# Patient Record
Sex: MALE | Race: WHITE | Employment: FULL TIME | RURAL
[De-identification: names, ages, dates, MRNs, and addresses within clinical notes are randomized per-mention and may not be internally consistent; named-entity substitution may affect disease eponyms.]

---

## 2017-08-09 ENCOUNTER — HOSPITAL ENCOUNTER (OUTPATIENT)
Dept: OTHER | Age: 47
Discharge: HOME | End: 2017-08-09
Attending: NURSE PRACTITIONER | Admitting: NURSE PRACTITIONER

## 2017-08-09 LAB
A/G RATIO: 1.8 (ref 0.8–2)
ALBUMIN SERPL-MCNC: 4.9 G/DL (ref 3.4–4.8)
ALP BLD-CCNC: 61 U/L (ref 25–100)
ALT SERPL-CCNC: 42 U/L (ref 4–36)
ANION GAP SERPL CALCULATED.3IONS-SCNC: 13 MMOL/L (ref 3–16)
AST SERPL-CCNC: 36 U/L (ref 8–33)
BASOPHILS ABSOLUTE: 0 K/UL (ref 0–0.1)
BASOPHILS RELATIVE PERCENT: 0.1 %
BILIRUB SERPL-MCNC: 0.5 MG/DL (ref 0.3–1.2)
BUN BLDV-MCNC: 12 MG/DL (ref 6–20)
CALCIUM SERPL-MCNC: 9.9 MG/DL (ref 8.5–10.5)
CHLORIDE BLD-SCNC: 98 MMOL/L (ref 98–107)
CHOLESTEROL, TOTAL: 276 MG/DL (ref 0–200)
CO2: 27 MMOL/L (ref 20–30)
CREAT SERPL-MCNC: 0.9 MG/DL (ref 0.4–1.2)
EOSINOPHILS ABSOLUTE: 0.1 K/UL (ref 0–0.4)
EOSINOPHILS RELATIVE PERCENT: 1.6 %
GFR AFRICAN AMERICAN: >59
GFR NON-AFRICAN AMERICAN: >59
GLOBULIN: 2.8 G/DL
GLUCOSE BLD-MCNC: 95 MG/DL (ref 74–106)
HCT VFR BLD CALC: 50.2 % (ref 40–54)
HDLC SERPL-MCNC: 48 MG/DL (ref 40–60)
HEMOGLOBIN: 16.6 G/DL (ref 13–18)
LDL CHOLESTEROL CALCULATED: 198 MG/DL
LYMPHOCYTES ABSOLUTE: 1.8 K/UL (ref 1.5–4)
LYMPHOCYTES RELATIVE PERCENT: 26.8 % (ref 20–40)
MCH RBC QN AUTO: 32.9 PG (ref 27–32)
MCHC RBC AUTO-ENTMCNC: 33.1 G/DL (ref 31–35)
MCV RBC AUTO: 99.6 FL (ref 80–100)
MONOCYTES ABSOLUTE: 0.6 K/UL (ref 0.2–0.8)
MONOCYTES RELATIVE PERCENT: 9.5 % (ref 3–10)
NEUTROPHILS ABSOLUTE: 4.2 K/UL (ref 2–7.5)
NEUTROPHILS RELATIVE PERCENT: 62 %
PDW BLD-RTO: 14.3 % (ref 11–16)
PLATELET # BLD: 169 K/UL (ref 150–400)
PMV BLD AUTO: 10.8 FL (ref 6–10)
POTASSIUM SERPL-SCNC: 4.8 MMOL/L (ref 3.4–5.1)
RBC # BLD: 5.04 M/UL (ref 4.5–6)
SODIUM BLD-SCNC: 138 MMOL/L (ref 136–145)
TOTAL PROTEIN: 7.7 G/DL (ref 6.4–8.3)
TRIGL SERPL-MCNC: 148 MG/DL (ref 0–249)
TSH SERPL DL<=0.05 MIU/L-ACNC: 1.97 UIU/ML (ref 0.35–5.5)
VITAMIN B-12: 365 PG/ML (ref 211–911)
VLDLC SERPL CALC-MCNC: 30 MG/DL
WBC # BLD: 6.8 K/UL (ref 4–11)

## 2017-08-11 ENCOUNTER — HOSPITAL ENCOUNTER (OUTPATIENT)
Dept: OTHER | Age: 47
Discharge: HOME | End: 2017-08-11
Attending: NURSE PRACTITIONER | Admitting: NURSE PRACTITIONER

## 2017-08-11 DIAGNOSIS — R06.02 BREATH SHORTNESS: ICD-10-CM

## 2017-08-31 ENCOUNTER — TELEPHONE (OUTPATIENT)
Dept: PULMONOLOGY | Facility: CLINIC | Age: 47
End: 2017-08-31

## 2018-09-10 ENCOUNTER — HOSPITAL ENCOUNTER (EMERGENCY)
Facility: HOSPITAL | Age: 48
Discharge: HOME | End: 2018-09-10
Attending: EMERGENCY MEDICINE
Payer: COMMERCIAL

## 2018-09-10 VITALS
HEART RATE: 87 BPM | DIASTOLIC BLOOD PRESSURE: 113 MMHG | OXYGEN SATURATION: 98 % | RESPIRATION RATE: 16 BRPM | SYSTOLIC BLOOD PRESSURE: 178 MMHG | TEMPERATURE: 98.1 F

## 2018-09-10 DIAGNOSIS — I16.0 HYPERTENSIVE URGENCY: ICD-10-CM

## 2018-09-10 DIAGNOSIS — M10.9 GOUT OF BOTH FEET: Primary | ICD-10-CM

## 2018-09-10 PROCEDURE — 99282 EMERGENCY DEPT VISIT SF MDM: CPT

## 2018-09-10 PROCEDURE — 6370000000 HC RX 637 (ALT 250 FOR IP): Performed by: EMERGENCY MEDICINE

## 2018-09-10 PROCEDURE — 96374 THER/PROPH/DIAG INJ IV PUSH: CPT

## 2018-09-10 PROCEDURE — 6360000002 HC RX W HCPCS: Performed by: EMERGENCY MEDICINE

## 2018-09-10 RX ORDER — IBUPROFEN 800 MG/1
800 TABLET ORAL EVERY 8 HOURS PRN
Qty: 30 TABLET | Refills: 0 | Status: SHIPPED | OUTPATIENT
Start: 2018-09-10 | End: 2020-01-23

## 2018-09-10 RX ORDER — METOPROLOL TARTRATE 50 MG/1
50 TABLET, FILM COATED ORAL ONCE
Status: COMPLETED | OUTPATIENT
Start: 2018-09-10 | End: 2018-09-10

## 2018-09-10 RX ORDER — INDOMETHACIN 25 MG/1
50 CAPSULE ORAL ONCE
Status: COMPLETED | OUTPATIENT
Start: 2018-09-10 | End: 2018-09-10

## 2018-09-10 RX ORDER — HYDROCODONE BITARTRATE AND ACETAMINOPHEN 7.5; 325 MG/1; MG/1
1 TABLET ORAL EVERY 6 HOURS PRN
Qty: 12 TABLET | Refills: 0 | Status: SHIPPED | OUTPATIENT
Start: 2018-09-10 | End: 2018-09-13

## 2018-09-10 RX ORDER — INDOMETHACIN 50 MG/1
50 CAPSULE ORAL 2 TIMES DAILY WITH MEALS
Qty: 60 CAPSULE | Refills: 3 | Status: SHIPPED | OUTPATIENT
Start: 2018-09-10 | End: 2019-09-27

## 2018-09-10 RX ORDER — IBUPROFEN 800 MG/1
500 TABLET ORAL EVERY 6 HOURS PRN
COMMUNITY
End: 2019-09-27

## 2018-09-10 RX ORDER — HYDRALAZINE HYDROCHLORIDE 20 MG/ML
10 INJECTION INTRAMUSCULAR; INTRAVENOUS EVERY 6 HOURS PRN
Status: DISCONTINUED | OUTPATIENT
Start: 2018-09-10 | End: 2018-09-10 | Stop reason: HOSPADM

## 2018-09-10 RX ORDER — LISINOPRIL 5 MG/1
20 TABLET ORAL ONCE
Status: COMPLETED | OUTPATIENT
Start: 2018-09-10 | End: 2018-09-10

## 2018-09-10 RX ADMIN — HYDRALAZINE HYDROCHLORIDE 10 MG: 20 INJECTION INTRAMUSCULAR; INTRAVENOUS at 06:47

## 2018-09-10 RX ADMIN — INDOMETHACIN 50 MG: 25 CAPSULE ORAL at 04:57

## 2018-09-10 RX ADMIN — METOPROLOL TARTRATE 50 MG: 50 TABLET ORAL at 05:35

## 2018-09-10 RX ADMIN — LISINOPRIL 20 MG: 5 TABLET ORAL at 05:35

## 2018-09-10 ASSESSMENT — PAIN SCALES - GENERAL: PAINLEVEL_OUTOF10: 7

## 2018-09-10 NOTE — ED PROVIDER NOTES
SURGERY           CURRENT MEDICATIONS       Previous Medications    ALBUTEROL SULFATE  (90 BASE) MCG/ACT INHALER    Inhale 2 puffs into the lungs every 4 hours as needed for Wheezing    ASPIRIN 81 MG TABLET    Take 81 mg by mouth daily    IBUPROFEN (ADVIL;MOTRIN) 800 MG TABLET    Take 500 mg by mouth every 6 hours as needed for Pain    LISINOPRIL (PRINIVIL;ZESTRIL) 20 MG TABLET    Take 20 mg by mouth daily    METOPROLOL (LOPRESSOR) 50 MG TABLET    Take 50 mg by mouth daily       ALLERGIES     Patient has no known allergies. FAMILY HISTORY     No family history on file. SOCIAL HISTORY       Social History     Social History    Marital status:      Spouse name: N/A    Number of children: N/A    Years of education: N/A     Social History Main Topics    Smoking status: Never Smoker    Smokeless tobacco: Current User     Types: Chew    Alcohol use 0.6 oz/week     1 Cans of beer per week      Comment: drinks till he passes out 1/2 pint to a 5th at a time    Drug use: No    Sexual activity: Not on file     Other Topics Concern    Not on file     Social History Narrative    No narrative on file         PHYSICAL EXAM    (up to 7 for level 4, 8 or more for level 5)     ED Triage Vitals   BP Temp Temp src Pulse Resp SpO2 Height Weight   -- -- -- -- -- -- -- --       Physical Exam  General :Patient is awake, alert, oriented, in no acute distress, nontoxic appearing  HEENT: Pupils are equally round and reactive to light, EOMI. Cardiac: Heart regular rate, rhythm, no murmurs, rubs, or gallops  Lungs: Lungs are clear to auscultation, there is no wheezing, rhonchi, or rales. Abdomen: Abdomen is soft, nontender, nondistended. Musculoskeletal: Ambulatory  EXT: tenderness with mild swelling to right ankle; there is also some tenderness at the base of the right 2nd toe;  Left midfoot with mild tenderness; some mild tenderness at the base of the left 2nd toe.   There is no significant redness in either of his feet as might be expected with gout but patient states this is typical for his presentation. Back: No midline or bony tenderness. Dermatology: Skin is warm and dry  Psych: Mentation is grossly normal, cognition is grossly normal. Affect is appropriate. DIAGNOSTIC RESULTS       RADIOLOGY:   Non-plain film images such as CT, Ultrasound and MRI are read by the radiologist. Plain radiographic images are visualized and preliminarily interpreted by the emergency physician with the below findings:      [] Radiologist's Report Reviewed:  No orders to display         ED BEDSIDE ULTRASOUND:   Performed by ED Physician - none    LABS:  Labs Reviewed - No data to display    I have reviewed and interpreted all of the currently available lab results from this visit (if applicable):  No results found for this visit on 09/10/18. All other labs were within normal range or not returned as of this dictation. EMERGENCY DEPARTMENT COURSE and DIFFERENTIAL DIAGNOSIS/MDM:   Vitals:    Vitals:    09/10/18 0441 09/10/18 0446   BP:  (!) 198/128   Pulse:  114   Resp: 16    Temp: 98.1 °F (36.7 °C)    SpO2:  97%           The patient will follow-up with their PCP in 1-2 days for reevaluation. If the patient or family members have any further concerns or any worsening symptoms they will return to the ED for reevaluation. CONSULTS:  None    PROCEDURES:  Procedures    CRITICAL CARE TIME    Total Critical Care time was 0 minutes, excluding separately reportable procedures. There was a high probability of clinically significant/life threatening deterioration in the patient's condition which required my urgent intervention. FINAL IMPRESSION      1.  Gout of both feet          DISPOSITION/PLAN   DISPOSITION Decision To Discharge 09/10/2018 05:22:56 AM      PATIENT REFERRED TO:  DORIS [facility]  [address] [address]  [phone]    Schedule an appointment as soon as possible for a visit

## 2018-09-19 ENCOUNTER — APPOINTMENT (OUTPATIENT)
Dept: CT IMAGING | Facility: HOSPITAL | Age: 48
End: 2018-09-19
Payer: COMMERCIAL

## 2018-09-19 ENCOUNTER — HOSPITAL ENCOUNTER (EMERGENCY)
Facility: HOSPITAL | Age: 48
Discharge: HOME | End: 2018-09-19
Attending: EMERGENCY MEDICINE
Payer: COMMERCIAL

## 2018-09-19 VITALS
HEIGHT: 69 IN | BODY MASS INDEX: 35.55 KG/M2 | SYSTOLIC BLOOD PRESSURE: 170 MMHG | WEIGHT: 240 LBS | DIASTOLIC BLOOD PRESSURE: 113 MMHG | TEMPERATURE: 98 F | HEART RATE: 107 BPM | OXYGEN SATURATION: 98 % | RESPIRATION RATE: 16 BRPM

## 2018-09-19 DIAGNOSIS — M54.32 BILATERAL SCIATICA: ICD-10-CM

## 2018-09-19 DIAGNOSIS — M54.31 BILATERAL SCIATICA: ICD-10-CM

## 2018-09-19 DIAGNOSIS — S39.012A BACK STRAIN, INITIAL ENCOUNTER: ICD-10-CM

## 2018-09-19 DIAGNOSIS — I16.0 HYPERTENSIVE URGENCY: Primary | ICD-10-CM

## 2018-09-19 PROCEDURE — 6360000002 HC RX W HCPCS: Performed by: EMERGENCY MEDICINE

## 2018-09-19 PROCEDURE — 96374 THER/PROPH/DIAG INJ IV PUSH: CPT

## 2018-09-19 PROCEDURE — 72131 CT LUMBAR SPINE W/O DYE: CPT

## 2018-09-19 PROCEDURE — 96375 TX/PRO/DX INJ NEW DRUG ADDON: CPT

## 2018-09-19 PROCEDURE — 6370000000 HC RX 637 (ALT 250 FOR IP): Performed by: EMERGENCY MEDICINE

## 2018-09-19 PROCEDURE — 99283 EMERGENCY DEPT VISIT LOW MDM: CPT

## 2018-09-19 RX ORDER — DIAZEPAM 5 MG/1
10 TABLET ORAL ONCE
Status: COMPLETED | OUTPATIENT
Start: 2018-09-19 | End: 2018-09-19

## 2018-09-19 RX ORDER — CYCLOBENZAPRINE HCL 5 MG
5 TABLET ORAL 3 TIMES DAILY PRN
Qty: 12 TABLET | Refills: 0 | Status: SHIPPED | OUTPATIENT
Start: 2018-09-19 | End: 2018-09-29

## 2018-09-19 RX ORDER — KETOROLAC TROMETHAMINE 30 MG/ML
30 INJECTION, SOLUTION INTRAMUSCULAR; INTRAVENOUS ONCE
Status: COMPLETED | OUTPATIENT
Start: 2018-09-19 | End: 2018-09-19

## 2018-09-19 RX ORDER — MORPHINE SULFATE 4 MG/ML
4 INJECTION, SOLUTION INTRAMUSCULAR; INTRAVENOUS ONCE
Status: COMPLETED | OUTPATIENT
Start: 2018-09-19 | End: 2018-09-19

## 2018-09-19 RX ORDER — DEXAMETHASONE SODIUM PHOSPHATE 10 MG/ML
10 INJECTION, SOLUTION INTRAMUSCULAR; INTRAVENOUS ONCE
Status: COMPLETED | OUTPATIENT
Start: 2018-09-19 | End: 2018-09-19

## 2018-09-19 RX ORDER — HYDRALAZINE HYDROCHLORIDE 20 MG/ML
10 INJECTION INTRAMUSCULAR; INTRAVENOUS ONCE
Status: COMPLETED | OUTPATIENT
Start: 2018-09-19 | End: 2018-09-19

## 2018-09-19 RX ORDER — OXYCODONE AND ACETAMINOPHEN 10; 325 MG/1; MG/1
1 TABLET ORAL EVERY 6 HOURS PRN
Qty: 12 TABLET | Refills: 0 | Status: SHIPPED | OUTPATIENT
Start: 2018-09-19 | End: 2018-10-19

## 2018-09-19 RX ADMIN — HYDRALAZINE HYDROCHLORIDE 10 MG: 20 INJECTION INTRAMUSCULAR; INTRAVENOUS at 05:52

## 2018-09-19 RX ADMIN — MORPHINE SULFATE 4 MG: 4 INJECTION INTRAVENOUS at 05:52

## 2018-09-19 RX ADMIN — DIAZEPAM 10 MG: 5 TABLET ORAL at 05:52

## 2018-09-19 RX ADMIN — KETOROLAC TROMETHAMINE 30 MG: 30 INJECTION, SOLUTION INTRAMUSCULAR at 05:50

## 2018-09-19 RX ADMIN — DEXAMETHASONE SODIUM PHOSPHATE 10 MG: 10 INJECTION, SOLUTION INTRAMUSCULAR; INTRAVENOUS at 05:51

## 2018-09-19 ASSESSMENT — PAIN SCALES - GENERAL: PAINLEVEL_OUTOF10: 7

## 2018-09-19 NOTE — ED PROVIDER NOTES
Medical History:   Diagnosis Date    Anxiety     Arthritis     CAD (coronary artery disease)     Depression     Headache(784.0)     Hypertension          SURGICAL HISTORY       Past Surgical History:   Procedure Laterality Date    FINGER AMPUTATION      also has partial amputation of right index and ring finger    LEG SURGERY           CURRENT MEDICATIONS       Previous Medications    ALBUTEROL SULFATE  (90 BASE) MCG/ACT INHALER    Inhale 2 puffs into the lungs every 4 hours as needed for Wheezing    ASPIRIN 81 MG TABLET    Take 81 mg by mouth daily    IBUPROFEN (ADVIL;MOTRIN) 800 MG TABLET    Take 500 mg by mouth every 6 hours as needed for Pain    IBUPROFEN (ADVIL;MOTRIN) 800 MG TABLET    Take 1 tablet by mouth every 8 hours as needed for Pain    INDOMETHACIN (INDOCIN) 50 MG CAPSULE    Take 1 capsule by mouth 2 times daily (with meals)    LISINOPRIL (PRINIVIL;ZESTRIL) 20 MG TABLET    Take 20 mg by mouth daily    METOPROLOL (LOPRESSOR) 50 MG TABLET    Take 50 mg by mouth daily       ALLERGIES     Patient has no known allergies. FAMILY HISTORY     No family history on file.        SOCIAL HISTORY       Social History     Social History    Marital status:      Spouse name: N/A    Number of children: N/A    Years of education: N/A     Social History Main Topics    Smoking status: Never Smoker    Smokeless tobacco: Current User     Types: Chew    Alcohol use 0.6 oz/week     1 Cans of beer per week      Comment: drinks till he passes out 1/2 pint to a 5th at a time    Drug use: No    Sexual activity: Not on file     Other Topics Concern    Not on file     Social History Narrative    No narrative on file         PHYSICAL EXAM    (up to 7 for level 4, 8 or more for level 5)     ED Triage Vitals [09/19/18 0455]   BP Temp Temp Source Pulse Resp SpO2 Height Weight   (!) 210/121 98 °F (36.7 °C) Oral 109 16 95 % 5' 9\" (1.753 m) 240 lb (108.9 kg)       Physical Exam  General :Patient is awake, Comment: Please note this report has been produced using speech recognition software and may contain errors related to that system including errors in grammar, punctuation, and spelling, as well as words and phrases that may be inappropriate. If there are any questions or concerns please feel free to contact the dictating provider for clarification.     Janette Dumont MD  Attending Emergency Physician                  Arnaud Kingsley MD  09/19/18 0999  Lion Minor MD  09/19/18 0044

## 2019-01-23 ENCOUNTER — HOSPITAL ENCOUNTER (EMERGENCY)
Facility: HOSPITAL | Age: 49
Discharge: HOME | End: 2019-01-23
Attending: EMERGENCY MEDICINE
Payer: COMMERCIAL

## 2019-01-23 ENCOUNTER — APPOINTMENT (OUTPATIENT)
Dept: GENERAL RADIOLOGY | Facility: HOSPITAL | Age: 49
End: 2019-01-23
Payer: COMMERCIAL

## 2019-01-23 VITALS
RESPIRATION RATE: 20 BRPM | HEIGHT: 68 IN | HEART RATE: 76 BPM | BODY MASS INDEX: 34.86 KG/M2 | WEIGHT: 230 LBS | OXYGEN SATURATION: 96 % | TEMPERATURE: 98.5 F | DIASTOLIC BLOOD PRESSURE: 101 MMHG | SYSTOLIC BLOOD PRESSURE: 172 MMHG

## 2019-01-23 DIAGNOSIS — R05.9 COUGH: ICD-10-CM

## 2019-01-23 DIAGNOSIS — J06.9 UPPER RESPIRATORY TRACT INFECTION, UNSPECIFIED TYPE: Primary | ICD-10-CM

## 2019-01-23 DIAGNOSIS — I10 ESSENTIAL HYPERTENSION: ICD-10-CM

## 2019-01-23 DIAGNOSIS — J40 BRONCHITIS: ICD-10-CM

## 2019-01-23 LAB
RAPID INFLUENZA  B AGN: NEGATIVE
RAPID INFLUENZA A AGN: NEGATIVE

## 2019-01-23 PROCEDURE — 6360000002 HC RX W HCPCS: Performed by: EMERGENCY MEDICINE

## 2019-01-23 PROCEDURE — 99284 EMERGENCY DEPT VISIT MOD MDM: CPT

## 2019-01-23 PROCEDURE — 6370000000 HC RX 637 (ALT 250 FOR IP): Performed by: EMERGENCY MEDICINE

## 2019-01-23 PROCEDURE — 87804 INFLUENZA ASSAY W/OPTIC: CPT

## 2019-01-23 PROCEDURE — 71046 X-RAY EXAM CHEST 2 VIEWS: CPT

## 2019-01-23 PROCEDURE — 94640 AIRWAY INHALATION TREATMENT: CPT

## 2019-01-23 RX ORDER — ATORVASTATIN CALCIUM 10 MG/1
10 TABLET, FILM COATED ORAL DAILY
COMMUNITY
End: 2019-09-27

## 2019-01-23 RX ORDER — PREDNISONE 20 MG/1
60 TABLET ORAL DAILY
Qty: 12 TABLET | Refills: 0 | Status: SHIPPED | OUTPATIENT
Start: 2019-01-23 | End: 2019-01-27

## 2019-01-23 RX ORDER — LEVOFLOXACIN 500 MG/1
500 TABLET, FILM COATED ORAL DAILY
Qty: 10 TABLET | Refills: 0 | Status: SHIPPED | OUTPATIENT
Start: 2019-01-23 | End: 2019-02-02

## 2019-01-23 RX ORDER — BENZONATATE 100 MG/1
100 CAPSULE ORAL 3 TIMES DAILY PRN
Qty: 10 CAPSULE | Refills: 0 | Status: SHIPPED | OUTPATIENT
Start: 2019-01-23 | End: 2019-01-30

## 2019-01-23 RX ORDER — DEXAMETHASONE 4 MG/1
10 TABLET ORAL ONCE
Status: COMPLETED | OUTPATIENT
Start: 2019-01-23 | End: 2019-01-23

## 2019-01-23 RX ORDER — ALBUTEROL SULFATE 90 UG/1
2 AEROSOL, METERED RESPIRATORY (INHALATION) EVERY 4 HOURS PRN
Qty: 1 INHALER | Refills: 1 | Status: SHIPPED | OUTPATIENT
Start: 2019-01-23 | End: 2020-01-23 | Stop reason: ALTCHOICE

## 2019-01-23 RX ORDER — LISINOPRIL 5 MG/1
20 TABLET ORAL ONCE
Status: COMPLETED | OUTPATIENT
Start: 2019-01-23 | End: 2019-01-23

## 2019-01-23 RX ORDER — ALBUTEROL SULFATE 2.5 MG/3ML
2.5 SOLUTION RESPIRATORY (INHALATION) ONCE
Status: COMPLETED | OUTPATIENT
Start: 2019-01-23 | End: 2019-01-23

## 2019-01-23 RX ORDER — METOPROLOL TARTRATE 50 MG/1
50 TABLET, FILM COATED ORAL ONCE
Status: COMPLETED | OUTPATIENT
Start: 2019-01-23 | End: 2019-01-23

## 2019-01-23 RX ADMIN — ALBUTEROL SULFATE 2.5 MG: 2.5 SOLUTION RESPIRATORY (INHALATION) at 16:55

## 2019-01-23 RX ADMIN — LISINOPRIL 20 MG: 5 TABLET ORAL at 17:23

## 2019-01-23 RX ADMIN — METOPROLOL TARTRATE 50 MG: 50 TABLET ORAL at 17:23

## 2019-01-23 RX ADMIN — DEXAMETHASONE 10 MG: 4 TABLET ORAL at 17:23

## 2019-09-27 ENCOUNTER — HOSPITAL ENCOUNTER (EMERGENCY)
Facility: HOSPITAL | Age: 49
Discharge: HOME | End: 2019-09-27
Attending: FAMILY MEDICINE
Payer: COMMERCIAL

## 2019-09-27 ENCOUNTER — APPOINTMENT (OUTPATIENT)
Dept: GENERAL RADIOLOGY | Facility: HOSPITAL | Age: 49
End: 2019-09-27
Payer: COMMERCIAL

## 2019-09-27 VITALS
BODY MASS INDEX: 37.67 KG/M2 | RESPIRATION RATE: 16 BRPM | WEIGHT: 240 LBS | DIASTOLIC BLOOD PRESSURE: 134 MMHG | OXYGEN SATURATION: 95 % | TEMPERATURE: 98.2 F | HEART RATE: 111 BPM | SYSTOLIC BLOOD PRESSURE: 215 MMHG | HEIGHT: 67 IN

## 2019-09-27 DIAGNOSIS — S89.81XA HYPEREXTENSION INJURY OF KNEE, RIGHT, INITIAL ENCOUNTER: Primary | ICD-10-CM

## 2019-09-27 DIAGNOSIS — M25.461 KNEE EFFUSION, RIGHT: ICD-10-CM

## 2019-09-27 DIAGNOSIS — I10 ESSENTIAL HYPERTENSION: ICD-10-CM

## 2019-09-27 PROCEDURE — 73560 X-RAY EXAM OF KNEE 1 OR 2: CPT

## 2019-09-27 PROCEDURE — 99283 EMERGENCY DEPT VISIT LOW MDM: CPT

## 2019-09-27 RX ORDER — OXYCODONE AND ACETAMINOPHEN 10; 325 MG/1; MG/1
1 TABLET ORAL EVERY 6 HOURS PRN
Qty: 12 TABLET | Refills: 0 | Status: SHIPPED | OUTPATIENT
Start: 2019-09-27 | End: 2019-09-30

## 2019-09-27 RX ORDER — METOPROLOL SUCCINATE 50 MG/1
50 TABLET, EXTENDED RELEASE ORAL DAILY
Qty: 30 TABLET | Refills: 0 | Status: SHIPPED | OUTPATIENT
Start: 2019-09-27 | End: 2020-01-23

## 2019-09-27 RX ORDER — METOPROLOL TARTRATE 50 MG/1
50 TABLET, FILM COATED ORAL ONCE
Status: DISCONTINUED | OUTPATIENT
Start: 2019-09-27 | End: 2019-09-27 | Stop reason: HOSPADM

## 2019-09-27 RX ORDER — LISINOPRIL 20 MG/1
40 TABLET ORAL ONCE
Status: DISCONTINUED | OUTPATIENT
Start: 2019-09-27 | End: 2019-09-27 | Stop reason: HOSPADM

## 2019-09-27 RX ORDER — LISINOPRIL 40 MG/1
40 TABLET ORAL DAILY
Qty: 30 TABLET | Refills: 0 | Status: SHIPPED | OUTPATIENT
Start: 2019-09-27 | End: 2020-01-23

## 2019-09-27 RX ORDER — OXYCODONE AND ACETAMINOPHEN 10; 325 MG/1; MG/1
1 TABLET ORAL ONCE
Status: DISCONTINUED | OUTPATIENT
Start: 2019-09-27 | End: 2019-09-27 | Stop reason: HOSPADM

## 2019-09-27 ASSESSMENT — PAIN SCALES - GENERAL: PAINLEVEL_OUTOF10: 5

## 2019-09-27 NOTE — ED PROVIDER NOTES
Laterality Date    FINGER AMPUTATION      also has partial amputation of right index and ring finger    LEG SURGERY           CURRENT MEDICATIONS       Previous Medications    ALBUTEROL SULFATE HFA (PROVENTIL HFA) 108 (90 BASE) MCG/ACT INHALER    Inhale 2 puffs into the lungs every 4 hours as needed for Wheezing or Shortness of Breath With spacer (and mask if indicated). Thanks. ASPIRIN 81 MG TABLET    Take 81 mg by mouth daily    IBUPROFEN (ADVIL;MOTRIN) 800 MG TABLET    Take 1 tablet by mouth every 8 hours as needed for Pain       ALLERGIES     Patient has no known allergies. FAMILY HISTORY     No family history on file. SOCIAL HISTORY       Social History     Socioeconomic History    Marital status:      Spouse name: Not on file    Number of children: Not on file    Years of education: Not on file    Highest education level: Not on file   Occupational History    Not on file   Social Needs    Financial resource strain: Not on file    Food insecurity:     Worry: Not on file     Inability: Not on file    Transportation needs:     Medical: Not on file     Non-medical: Not on file   Tobacco Use    Smoking status: Never Smoker    Smokeless tobacco: Current User     Types: Chew   Substance and Sexual Activity    Alcohol use:  Yes     Alcohol/week: 1.0 standard drinks     Types: 1 Cans of beer per week     Comment: drinks till he passes out 1/2 pint to a 5th at a time    Drug use: No    Sexual activity: Not on file   Lifestyle    Physical activity:     Days per week: Not on file     Minutes per session: Not on file    Stress: Not on file   Relationships    Social connections:     Talks on phone: Not on file     Gets together: Not on file     Attends Yazidism service: Not on file     Active member of club or organization: Not on file     Attends meetings of clubs or organizations: Not on file     Relationship status: Not on file    Intimate partner violence:     Fear of current or ex partner: Not on file     Emotionally abused: Not on file     Physically abused: Not on file     Forced sexual activity: Not on file   Other Topics Concern    Not on file   Social History Narrative    Not on file       SCREENINGS             PHYSICAL EXAM    (up to 7 for level 4, 8 or more for level 5)     ED Triage Vitals [09/27/19 0622]   BP Temp Temp Source Pulse Resp SpO2 Height Weight   (!) 215/134 98.2 °F (36.8 °C) Oral 111 16 95 % 5' 7\" (1.702 m) 240 lb (108.9 kg)       Physical Exam   Constitutional: He is oriented to person, place, and time. He appears well-developed and well-nourished. He appears distressed. Pt with moderate pain on arrival. Unable to fully ambulate on right knee   HENT:   Head: Normocephalic and atraumatic. Eyes: Pupils are equal, round, and reactive to light. Conjunctivae and EOM are normal.   Neck: Neck supple. Cardiovascular: Regular rhythm and normal heart sounds. Tachycardia   Pulmonary/Chest: Effort normal and breath sounds normal.   Musculoskeletal: He exhibits edema and tenderness. He exhibits no deformity. Tenderness to right global knee area to palpation with moderate effusion suprapatellar with limited ROM globally. No popliteal fossa tenderness. Stable knee without any laxity. Neurological: He is alert and oriented to person, place, and time. Skin: Skin is warm and dry. Psychiatric: He has a normal mood and affect. His behavior is normal.   Nursing note and vitals reviewed. DIAGNOSTIC RESULTS     EKG: All EKG's are interpreted by the Emergency Department Physician who either signs or Co-signs this chart in the absence of a cardiologist.    None    RADIOLOGY:   Non-plain film images such as CT, Ultrasound and MRI are read by the radiologist. Ab Macario radiographic images are visualized andpreliminarily interpreted by the emergency physician with the below findings:    Right knee - No acute fracture;  Moderate effusion;  Normal alignment    Interpretationper the Radiologist below, if available at the time of this note:    XR KNEE RIGHT (1-2 VIEWS)   Final Result      2 views demonstrate a moderate knee effusion. No osseous abnormality. ED BEDSIDE ULTRASOUND:   Performed by ED Physician - none    LABS:  Labs Reviewed - No data to display    All other labs were within normal range or not returned as of this dictation. EMERGENCY DEPARTMENT COURSE and DIFFERENTIAL DIAGNOSIS/MDM:   Vitals:    Vitals:    09/27/19 0622   BP: (!) 215/134   Pulse: 111   Resp: 16   Temp: 98.2 °F (36.8 °C)   TempSrc: Oral   SpO2: 95%   Weight: 240 lb (108.9 kg)   Height: 5' 7\" (1.702 m)           CRITICAL CARE TIME   Total Critical Care time was 0 minutes, excluding separatelyreportable procedures. There was a high probability ofclinically significant/life threatening deterioration in the patient's condition which required my urgent intervention. CONSULTS:  None    PROCEDURES:  None    PROGRESS NOTES:    Pt given his BP meds when he got here. He had been out of BP meds for 2 months. Asymptomatic with BP. Will cover and give BP rx and give pain meds and give Rx of pain meds. Pt needs to wear brace and will give pain meds for home. FINAL IMPRESSION      1. Hyperextension injury of knee, right, initial encounter New Problem   2. Knee effusion, right New Problem   3.  Essential hypertension          DISPOSITION/PLAN   DISPOSITION Decision To Discharge 09/27/2019 07:21:26 AM      PATIENT REFERRED TO:  DORIS Zapien CNP  [address] [place] [id number]  [phone]    Schedule an appointment as soon as possible for a visit in 1 week  For follow-up and may need Ortho referral or MRI of knee to assess for ligamentous injury      DISCHARGE MEDICATIONS:  New Prescriptions    LISINOPRIL (PRINIVIL;ZESTRIL) 40 MG TABLET    Take 1 tablet by mouth daily    METOPROLOL SUCCINATE (TOPROL XL) 50 MG EXTENDED RELEASE TABLET    Take 1 tablet by mouth daily    OXYCODONE-ACETAMINOPHEN (PERCOCET)  MG PER TABLET    Take 1 tablet by mouth every 6 hours as needed for Pain for up to 3 days.  Intended supply: 30 days       (Please note that portions of this note were completed with a voice recognition program.  Efforts were made to edit the dictations but occasionallywords are mis-transcribed.)    Rola Hansen DO (electronically signed)  Attending Emergency Physician          Rola Hansen DO  09/27/19 7917

## 2019-10-07 ENCOUNTER — TRANSCRIBE ORDERS (OUTPATIENT)
Dept: ADMINISTRATIVE | Facility: HOSPITAL | Age: 49
End: 2019-10-07

## 2019-10-07 DIAGNOSIS — M25.561 RIGHT KNEE PAIN, UNSPECIFIED CHRONICITY: Primary | ICD-10-CM

## 2019-10-08 ENCOUNTER — HOSPITAL ENCOUNTER (OUTPATIENT)
Dept: MRI IMAGING | Facility: HOSPITAL | Age: 49
Discharge: HOME | End: 2019-10-08
Admitting: ORTHOPAEDIC SURGERY

## 2019-10-08 DIAGNOSIS — M25.561 RIGHT KNEE PAIN, UNSPECIFIED CHRONICITY: ICD-10-CM

## 2019-10-08 PROCEDURE — 73721 MRI JNT OF LWR EXTRE W/O DYE: CPT

## 2020-01-23 ENCOUNTER — OFFICE VISIT (OUTPATIENT)
Dept: PRIMARY CARE CLINIC | Age: 50
End: 2020-01-23
Payer: COMMERCIAL

## 2020-01-23 ENCOUNTER — HOSPITAL ENCOUNTER (EMERGENCY)
Facility: HOSPITAL | Age: 50
Discharge: HOME | End: 2020-01-23
Attending: EMERGENCY MEDICINE
Payer: COMMERCIAL

## 2020-01-23 VITALS
SYSTOLIC BLOOD PRESSURE: 185 MMHG | DIASTOLIC BLOOD PRESSURE: 118 MMHG | OXYGEN SATURATION: 94 % | RESPIRATION RATE: 20 BRPM | HEIGHT: 68 IN | WEIGHT: 250 LBS | BODY MASS INDEX: 37.89 KG/M2 | HEART RATE: 96 BPM

## 2020-01-23 VITALS
DIASTOLIC BLOOD PRESSURE: 100 MMHG | BODY MASS INDEX: 38.04 KG/M2 | TEMPERATURE: 98.7 F | HEART RATE: 121 BPM | OXYGEN SATURATION: 98 % | SYSTOLIC BLOOD PRESSURE: 200 MMHG | RESPIRATION RATE: 16 BRPM | HEIGHT: 68 IN | WEIGHT: 251 LBS

## 2020-01-23 LAB
INFLUENZA A ANTIBODY: NORMAL
INFLUENZA B ANTIBODY: NORMAL
S PYO AG THROAT QL: NEGATIVE

## 2020-01-23 PROCEDURE — 99284 EMERGENCY DEPT VISIT MOD MDM: CPT

## 2020-01-23 PROCEDURE — 87804 INFLUENZA ASSAY W/OPTIC: CPT | Performed by: NURSE PRACTITIONER

## 2020-01-23 PROCEDURE — 87880 STREP A ASSAY W/OPTIC: CPT

## 2020-01-23 PROCEDURE — 96375 TX/PRO/DX INJ NEW DRUG ADDON: CPT

## 2020-01-23 PROCEDURE — 96374 THER/PROPH/DIAG INJ IV PUSH: CPT

## 2020-01-23 PROCEDURE — 2500000003 HC RX 250 WO HCPCS: Performed by: EMERGENCY MEDICINE

## 2020-01-23 PROCEDURE — 99202 OFFICE O/P NEW SF 15 MIN: CPT | Performed by: NURSE PRACTITIONER

## 2020-01-23 PROCEDURE — 6360000002 HC RX W HCPCS: Performed by: EMERGENCY MEDICINE

## 2020-01-23 RX ORDER — GUAIFENESIN 600 MG/1
600 TABLET, EXTENDED RELEASE ORAL 2 TIMES DAILY
Qty: 14 TABLET | Refills: 0 | Status: SHIPPED | OUTPATIENT
Start: 2020-01-23 | End: 2020-01-30

## 2020-01-23 RX ORDER — PREDNISONE 20 MG/1
20 TABLET ORAL 2 TIMES DAILY
Qty: 10 TABLET | Refills: 0 | Status: SHIPPED | OUTPATIENT
Start: 2020-01-23 | End: 2020-01-28

## 2020-01-23 RX ORDER — AMLODIPINE BESYLATE 10 MG/1
10 TABLET ORAL NIGHTLY
Qty: 30 TABLET | Refills: 0 | Status: SHIPPED | OUTPATIENT
Start: 2020-01-23 | End: 2020-10-06 | Stop reason: SDUPTHER

## 2020-01-23 RX ORDER — AMLODIPINE BESYLATE 10 MG/1
10 TABLET ORAL DAILY
Qty: 15 TABLET | Refills: 0 | Status: SHIPPED | OUTPATIENT
Start: 2020-01-23 | End: 2020-01-23

## 2020-01-23 RX ORDER — CLONIDINE HYDROCHLORIDE 0.1 MG/1
0.1 TABLET ORAL ONCE
Status: COMPLETED | OUTPATIENT
Start: 2020-01-23 | End: 2020-01-23

## 2020-01-23 RX ORDER — DIPHENHYDRAMINE HYDROCHLORIDE 50 MG/ML
50 INJECTION INTRAMUSCULAR; INTRAVENOUS ONCE
Status: COMPLETED | OUTPATIENT
Start: 2020-01-23 | End: 2020-01-23

## 2020-01-23 RX ORDER — METHYLPREDNISOLONE SODIUM SUCCINATE 125 MG/2ML
125 INJECTION, POWDER, LYOPHILIZED, FOR SOLUTION INTRAMUSCULAR; INTRAVENOUS ONCE
Status: COMPLETED | OUTPATIENT
Start: 2020-01-23 | End: 2020-01-23

## 2020-01-23 RX ORDER — METOPROLOL SUCCINATE 50 MG/1
50 TABLET, EXTENDED RELEASE ORAL DAILY
Qty: 30 TABLET | Refills: 0 | Status: SHIPPED | OUTPATIENT
Start: 2020-01-23 | End: 2020-10-06 | Stop reason: SDUPTHER

## 2020-01-23 RX ADMIN — CLONIDINE HYDROCHLORIDE 0.1 MG: 0.1 TABLET ORAL at 16:26

## 2020-01-23 RX ADMIN — METHYLPREDNISOLONE SODIUM SUCCINATE 125 MG: 125 INJECTION, POWDER, FOR SOLUTION INTRAMUSCULAR; INTRAVENOUS at 00:15

## 2020-01-23 RX ADMIN — FAMOTIDINE 20 MG: 10 INJECTION, SOLUTION INTRAVENOUS at 00:15

## 2020-01-23 RX ADMIN — DIPHENHYDRAMINE HYDROCHLORIDE 50 MG: 50 INJECTION, SOLUTION INTRAMUSCULAR; INTRAVENOUS at 00:14

## 2020-01-23 SDOH — HEALTH STABILITY: MENTAL HEALTH: HOW OFTEN DO YOU HAVE A DRINK CONTAINING ALCOHOL?: NOT ASKED

## 2020-01-23 ASSESSMENT — PATIENT HEALTH QUESTIONNAIRE - PHQ9: DEPRESSION UNABLE TO ASSESS: URGENT/EMERGENT SITUATION

## 2020-01-23 NOTE — LETTER
[facility]  [facility] [address]  Phone: [phone]               January 23, 2020    Patient: Cecilia Villagran   YOB: 1970   Date of Visit: 1/22/2020       To Whom It May Concern:    Tonio Clay was seen and treated in our emergency department on 1/22/2020.  He needs to be excused from work 1/23/20 thru 1/24/20    Sincerely,       Kayla Abarca RN         Signature:__________________________________

## 2020-01-23 NOTE — PROGRESS NOTES
SUBJECTIVE:    Patient ID: Susana Jara is a 54 y.o.male. Chief Complaint   Patient presents with    Cough     not production    Congestion    Wheezing    Diarrhea    Hypertension         HPI:    Cough  Pt presents to clinic for cough, congestion and wheezing for several days. Congested cough, nonproductive. Was on albuterol inhaler but hasn't had it due to financial issues. Denies fevers, SOB, AMIN, LE edema. Lying flat makes it worse. No treatment regimens. HTN  Pt with hx HTN and has been Elevated past few months. Was on norvasc, lisinopril, metoprolol and stopped 2 months ago. Has been off BP meds for several months due to financial issues. Reports SBP Has been running 180-200s. Report she wants to change PCPs and establish here in clinic. 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 Does not exercise or follow low sodium diet. Denies CP, LE edema, AMIN, SOB, palpitations, syncope. No treatment regimens. Patient's medications, allergies, past medical, surgical, social and family histories were reviewed and updated as appropriate in electronic medical record. No outpatient medications have been marked as taking for the 1/23/20 encounter (Office Visit) with Hiawatha Gosselin, APRN - CNP. Review of Systems   Constitutional: Positive for fatigue. Negative for chills and fever. HENT: Positive for congestion, postnasal drip, sinus pressure, sinus pain and sore throat. Negative for ear discharge and ear pain. Eyes: Negative for photophobia and visual disturbance. Respiratory: Positive for cough. Negative for chest tightness, shortness of breath and wheezing. Cardiovascular: Negative for chest pain and palpitations. Hx HTN -200s in office and not been taking meds   Gastrointestinal: Negative for abdominal pain, diarrhea and vomiting.    Musculoskeletal: Negative lisinopril because episode angioedema last night and seen in ED for it. Did send in rx for URI, HTN. Long conversation on medication compliance. Pt agreeable to plans of care. - metoprolol succinate (TOPROL XL) 50 MG extended release tablet; Take 1 tablet by mouth daily  Dispense: 30 tablet; Refill: 0  - amLODIPine (NORVASC) 10 MG tablet; Take 1 tablet by mouth nightly  Dispense: 30 tablet; Refill: 0    2. Non compliance w medication regimen  See 1.    3. Upper respiratory tract infection, unspecified type  Take meds as directed. RTC if needed. - predniSONE (DELTASONE) 20 MG tablet; Take 1 tablet by mouth 2 times daily for 5 days  Dispense: 10 tablet; Refill: 0  - guaiFENesin (MUCINEX) 600 MG extended release tablet; Take 1 tablet by mouth 2 times daily for 7 days  Dispense: 14 tablet; Refill: 0    4. Cough  Flu negative.      - POCT Influenza A/B        Orders Placed This Encounter   Medications    metoprolol succinate (TOPROL XL) 50 MG extended release tablet     Sig: Take 1 tablet by mouth daily     Dispense:  30 tablet     Refill:  0    amLODIPine (NORVASC) 10 MG tablet     Sig: Take 1 tablet by mouth nightly     Dispense:  30 tablet     Refill:  0    predniSONE (DELTASONE) 20 MG tablet     Sig: Take 1 tablet by mouth 2 times daily for 5 days     Dispense:  10 tablet     Refill:  0    guaiFENesin (MUCINEX) 600 MG extended release tablet     Sig: Take 1 tablet by mouth 2 times daily for 7 days     Dispense:  14 tablet     Refill:  0    cloNIDine (CATAPRES) tablet 0.1 mg    cloNIDine (CATAPRES) tablet 0.1 mg

## 2020-01-23 NOTE — ED PROVIDER NOTES
Triage Chief Complaint:   Allergic Reaction and Shortness of Breath    [place]:  Zaria Altamirano is a 54 y.o. male that presents with swelling of the tongue. He noticed his tongue swelling about an hour PTA. Swelling has increased. He is having difficulty speaking. He is not having difficulty swallowing or breathing. He has hypertension and takes lisinopril. He denies having taking lisinopril recently. Tells me been is off his medication. Blood pressure is elevated. He denies headache. No stiff neck. No chest pain or palpitations. No shortness of breath or cough. No abdominal pain or vomiting. He has not had a seizure. ROS:  General:  No fevers, no chills, no weakness  Eyes:  No recent vison changes, no discharge  ENT:  No sore throat, no nasal congestion, no hearing changes, tongue swelling  Cardiovascular:  No chest pain, no palpitations  Respiratory:  No shortness of breath, no cough, no wheezing  Gastrointestinal:  No pain, no nausea, no vomiting, no diarrhea  Musculoskeletal:  No muscle pain, no joint pain  Skin:  No rash, no pruritis, no easy bruising  Neurologic:  No speech problems, no headache, no extremity numbness, no extremity tingling, no extremity weakness  Psychiatric:  No anxiety  Genitourinary:  No dysuria, no hematuria  Endocrine:  No unexpected weight gain, no unexpected weight loss  Extremities:  no edema, no pain    Past Medical History:   Diagnosis Date    Anxiety     Arthritis     CAD (coronary artery disease)     Depression     Headache(784.0)     Hypertension      Past Surgical History:   Procedure Laterality Date    FINGER AMPUTATION      also has partial amputation of right index and ring finger    LEG SURGERY       History reviewed. No pertinent family history.   Social History     Socioeconomic History    Marital status:      Spouse name: Not on file    Number of children: Not on file    Years of education: Not on file    Highest education level: Not on file   Occupational History    Not on file   Social Needs    Financial resource strain: Not on file    Food insecurity:     Worry: Not on file     Inability: Not on file    Transportation needs:     Medical: Not on file     Non-medical: Not on file   Tobacco Use    Smoking status: Never Smoker    Smokeless tobacco: Current User     Types: Chew   Substance and Sexual Activity    Alcohol use: Yes     Alcohol/week: 1.0 standard drinks     Types: 1 Cans of beer per week     Comment: drinks till he passes out 1/2 pint to a 5th at a time    Drug use: No    Sexual activity: Not on file   Lifestyle    Physical activity:     Days per week: Not on file     Minutes per session: Not on file    Stress: Not on file   Relationships    Social connections:     Talks on phone: Not on file     Gets together: Not on file     Attends Yarsani service: Not on file     Active member of club or organization: Not on file     Attends meetings of clubs or organizations: Not on file     Relationship status: Not on file    Intimate partner violence:     Fear of current or ex partner: Not on file     Emotionally abused: Not on file     Physically abused: Not on file     Forced sexual activity: Not on file   Other Topics Concern    Not on file   Social History Narrative    Not on file     No current facility-administered medications for this encounter. Current Outpatient Medications   Medication Sig Dispense Refill    amLODIPine (NORVASC) 10 MG tablet Take 1 tablet by mouth daily 15 tablet 0    lisinopril (PRINIVIL;ZESTRIL) 40 MG tablet Take 1 tablet by mouth daily 30 tablet 0    metoprolol succinate (TOPROL XL) 50 MG extended release tablet Take 1 tablet by mouth daily 30 tablet 0    albuterol sulfate HFA (PROVENTIL HFA) 108 (90 Base) MCG/ACT inhaler Inhale 2 puffs into the lungs every 4 hours as needed for Wheezing or Shortness of Breath With spacer (and mask if indicated). Thanks.  1 Inhaler 1    ibuprofen (ADVIL;MOTRIN) 800 MG tablet Take 1 tablet by mouth every 8 hours as needed for Pain 30 tablet 0    aspirin 81 MG tablet Take 81 mg by mouth daily       No Known Allergies    Nursing Notes Reviewed    Physical Exam:  ED Triage Vitals [01/23/20 0004]   Enc Vitals Group      BP (!) 212/131      Pulse 105      Resp 26      Temp       Temp src       SpO2 95 %      Weight 250 lb (113.4 kg)      Height 5' 8\" (1.727 m)      Head Circumference       Peak Flow       Pain Score       Pain Loc       Pain Edu? Excl. in [address]? GENERAL APPEARANCE: Awake and alert. Cooperative. No acute distress. He is sitting up for examination. Speech is somewhat garbled but can be understood. HEAD: Normocephalic. Atraumatic. EYES: EOM's grossly intact. Sclera anicteric. PEERL  ENT: Mucous membranes are moist. Tolerates saliva. No trismus. His tongue is enlarged. He can close his mouth. No lip swelling. No posterior pharyngeal swelling. No airway compromise or stridor. NECK: Supple. No meningismus. Trachea midline. No JVD. No bruits. HEART: RRR. Radial pulses 2+. Heart without murmur. LUNGS: Respirations unlabored. CTAB  ABDOMEN: Soft. Non-tender. No guarding or rebound. No CVAT. EXTREMITIES: No acute deformities. Hand  are equal.  No joint swelling. No lower extremity calf pain or edema. SKIN: Warm and dry. NEUROLOGICAL: No gross facial drooping. GCS 15. Cranial nerves intact. DTRs are equal.  Moves all 4 extremities spontaneously. No focal motor or sensory abnormalities of the upper or lower extremities. PSYCHIATRIC: Normal mood.     I have reviewed and interpreted all of the currently available lab results from this visit (if applicable):  Results for orders placed or performed during the hospital encounter of 01/23/20   Strep Screen Group A Throat   Result Value Ref Range    Rapid Strep A Screen Negative Negative      Radiographs (if obtained):  [] The following radiograph was interpreted by myself in

## 2020-01-23 NOTE — PATIENT INSTRUCTIONS
dispose of used patches by folding them in half so that the sticky sides meet, and then flushing them down a toilet. They should not be placed in the household trash where children or pets can find them. · If you have any questions, ask your provider or pharmacist for more information. · Be sure to keep all appointments for provider visits or tests. We are committed to providing you with the best care possible. In order to help us achieve these goals please remember to bring all medications, herbal products, and over the counter supplements with you to each visit. If your provider has ordered testing for you, please be sure to follow up with our office if you have not received results within 7 days after the testing took place. *If you receive a survey after visiting one of our offices, please take time to share your experience concerning your physician office visit. These surveys are confidential and no health information about you is shared. We are eager to improve for you and we are counting on your feedback to help make that happen. Thank you for requesting your Continuity of Care Document (CCD) electronically. Please follow the instructions below to securely access your online medical record. [email or web address] allows you to send messages to your doctor, view your test results, renew your prescriptions, schedule appointments, and more. How Do I Access my CCD? In your Internet browser, go to [email or web address] Enter your user name and password   Click on My medical Record  --> Download Summary --> Enter Password --> Download --> Save or Open Document    Additional Information  If you have questions, please contact your physician practice where you receive care. Remember, [email or web address] is NOT to be used for urgent needs. For medical emergencies, dial 911.

## 2020-02-12 ASSESSMENT — ENCOUNTER SYMPTOMS
COUGH: 1
SINUS PRESSURE: 1
CHEST TIGHTNESS: 0
DIARRHEA: 0
SORE THROAT: 1
ABDOMINAL PAIN: 0
WHEEZING: 0
BACK PAIN: 0
VOMITING: 0
SINUS PAIN: 1
PHOTOPHOBIA: 0
SHORTNESS OF BREATH: 0

## 2020-07-17 ENCOUNTER — HOSPITAL ENCOUNTER (OUTPATIENT)
Dept: GENERAL RADIOLOGY | Facility: HOSPITAL | Age: 50
Discharge: HOME | End: 2020-07-17
Payer: COMMERCIAL

## 2020-07-17 ENCOUNTER — OFFICE VISIT (OUTPATIENT)
Dept: PRIMARY CARE CLINIC | Age: 50
End: 2020-07-17
Payer: COMMERCIAL

## 2020-07-17 ENCOUNTER — HOSPITAL ENCOUNTER (OUTPATIENT)
Facility: HOSPITAL | Age: 50
Discharge: HOME | End: 2020-07-17
Payer: COMMERCIAL

## 2020-07-17 VITALS — TEMPERATURE: 98 F | OXYGEN SATURATION: 97 % | HEART RATE: 109 BPM

## 2020-07-17 LAB
BILIRUBIN, POC: NORMAL
BLOOD URINE, POC: NORMAL
CLARITY, POC: CLEAR
COLOR, POC: YELLOW
GLUCOSE URINE, POC: NORMAL
INFLUENZA A ANTIBODY: NORMAL
INFLUENZA B ANTIBODY: NORMAL
KETONES, POC: NORMAL
LEUKOCYTE EST, POC: NORMAL
NITRITE, POC: NORMAL
PH, POC: 6
PROTEIN, POC: NORMAL
SPECIFIC GRAVITY, POC: 1.01
UROBILINOGEN, POC: 0.2

## 2020-07-17 PROCEDURE — 99213 OFFICE O/P EST LOW 20 MIN: CPT | Performed by: NURSE PRACTITIONER

## 2020-07-17 PROCEDURE — 99000 SPECIMEN HANDLING OFFICE-LAB: CPT | Performed by: NURSE PRACTITIONER

## 2020-07-17 PROCEDURE — 87804 INFLUENZA ASSAY W/OPTIC: CPT | Performed by: NURSE PRACTITIONER

## 2020-07-17 PROCEDURE — 81002 URINALYSIS NONAUTO W/O SCOPE: CPT | Performed by: NURSE PRACTITIONER

## 2020-07-17 RX ORDER — CEPHALEXIN 500 MG/1
500 CAPSULE ORAL 3 TIMES DAILY
Qty: 15 CAPSULE | Refills: 0 | Status: SHIPPED | OUTPATIENT
Start: 2020-07-17 | End: 2020-07-22

## 2020-07-17 ASSESSMENT — ENCOUNTER SYMPTOMS
SHORTNESS OF BREATH: 1
EYES NEGATIVE: 1
SORE THROAT: 1
SINUS PAIN: 1
COUGH: 1
GASTROINTESTINAL NEGATIVE: 1

## 2020-07-17 ASSESSMENT — PATIENT HEALTH QUESTIONNAIRE - PHQ9: DEPRESSION UNABLE TO ASSESS: URGENT/EMERGENT SITUATION

## 2020-07-17 NOTE — PROGRESS NOTES
2020    Michel Yuan (:  1970) is a 54 y.o. male, here requesting COVID-19 testing    History of Present Illness  Fever and lung pain body aches    Vitals:    20 1106   Pulse: 109   Temp: 98 °F (36.7 °C)   TempSrc: Oral   SpO2: 97%  Comment: room air       ASSESSMENT  Screening for COVID-19/ Viral disease    PLAN  POCT influenza testing Negative  COVID-19 sample collected and submitted  Patient given detailed CDC instructions contained within After Visit Summary       An  electronic signature was used to authenticate this note.     --Ascencion Ray MA on 2020 at 11:07 AM

## 2020-09-08 ENCOUNTER — HOSPITAL ENCOUNTER (EMERGENCY)
Facility: HOSPITAL | Age: 50
Discharge: HOME | End: 2020-09-09
Attending: EMERGENCY MEDICINE
Payer: COMMERCIAL

## 2020-09-08 ENCOUNTER — APPOINTMENT (OUTPATIENT)
Dept: GENERAL RADIOLOGY | Facility: HOSPITAL | Age: 50
End: 2020-09-08
Payer: COMMERCIAL

## 2020-09-08 PROCEDURE — 73590 X-RAY EXAM OF LOWER LEG: CPT

## 2020-09-08 PROCEDURE — 6370000000 HC RX 637 (ALT 250 FOR IP): Performed by: EMERGENCY MEDICINE

## 2020-09-08 PROCEDURE — 99283 EMERGENCY DEPT VISIT LOW MDM: CPT

## 2020-09-08 RX ORDER — LISINOPRIL 20 MG/1
40 TABLET ORAL ONCE
Status: COMPLETED | OUTPATIENT
Start: 2020-09-08 | End: 2020-09-08

## 2020-09-08 RX ORDER — ASPIRIN 81 MG/1
81 TABLET ORAL DAILY
COMMUNITY
End: 2020-10-06 | Stop reason: SDUPTHER

## 2020-09-08 RX ORDER — LISINOPRIL 40 MG/1
40 TABLET ORAL 2 TIMES DAILY
COMMUNITY
End: 2020-10-06

## 2020-09-08 RX ORDER — HYDROCODONE BITARTRATE AND ACETAMINOPHEN 7.5; 325 MG/1; MG/1
1 TABLET ORAL ONCE
Status: COMPLETED | OUTPATIENT
Start: 2020-09-08 | End: 2020-09-08

## 2020-09-08 RX ADMIN — LISINOPRIL 40 MG: 20 TABLET ORAL at 22:15

## 2020-09-08 RX ADMIN — HYDROCODONE BITARTRATE AND ACETAMINOPHEN 1 TABLET: 7.5; 325 TABLET ORAL at 22:15

## 2020-09-08 ASSESSMENT — PAIN DESCRIPTION - FREQUENCY: FREQUENCY: CONTINUOUS

## 2020-09-08 ASSESSMENT — PAIN SCALES - GENERAL
PAINLEVEL_OUTOF10: 6
PAINLEVEL_OUTOF10: 6

## 2020-09-08 ASSESSMENT — PAIN DESCRIPTION - DESCRIPTORS: DESCRIPTORS: SHARP;SHOOTING

## 2020-09-08 ASSESSMENT — PAIN DESCRIPTION - PROGRESSION: CLINICAL_PROGRESSION: GRADUALLY WORSENING

## 2020-09-08 ASSESSMENT — PAIN DESCRIPTION - PAIN TYPE: TYPE: ACUTE PAIN

## 2020-09-08 ASSESSMENT — PAIN DESCRIPTION - LOCATION: LOCATION: LEG

## 2020-09-08 ASSESSMENT — PAIN DESCRIPTION - ORIENTATION: ORIENTATION: LEFT

## 2020-09-08 NOTE — LETTER
[facility]  [place] [address]  Phone: [phone]               September 9, 2020    Patient: Aldo Brennan   YOB: 1970   Date of Visit: 9/8/2020       To Whom It May Concern:    Aldo Brennan was seen and treated in our emergency department on 9/8/2020.  He       Sincerely,       Edis David RN         Signature:__________________________________

## 2020-09-09 VITALS
WEIGHT: 249 LBS | HEIGHT: 67 IN | DIASTOLIC BLOOD PRESSURE: 102 MMHG | RESPIRATION RATE: 16 BRPM | BODY MASS INDEX: 39.08 KG/M2 | SYSTOLIC BLOOD PRESSURE: 158 MMHG | OXYGEN SATURATION: 98 % | TEMPERATURE: 97.6 F | HEART RATE: 91 BPM

## 2020-09-09 RX ORDER — ACETAMINOPHEN AND CODEINE PHOSPHATE 300; 30 MG/1; MG/1
1 TABLET ORAL EVERY 6 HOURS PRN
Qty: 12 TABLET | Refills: 0 | Status: SHIPPED | OUTPATIENT
Start: 2020-09-09 | End: 2020-09-12

## 2020-09-09 RX ORDER — LISINOPRIL 40 MG/1
40 TABLET ORAL 2 TIMES DAILY
Qty: 60 TABLET | Refills: 0 | Status: SHIPPED | OUTPATIENT
Start: 2020-09-09 | End: 2020-10-06 | Stop reason: SDUPTHER

## 2020-09-09 RX ORDER — CYCLOBENZAPRINE HCL 10 MG
10 TABLET ORAL 3 TIMES DAILY PRN
Qty: 21 TABLET | Refills: 0 | Status: SHIPPED | OUTPATIENT
Start: 2020-09-09 | End: 2020-09-16

## 2020-09-09 NOTE — ED TRIAGE NOTES
Patient arrived to ER with chief complaint with right leg pain that started 4-5 years ago that has progressively worsened. Patient is hypertensive on arrival but hasn't taken his medications because he hasn't been able to get to his PCP. Patient typically works full time but this leg pain is interfering with his work and daily life.

## 2020-09-09 NOTE — ED PROVIDER NOTES
[address] ENCOUNTER      Pt Name: Fabrizio Sena  MRN: [id number]  YOB: 1970  Date of evaluation: 9/8/2020  Provider: Jamil Hanley MD    [address] [place] [address]       Chief Complaint   Patient presents with    Leg Pain         HISTORY OF PRESENT ILLNESS  (Location/Symptom, Timing/Onset, Context/Setting, Quality, Duration, Modifying Factors, Severity.)   Fabrizio Sena is a 52 y.o. male who presents to the emergency department with chronic right leg pain that he states feels that the discomfort is in the front of his shin bone. He denies any injury or trauma. He states that every time that he goes to his regular doctor that they are more worried about his blood pressure and has never x-rayed or evaluated his shin bones so he presented to the emergency department for further evaluation. Patient is able to ambulate but with some discomfort. Patient denies any other complaint. Patient is resting comfortably no acute distress conversing in full sentences. Patient states that he is been out of his lisinopril for approximately 3 months has not followed up to get it refilled      Nursing notes were reviewed. REVIEW OFSYSTEMS    (2-9 systems for level 4, 10 or more for level 5)   ROS:  General:  No fevers, no chills, no weakness  Cardiovascular:  No chest pain, no palpitations  Respiratory:  No shortness of breath, no cough, no wheezing  Gastrointestinal:  No pain, no nausea, no vomiting, no diarrhea  Musculoskeletal: Right leg pain  Skin:  No rash, no easy bruising  Neurologic:  No speech problems, no headache, no extremity weakness  Psychiatric:  No anxiety  Genitourinary:  No dysuria, no hematuria    Except as noted above the remainder of the review of systems was reviewed and negative.        PAST MEDICAL HISTORY     Past Medical History:   Diagnosis Date    Anxiety     Arthritis     CAD (coronary artery disease)     Depression     Headache(784.0)     Hypertension          SURGICAL HISTORY       Past Surgical History:   Procedure Laterality Date    FINGER AMPUTATION      also has partial amputation of right index and ring finger    LEG SURGERY           CURRENT MEDICATIONS       Previous Medications    AMLODIPINE (NORVASC) 10 MG TABLET    Take 1 tablet by mouth nightly    ASPIRIN EC 81 MG EC TABLET    Take 81 mg by mouth daily    LISINOPRIL (PRINIVIL;ZESTRIL) 40 MG TABLET    Take 40 mg by mouth 2 times daily    METOPROLOL SUCCINATE (TOPROL XL) 50 MG EXTENDED RELEASE TABLET    Take 1 tablet by mouth daily       ALLERGIES     Patient has no known allergies. FAMILY HISTORY     History reviewed. No pertinent family history. SOCIAL HISTORY       Social History     Socioeconomic History    Marital status:      Spouse name: None    Number of children: None    Years of education: None    Highest education level: None   Occupational History    None   Social Needs    Financial resource strain: None    Food insecurity     Worry: None     Inability: None    Transportation needs     Medical: None     Non-medical: None   Tobacco Use    Smoking status: Never Smoker    Smokeless tobacco: Current User     Types: Chew   Substance and Sexual Activity    Alcohol use: Yes     Comment: weekends.     Drug use: Never    Sexual activity: None   Lifestyle    Physical activity     Days per week: None     Minutes per session: None    Stress: None   Relationships    Social connections     Talks on phone: None     Gets together: None     Attends Yarsani service: None     Active member of club or organization: None     Attends meetings of clubs or organizations: None     Relationship status: None    Intimate partner violence     Fear of current or ex partner: None     Emotionally abused: None     Physically abused: None     Forced sexual activity: None   Other Topics Concern    None   Social History Narrative    None         PHYSICAL EXAM    (up to 7 for level 4, 8 or more for level 5)     ED Triage Vitals [09/08/20 2105]   BP Temp Temp Source Pulse Resp SpO2 Height Weight   (!) 180/125 98.7 °F (37.1 °C) Oral 95 16 97 % 5' 7\" (1.702 m) 249 lb (112.9 kg)       Physical Exam  General :Patient is awake, alert, oriented, in no acute distress, nontoxic appearing  HEENT: Pupils are equally round and reactive to light, EOMI, conjunctivae clear. Oral mucosa is moist, no exudate. Uvula is midline  Neck: Neck is supple, full range of motion, trachea midline  Cardiac: Heart regular rate, rhythm, no murmurs, rubs, or gallops  Lungs: Lungs are clear to auscultation, there is no wheezing, rhonchi, or rales. There is no use of accessory muscles. Chest wall: There is no tenderness to palpation over the chest wall or over ribs  Abdomen: Abdomen is soft, nontender, nondistended. There is no firm or pulsatile masses, no rebound rigidity or guarding. Musculoskeletal: 5 out of 5 strength in all 4 extremities. No focal muscle deficits are appreciated  Right tib-fib= pain on palpation anterior tibia medial aspect that re-creates the pain on palpation that brought the patient into the emergency department. Negative Homans sign present. No deformity noted. Patient has 5 out of 5 muscle 2+ reflexes neurovascularly intact  Neuro: Motor intact, sensory intact, level of consciousness is normal, cerebellar function is normal, reflexes are grossly normal. No evidence of incontinence or loss of bowel or bladder function, no saddle anesthesia noted   Dermatology: Skin is warm and dry  Psych: Mentation is grossly normal, cognition is grossly normal. Affect is appropriate.       DIAGNOSTIC RESULTS     EKG: All EKG's are interpreted by the Emergency Department Physician who either signs or Co-signs this chart in the [address] a cardiologist.    The EKG interpreted by me shows     RADIOLOGY:   Non-plain film images such as CT, Ultrasound and MRI are read by the radiologist. Plain radiographic images are visualized and preliminarily interpreted by the emergency physician with the below findings:      ? Radiologist's Report Reviewed:  XR TIBIA FIBULA RIGHT (2 VIEWS)   Final Result      No acute osseous findings. ED BEDSIDE ULTRASOUND:   Performed by ED Physician - none    LABS:    I have reviewed and interpreted all of the currently available lab results from this visit (ifapplicable):  No results found for this visit on 09/08/20. All other labs were within normal range or not returned as of this dictation. EMERGENCY DEPARTMENT COURSE and DIFFERENTIAL DIAGNOSIS/MDM:   Vitals:    Vitals:    09/08/20 2105 09/08/20 2215 09/09/20 0007 09/09/20 0010   BP: (!) 180/125 (!) 117/112 (!) 158/102 (!) 148/108   Pulse: 95      Resp: 16      Temp: 98.7 °F (37.1 °C)      TempSrc: Oral      SpO2: 97%      Weight: 249 lb (112.9 kg)      Height: 5' 7\" (1.702 m)          MEDICATIONS ADMINISTERED IN ED:  Medications   lisinopril (PRINIVIL;ZESTRIL) tablet 40 mg (40 mg Oral Given 9/8/20 2215)   HYDROcodone-acetaminophen (NORCO) 7.5-325 MG per tablet 1 tablet (1 tablet Oral Given 9/8/20 2215)       Patient was placed examination room at which time after exam was performed patient was given Norco 7.5 p.o. along with lisinopril 40 mg p.o. Patient was then was taken over for radiographic evaluation of the right anterior tibia-fibula which was negative per radiology. Results were reviewed with patient who is instructed the need to follow-up with orthopedics. She was instructed to ice and elevate. Patient was given a prescription for his lisinopril 40 mg p.o. twice daily that he was taking but stopped several months ago. Patient was instructed to follow-up with Dr. Lin Shipley. Patient's blood pressure on discharge 148/96. Patient was appreciative the care and agrees with the plan above    The patient will follow-up with their PCP in 1-2 days for reevaluation.   If the patient or family members have anyfurther concerns or any worsening symptoms they will return to the ED for reevaluation. CONSULTS:  None    PROCEDURES:  Procedures    CRITICAL CARE TIME    Total Critical Care time was 0 minutes, excluding separately reportable procedures. There was a high probability of clinically significant/life threatening deterioration in the patient's condition which required my urgent intervention. FINAL IMPRESSION      1. Right leg pain Stable   2. Musculoskeletal pain Stable   3. Essential hypertension Stable         DISPOSITION/PLAN   DISPOSITION        PATIENT REFERRED TO:  DORIS Hooper - CNP  [address] [place] [id number]  [phone]    Schedule an appointment as soon as possible for a visit in 2 days      Margarita Diallo MD  [address]  [phone]    In 2 days  If symptoms worsen      DISCHARGE MEDICATIONS:  New Prescriptions    ACETAMINOPHEN-CODEINE (TYLENOL/CODEINE #3) 300-30 MG PER TABLET    Take 1 tablet by mouth every 6 hours as needed for Pain for up to 3 days. Intended supply: 3 days. Take lowest dose possible to manage pain    CYCLOBENZAPRINE (FLEXERIL) 10 MG TABLET    Take 1 tablet by mouth 3 times daily as needed for Muscle spasms    LISINOPRIL (PRINIVIL;ZESTRIL) 40 MG TABLET    Take 1 tablet by mouth 2 times daily       Comment: Please note this report has been produced using speech recognition software and may contain errorsrelated to that system including errors in grammar, punctuation, and spelling, as well as words and phrases that may be inappropriate. If there are any questions or concerns please feel free to contact the dictating providerfor clarification.     Pardeep Callejas MD  Attending Emergency Physician              Pardeep Clalejas MD  09/09/20 [id number]

## 2020-10-06 ENCOUNTER — OFFICE VISIT (OUTPATIENT)
Dept: PRIMARY CARE CLINIC | Age: 50
End: 2020-10-06
Payer: COMMERCIAL

## 2020-10-06 PROCEDURE — 99213 OFFICE O/P EST LOW 20 MIN: CPT | Performed by: NURSE PRACTITIONER

## 2020-10-06 RX ORDER — METOPROLOL SUCCINATE 50 MG/1
50 TABLET, EXTENDED RELEASE ORAL DAILY
Qty: 30 TABLET | Refills: 1 | Status: SHIPPED | OUTPATIENT
Start: 2020-10-06 | End: 2021-05-17 | Stop reason: SDUPTHER

## 2020-10-06 RX ORDER — PREDNISONE 20 MG/1
20 TABLET ORAL 2 TIMES DAILY
Qty: 10 TABLET | Refills: 0 | Status: SHIPPED | OUTPATIENT
Start: 2020-10-06 | End: 2020-10-11

## 2020-10-06 RX ORDER — CYCLOBENZAPRINE HCL 10 MG
10 TABLET ORAL 2 TIMES DAILY PRN
Qty: 30 TABLET | Refills: 0 | Status: SHIPPED | OUTPATIENT
Start: 2020-10-06 | End: 2022-05-27 | Stop reason: ALTCHOICE

## 2020-10-06 RX ORDER — LISINOPRIL 40 MG/1
40 TABLET ORAL DAILY
Qty: 30 TABLET | Refills: 1 | Status: SHIPPED | OUTPATIENT
Start: 2020-10-06 | End: 2021-05-17 | Stop reason: SDUPTHER

## 2020-10-06 RX ORDER — HYDROXYZINE HYDROCHLORIDE 25 MG/1
25 TABLET, FILM COATED ORAL DAILY PRN
Qty: 30 TABLET | Refills: 0 | Status: SHIPPED | OUTPATIENT
Start: 2020-10-06 | End: 2022-05-27

## 2020-10-06 RX ORDER — ASPIRIN 81 MG/1
81 TABLET ORAL DAILY
Qty: 30 TABLET | Refills: 2 | Status: SHIPPED | OUTPATIENT
Start: 2020-10-06 | End: 2021-05-17 | Stop reason: SDUPTHER

## 2020-10-06 RX ORDER — AMLODIPINE BESYLATE 10 MG/1
10 TABLET ORAL NIGHTLY
Qty: 30 TABLET | Refills: 1 | Status: SHIPPED | OUTPATIENT
Start: 2020-10-06 | End: 2021-05-17 | Stop reason: SDUPTHER

## 2020-10-06 RX ORDER — HYDROCHLOROTHIAZIDE 25 MG/1
25 TABLET ORAL DAILY
Qty: 30 TABLET | Refills: 0 | Status: SHIPPED | OUTPATIENT
Start: 2020-10-06 | End: 2021-05-17 | Stop reason: SDUPTHER

## 2020-10-06 RX ORDER — AZITHROMYCIN 250 MG/1
250 TABLET, FILM COATED ORAL SEE ADMIN INSTRUCTIONS
Qty: 6 TABLET | Refills: 0 | Status: SHIPPED | OUTPATIENT
Start: 2020-10-06 | End: 2020-10-11

## 2020-10-06 NOTE — PROGRESS NOTES
10/6/2020    Gwen Canchola (:  1970) is a 52 y.o. male, here requesting COVID-19 testing    History of Present Illness  Close contact with a lab confirmed COVID-19 patient within 14 days of symptom onset     Temp 99    ASSESSMENT  Screening for COVID-19/ Viral disease    PLAN  POCT influenza testing Not Tested  COVID-19 sample collected and submitted  Patient given detailed CDC instructions contained within After Visit Summary       An  electronic signature was used to authenticate this note.     --Regina Robles on 10/6/2020 at 2:40 PM

## 2020-10-06 NOTE — LETTER
[facility]  [address] [address]  Phone: [phone]  Fax: [phone]    DORIS Mackay CNP        October 6, 2020     Patient: Oksana Tatum   YOB: 1970   Date of Visit: 10/6/2020       To Whom it May Concern:    Oksana Tatum was seen in my clinic on 10/6/2020. He may return to work on when his covid test results are recieved. .    If you have any questions or concerns, please don't hesitate to call.     Sincerely,         DORIS Mackay CNP

## 2020-10-12 ENCOUNTER — TELEPHONE (OUTPATIENT)
Dept: PRIMARY CARE CLINIC | Age: 50
End: 2020-10-12

## 2020-10-12 ASSESSMENT — ENCOUNTER SYMPTOMS
SORE THROAT: 0
FACIAL SWELLING: 0
RESPIRATORY NEGATIVE: 1
EYES NEGATIVE: 1
GASTROINTESTINAL NEGATIVE: 1
SINUS PRESSURE: 1

## 2020-10-12 NOTE — TELEPHONE ENCOUNTER
Health department notified [facility] ([facility]) of negative COVID results. Tried calling patient, number not a working number. Will scan in the copy of results to patients chart.

## 2020-10-12 NOTE — PROGRESS NOTES
Musculoskeletal: Normal range of motion. Cardiovascular:      Rate and Rhythm: Normal rate. Pulmonary:      Effort: Pulmonary effort is normal.      Breath sounds: Normal breath sounds. Musculoskeletal:         General: No swelling or tenderness. Skin:     General: Skin is warm and dry. Neurological:      General: No focal deficit present. Mental Status: He is alert and oriented to person, place, and time. Psychiatric:         Mood and Affect: Mood normal.         Behavior: Behavior normal.         Lab Results   Component Value Date     04/18/2018    K 3.8 04/18/2018     04/18/2018    CO2 24 04/18/2018    GLUCOSE 151 04/18/2018    BUN 11 04/18/2018    CREATININE 0.9 04/18/2018    CALCIUM 8.9 04/18/2018    PROT 7.3 04/18/2018    LABALBU 4.5 04/18/2018    BILITOT 0.3 04/18/2018    ALT 31 04/18/2018    AST 29 04/18/2018       Microscopic Examination (no units)   Date Value   04/18/2018 Not Indicated     LDL Calculated (mg/dL)   Date Value   08/09/2017 198 (H)         Lab Results   Component Value Date    WBC 5.2 04/18/2018    NEUTROABS 2.5 04/18/2018    HGB 15.0 04/18/2018    HCT 43.9 04/18/2018    MCV 97.6 04/18/2018     04/18/2018       Lab Results   Component Value Date    TSH 1.97 08/09/2017         ASSESSMENT/PLAN:     1. Suspected COVID-19 virus infection  Test today. Education provided. - GA HANDLG&/OR CONVEY OF SPEC FOR TR OFFICE TO LAB    2. Educated about COVID-19 virus infection  - [place] HANDLG&/OR CONVEY OF SPEC FOR TR OFFICE TO LAB    3. Uncontrolled hypertension  Refill meds. Cont home meds. Added hctz. Keep BP log BID and call PCP results in 4-5 days. If worsening BP, RTC or ED. Pt verbalized understanding. F/U PCP 2 weeks. - amLODIPine (NORVASC) 10 MG tablet; Take 1 tablet by mouth nightly  Dispense: 30 tablet; Refill: 1  - metoprolol succinate (TOPROL XL) 50 MG extended release tablet; Take 1 tablet by mouth daily  Dispense: 30 tablet;  Refill: 1  - hydroCHLOROthiazide (HYDRODIURIL) 25 MG tablet; Take 1 tablet by mouth daily  Dispense: 30 tablet; Refill: 0    4. Muscle spasm  - cyclobenzaprine (FLEXERIL) 10 MG tablet; Take 1 tablet by mouth 2 times daily as needed for Muscle spasms (muscle relaxer, pain)  Dispense: 30 tablet; Refill: 0    5. Anxiety  - hydrOXYzine (ATARAX) 25 MG tablet; Take 1 tablet by mouth daily as needed for Anxiety  Dispense: 30 tablet; Refill: 0    6. Bilateral non-suppurative otitis media  - azithromycin (ZITHROMAX) 250 MG tablet; Take 1 tablet by mouth See Admin Instructions for 5 days 500mg on day 1 followed by 250mg on days 2 - 5  Dispense: 6 tablet;  Refill: 0        Orders Placed This Encounter   Medications    amLODIPine (NORVASC) 10 MG tablet     Sig: Take 1 tablet by mouth nightly     Dispense:  30 tablet     Refill:  1    lisinopril (PRINIVIL;ZESTRIL) 40 MG tablet     Sig: Take 1 tablet by mouth daily     Dispense:  30 tablet     Refill:  1    metoprolol succinate (TOPROL XL) 50 MG extended release tablet     Sig: Take 1 tablet by mouth daily     Dispense:  30 tablet     Refill:  1    cyclobenzaprine (FLEXERIL) 10 MG tablet     Sig: Take 1 tablet by mouth 2 times daily as needed for Muscle spasms (muscle relaxer, pain)     Dispense:  30 tablet     Refill:  0    hydroCHLOROthiazide (HYDRODIURIL) 25 MG tablet     Sig: Take 1 tablet by mouth daily     Dispense:  30 tablet     Refill:  0    azithromycin (ZITHROMAX) 250 MG tablet     Sig: Take 1 tablet by mouth See Admin Instructions for 5 days 500mg on day 1 followed by 250mg on days 2 - 5     Dispense:  6 tablet     Refill:  0    predniSONE (DELTASONE) 20 MG tablet     Sig: Take 1 tablet by mouth 2 times daily for 5 days     Dispense:  10 tablet     Refill:  0    hydrOXYzine (ATARAX) 25 MG tablet     Sig: Take 1 tablet by mouth daily as needed for Anxiety     Dispense:  30 tablet     Refill:  0    aspirin EC 81 MG EC tablet     Sig: Take 1 tablet by mouth daily Dispense:  30 tablet     Refill:  2

## 2020-10-28 ENCOUNTER — TELEPHONE (OUTPATIENT)
Dept: PRIMARY CARE CLINIC | Age: 50
End: 2020-10-28

## 2020-10-28 NOTE — TELEPHONE ENCOUNTER
----- Message from Rosa AprilDORIS - CNP sent at 10/12/2020 10:41 AM EDT -----  Regarding: needs f/u appt  Needs f/u appointment for HTN. Used to see jourdan and now wants to establish here. [facility]ma or [facility]. Thanks.

## 2021-05-17 ENCOUNTER — APPOINTMENT (OUTPATIENT)
Dept: GENERAL RADIOLOGY | Facility: HOSPITAL | Age: 51
End: 2021-05-17
Payer: COMMERCIAL

## 2021-05-17 ENCOUNTER — HOSPITAL ENCOUNTER (EMERGENCY)
Facility: HOSPITAL | Age: 51
Discharge: HOME | End: 2021-05-17
Attending: EMERGENCY MEDICINE
Payer: COMMERCIAL

## 2021-05-17 VITALS
BODY MASS INDEX: 39.24 KG/M2 | RESPIRATION RATE: 18 BRPM | HEART RATE: 111 BPM | DIASTOLIC BLOOD PRESSURE: 128 MMHG | HEIGHT: 67 IN | OXYGEN SATURATION: 94 % | WEIGHT: 250 LBS | SYSTOLIC BLOOD PRESSURE: 201 MMHG | TEMPERATURE: 107 F

## 2021-05-17 DIAGNOSIS — I10 ESSENTIAL HYPERTENSION: ICD-10-CM

## 2021-05-17 DIAGNOSIS — I10 UNCONTROLLED HYPERTENSION: ICD-10-CM

## 2021-05-17 DIAGNOSIS — M25.561 ACUTE PAIN OF RIGHT KNEE: ICD-10-CM

## 2021-05-17 DIAGNOSIS — M25.461 KNEE EFFUSION, RIGHT: Primary | ICD-10-CM

## 2021-05-17 PROCEDURE — 6360000002 HC RX W HCPCS: Performed by: EMERGENCY MEDICINE

## 2021-05-17 PROCEDURE — 99284 EMERGENCY DEPT VISIT MOD MDM: CPT

## 2021-05-17 PROCEDURE — 6370000000 HC RX 637 (ALT 250 FOR IP): Performed by: EMERGENCY MEDICINE

## 2021-05-17 PROCEDURE — 73562 X-RAY EXAM OF KNEE 3: CPT

## 2021-05-17 PROCEDURE — 96372 THER/PROPH/DIAG INJ SC/IM: CPT

## 2021-05-17 RX ORDER — LISINOPRIL 40 MG/1
40 TABLET ORAL DAILY
Qty: 30 TABLET | Refills: 1 | Status: SHIPPED | OUTPATIENT
Start: 2021-05-17 | End: 2022-04-03

## 2021-05-17 RX ORDER — AMLODIPINE BESYLATE 10 MG/1
10 TABLET ORAL NIGHTLY
Qty: 30 TABLET | Refills: 1 | Status: SHIPPED | OUTPATIENT
Start: 2021-05-17 | End: 2022-05-16 | Stop reason: SDUPTHER

## 2021-05-17 RX ORDER — AMLODIPINE BESYLATE 5 MG/1
10 TABLET ORAL ONCE
Status: COMPLETED | OUTPATIENT
Start: 2021-05-17 | End: 2021-05-17

## 2021-05-17 RX ORDER — METOPROLOL TARTRATE 50 MG/1
50 TABLET, FILM COATED ORAL ONCE
Status: COMPLETED | OUTPATIENT
Start: 2021-05-17 | End: 2021-05-17

## 2021-05-17 RX ORDER — ASPIRIN 81 MG/1
81 TABLET ORAL DAILY
Qty: 30 TABLET | Refills: 2 | Status: SHIPPED | OUTPATIENT
Start: 2021-05-17 | End: 2022-05-27 | Stop reason: SDUPTHER

## 2021-05-17 RX ORDER — KETOROLAC TROMETHAMINE 30 MG/ML
30 INJECTION, SOLUTION INTRAMUSCULAR; INTRAVENOUS ONCE
Status: COMPLETED | OUTPATIENT
Start: 2021-05-17 | End: 2021-05-17

## 2021-05-17 RX ORDER — METOPROLOL SUCCINATE 50 MG/1
50 TABLET, EXTENDED RELEASE ORAL DAILY
Qty: 30 TABLET | Refills: 1 | Status: SHIPPED | OUTPATIENT
Start: 2021-05-17 | End: 2022-05-16 | Stop reason: SDUPTHER

## 2021-05-17 RX ORDER — HYDROCHLOROTHIAZIDE 25 MG/1
25 TABLET ORAL DAILY
Qty: 30 TABLET | Refills: 1 | Status: SHIPPED | OUTPATIENT
Start: 2021-05-17 | End: 2022-06-24

## 2021-05-17 RX ADMIN — AMLODIPINE BESYLATE 10 MG: 5 TABLET ORAL at 10:04

## 2021-05-17 RX ADMIN — METOPROLOL TARTRATE 50 MG: 50 TABLET, FILM COATED ORAL at 10:04

## 2021-05-17 RX ADMIN — KETOROLAC TROMETHAMINE 30 MG: 30 INJECTION, SOLUTION INTRAMUSCULAR at 09:40

## 2021-05-17 ASSESSMENT — PAIN DESCRIPTION - PAIN TYPE: TYPE: ACUTE PAIN

## 2021-05-17 ASSESSMENT — PAIN DESCRIPTION - LOCATION: LOCATION: KNEE

## 2021-05-17 ASSESSMENT — PAIN SCALES - GENERAL
PAINLEVEL_OUTOF10: 5
PAINLEVEL_OUTOF10: 2

## 2021-05-17 NOTE — ED PROVIDER NOTES
[address] [facility] ENCOUNTER      Pt Name: Krystal Kanner  MRN: [id number]  [place] 1970  Date of evaluation: 5/17/2021  Provider: Krystal Bragg DO    CHIEF COMPLAINT       Chief Complaint   Patient presents with    Knee Injury         HISTORY OF PRESENT ILLNESS   (Location/Symptom, Timing/Onset, Context/Setting, Quality, Duration, Modifying Factors, Severity)  Note limiting factors. Krystal Kanner is a 52 y.o. male who presents to the emergency department with complaint of right knee pain. Patient reports 2 days ago he was playing Frisbee with his family member and when he jumped up and landed on his right leg felt something pull. Reports pain in his right knee since that time. Still able to flex and extend at the knee but reporting worsening pain with the movement. Has been taking Tylenol and Motrin for the pain without relief. Denies any numbness weakness or tingling distally. Denies any other fall or head trauma. States he has history of a patellar tendon tear in the past.        Appropriate PPE was used including an eye shield, gloves, N95 mask, surgical mask during the entire patient encounter and exam.  If necessary (pt being intubated or aerosolizing equipment in use) a gown was also used. Nursing Notes were reviewed.       PAST MEDICAL HISTORY     Past Medical History:   Diagnosis Date    Anxiety     Arthritis     CAD (coronary artery disease)     Depression     Headache(784.0)     Hypertension          SURGICAL HISTORY       Past Surgical History:   Procedure Laterality Date    FINGER AMPUTATION      also has partial amputation of right index and ring finger    LEG SURGERY           CURRENT MEDICATIONS       Current Discharge Medication List      CONTINUE these medications which have NOT CHANGED    Details   cyclobenzaprine (FLEXERIL) 10 MG tablet Take 1 tablet by mouth 2 times daily as needed for Muscle spasms (muscle relaxer, pain)  Qty: breath  Cardiovascular: denies chest pain, palpitations  GI: denies abdominal pain, nausea, vomiting, or diarrhea  Musculoskeletal: Reports right knee pain  Skin: denies rash  Neurologic: denies focal weakness or sensory changes    Except as noted above the remainder of the review of systems was reviewed and negative. PHYSICAL EXAM    (up to 7 for level 4, 8 or more for level 5)     ED Triage Vitals   BP Temp Temp src Pulse Resp SpO2 Height Weight   -- -- -- -- -- -- -- --       General appearance: well-developed, well-nourished, no acute distress, nontoxic appearance  Neck: normal range of motion, no tenderness, trachea midline, no stridor  Respiratory: normal breath sounds, non labored breathing pattern  Cardiovascular: normal heart rate, normal rhythm  GI: nontender, bowel sounds normal, soft, nondistended, no pulsatile masses  Musculoskeletal: Patient with decreased motion of the right knee secondary pain however able to flex and extend at the knee, 2+ DP pulse distally, normal sensation distally, normal dorsiflexion plantarflexion of the ankle and normal internal and external rotation of the hip, no edema, intact distal pulses, no clubbing, cyanosis. Good range of motion  Back: no tenderness  Integument: warm, dry, no erythema, no rash, < 2 second cap refill  Neurologic: alert and oriented ×3, no focal deficits appreciated    DIAGNOSTIC RESULTS       RADIOLOGY:   Interpretation per the Radiologist below, if available at the time of this note:    XR KNEE RIGHT (3 VIEWS)   Final Result   1. No radiographic evidence of acute fracture or subluxation. 2. Mild osteoarthritis. 3. Enthesopathy changes at the inferior pole of the patella. 4. Incidental suprapatellar joint effusion. 5. Soft tissue swelling anterior to the patellar tendon can be correlated for possible bursitis.             LABS:  Labs Reviewed - No data to display    All other labs were within normal range or not returned as of this dictation. EMERGENCY DEPARTMENT COURSE and DIFFERENTIAL DIAGNOSIS/MDM:   Vitals:    Vitals:    05/17/21 0930 05/17/21 0953 05/17/21 1004 05/17/21 1012   BP: (!) 201/125 (!) 208/121     Pulse: 112  111    Resp:       Temp:    98.2 °F (36.8 °C)   SpO2: 94%      Weight:       Height:             MDM  44-year-old male presents the emergency department with complaint of right knee pain. Vital signs show elevated blood pressure. Physical exam as above. He is alert awake and overall appears nontoxic. No acute distress. Does have decreased range of motion of the right knee secondary to pain however still able to flex and extend at the knee. No obvious sign of complete tendon tear. No swelling or ecchymosis noted. Neurovascular intact distally. Will obtain an x-ray of the right knee and give a shot of Toradol for pain. Patient's blood pressure continues to be elevated. Discussed with him that he has been out of his medications for over 1 month. Is normally on 4 antihypertensive medications. He denies chest pain, shortness of breath, headache, vision changes, neurological abnormalities. Appears to be suffering from asymptomatic hypertension. He was given his home dose amlodipine and metoprolol here. Will prescribe patient's blood pressure medication for outpatient treatment and instructed he needs to follow-up closely for this as this puts him at risk for heart attack, stroke, kidney injury and other acute abnormalities. Patient's x-ray shows no acute fracture or subluxation. Does show signs of patellar tendon changes as well as a suprapatellar effusion. Possibility of bursitis, tendon or ligament injury. Discussed with patient he needs to use a knee immobilizer as well as crutches and follow-up with orthopedic surgery. He states he already has both of these at home and will make sure to use those prior to seeing orthopedic surgery.  He has followed up with Dr. Finesse Felton in the past so we will give his contact information. Return precautions given. Patient agrees with discharge plan. CONSULTS:  None      FINAL IMPRESSION      1. Knee effusion, right    2. Acute pain of right knee    3. Uncontrolled hypertension    4.  Essential hypertension          DISPOSITION/PLAN   DISPOSITION Decision To Discharge 05/17/2021 10:18:23 AM      PATIENT REFERRED TO:  DORIS Teague CNP  [address] [place] [address]  [phone]    Schedule an appointment as soon as possible for a visit in 2 days  As needed, If symptoms worsen    Dr. Daniela Guerin  [phone]: [facility] in 1 week  As needed, If symptoms worsen      DISCHARGE MEDICATIONS:  Current Discharge Medication List             (Please note that portions of this note were completed with a voice recognition program.  Efforts were made to edit the dictations but occasionally words are mis-transcribed.)    Tucker Langford DO (electronically signed)  Attending Emergency Physician      Tucker Langford DO  05/17/21 1027

## 2021-06-03 ENCOUNTER — HOSPITAL ENCOUNTER (OUTPATIENT)
Facility: HOSPITAL | Age: 51
Discharge: HOME | End: 2021-06-03
Payer: COMMERCIAL

## 2021-06-03 LAB
A/G RATIO: 1.6 (ref 0.8–2)
ALBUMIN SERPL-MCNC: 4.7 G/DL (ref 3.4–4.8)
ALP BLD-CCNC: 66 U/L (ref 25–100)
ALT SERPL-CCNC: 33 U/L (ref 4–36)
ANION GAP SERPL CALCULATED.3IONS-SCNC: 15 MMOL/L (ref 3–16)
AST SERPL-CCNC: 38 U/L (ref 8–33)
BASOPHILS ABSOLUTE: 0 K/UL (ref 0–0.1)
BASOPHILS RELATIVE PERCENT: 0.6 %
BILIRUB SERPL-MCNC: 0.5 MG/DL (ref 0.3–1.2)
BUN BLDV-MCNC: 9 MG/DL (ref 6–20)
C-REACTIVE PROTEIN: 4.7 MG/L (ref 0–5.1)
CALCIUM SERPL-MCNC: 9.1 MG/DL (ref 8.5–10.5)
CHLORIDE BLD-SCNC: 99 MMOL/L (ref 98–107)
CHOLESTEROL, TOTAL: 250 MG/DL (ref 0–200)
CO2: 24 MMOL/L (ref 20–30)
CREAT SERPL-MCNC: 0.8 MG/DL (ref 0.4–1.2)
EOSINOPHILS ABSOLUTE: 0.3 K/UL (ref 0–0.4)
EOSINOPHILS RELATIVE PERCENT: 4.4 %
FOLATE: 14.06 NG/ML
GFR AFRICAN AMERICAN: >59
GFR NON-AFRICAN AMERICAN: >59
GLOBULIN: 2.9 G/DL
GLUCOSE BLD-MCNC: 111 MG/DL (ref 74–106)
HCT VFR BLD CALC: 46.1 % (ref 40–54)
HDLC SERPL-MCNC: 43 MG/DL (ref 40–60)
HEMOGLOBIN: 15.8 G/DL (ref 13–18)
IMMATURE GRANULOCYTES #: 0 K/UL
IMMATURE GRANULOCYTES %: 0.2 % (ref 0–5)
LDL CHOLESTEROL CALCULATED: 153 MG/DL
LYMPHOCYTES ABSOLUTE: 2.4 K/UL (ref 1.5–4)
LYMPHOCYTES RELATIVE PERCENT: 36.4 %
MCH RBC QN AUTO: 33 PG (ref 27–32)
MCHC RBC AUTO-ENTMCNC: 34.3 G/DL (ref 31–35)
MCV RBC AUTO: 96.2 FL (ref 80–100)
MONOCYTES ABSOLUTE: 0.5 K/UL (ref 0.2–0.8)
MONOCYTES RELATIVE PERCENT: 8.1 %
NEUTROPHILS ABSOLUTE: 3.3 K/UL (ref 2–7.5)
NEUTROPHILS RELATIVE PERCENT: 50.3 %
PDW BLD-RTO: 12.4 % (ref 11–16)
PLATELET # BLD: 182 K/UL (ref 150–400)
PMV BLD AUTO: 10.3 FL (ref 6–10)
POTASSIUM SERPL-SCNC: 4 MMOL/L (ref 3.4–5.1)
RBC # BLD: 4.79 M/UL (ref 4.5–6)
RHEUMATOID FACTOR: <10 IU/ML
SEDIMENTATION RATE, ERYTHROCYTE: 7 MM/HR (ref 0–15)
SODIUM BLD-SCNC: 138 MMOL/L (ref 136–145)
TOTAL PROTEIN: 7.6 G/DL (ref 6.4–8.3)
TRIGL SERPL-MCNC: 270 MG/DL (ref 0–249)
TSH SERPL DL<=0.05 MIU/L-ACNC: 1.73 UIU/ML (ref 0.27–4.2)
URIC ACID, SERUM: 10 MG/DL (ref 3.7–8.6)
VITAMIN B-12: 446 PG/ML (ref 211–911)
VLDLC SERPL CALC-MCNC: 54 MG/DL
WBC # BLD: 6.5 K/UL (ref 4–11)

## 2021-06-03 PROCEDURE — 86038 ANTINUCLEAR ANTIBODIES: CPT

## 2021-06-03 PROCEDURE — 82607 VITAMIN B-12: CPT

## 2021-06-03 PROCEDURE — 36415 COLL VENOUS BLD VENIPUNCTURE: CPT

## 2021-06-03 PROCEDURE — 86431 RHEUMATOID FACTOR QUANT: CPT

## 2021-06-03 PROCEDURE — 84550 ASSAY OF BLOOD/URIC ACID: CPT

## 2021-06-03 PROCEDURE — 80053 COMPREHEN METABOLIC PANEL: CPT

## 2021-06-03 PROCEDURE — 84443 ASSAY THYROID STIM HORMONE: CPT

## 2021-06-03 PROCEDURE — 82746 ASSAY OF FOLIC ACID SERUM: CPT

## 2021-06-03 PROCEDURE — 85025 COMPLETE CBC W/AUTO DIFF WBC: CPT

## 2021-06-03 PROCEDURE — 86140 C-REACTIVE PROTEIN: CPT

## 2021-06-03 PROCEDURE — 85652 RBC SED RATE AUTOMATED: CPT

## 2021-06-03 PROCEDURE — 80061 LIPID PANEL: CPT

## 2021-06-04 LAB — ANTI-NUCLEAR ANTIBODY (ANA): NEGATIVE

## 2021-09-14 ENCOUNTER — HOSPITAL ENCOUNTER (EMERGENCY)
Facility: HOSPITAL | Age: 51
Discharge: HOME | End: 2021-09-14
Attending: EMERGENCY MEDICINE
Payer: COMMERCIAL

## 2021-09-14 ENCOUNTER — APPOINTMENT (OUTPATIENT)
Dept: CT IMAGING | Facility: HOSPITAL | Age: 51
End: 2021-09-14
Payer: COMMERCIAL

## 2021-09-14 VITALS
RESPIRATION RATE: 16 BRPM | TEMPERATURE: 97.9 F | HEIGHT: 68 IN | OXYGEN SATURATION: 96 % | DIASTOLIC BLOOD PRESSURE: 82 MMHG | HEART RATE: 91 BPM | BODY MASS INDEX: 38.09 KG/M2 | WEIGHT: 251.3 LBS | SYSTOLIC BLOOD PRESSURE: 124 MMHG

## 2021-09-14 DIAGNOSIS — M47.817 SPONDYLOSIS OF LUMBOSACRAL REGION, UNSPECIFIED SPINAL OSTEOARTHRITIS COMPLICATION STATUS: ICD-10-CM

## 2021-09-14 DIAGNOSIS — S39.012A STRAIN OF LUMBAR REGION, INITIAL ENCOUNTER: Primary | ICD-10-CM

## 2021-09-14 DIAGNOSIS — M62.838 SPASM OF MUSCLE: ICD-10-CM

## 2021-09-14 LAB
BILIRUBIN URINE: NEGATIVE
BLOOD, URINE: NEGATIVE
CLARITY: CLEAR
COLOR: YELLOW
GLUCOSE URINE: NEGATIVE MG/DL
KETONES, URINE: NEGATIVE MG/DL
LEUKOCYTE ESTERASE, URINE: NEGATIVE
MICROSCOPIC EXAMINATION: NORMAL
NITRITE, URINE: NEGATIVE
PH UA: 5 (ref 5–8)
PROTEIN UA: NEGATIVE MG/DL
SPECIFIC GRAVITY UA: <=1.005 (ref 1–1.03)
URINE REFLEX TO CULTURE: NORMAL
URINE TYPE: NORMAL
UROBILINOGEN, URINE: 0.2 E.U./DL

## 2021-09-14 PROCEDURE — 72131 CT LUMBAR SPINE W/O DYE: CPT

## 2021-09-14 PROCEDURE — 6360000002 HC RX W HCPCS: Performed by: EMERGENCY MEDICINE

## 2021-09-14 PROCEDURE — 93005 ELECTROCARDIOGRAM TRACING: CPT

## 2021-09-14 PROCEDURE — 81003 URINALYSIS AUTO W/O SCOPE: CPT

## 2021-09-14 PROCEDURE — 6370000000 HC RX 637 (ALT 250 FOR IP): Performed by: EMERGENCY MEDICINE

## 2021-09-14 PROCEDURE — 96372 THER/PROPH/DIAG INJ SC/IM: CPT

## 2021-09-14 PROCEDURE — 99283 EMERGENCY DEPT VISIT LOW MDM: CPT

## 2021-09-14 RX ORDER — KETOROLAC TROMETHAMINE 30 MG/ML
60 INJECTION, SOLUTION INTRAMUSCULAR; INTRAVENOUS ONCE
Status: COMPLETED | OUTPATIENT
Start: 2021-09-14 | End: 2021-09-14

## 2021-09-14 RX ORDER — HYDROCODONE BITARTRATE AND ACETAMINOPHEN 7.5; 325 MG/1; MG/1
1 TABLET ORAL ONCE
Status: COMPLETED | OUTPATIENT
Start: 2021-09-14 | End: 2021-09-14

## 2021-09-14 RX ORDER — HYDROCODONE BITARTRATE AND ACETAMINOPHEN 5; 325 MG/1; MG/1
1 TABLET ORAL EVERY 6 HOURS PRN
Qty: 12 TABLET | Refills: 0 | Status: SHIPPED | OUTPATIENT
Start: 2021-09-14 | End: 2021-09-17

## 2021-09-14 RX ORDER — CYCLOBENZAPRINE HCL 10 MG
10 TABLET ORAL ONCE
Status: COMPLETED | OUTPATIENT
Start: 2021-09-14 | End: 2021-09-14

## 2021-09-14 RX ADMIN — CYCLOBENZAPRINE 10 MG: 10 TABLET, FILM COATED ORAL at 23:03

## 2021-09-14 RX ADMIN — KETOROLAC TROMETHAMINE 60 MG: 30 INJECTION, SOLUTION INTRAMUSCULAR at 22:28

## 2021-09-14 RX ADMIN — HYDROCODONE BITARTRATE AND ACETAMINOPHEN 1 TABLET: 7.5; 325 TABLET ORAL at 23:03

## 2021-09-14 ASSESSMENT — PAIN SCALES - GENERAL
PAINLEVEL_OUTOF10: 10

## 2021-09-14 NOTE — LETTER
[facility] Emergency Department  [facility] [address]  Phone: [phone]               September 14, 2021    Patient: Oksana Tatum   YOB: 1970   Date of Visit: 9/14/2021       To Whom It May Concern:    Oksana Tatum was seen and treated in our emergency department on 9/14/2021. He may return to work on 9/16/2021.       Sincerely,       Bravo Bowden RN         Signature:__________________________________

## 2021-09-15 LAB
EKG ATRIAL RATE: 90 BPM
EKG DIAGNOSIS: NORMAL
EKG P AXIS: 44 DEGREES
EKG P-R INTERVAL: 168 MS
EKG Q-T INTERVAL: 368 MS
EKG QRS DURATION: 118 MS
EKG QTC CALCULATION (BAZETT): 450 MS
EKG R AXIS: 51 DEGREES
EKG T AXIS: -54 DEGREES
EKG VENTRICULAR RATE: 90 BPM

## 2021-09-15 NOTE — ED NOTES
Patient reports having low back pain, difficult to move, walk, etc. States when he moves a certain way, feels like a \"knife stabbing him in the back\". 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 Patient intoxicated. States he drank a half pint today attempting to numb his pain, but it did not work.       Rohit Andrew RN  09/14/21 2032

## 2021-09-15 NOTE — ED PROVIDER NOTES
[address] [facility] [address] ENCOUNTER      Pt Name: Shelli Guzman  MRN: [id number]  YOB: 1970  Date of evaluation: 9/14/2021  Provider: Luz Ruiz MD    CHIEF COMPLAINT       Chief Complaint   Patient presents with    Back Pain         HISTORY OF PRESENT ILLNESS  (Location/Symptom, Timing/Onset, Context/Setting, Quality, Duration, Modifying Factors, Severity.)   Shelli Guzman is a 54 y.o. male who presents to the emergency department with what he describes as approximately 2-day history of lower back pain radiating to his thighs. Patient states that he was trying to load a tractor onto a trailer when he felt a sharp pulling sensation in his lower back. Patient denied any changes in bowel or bladder function. Patient denies any saddle anesthesia. Patient states he is able to ambulate but with marked discomfort secondary to a pulling sensation in his lower back. Patient states that he drank a half a pint of whiskey today to try and help offset the pain but as the pain continued to be present he came to the emergency department for further evaluation. Patient denies any other complaint. Patient is resting comfortably in the room in no acute distress conversing in full sentences nontoxic      Nursing notes were reviewed. REVIEW OFSYSTEMS    (2-9 systems for level 4, 10 or more for level 5)   ROS:  General:  No fevers, no chills, no weakness  Cardiovascular:  No chest pain, no palpitations  Respiratory:  No shortness of breath, no cough, no wheezing  Gastrointestinal:  No pain, no nausea, no vomiting, no diarrhea  Musculoskeletal: Lower back pain  Skin:  No rash, no easy bruising  Neurologic:  No speech problems, no headache, no extremity weakness  Psychiatric:  No anxiety  Genitourinary:  No dysuria, no hematuria    Except as noted above the remainder of the review of systems was reviewed and negative.        PAST MEDICAL HISTORY     Past Medical History: Diagnosis Date    Anxiety     Arthritis     Back pain     CAD (coronary artery disease)     Depression     Headache(784.0)     Hypertension          SURGICAL HISTORY       Past Surgical History:   Procedure Laterality Date    FINGER AMPUTATION      also has partial amputation of right index and ring finger    LEG SURGERY           CURRENT MEDICATIONS       Previous Medications    AMLODIPINE (NORVASC) 10 MG TABLET    Take 1 tablet by mouth nightly    ASPIRIN EC 81 MG EC TABLET    Take 1 tablet by mouth daily    CYCLOBENZAPRINE (FLEXERIL) 10 MG TABLET    Take 1 tablet by mouth 2 times daily as needed for Muscle spasms (muscle relaxer, pain)    HYDROCHLOROTHIAZIDE (HYDRODIURIL) 25 MG TABLET    Take 1 tablet by mouth daily    HYDROXYZINE (ATARAX) 25 MG TABLET    Take 1 tablet by mouth daily as needed for Anxiety    LISINOPRIL (PRINIVIL;ZESTRIL) 40 MG TABLET    Take 1 tablet by mouth daily    METOPROLOL SUCCINATE (TOPROL XL) 50 MG EXTENDED RELEASE TABLET    Take 1 tablet by mouth daily       ALLERGIES     Patient has no known allergies. FAMILY HISTORY     History reviewed. No pertinent family history.        SOCIAL HISTORY       Social History     Socioeconomic History    Marital status:      Spouse name: None    Number of children: None    Years of education: None    Highest education level: None   Occupational History    None   Tobacco Use    Smoking status: Never Smoker    Smokeless tobacco: Current User     Types: Chew   Substance and Sexual Activity    Alcohol use: Yes     Comment: drank a half pint today 9/14 trying to take his pain away    Drug use: Never    Sexual activity: None   Other Topics Concern    None   Social History Narrative    None     Social Determinants of Health     Financial Resource Strain:     Difficulty of Paying Living Expenses:    Food Insecurity:     Worried About Running Out of Food in the Last Year:     Marla of Food in the Last Year:    Transportation Needs:     Lack of Transportation (Medical):  Lack of Transportation (Non-Medical):    Physical Activity:     Days of Exercise per Week:     Minutes of Exercise per Session:    Stress:     Feeling of Stress :    Social Connections:     Frequency of Communication with Friends and Family:     Frequency of Social Gatherings with Friends and Family:     Attends Oriental orthodox Services:     Active Member of Clubs or Organizations:     Attends Club or Organization Meetings:     Marital Status:    Intimate Partner Violence:     Fear of Current or Ex-Partner:     Emotionally Abused:     Physically Abused:     Sexually Abused:          PHYSICAL EXAM    (up to 7 for level 4, 8 or more for level 5)     ED Triage Vitals [09/14/21 2026]   BP Temp Temp Source Pulse Resp SpO2 Height Weight   124/82 97.9 °F (36.6 °C) Oral 91 16 96 % 5' 8\" (1.727 m) 251 lb 4.8 oz (114 kg)       Physical Exam  General :Patient is awake, alert, oriented, in no acute distress, nontoxic appearing  HEENT: Pupils are equally round and reactive to light, EOMI, conjunctivae clear. Oral mucosa is moist, no exudate. Uvula is midline  Neck: Neck is supple, full range of motion, trachea midline  Cardiac: Heart regular rate, rhythm, no murmurs, rubs, or gallops  Lungs: Lungs are clear to auscultation, there is no wheezing, rhonchi, or rales. There is no use of accessory muscles. Chest wall: There is no tenderness to palpation over the chest wall or over ribs  Abdomen: Abdomen is soft, nontender, nondistended. There is no firm or pulsatile masses, no rebound rigidity or guarding. Musculoskeletal: 5 out of 5 strength in all 4 extremities. No focal muscle deficits are appreciated  Lumbar spine= paravertebral muscle tenderness on palpation that recreates his symptoms of brought the patient to emergency department. No step-off deformity noted. Neurovascularly intact  Neuro:  Motor intact, sensory intact, level of consciousness is normal, cerebellar function is normal, reflexes are grossly normal. No evidence of incontinence or loss of bowel or bladder function, no saddle anesthesia noted   Dermatology: Skin is warm and dry  Psych: Mentation is grossly normal, cognition is grossly normal. Affect is appropriate. DIAGNOSTIC RESULTS     EKG: All EKG's are interpreted by the Emergency Department Physician who either signs or Co-signs this chart in the [address] a cardiologist.    The EKG interpreted by me shows     RADIOLOGY:   Non-plain film images such as CT, Ultrasound and MRI are read by the radiologist. Plain radiographic images are visualized and preliminarily interpreted by the emergency physician with the below findings:      ? Radiologist's Report Reviewed:  CT LUMBAR SPINE WO CONTRAST   Final Result   Impression:   No acute fracture. 2. Degenerative changes similar compared to 9/19/2018. ED BEDSIDE ULTRASOUND:   Performed by ED Physician - none    LABS:    I have reviewed and interpreted all of the currently available lab results from this visit (ifapplicable):  Results for orders placed or performed during the hospital encounter of 09/14/21   Urine Reflex to Culture    Specimen: Urine, clean catch   Result Value Ref Range    Color, UA Yellow Straw/Yellow    Clarity, UA Clear Clear    Glucose, Ur Negative Negative mg/dL    Bilirubin Urine Negative Negative    Ketones, Urine Negative Negative mg/dL    Specific Gravity, UA <=1.005 1.005 - 1.030    Blood, Urine Negative Negative    pH, UA 5.0 5.0 - 8.0    Protein, UA Negative Negative mg/dL    Urobilinogen, Urine 0.2 <2.0 E.U./dL    Nitrite, Urine Negative Negative    Leukocyte Esterase, Urine Negative Negative    Microscopic Examination Not Indicated     Urine Type Voided     Urine Reflex to Culture Not Indicated         All other labs were within normal range or not returned as of this dictation.     EMERGENCY DEPARTMENT COURSE and DIFFERENTIAL DIAGNOSIS/MDM:   Vitals:    Vitals: 09/14/21 2026   BP: 124/82   Pulse: 91   Resp: 16   Temp: 97.9 °F (36.6 °C)   TempSrc: Oral   SpO2: 96%   Weight: 251 lb 4.8 oz (114 kg)   Height: 5' 8\" (1.727 m)       MEDICATIONS ADMINISTERED IN ED:  Medications   HYDROcodone-acetaminophen (NORCO) 7.5-325 MG per tablet 1 tablet (has no administration in time range)   cyclobenzaprine (FLEXERIL) tablet 10 mg (has no administration in time range)   ketorolac (TORADOL) injection 60 mg (60 mg IntraMUSCular Given 9/14/21 2228)       Patient was placed examination room at which time after exam was performed patient was given Toradol 60 mg IM and was sent over for CT scan lumbar spine. Urinalysis was within normal limits. CT scan lumbar spine revealed degenerative changes with no acute process. Patient was given Norco 7.5 p.o. along with 10 mg of Flexeril p.o. Results reviewed with patient who is instructed that back rest and to follow-up with primary physician for outpatient physical therapy and to take medications as prescribed. Patient was appreciative the care and agrees with the plan above    The patient will follow-up with their PCP in 1-2 days for reevaluation. If the patient or family members have anyfurther concerns or any worsening symptoms they will return to the ED for reevaluation. CONSULTS:  None    PROCEDURES:  Procedures    CRITICAL CARE TIME    Total Critical Care time was 0 minutes, excluding separately reportable procedures. There was a high probability of clinically significant/life threatening deterioration in the patient's condition which required my urgent intervention. FINAL IMPRESSION      1. Strain of lumbar region, initial encounter Stable   2. Spasm of muscle Stable   3.  Spondylosis of lumbosacral region, unspecified spinal osteoarthritis complication status Stable         DISPOSITION/PLAN   DISPOSITION        PATIENT REFERRED TO:  DORIS Woodard - [address] [facility] [place]  [phone]    Schedule an appointment as soon as possible for a visit in 2 days      [facility] Emergency Department  [address] [facility]  [phone]  In 2 days  If symptoms worsen      DISCHARGE MEDICATIONS:  New Prescriptions    HYDROCODONE-ACETAMINOPHEN (NORCO) 5-325 MG PER TABLET    Take 1 tablet by mouth every 6 hours as needed for Pain for up to 3 days. Intended supply: 3 days. Take lowest dose possible to manage pain       Comment: Please note this report has been produced using speech recognition software and may contain errorsrelated to that system including errors in grammar, punctuation, and spelling, as well as words and phrases that may be inappropriate. If there are any questions or concerns please feel free to contact the dictating providerfor clarification.     Ra Pedersen MD  Attending Emergency Physician              Ra Pedersen MD  09/14/21 5484

## 2021-09-15 NOTE — ED NOTES
Patient began to complain of \"pressure in the chest\". Patient states it has been that way for a few days but \"didn't want to say anything to anyone\" but thought it would be a good idea to \"mention it while he was here\". RN will perform EKG.       Rebecca Hazel RN  09/14/21 1647

## 2021-09-15 NOTE — ED NOTES
Discharge instructions provided to patient. Patient verbalizes understanding of d/c plan and follow up care. Patient given paper prescription for qty 12 norco to fill at his choice pharmacy. Patient ambulatory off unit to POV at this time with no further needs noted.        Cruzito Loomis RN  09/14/21 4672

## 2021-09-23 ENCOUNTER — TELEPHONE (OUTPATIENT)
Dept: CARDIOLOGY | Facility: CLINIC | Age: 51
End: 2021-09-23

## 2022-01-03 ENCOUNTER — APPOINTMENT (OUTPATIENT)
Dept: GENERAL RADIOLOGY | Facility: HOSPITAL | Age: 52
End: 2022-01-03
Payer: COMMERCIAL

## 2022-01-03 ENCOUNTER — HOSPITAL ENCOUNTER (EMERGENCY)
Facility: HOSPITAL | Age: 52
Discharge: HOME | End: 2022-01-03
Attending: EMERGENCY MEDICINE
Payer: COMMERCIAL

## 2022-01-03 VITALS
TEMPERATURE: 98.2 F | RESPIRATION RATE: 17 BRPM | WEIGHT: 240 LBS | SYSTOLIC BLOOD PRESSURE: 174 MMHG | DIASTOLIC BLOOD PRESSURE: 98 MMHG | HEIGHT: 67 IN | OXYGEN SATURATION: 95 % | HEART RATE: 100 BPM | BODY MASS INDEX: 37.67 KG/M2

## 2022-01-03 DIAGNOSIS — U07.1 UPPER RESPIRATORY TRACT INFECTION DUE TO COVID-19 VIRUS: Primary | ICD-10-CM

## 2022-01-03 DIAGNOSIS — J06.9 UPPER RESPIRATORY TRACT INFECTION DUE TO COVID-19 VIRUS: Primary | ICD-10-CM

## 2022-01-03 LAB
A/G RATIO: 1.6 (ref 0.8–2)
ALBUMIN SERPL-MCNC: 4.6 G/DL (ref 3.4–4.8)
ALP BLD-CCNC: 74 U/L (ref 25–100)
ALT SERPL-CCNC: 81 U/L (ref 4–36)
ANION GAP SERPL CALCULATED.3IONS-SCNC: 14 MMOL/L (ref 3–16)
APTT: 25 SEC (ref 22.5–34.9)
AST SERPL-CCNC: 89 U/L (ref 8–33)
BASOPHILS ABSOLUTE: 0 K/UL (ref 0–0.1)
BASOPHILS RELATIVE PERCENT: 0.3 %
BILIRUB SERPL-MCNC: 0.9 MG/DL (ref 0.3–1.2)
BUN BLDV-MCNC: 12 MG/DL (ref 6–20)
CALCIUM SERPL-MCNC: 9.6 MG/DL (ref 8.5–10.5)
CHLORIDE BLD-SCNC: 94 MMOL/L (ref 98–107)
CO2: 26 MMOL/L (ref 20–30)
CREAT SERPL-MCNC: 1 MG/DL (ref 0.4–1.2)
D DIMER: 0.46 UG/ML FEU (ref 0–0.6)
EOSINOPHILS ABSOLUTE: 0.1 K/UL (ref 0–0.4)
EOSINOPHILS RELATIVE PERCENT: 1.4 %
GFR AFRICAN AMERICAN: >59
GFR NON-AFRICAN AMERICAN: >59
GLOBULIN: 2.9 G/DL
GLUCOSE BLD-MCNC: 136 MG/DL (ref 74–106)
HCT VFR BLD CALC: 44.9 % (ref 40–54)
HEMOGLOBIN: 15.7 G/DL (ref 13–18)
IMMATURE GRANULOCYTES #: 0 K/UL
IMMATURE GRANULOCYTES %: 0.3 % (ref 0–5)
INR BLD: 1.01 (ref 0.88–1.11)
LACTIC ACID, SEPSIS: 1.8 MMOL/L (ref 0.4–1.9)
LYMPHOCYTES ABSOLUTE: 1.5 K/UL (ref 1.5–4)
LYMPHOCYTES RELATIVE PERCENT: 23.5 %
MAGNESIUM: 1.6 MG/DL (ref 1.7–2.4)
MCH RBC QN AUTO: 34.4 PG (ref 27–32)
MCHC RBC AUTO-ENTMCNC: 35 G/DL (ref 31–35)
MCV RBC AUTO: 98.5 FL (ref 80–100)
MONOCYTES ABSOLUTE: 0.6 K/UL (ref 0.2–0.8)
MONOCYTES RELATIVE PERCENT: 8.8 %
NEUTROPHILS ABSOLUTE: 4.1 K/UL (ref 2–7.5)
NEUTROPHILS RELATIVE PERCENT: 65.7 %
PDW BLD-RTO: 13.1 % (ref 11–16)
PLATELET # BLD: 133 K/UL (ref 150–400)
PMV BLD AUTO: 10 FL (ref 6–10)
POTASSIUM REFLEX MAGNESIUM: 3.1 MMOL/L (ref 3.4–5.1)
PRO-BNP: 95 PG/ML (ref 0–900)
PROTHROMBIN TIME: 12.8 SEC (ref 11.6–13.8)
RBC # BLD: 4.56 M/UL (ref 4.5–6)
SARS-COV-2, NAAT: DETECTED
SODIUM BLD-SCNC: 134 MMOL/L (ref 136–145)
TOTAL PROTEIN: 7.5 G/DL (ref 6.4–8.3)
TROPONIN: <0.3 NG/ML
WBC # BLD: 6.3 K/UL (ref 4–11)

## 2022-01-03 PROCEDURE — 93005 ELECTROCARDIOGRAM TRACING: CPT

## 2022-01-03 PROCEDURE — 85610 PROTHROMBIN TIME: CPT

## 2022-01-03 PROCEDURE — 83605 ASSAY OF LACTIC ACID: CPT

## 2022-01-03 PROCEDURE — 85025 COMPLETE CBC W/AUTO DIFF WBC: CPT

## 2022-01-03 PROCEDURE — 85730 THROMBOPLASTIN TIME PARTIAL: CPT

## 2022-01-03 PROCEDURE — 83880 ASSAY OF NATRIURETIC PEPTIDE: CPT

## 2022-01-03 PROCEDURE — 85379 FIBRIN DEGRADATION QUANT: CPT

## 2022-01-03 PROCEDURE — 71045 X-RAY EXAM CHEST 1 VIEW: CPT

## 2022-01-03 PROCEDURE — 87635 SARS-COV-2 COVID-19 AMP PRB: CPT

## 2022-01-03 PROCEDURE — 80053 COMPREHEN METABOLIC PANEL: CPT

## 2022-01-03 PROCEDURE — 83735 ASSAY OF MAGNESIUM: CPT

## 2022-01-03 PROCEDURE — 84484 ASSAY OF TROPONIN QUANT: CPT

## 2022-01-03 PROCEDURE — 36415 COLL VENOUS BLD VENIPUNCTURE: CPT

## 2022-01-03 PROCEDURE — 99283 EMERGENCY DEPT VISIT LOW MDM: CPT

## 2022-01-03 RX ORDER — SODIUM CHLORIDE, SODIUM LACTATE, POTASSIUM CHLORIDE, AND CALCIUM CHLORIDE .6; .31; .03; .02 G/100ML; G/100ML; G/100ML; G/100ML
1000 INJECTION, SOLUTION INTRAVENOUS ONCE
Status: DISCONTINUED | OUTPATIENT
Start: 2022-01-03 | End: 2022-01-03

## 2022-01-03 ASSESSMENT — PAIN SCALES - GENERAL: PAINLEVEL_OUTOF10: 2

## 2022-01-03 ASSESSMENT — PAIN DESCRIPTION - PROGRESSION: CLINICAL_PROGRESSION: GRADUALLY WORSENING

## 2022-01-03 ASSESSMENT — PAIN DESCRIPTION - DESCRIPTORS: DESCRIPTORS: ACHING

## 2022-01-03 ASSESSMENT — PAIN DESCRIPTION - PAIN TYPE: TYPE: ACUTE PAIN

## 2022-01-03 ASSESSMENT — PAIN DESCRIPTION - FREQUENCY: FREQUENCY: CONTINUOUS

## 2022-01-03 ASSESSMENT — PAIN DESCRIPTION - LOCATION: LOCATION: GENERALIZED

## 2022-01-03 NOTE — Clinical Note
Chang Handler was seen and treated in our emergency department on 1/3/2022. He may return to work on 01/08/2022. If you have any questions or concerns, please don't hesitate to call.       Blaze Rodriguez MD

## 2022-01-03 NOTE — ED PROVIDER NOTES
[facility]ittney [facility] ENCOUNTER      Pt Name: Gilberto Downey  MRN: [id number]  YOB: 1970  Date of evaluation: 1/3/2022  Provider: Dontae Green MD    CHIEF COMPLAINT       Chief Complaint   Patient presents with    Concern For COVID-19     cough, general weakness, body aches, loss of taste and smell, onset last night         HISTORY OF PRESENT ILLNESS  (Location/Symptom, Timing/Onset, Context/Setting, Quality, Duration, Modifying Factors, Severity.)   Gilberto Downey is a 54 y.o. male who presents to the emergency department complaining of feeling really bad having no taste or no spell aching all over for the last 2 days temp was 100.7 yesterday he has not taken anything for fever today and is afebrile he has not been eating or drinking just because he does not feel like it he has not been vaccinated for COVID-19 he is not had any known exposure he does complain of some head congestion and a severe cough productive of white sputum and gets very short of breath with coughing spells denies any sore throat. Nursing notes were reviewed. REVIEW OFSYSTEMS    (2-9 systems for level 4, 10 or more for level 5)   ROS:  General:  + fevers, no chills, no weakness  Cardiovascular:  No chest pain, no palpitations  Respiratory:  + shortness of breath+ cough, no wheezing  Gastrointestinal:  No pain, no nausea, no vomiting, no diarrhea  Musculoskeletal:  No muscle pain, no joint pain  Skin:  No rash, no easy bruising  Neurologic:  No speech problems, no headache, no extremity weakness  Psychiatric:  No anxiety  Genitourinary:  No dysuria, no hematuria    Except as noted above the remainder of the review of systems was reviewed and negative.        PAST MEDICAL HISTORY     Past Medical History:   Diagnosis Date    Anxiety     Arthritis     Back pain     CAD (coronary artery disease)     Depression     Headache(784.0)     Hypertension          SURGICAL HISTORY Past Surgical History:   Procedure Laterality Date    FINGER AMPUTATION      also has partial amputation of right index and ring finger    LEG SURGERY           CURRENT MEDICATIONS       Previous Medications    AMLODIPINE (NORVASC) 10 MG TABLET    Take 1 tablet by mouth nightly    ASPIRIN EC 81 MG EC TABLET    Take 1 tablet by mouth daily    CYCLOBENZAPRINE (FLEXERIL) 10 MG TABLET    Take 1 tablet by mouth 2 times daily as needed for Muscle spasms (muscle relaxer, pain)    HYDROCHLOROTHIAZIDE (HYDRODIURIL) 25 MG TABLET    Take 1 tablet by mouth daily    HYDROXYZINE (ATARAX) 25 MG TABLET    Take 1 tablet by mouth daily as needed for Anxiety    LISINOPRIL (PRINIVIL;ZESTRIL) 40 MG TABLET    Take 1 tablet by mouth daily    METOPROLOL SUCCINATE (TOPROL XL) 50 MG EXTENDED RELEASE TABLET    Take 1 tablet by mouth daily       ALLERGIES     Patient has no known allergies. FAMILY HISTORY     History reviewed. No pertinent family history. SOCIAL HISTORY       Social History     Socioeconomic History    Marital status:      Spouse name: None    Number of children: None    Years of education: None    Highest education level: None   Occupational History    None   Tobacco Use    Smoking status: Never Smoker    Smokeless tobacco: Current User     Types: Chew   Substance and Sexual Activity    Alcohol use: Yes     Comment: drank a half pint today 9/14 trying to take his pain away    Drug use: Never    Sexual activity: None   Other Topics Concern    None   Social History Narrative    None     Social Determinants of Health     Financial Resource Strain:     Difficulty of Paying Living Expenses: Not on file   Food Insecurity:     Worried About Running Out of Food in the Last Year: Not on file    Marla of Food in the Last Year: Not on file   Transportation Needs:     Lack of Transportation (Medical): Not on file    Lack of Transportation (Non-Medical):  Not on file   Physical Activity:     Days of Exercise per Week: Not on file    Minutes of Exercise per Session: Not on file   Stress:     Feeling of Stress : Not on file   Social Connections:     Frequency of Communication with Friends and Family: Not on file    Frequency of Social Gatherings with Friends and Family: Not on file    Attends Faith Services: Not on file    Active Member of [address] [place] [address] [employer] or Organizations: Not on file    Attends Club or Organization Meetings: Not on file    Marital Status: Not on file   Intimate Partner Violence:     Fear of Current or Ex-Partner: Not on file    Emotionally Abused: Not on file    Physically Abused: Not on file    Sexually Abused: Not on file   Housing Stability:     Unable to Pay for Housing in the Last Year: Not on file    Number of [place] in the Last Year: Not on file    Unstable Housing in the Last Year: Not on file         PHYSICAL EXAM    (up to 7 for level 4, 8 or more for level 5)     ED Triage Vitals [01/03/22 1701]   BP Temp Temp Source Pulse Resp SpO2 Height Weight   (!) 132/103 98.2 °F (36.8 °C) Oral 110 24 99 % 5' 7\" (1.702 m) 240 lb (108.9 kg)       Physical Exam  General :Patient is awake, alert, oriented, in no acute distress, nontoxic appearing  HEENT: Pupils are equally round and reactive to light, EOMI, conjunctivae clear. Neck: Neck is supple, full range of motion, trachea midline  Cardiac: Heart tachycardic, rhythm, no murmurs, rubs, or gallops  Lungs: Lungs are clear to auscultation, there is no wheezing, rhonchi, or rales. There is no use of accessory muscles. Chest wall: There is no tenderness to palpation over the chest wall or over ribs  Abdomen: Abdomen is soft, nontender, nondistended. There is no firm or pulsatile masses, no rebound rigidity or guarding. Musculoskeletal: 5 out of 5 strength in all 4 extremities. No focal muscle deficits are appreciated  Neuro:  Motor intact, sensory intact, level of consciousness is normal,Dermatology: Skin is warm and dry  Psych: Mentation is grossly normal, cognition is grossly normal. Affect is appropriate. DIAGNOSTIC RESULTS     EKG: All EKG's are interpreted by the Emergency Department Physician who either signs or Co-signs this chart in the [address] a cardiologist.    The EKG interpreted by me shows sinus tach rate of 100 biphasic T waves in V5 and V6  RADIOLOGY:   Non-plain film images such as CT, Ultrasound and MRI are read by the radiologist. Plain radiographic images are visualized and preliminarily interpreted by the emergency physician with the below findings:      ? Radiologist's Report Reviewed:  XR CHEST PORTABLE   Final Result      No acute cardiopulmonary disease.             ED BEDSIDE ULTRASOUND:   Performed by ED Physician - none    LABS:    I have reviewed and interpreted all of the currently available lab results from this visit (ifapplicable):  Results for orders placed or performed during the hospital encounter of 01/03/22   COVID-19, Rapid    Specimen: Nasopharyngeal Swab   Result Value Ref Range    SARS-CoV-2, NAAT DETECTED (AA) Not Detected   CBC Auto Differential   Result Value Ref Range    WBC 6.3 4.0 - 11.0 K/uL    RBC 4.56 4.50 - 6.00 M/uL    Hemoglobin 15.7 13.0 - 18.0 g/dL    Hematocrit 44.9 40.0 - 54.0 %    MCV 98.5 80.0 - 100.0 fL    MCH 34.4 (H) 27.0 - 32.0 pg    MCHC 35.0 31.0 - 35.0 g/dL    RDW 13.1 11.0 - 16.0 %    Platelets 397 (L) 651 - 400 K/uL    MPV 10.0 6.0 - 10.0 fL    Neutrophils % 65.7 %    Immature Granulocytes % 0.3 0.0 - 5.0 %    Lymphocytes % 23.5 %    Monocytes % 8.8 %    Eosinophils % 1.4 %    Basophils % 0.3 %    Neutrophils Absolute 4.1 2.0 - 7.5 K/uL    Immature Granulocytes # 0.0 K/uL    Lymphocytes Absolute 1.5 1.5 - 4.0 K/uL    Monocytes Absolute 0.6 0.2 - 0.8 K/uL    Eosinophils Absolute 0.1 0.0 - 0.4 K/uL    Basophils Absolute 0.0 0.0 - 0.1 K/uL   Comprehensive Metabolic Panel w/ Reflex to MG   Result Value Ref Range    Sodium 134 (L) 136 - 145 mmol/L    Potassium reflex Magnesium 3.1 (L) 3.4 - 5.1 mmol/L    Chloride 94 (L) 98 - 107 mmol/L    CO2 26 20 - 30 mmol/L    Anion Gap 14 3 - 16    Glucose 136 (H) 74 - 106 mg/dL    BUN 12 6 - 20 mg/dL    CREATININE 1.0 0.4 - 1.2 mg/dL    GFR Non-African American >59 >59    GFR African American >59 >59    Calcium 9.6 8.5 - 10.5 mg/dL    Total Protein 7.5 6.4 - 8.3 g/dL    Albumin 4.6 3.4 - 4.8 g/dL    Albumin/Globulin Ratio 1.6 0.8 - 2.0    Total Bilirubin 0.9 0.3 - 1.2 mg/dL    Alkaline Phosphatase 74 25 - 100 U/L    ALT 81 (H) 4 - 36 U/L    AST 89 (H) 8 - 33 U/L    Globulin 2.9 Not Established g/dL   Troponin   Result Value Ref Range    Troponin <0.30 <0.30 ng/mL   Brain Natriuretic Peptide   Result Value Ref Range    Pro-BNP 95 0 - 900 pg/mL   Protime-INR   Result Value Ref Range    Protime 12.8 11.6 - 13.8 sec    INR 1.01 0.88 - 1.11   APTT   Result Value Ref Range    aPTT 25.0 22.5 - 34.9 sec   D-Dimer, Quantitative   Result Value Ref Range    D-Dimer, Quant 0.46 0.00 - 0.60 ug/mL FEU   Lactate, Sepsis   Result Value Ref Range    Lactic Acid, Sepsis 1.8 0.4 - 1.9 mmol/L   Magnesium   Result Value Ref Range    Magnesium 1.6 (L) 1.7 - 2.4 mg/dL        All other labs were within normal range or not returned as of this dictation.     EMERGENCY DEPARTMENT COURSE and DIFFERENTIAL DIAGNOSIS/MDM:   Vitals:    Vitals:    01/03/22 1800 01/03/22 1815 01/03/22 1830 01/03/22 1845   BP:   (!) 158/106    Pulse: 101 100     Resp: 17      Temp:       TempSrc:       SpO2: 99% 98% 96% 95%   Weight:       Height:           MEDICATIONS ADMINISTERED IN ED:  Medications   lactated ringers bolus (has no administration in time range)       Patient is been stable and feeling okay his lab work is good other than his magnesium potassium being a little low he does want to go ahead and take the antibody therapy but he wishes to come back during the week and get it as an outpatient infusion rather than staying tonight therefore we will discharge to home and have him follow-up for that tomorrow. The patient will follow-up with their PCP in 1-2 days for reevaluation. If the patient or family members have anyfurther concerns or any worsening symptoms they will return to the ED for reevaluation. CONSULTS:  None    PROCEDURES:  Procedures    CRITICAL CARE TIME    Total Critical Care time was 0 minutes, excluding separately reportable procedures. There was a high probability of clinically significant/life threatening deterioration in the patient's condition which required my urgent intervention. FINAL IMPRESSION      1. Upper respiratory tract infection due to COVID-19 virus New Problem         DISPOSITION/PLAN   DISPOSITION    Stable discharge to home to return for outpatient antibiotic therapy    PATIENT REFERRED TO:  [facility] [address]  [phone]    Call in 3 days        DISCHARGE MEDICATIONS:  New Prescriptions    No medications on file       Comment: Please note this report has been produced using speech recognition software and may contain errorsrelated to that system including errors in grammar, punctuation, and spelling, as well as words and phrases that may be inappropriate. If there are any questions or concerns please feel free to contact the dictating providerfor clarification.     Erich Charles MD  Attending Emergency Physician              Erich Charles MD  01/03/22 6546

## 2022-03-06 ENCOUNTER — APPOINTMENT (OUTPATIENT)
Dept: CT IMAGING | Facility: HOSPITAL | Age: 52
End: 2022-03-06

## 2022-03-06 ENCOUNTER — HOSPITAL ENCOUNTER (EMERGENCY)
Facility: HOSPITAL | Age: 52
Discharge: HOME | End: 2022-03-06
Attending: EMERGENCY MEDICINE | Admitting: EMERGENCY MEDICINE

## 2022-03-06 ENCOUNTER — APPOINTMENT (OUTPATIENT)
Dept: GENERAL RADIOLOGY | Facility: HOSPITAL | Age: 52
End: 2022-03-06

## 2022-03-06 VITALS
SYSTOLIC BLOOD PRESSURE: 175 MMHG | DIASTOLIC BLOOD PRESSURE: 108 MMHG | RESPIRATION RATE: 18 BRPM | OXYGEN SATURATION: 95 % | TEMPERATURE: 98.5 F | WEIGHT: 230 LBS | BODY MASS INDEX: 34.07 KG/M2 | HEIGHT: 69 IN | HEART RATE: 111 BPM

## 2022-03-06 DIAGNOSIS — I10 HYPERTENSION, UNSPECIFIED TYPE: Primary | ICD-10-CM

## 2022-03-06 DIAGNOSIS — R55 SYNCOPE, UNSPECIFIED SYNCOPE TYPE: ICD-10-CM

## 2022-03-06 LAB
ALBUMIN SERPL-MCNC: 4.4 G/DL (ref 3.5–5.2)
ALBUMIN/GLOB SERPL: 1.4 G/DL
ALP SERPL-CCNC: 89 U/L (ref 39–117)
ALT SERPL W P-5'-P-CCNC: 49 U/L (ref 1–41)
ANION GAP SERPL CALCULATED.3IONS-SCNC: 15.2 MMOL/L (ref 5–15)
AST SERPL-CCNC: 41 U/L (ref 1–40)
BASOPHILS # BLD AUTO: 0.03 10*3/MM3 (ref 0–0.2)
BASOPHILS NFR BLD AUTO: 0.5 % (ref 0–1.5)
BILIRUB SERPL-MCNC: 0.2 MG/DL (ref 0–1.2)
BUN SERPL-MCNC: 12 MG/DL (ref 6–20)
BUN/CREAT SERPL: 14 (ref 7–25)
CALCIUM SPEC-SCNC: 9.4 MG/DL (ref 8.6–10.5)
CHLORIDE SERPL-SCNC: 103 MMOL/L (ref 98–107)
CO2 SERPL-SCNC: 21.8 MMOL/L (ref 22–29)
CREAT SERPL-MCNC: 0.86 MG/DL (ref 0.76–1.27)
DEPRECATED RDW RBC AUTO: 45.6 FL (ref 37–54)
EGFRCR SERPLBLD CKD-EPI 2021: 106.1 ML/MIN/1.73
EOSINOPHIL # BLD AUTO: 0.15 10*3/MM3 (ref 0–0.4)
EOSINOPHIL NFR BLD AUTO: 2.6 % (ref 0.3–6.2)
ERYTHROCYTE [DISTWIDTH] IN BLOOD BY AUTOMATED COUNT: 12.7 % (ref 12.3–15.4)
GLOBULIN UR ELPH-MCNC: 3.1 GM/DL
GLUCOSE SERPL-MCNC: 137 MG/DL (ref 65–99)
HCT VFR BLD AUTO: 47.5 % (ref 37.5–51)
HGB BLD-MCNC: 16.6 G/DL (ref 13–17.7)
HOLD SPECIMEN: NORMAL
HOLD SPECIMEN: NORMAL
IMM GRANULOCYTES # BLD AUTO: 0.02 10*3/MM3 (ref 0–0.05)
IMM GRANULOCYTES NFR BLD AUTO: 0.3 % (ref 0–0.5)
LYMPHOCYTES # BLD AUTO: 2.22 10*3/MM3 (ref 0.7–3.1)
LYMPHOCYTES NFR BLD AUTO: 38.4 % (ref 19.6–45.3)
MCH RBC QN AUTO: 34.2 PG (ref 26.6–33)
MCHC RBC AUTO-ENTMCNC: 34.9 G/DL (ref 31.5–35.7)
MCV RBC AUTO: 97.9 FL (ref 79–97)
MONOCYTES # BLD AUTO: 0.48 10*3/MM3 (ref 0.1–0.9)
MONOCYTES NFR BLD AUTO: 8.3 % (ref 5–12)
NEUTROPHILS NFR BLD AUTO: 2.88 10*3/MM3 (ref 1.7–7)
NEUTROPHILS NFR BLD AUTO: 49.9 % (ref 42.7–76)
NRBC BLD AUTO-RTO: 0 /100 WBC (ref 0–0.2)
PLATELET # BLD AUTO: 191 10*3/MM3 (ref 140–450)
PMV BLD AUTO: 10.1 FL (ref 6–12)
POTASSIUM SERPL-SCNC: 3.7 MMOL/L (ref 3.5–5.2)
PROT SERPL-MCNC: 7.5 G/DL (ref 6–8.5)
RBC # BLD AUTO: 4.85 10*6/MM3 (ref 4.14–5.8)
SODIUM SERPL-SCNC: 140 MMOL/L (ref 136–145)
TROPONIN T SERPL-MCNC: <0.01 NG/ML (ref 0–0.03)
TROPONIN T SERPL-MCNC: <0.01 NG/ML (ref 0–0.03)
WBC NRBC COR # BLD: 5.78 10*3/MM3 (ref 3.4–10.8)
WHOLE BLOOD HOLD SPECIMEN: NORMAL
WHOLE BLOOD HOLD SPECIMEN: NORMAL

## 2022-03-06 PROCEDURE — 80053 COMPREHEN METABOLIC PANEL: CPT | Performed by: NURSE PRACTITIONER

## 2022-03-06 PROCEDURE — 71045 X-RAY EXAM CHEST 1 VIEW: CPT

## 2022-03-06 PROCEDURE — 96374 THER/PROPH/DIAG INJ IV PUSH: CPT

## 2022-03-06 PROCEDURE — 85025 COMPLETE CBC W/AUTO DIFF WBC: CPT | Performed by: NURSE PRACTITIONER

## 2022-03-06 PROCEDURE — 99284 EMERGENCY DEPT VISIT MOD MDM: CPT

## 2022-03-06 PROCEDURE — 70450 CT HEAD/BRAIN W/O DYE: CPT

## 2022-03-06 PROCEDURE — 93005 ELECTROCARDIOGRAM TRACING: CPT

## 2022-03-06 PROCEDURE — 84484 ASSAY OF TROPONIN QUANT: CPT | Performed by: NURSE PRACTITIONER

## 2022-03-06 PROCEDURE — 25010000002 HYDRALAZINE PER 20 MG: Performed by: NURSE PRACTITIONER

## 2022-03-06 RX ORDER — HYDROCODONE BITARTRATE AND ACETAMINOPHEN 5; 325 MG/1; MG/1
1 TABLET ORAL ONCE
Status: COMPLETED | OUTPATIENT
Start: 2022-03-06 | End: 2022-03-06

## 2022-03-06 RX ORDER — HYDRALAZINE HYDROCHLORIDE 20 MG/ML
10 INJECTION INTRAMUSCULAR; INTRAVENOUS ONCE
Status: COMPLETED | OUTPATIENT
Start: 2022-03-06 | End: 2022-03-06

## 2022-03-06 RX ORDER — SODIUM CHLORIDE 0.9 % (FLUSH) 0.9 %
10 SYRINGE (ML) INJECTION AS NEEDED
Status: DISCONTINUED | OUTPATIENT
Start: 2022-03-06 | End: 2022-03-06 | Stop reason: HOSPADM

## 2022-03-06 RX ADMIN — METOPROLOL TARTRATE 25 MG: 25 TABLET, FILM COATED ORAL at 20:52

## 2022-03-06 RX ADMIN — HYDRALAZINE HYDROCHLORIDE 10 MG: 20 INJECTION INTRAMUSCULAR; INTRAVENOUS at 19:42

## 2022-03-06 RX ADMIN — HYDROCODONE BITARTRATE AND ACETAMINOPHEN 1 TABLET: 5; 325 TABLET ORAL at 19:00

## 2022-03-06 NOTE — ED PROVIDER NOTES
Subjective   Chief Complaint: Chest pain with syncopal episode    History of Present Illness: This is a 51-year-old male patient comes into the ED via EMS with complaints of chest pain that radiates to his right arm, and syncope episode where patient hit his head.  Patient states he has had ongoing chest pain described as a dull continual ache that has radiated to his right arm that has come and gone over the last 24 to 36 hours.  Patient states he was getting ready to go to work when he blacked out fell.  Patient states he has a headache and thinks he hit his head when he passed out.  Patient rates his pain at 7 out of 10 described as a dull continual ache    Nurses Notes reviewed and agree, including vitals, allergies, social history and prior medical history.                Review of Systems   Cardiovascular: Positive for chest pain and leg swelling.   Neurological: Positive for syncope and headaches.   All other systems reviewed and are negative.      Past Medical History:   Diagnosis Date   • Anxiety    • Hypertension        No Known Allergies    Past Surgical History:   Procedure Laterality Date   • FRACTURE SURGERY Left     tib/fib       Family History   Problem Relation Age of Onset   • No Known Problems Mother    • No Known Problems Father        Social History     Socioeconomic History   • Marital status:    Tobacco Use   • Smoking status: Never Smoker   • Smokeless tobacco: Current User     Types: Snuff   Substance and Sexual Activity   • Alcohol use: Yes           Objective   Physical Exam  Vitals and nursing note reviewed.   Constitutional:       Appearance: Normal appearance.   HENT:      Head: Normocephalic and atraumatic.   Eyes:      Extraocular Movements: Extraocular movements intact.      Pupils: Pupils are equal, round, and reactive to light.   Cardiovascular:      Rate and Rhythm: Normal rate and regular rhythm.      Pulses: Normal pulses.           Carotid pulses are 2+ on the right side  and 2+ on the left side.       Radial pulses are 2+ on the right side and 2+ on the left side.        Dorsalis pedis pulses are 2+ on the right side and 2+ on the left side.        Posterior tibial pulses are 2+ on the right side and 2+ on the left side.      Heart sounds: Normal heart sounds.   Pulmonary:      Effort: Pulmonary effort is normal.      Breath sounds: Normal breath sounds.   Chest:      Chest wall: No tenderness.   Abdominal:      General: Bowel sounds are normal.      Palpations: Abdomen is soft.   Musculoskeletal:         General: Normal range of motion.      Cervical back: Normal range of motion and neck supple.   Skin:     General: Skin is warm and dry.      Capillary Refill: Capillary refill takes less than 2 seconds.   Neurological:      Mental Status: He is alert.      GCS: GCS eye subscore is 4. GCS verbal subscore is 5. GCS motor subscore is 6.      Cranial Nerves: Cranial nerves are intact.      Sensory: Sensation is intact.      Motor: Motor function is intact.   Psychiatric:         Attention and Perception: Attention and perception normal.         Mood and Affect: Mood and affect normal.         Speech: Speech normal.         Behavior: Behavior normal.         Procedures           ED Course  ED Course as of 03/06/22 2136   Sun Mar 06, 2022   1810 EKG interpreted by me reveals sinus tachycardia with rate of 117 bpm.  There are nonspecific ST and T wave changes.  EKG similar morphology compared to previous.  This is an abnormal appearing EKG. [TB]      ED Course User Index  [TB] Ayana Rosado MD                                                 [facility]    Final diagnoses:   Hypertension, unspecified type   Syncope, unspecified syncope type       ED Disposition  ED Disposition     ED Disposition   Discharge    Condition   Stable    Comment   --             Shanna Rubin, APRN  [address] [facility] [address] [phone]    In 1 week  Follow-up         Medication List      No  changes were made to your prescriptions during this visit.          Kyle Adan, APRN  03/06/22 2277

## 2022-03-07 NOTE — EXTERNAL PATIENT INSTRUCTIONS
Patient Education   Table of Contents       Hypertension, Adult       Near-Syncope     To view videos and all your education online visit,   [email or web address]   or scan this QR code with your smartphone.                  Hypertension, Adult     Hypertension is another name for high blood pressure. High blood pressure forces your heart to work harder to pump blood. This can cause problems over time.   There are two numbers in a blood pressure reading. There is a top number (systolic) over a bottom number (diastolic). It is best to have a blood pressure that is below 120/80. Healthy choices can help lower your blood pressure, or you may need medicine to help lower it.   What are the causes?   The cause of this condition is not known. Some conditions may be related to high blood pressure.   What increases the risk?         Smoking.       Having type 2 diabetes mellitus, high cholesterol, or both.       Not getting enough exercise or physical activity.       Being overweight.       Having too much fat, sugar, calories, or salt (sodium) in your diet.       Drinking too much alcohol.       Having long-term (chronic) kidney disease.       Having a family history of high blood pressure.       Age. Risk increases with age.       Race. You may be at higher risk if you are .       Gender. Men are at higher risk than women before age 45. After age 65, women are at higher risk than men.       Having obstructive sleep apnea.       Stress.     What are the signs or symptoms?        High blood pressure may not cause symptoms. Very high blood pressure (hypertensive crisis) may cause:       Headache.       Feelings of worry or nervousness (anxiety).       Shortness of breath.       Nosebleed.       A feeling of being sick to your stomach (nausea).       Throwing up (vomiting).       Changes in how you see.       Very bad chest pain.       Seizures.     How is this treated?        This condition is  treated by making healthy lifestyle changes, such as:       Eating healthy foods.       Exercising more.       Drinking less alcohol.      Your health care provider may prescribe medicine if lifestyle changes are not enough to get your blood pressure under control, and if:       Your top number is above 130.       Your bottom number is above 80.       Your personal target blood pressure may vary.     Follow these instructions at home:   Eating and drinking           If told, follow the DASH eating plan. To follow this plan:       Fill one half of your plate at each meal with fruits and vegetables.       Fill one fourth of your plate at each meal with whole grains. Whole grains include whole-wheat pasta, brown rice, and whole-grain bread.       Eat or drink low-fat dairy products, such as skim milk or low-fat yogurt.       Fill one fourth of your plate at each meal with low-fat (lean) proteins. Low-fat proteins include fish, chicken without skin, eggs, beans, and tofu.       Avoid fatty meat, cured and processed meat, or chicken with skin.       Avoid pre-made or processed food.       Eat less than 1,500 mg of salt each day.      Do not  drink alcohol if:       Your doctor tells you not to drink.       You are pregnant, may be pregnant, or are planning to become pregnant.      If you drink alcohol:      Limit how much you use to:       0?1 drink a day for women.       0?2 drinks a day for men.       Be aware of how much alcohol is in your drink. In the [place] one drink equals one 12 oz bottle of beer (355 mL), one 5 oz glass of wine (148 mL), or one 1? oz glass of hard liquor (44 mL).     Lifestyle            Work with your doctor to stay at a healthy weight or to lose weight. Ask your doctor what the best weight is for you.       Get at least 30 minutes of exercise most days of the week. This may include walking, swimming, or biking.       Get at least 30 minutes of exercise that strengthens your muscles (resistance  exercise) at least 3 days a week. This may include lifting weights or doing Pilates.      Do not  use any products that contain nicotine or tobacco, such as cigarettes, e-cigarettes, and chewing tobacco. If you need help quitting, ask your doctor.       Check your blood pressure at home as told by your doctor.       Keep all follow-up visits as told by your doctor. This is important.       Medicines         Take over-the-counter and prescription medicines only as told by your doctor. Follow directions carefully.      Do not  skip doses of blood pressure medicine. The medicine does not work as well if you skip doses. Skipping doses also puts you at risk for problems.       Ask your doctor about side effects or reactions to medicines that you should watch for.       Contact a doctor if you:         Think you are having a reaction to the medicine you are taking.       Have headaches that keep coming back (recurring).       Feel dizzy.       Have swelling in your ankles.       Have trouble with your vision.     Get help right away if you:         Get a very bad headache.       Start to feel mixed up (confused).       Feel weak or numb.       Feel faint.      Have very bad pain in your:       Chest.       Belly (abdomen).       Throw up more than once.       Have trouble breathing.     Summary         Hypertension is another name for high blood pressure.       High blood pressure forces your heart to work harder to pump blood.       For most people, a normal blood pressure is less than 120/80.       Making healthy choices can help lower blood pressure. If your blood pressure does not get lower with healthy choices, you may need to take medicine.     This information is not intended to replace advice given to you by your health care provider. Make sure you discuss any questions you have with your health care provider.     Document Released: 06/05/2009Document Revised: 08/28/2019Document Reviewed: 08/28/2019     [employer]  Patient Education ? 2021 [employer]         Near-Syncope         Near-syncope is when you suddenly get weak or dizzy, or you feel like you might pass out (faint). This may also be called presyncope. This is due to a lack of blood flow to the brain. During an episode of near-syncope, you may:       Feel dizzy, weak, or light-headed.       Feel sick to your stomach (nauseous).       See all white or all black.       See spots.       Have cold, clammy skin.     This condition is caused by a sudden decrease in blood flow to the brain. This decrease can result from various causes, but most of those causes are not dangerous. However, near-syncope may be a sign of a serious medical problem, so it is important to seek medical care.     Follow these instructions at home:   Medicines         Take over-the-counter and prescription medicines only as told by your doctor.       If you are taking blood pressure or heart medicine, get up slowly and spend many minutes getting ready to sit and then stand. This can help with dizziness.     General instructions         Be aware of any changes in your symptoms.       Talk with your doctor about your symptoms. You may need to have testing to find the cause of your near-syncope.       If you start to feel like you might pass out, lie down right away. Raise (elevate) your feet above the level of your heart. Breathe deeply and steadily. Wait until all of the symptoms are gone.       Have someone stay with you until you feel stable.      Do not  drive, use machinery, or play sports until your doctor says it is okay.       Drink enough fluid to keep your pee (urine) pale yellow.       Keep all follow-up visits as told by your doctor. This is important.     Get help right away if you:         Have a seizure.      Have pain in your:       Chest.       Belly (abdomen).       Back.       Faint once or more than once.       Have a very bad headache.       Are bleeding from your mouth or butt.        Have black or tarry poop (stool).       Have a very fast or uneven heartbeat (palpitations).       Are mixed up (confused).       Have trouble walking.       Are very weak.       Have trouble seeing.     These symptoms may be an emergency. Do not wait to see if the symptoms will go away. Get medical help right away. Call your local emergency services (911 in the [place] Do not drive yourself to the hospital.   Summary         Near-syncope is when you suddenly get weak or dizzy, or you feel like you might pass out (faint).       This condition is caused by a lack of blood flow to the brain.       Near-syncope may be a sign of a serious medical problem, so it is important to seek medical care.     This information is not intended to replace advice given to you by your health care provider. Make sure you discuss any questions you have with your health care provider.     Document Released: 06/05/2009Document Revised: 04/10/2020Document Reviewed: 11/06/2019     [employer] Patient Education ? 2021 [employer]

## 2022-04-03 ENCOUNTER — HOSPITAL ENCOUNTER (EMERGENCY)
Facility: HOSPITAL | Age: 52
Discharge: HOME | End: 2022-04-03
Attending: HOSPITALIST
Payer: COMMERCIAL

## 2022-04-03 ENCOUNTER — APPOINTMENT (OUTPATIENT)
Dept: GENERAL RADIOLOGY | Facility: HOSPITAL | Age: 52
End: 2022-04-03
Payer: COMMERCIAL

## 2022-04-03 VITALS
BODY MASS INDEX: 34.1 KG/M2 | OXYGEN SATURATION: 96 % | TEMPERATURE: 98.3 F | HEART RATE: 91 BPM | SYSTOLIC BLOOD PRESSURE: 152 MMHG | HEIGHT: 68 IN | RESPIRATION RATE: 16 BRPM | DIASTOLIC BLOOD PRESSURE: 94 MMHG | WEIGHT: 225 LBS

## 2022-04-03 DIAGNOSIS — R07.9 CHEST PAIN, UNSPECIFIED TYPE: Primary | ICD-10-CM

## 2022-04-03 LAB
A/G RATIO: 1.3 (ref 0.8–2)
ALBUMIN SERPL-MCNC: 4.5 G/DL (ref 3.4–4.8)
ALP BLD-CCNC: 82 U/L (ref 25–100)
ALT SERPL-CCNC: 27 U/L (ref 4–36)
ANION GAP SERPL CALCULATED.3IONS-SCNC: 15 MMOL/L (ref 3–16)
AST SERPL-CCNC: 28 U/L (ref 8–33)
BASOPHILS ABSOLUTE: 0.1 K/UL (ref 0–0.1)
BASOPHILS RELATIVE PERCENT: 0.7 %
BILIRUB SERPL-MCNC: 0.4 MG/DL (ref 0.3–1.2)
BUN BLDV-MCNC: 8 MG/DL (ref 6–20)
CALCIUM SERPL-MCNC: 8.6 MG/DL (ref 8.5–10.5)
CHLORIDE BLD-SCNC: 97 MMOL/L (ref 98–107)
CO2: 23 MMOL/L (ref 20–30)
CREAT SERPL-MCNC: 0.8 MG/DL (ref 0.4–1.2)
EOSINOPHILS ABSOLUTE: 0.2 K/UL (ref 0–0.4)
EOSINOPHILS RELATIVE PERCENT: 2.1 %
GFR AFRICAN AMERICAN: >59
GFR NON-AFRICAN AMERICAN: >59
GLOBULIN: 3.4 G/DL
GLUCOSE BLD-MCNC: 138 MG/DL (ref 74–106)
HCT VFR BLD CALC: 47 % (ref 40–54)
HEMOGLOBIN: 16.4 G/DL (ref 13–18)
IMMATURE GRANULOCYTES #: 0 K/UL
IMMATURE GRANULOCYTES %: 0.3 % (ref 0–5)
LYMPHOCYTES ABSOLUTE: 2.3 K/UL (ref 1.5–4)
LYMPHOCYTES RELATIVE PERCENT: 25.4 %
MCH RBC QN AUTO: 33.3 PG (ref 27–32)
MCHC RBC AUTO-ENTMCNC: 34.9 G/DL (ref 31–35)
MCV RBC AUTO: 95.5 FL (ref 80–100)
MONOCYTES ABSOLUTE: 0.5 K/UL (ref 0.2–0.8)
MONOCYTES RELATIVE PERCENT: 5.5 %
NEUTROPHILS ABSOLUTE: 6 K/UL (ref 2–7.5)
NEUTROPHILS RELATIVE PERCENT: 66 %
PDW BLD-RTO: 12.2 % (ref 11–16)
PLATELET # BLD: 226 K/UL (ref 150–400)
PMV BLD AUTO: 9.2 FL (ref 6–10)
POTASSIUM REFLEX MAGNESIUM: 3.8 MMOL/L (ref 3.4–5.1)
PRO-BNP: 141 PG/ML (ref 0–900)
RBC # BLD: 4.92 M/UL (ref 4.5–6)
SODIUM BLD-SCNC: 135 MMOL/L (ref 136–145)
TOTAL PROTEIN: 7.9 G/DL (ref 6.4–8.3)
TROPONIN: <0.3 NG/ML
TROPONIN: <0.3 NG/ML
WBC # BLD: 9 K/UL (ref 4–11)

## 2022-04-03 PROCEDURE — 83880 ASSAY OF NATRIURETIC PEPTIDE: CPT

## 2022-04-03 PROCEDURE — 6370000000 HC RX 637 (ALT 250 FOR IP): Performed by: HOSPITALIST

## 2022-04-03 PROCEDURE — 99284 EMERGENCY DEPT VISIT MOD MDM: CPT

## 2022-04-03 PROCEDURE — 96374 THER/PROPH/DIAG INJ IV PUSH: CPT

## 2022-04-03 PROCEDURE — 85025 COMPLETE CBC W/AUTO DIFF WBC: CPT

## 2022-04-03 PROCEDURE — 71045 X-RAY EXAM CHEST 1 VIEW: CPT

## 2022-04-03 PROCEDURE — 80053 COMPREHEN METABOLIC PANEL: CPT

## 2022-04-03 PROCEDURE — 93005 ELECTROCARDIOGRAM TRACING: CPT

## 2022-04-03 PROCEDURE — 84484 ASSAY OF TROPONIN QUANT: CPT

## 2022-04-03 PROCEDURE — 6360000002 HC RX W HCPCS: Performed by: HOSPITALIST

## 2022-04-03 PROCEDURE — 36415 COLL VENOUS BLD VENIPUNCTURE: CPT

## 2022-04-03 RX ORDER — NITROGLYCERIN 0.4 MG/1
0.4 TABLET SUBLINGUAL EVERY 5 MIN PRN
Qty: 25 TABLET | Refills: 0 | Status: SHIPPED | OUTPATIENT
Start: 2022-04-03 | End: 2022-06-24 | Stop reason: SDUPTHER

## 2022-04-03 RX ORDER — MORPHINE SULFATE 2 MG/ML
2 INJECTION, SOLUTION INTRAMUSCULAR; INTRAVENOUS EVERY 30 MIN PRN
Status: DISCONTINUED | OUTPATIENT
Start: 2022-04-03 | End: 2022-04-03 | Stop reason: HOSPADM

## 2022-04-03 RX ORDER — NITROGLYCERIN 0.4 MG/1
0.4 TABLET SUBLINGUAL EVERY 5 MIN PRN
Status: DISCONTINUED | OUTPATIENT
Start: 2022-04-03 | End: 2022-04-03 | Stop reason: HOSPADM

## 2022-04-03 RX ORDER — ONDANSETRON 2 MG/ML
4 INJECTION INTRAMUSCULAR; INTRAVENOUS EVERY 30 MIN PRN
Status: DISCONTINUED | OUTPATIENT
Start: 2022-04-03 | End: 2022-04-03 | Stop reason: HOSPADM

## 2022-04-03 RX ORDER — ASPIRIN 81 MG/1
324 TABLET, CHEWABLE ORAL ONCE
Status: COMPLETED | OUTPATIENT
Start: 2022-04-03 | End: 2022-04-03

## 2022-04-03 RX ADMIN — NITROGLYCERIN 1 INCH: 20 OINTMENT TOPICAL at 12:59

## 2022-04-03 RX ADMIN — Medication 0.4 MG: at 12:22

## 2022-04-03 RX ADMIN — Medication 0.4 MG: at 12:34

## 2022-04-03 RX ADMIN — ONDANSETRON 4 MG: 2 INJECTION INTRAMUSCULAR; INTRAVENOUS at 12:22

## 2022-04-03 RX ADMIN — ASPIRIN 81 MG 324 MG: 81 TABLET ORAL at 12:21

## 2022-04-03 ASSESSMENT — PAIN SCALES - GENERAL: PAINLEVEL_OUTOF10: 4

## 2022-04-03 ASSESSMENT — PAIN DESCRIPTION - PAIN TYPE: TYPE: ACUTE PAIN

## 2022-04-03 ASSESSMENT — HEART SCORE: ECG: 1

## 2022-04-03 ASSESSMENT — PAIN - FUNCTIONAL ASSESSMENT: PAIN_FUNCTIONAL_ASSESSMENT: 0-10

## 2022-04-03 ASSESSMENT — PAIN DESCRIPTION - DESCRIPTORS: DESCRIPTORS: SQUEEZING

## 2022-04-03 ASSESSMENT — PAIN DESCRIPTION - FREQUENCY: FREQUENCY: INTERMITTENT

## 2022-04-03 ASSESSMENT — PAIN DESCRIPTION - LOCATION: LOCATION: CHEST

## 2022-04-03 NOTE — ED NOTES
Pt continues to deny chest pain at this time. I will apply nitro paste as ordered.      Jose Rodrigues RN  04/03/22 7687

## 2022-04-03 NOTE — ED TRIAGE NOTES
Pt arrives pov c/o chest pain 4/10 at this time, squeezing pain. He states he was watching tv when it started about 1000. He also c/o vomiting.   He did take a nitro at home about 1 hour ago but it did not help

## 2022-04-03 NOTE — ED NOTES
Reviewed discharge plan with Jennifer Iglesias. Encouraged him to f/u with Sahra Henson, DROIS Thomson CNP and with dr. Matt Paul and he understood. NAD noted on discharge, gait steady. Reviewed discharge prescription for:     Current Discharge Medication List      START taking these medications    Details   nitroGLYCERIN (NITROSTAT) 0.4 MG SL tablet Place 1 tablet under the tongue every 5 minutes as needed for Chest pain up to max of 3 total doses. If no relief after 1 dose, call 911. Qty: 25 tablet, Refills: 0             Yadira Tolentino states understanding of how and when to take medications.       Electronically signed by Gregorio Brian RN on 4/3/2022 at 4:58 PM     Gregorio Brian RN  04/03/22 1096

## 2022-04-03 NOTE — ED PROVIDER NOTES
[address] ENCOUNTER      Pt Name: Ld Barth  MRN: [id number]  YOB: 1970  Date of evaluation: 4/3/2022  Provider: Dipak Loo DO    CHIEF COMPLAINT       Chief Complaint   Patient presents with    Chest Pain         HISTORY OF PRESENT ILLNESS  (Location/Symptom, Timing/Onset, Context/Setting, Quality, Duration, Modifying Factors, Severity.)   Ld Barth is a 54 y.o. male who presents to the emergency department for chest pain. Patient states chest pain started 2 hours prior to arrival here around 10 AM.  Patient states he is watching television when it started. Patient describes the sensation as a sharp squeezing sensation in his chest.  When asked where it hurts he points to the left lateral aspect of his chest wall. Denies any radiation of the pain. However he stated that earlier his left arm was hurting some but that has resolved. Patient states he did take 1 nitroglycerin that his wife gave him at home about an hour prior to arrival here without any change in his symptoms. He states he is supposed to take an aspirin every day but he ran out so is not had any today. He denies anything that he did at home to make his symptoms any better or any worse. States he is short of breath with the symptoms. Advised that he is nauseated but he has not had any vomiting. Denies any diaphoresis or breaking out in a cold sweat. Patient states he had similar symptoms like this about a month ago was evaluated at the emergency department at [facility]. Has not followed up with cardiology since then. Patient states he is on medication for high blood pressure but denies any history of diabetes. Denies any smoking. States he does occasionally drink alcohol just socially he had about 6 beers last night while watching basketball games.   States he does use smokeless tobacco.  No primary relatives with history of CAD before the age of 60.      Nursing notes were reviewed. REVIEW OFSYSTEMS    (2-9 systems for level 4, 10 or more for level 5)   ROS:  General:  No fevers, no chills, no weakness  Cardiovascular:  + chest pain, no palpitations  Respiratory:  + shortness of breath, no cough, no wheezing  Gastrointestinal:  No pain, + nausea, no vomiting, no diarrhea  Musculoskeletal:  No muscle pain, no joint pain  Skin:  No rash, no easy bruising  Neurologic:  No speech problems, no headache, no extremity weakness  Psychiatric:  No anxiety  Genitourinary:  No dysuria, no hematuria    Except as noted above the remainder of the review of systems was reviewed and negative. PAST MEDICAL HISTORY     Past Medical History:   Diagnosis Date    Anxiety     Arthritis     Back pain     CAD (coronary artery disease)     Depression     Headache(784.0)     Hypertension          SURGICAL HISTORY       Past Surgical History:   Procedure Laterality Date    FINGER AMPUTATION      also has partial amputation of right index and ring finger    LEG SURGERY           CURRENT MEDICATIONS       Previous Medications    AMLODIPINE (NORVASC) 10 MG TABLET    Take 1 tablet by mouth nightly    ASPIRIN EC 81 MG EC TABLET    Take 1 tablet by mouth daily    CYCLOBENZAPRINE (FLEXERIL) 10 MG TABLET    Take 1 tablet by mouth 2 times daily as needed for Muscle spasms (muscle relaxer, pain)    HYDROCHLOROTHIAZIDE (HYDRODIURIL) 25 MG TABLET    Take 1 tablet by mouth daily    HYDROXYZINE (ATARAX) 25 MG TABLET    Take 1 tablet by mouth daily as needed for Anxiety    METOPROLOL SUCCINATE (TOPROL XL) 50 MG EXTENDED RELEASE TABLET    Take 1 tablet by mouth daily       ALLERGIES     Patient has no known allergies. FAMILY HISTORY     History reviewed. No pertinent family history.        SOCIAL HISTORY       Social History     Socioeconomic History    Marital status:      Spouse name: None    Number of children: None    Years of education: None    Highest education level: None   Occupational History    None   Tobacco Use    Smoking status: Never Smoker    Smokeless tobacco: Current User     Types: Chew   Substance and Sexual Activity    Alcohol use: Yes     Comment: drank a half pint today 9/14 trying to take his pain away    Drug use: Never    Sexual activity: None   Other Topics Concern    None   Social History Narrative    None     Social Determinants of Health     Financial Resource Strain:     Difficulty of Paying Living Expenses: Not on file   Food Insecurity:     Worried About Running Out of Food in the Last Year: Not on file    Marla of Food in the Last Year: Not on file   Transportation Needs:     Lack of Transportation (Medical): Not on file    Lack of Transportation (Non-Medical):  Not on file   Physical Activity:     Days of Exercise per Week: Not on file    Minutes of Exercise per Session: Not on file   Stress:     Feeling of Stress : Not on file   Social Connections:     Frequency of Communication with Friends and Family: Not on file    Frequency of Social Gatherings with Friends and Family: Not on file    Attends Faith Services: Not on file    Active Member of [address] [place] [address] or Organizations: Not on file    Attends Club or Organization Meetings: Not on file    Marital Status: Not on file   Intimate Partner Violence:     Fear of Current or Ex-Partner: Not on file    Emotionally Abused: Not on file    Physically Abused: Not on file    Sexually Abused: Not on file   Housing Stability:     Unable to Pay for Housing in the Last Year: Not on file    Number of [place] in the Last Year: Not on file    Unstable Housing in the Last Year: Not on file         PHYSICAL EXAM    (up to 7 for level 4, 8 or more for level 5)     ED Triage Vitals   BP Temp Temp src Pulse Resp SpO2 Height Weight   -- -- -- -- -- -- -- --       Physical Exam  General :Patient is awake, alert, oriented, in no acute distress, nontoxic appearing  HEENT: Pupils are equally round and reactive to light, EOMI, conjunctivae clear. Oral mucosa is moist, no exudate. Uvula is midline  Cardiac: Heart regular rate, rhythm, no murmurs, rubs, or gallops  Lungs: Lungs are clear to auscultation, there is no wheezing, rhonchi, or rales. There is no use of accessory muscles. Chest wall: There is no tenderness to palpation over the chest wall or over ribs  Abdomen: Abdomen is soft, nontender, nondistended. There is no firm or pulsatile masses, no rebound rigidity or guarding. Musculoskeletal: 5 out of 5 strength in all 4 extremities. No focal muscle deficits are appreciated  Neuro: Motor intact, sensory intact, level of consciousness is normal  Dermatology: Skin is warm and dry  Psych: Mentation is grossly normal, cognition is grossly normal. Affect is appropriate. DIAGNOSTIC RESULTS     EKG: All EKG's are interpreted by the Emergency Department Physician who either signs or Co-signs this chart in the [address] a cardiologist.    The EKG interpreted by me shows rhythm. Incomplete left bundle branch block. Heart rate is 95 bpm, ID interval is 175 ms, QRS durations 119, QT is 352 QTC is 443 ms. No acute T wave inversions concerning for acute myocardial ischemia. No ST elevations concerning for acute myocardial infarction. There is ST depression with inverted T wave in lead III and the possible elevation pattern in aVL but there is not contiguous findings for this.     RADIOLOGY:   Non-plain film images such as CT, Ultrasound and MRI are read by the radiologist. Plain radiographic images are visualized and preliminarily interpreted by the emergency physician with the below findings:      ? Radiologist's Report Reviewed:  XR CHEST PORTABLE   Final Result      Normal.            ED BEDSIDE ULTRASOUND:   Performed by ED Physician - none    LABS:    I have reviewed and interpreted all of the currently available lab results from this visit (ifapplicable):  Results for orders placed or performed during the hospital encounter of 04/03/22   CBC with Auto Differential   Result Value Ref Range    WBC 9.0 4.0 - 11.0 K/uL    RBC 4.92 4.50 - 6.00 M/uL    Hemoglobin 16.4 13.0 - 18.0 g/dL    Hematocrit 47.0 40.0 - 54.0 %    MCV 95.5 80.0 - 100.0 fL    MCH 33.3 (H) 27.0 - 32.0 pg    MCHC 34.9 31.0 - 35.0 g/dL    RDW 12.2 11.0 - 16.0 %    Platelets 607 910 - 288 K/uL    MPV 9.2 6.0 - 10.0 fL    Neutrophils % 66.0 %    Immature Granulocytes % 0.3 0.0 - 5.0 %    Lymphocytes % 25.4 %    Monocytes % 5.5 %    Eosinophils % 2.1 %    Basophils % 0.7 %    Neutrophils Absolute 6.0 2.0 - 7.5 K/uL    Immature Granulocytes # 0.0 K/uL    Lymphocytes Absolute 2.3 1.5 - 4.0 K/uL    Monocytes Absolute 0.5 0.2 - 0.8 K/uL    Eosinophils Absolute 0.2 0.0 - 0.4 K/uL    Basophils Absolute 0.1 0.0 - 0.1 K/uL   Comprehensive Metabolic Panel w/ Reflex to MG   Result Value Ref Range    Sodium 135 (L) 136 - 145 mmol/L    Potassium reflex Magnesium 3.8 3.4 - 5.1 mmol/L    Chloride 97 (L) 98 - 107 mmol/L    CO2 23 20 - 30 mmol/L    Anion Gap 15 3 - 16    Glucose 138 (H) 74 - 106 mg/dL    BUN 8 6 - 20 mg/dL    CREATININE 0.8 0.4 - 1.2 mg/dL    GFR Non-African American >59 >59    GFR African American >59 >59    Calcium 8.6 8.5 - 10.5 mg/dL    Total Protein 7.9 6.4 - 8.3 g/dL    Albumin 4.5 3.4 - 4.8 g/dL    Albumin/Globulin Ratio 1.3 0.8 - 2.0    Total Bilirubin 0.4 0.3 - 1.2 mg/dL    Alkaline Phosphatase 82 25 - 100 U/L    ALT 27 4 - 36 U/L    AST 28 8 - 33 U/L    Globulin 3.4 Not Established g/dL   Troponin   Result Value Ref Range    Troponin <0.30 <0.30 ng/mL   Brain Natriuretic Peptide   Result Value Ref Range    Pro- 0 - 900 pg/mL   Troponin   Result Value Ref Range    Troponin <0.30 <0.30 ng/mL        All other labs were within normal range or not returned as of this dictation.     EMERGENCY DEPARTMENT COURSE and DIFFERENTIAL DIAGNOSIS/MDM:   Vitals:    Vitals:    04/03/22 1224 04/03/22 1230 04/03/22 1238 04/03/22 1315   BP:  (!) 136/93  (!) 146/94   Pulse:  93 94 90   Resp:  23 16 17   Temp:       TempSrc:       SpO2:   96%    Weight: 225 lb (102.1 kg)      Height: 5' 8\" (1.727 m)          MEDICATIONS ADMINISTERED IN ED:  Medications   nitroGLYCERIN (NITROSTAT) SL tablet 0.4 mg (0.4 mg SubLINGual Given 4/3/22 1234)   ondansetron (ZOFRAN) injection 4 mg (4 mg IntraVENous Given 4/3/22 1222)   morphine (PF) injection 2 mg (has no administration in time range)   aspirin chewable tablet 324 mg (324 mg Oral Given 4/3/22 1221)   nitroglycerin (NITRO-BID) 2 % ointment 1 inch (1 inch Topical Given 4/3/22 1259)       After initial evaluation examination I did have a conversation with the patient about the upcoming plan, treatment possible disposition which he was agreeable to the times dictation. Patient has been going given some aspirin 324 mg here. We will also give him nitro for his chest pain. If this does not help we will also provide with some morphine to see if we can help ease the patient's symptoms off. He does rate his pain about an 8 out of 10. Describes it as a squeezing sharp sensation. Patient also had portable chest radiograph performed. We will check CBC, CMP, troponin, 3-hour troponin and a proBNP. Patient also have twelve-lead EKG performed. Patient states he takes multiple medications but he is not sure exactly what they are. States he gets them at [facility] here in town. Patient with patient's presentation and history his HEART score would be a 3 or less. This would place him at the low risk category for possible discharge home with a 2.5% chance of a major acute cardiac event within the next 6weeks the risk is not 0% but it is a 2.5% which is considered safe for discharge. This discussed with the patient he does state his understanding. Blood work showed white count 9, hemoglobin is 16.4, hematocrit 47.0, platelet counts 399.   Comprehensive metabolic panel was benign except for sodium 135 which is just one-point should return back to emergency department for further evaluation work-up. The patient has a does need to follow-up with his regular family physician within the next 1 to 2 days reevaluation. Also given instructions if symptoms worsens or new symptoms arise he should return back to emergency department for further evaluation work-up. CONSULTS:  None    PROCEDURES:  Procedures    CRITICAL CARE TIME    Total Critical Care time was 0 minutes, excluding separately reportable procedures. There was a high probability of clinically significant/life threatening deterioration in the patient's condition which required my urgent intervention. FINAL IMPRESSION      1. Chest pain, unspecified type          DISPOSITION/PLAN   DISPOSITION        PATIENT REFERRED TO:  DORIS Carroll CNP  [address] [place] [address] [place]  [phone]    In 2 days      [facility]  [facility] [address] [address],[facility] [address] [address] [phone]    As needed, If symptoms worsen    [facility], [address] [place] [address]  [phone]    Schedule an appointment as soon as possible for a visit in 1 week        DISCHARGE MEDICATIONS:  New Prescriptions    NITROGLYCERIN (NITROSTAT) 0.4 MG SL TABLET    Place 1 tablet under the tongue every 5 minutes as needed for Chest pain up to max of 3 total doses. If no relief after 1 dose, call 911. Comment: Please note this report has been produced using speech recognition software and may contain errorsrelated to that system including errors in grammar, punctuation, and spelling, as well as words and phrases that may be inappropriate. If there are any questions or concerns please feel free to contact the dictating providerfor clarification.     Dipak Loo DO  Attending Emergency Physician              Dipak Loo DO  04/03/22 6544

## 2022-04-03 NOTE — ED NOTES
Pt was given aspirin and nitro. He states it has helped his pain. He rates his pain 1/10 at this time.      Kezia Oliver RN  04/03/22 3701

## 2022-04-03 NOTE — ED NOTES
I did advise registration that pt wife may come back. He advised me that she stepped out for a moment. He will let her back when she returns.      Trisha Lambert RN  04/03/22 9924

## 2022-04-03 NOTE — ED NOTES
Pt states his pain is back - squeezing / pressure pain. He rates it 6/10 at this time.   Second nitro was given     Guy Hernandez RN  04/03/22 6961

## 2022-04-03 NOTE — ED NOTES
Tech is having difficulty obtaining ekg. Pt's chest has been shaved.      Jose Rodrigues, RN  04/03/22 6519

## 2022-05-16 ENCOUNTER — OFFICE VISIT (OUTPATIENT)
Dept: PRIMARY CARE CLINIC | Age: 52
End: 2022-05-16
Payer: COMMERCIAL

## 2022-05-16 DIAGNOSIS — R09.81 NASAL CONGESTION: ICD-10-CM

## 2022-05-16 DIAGNOSIS — I10 UNCONTROLLED HYPERTENSION: ICD-10-CM

## 2022-05-16 DIAGNOSIS — R05.9 COUGH: Primary | ICD-10-CM

## 2022-05-16 DIAGNOSIS — I10 HYPERTENSION, UNSPECIFIED TYPE: ICD-10-CM

## 2022-05-16 LAB
INFLUENZA A ANTIBODY: NORMAL
INFLUENZA B ANTIBODY: NORMAL
Lab: NORMAL
QC PASS/FAIL: NORMAL
SARS-COV-2 RDRP RESP QL NAA+PROBE: NEGATIVE

## 2022-05-16 PROCEDURE — 99213 OFFICE O/P EST LOW 20 MIN: CPT | Performed by: PHYSICIAN ASSISTANT

## 2022-05-16 PROCEDURE — 87635 SARS-COV-2 COVID-19 AMP PRB: CPT | Performed by: PHYSICIAN ASSISTANT

## 2022-05-16 PROCEDURE — 87804 INFLUENZA ASSAY W/OPTIC: CPT | Performed by: PHYSICIAN ASSISTANT

## 2022-05-16 RX ORDER — AMLODIPINE BESYLATE 10 MG/1
10 TABLET ORAL NIGHTLY
Qty: 30 TABLET | Refills: 1 | Status: SHIPPED | OUTPATIENT
Start: 2022-05-16 | End: 2022-05-27 | Stop reason: SDUPTHER

## 2022-05-16 RX ORDER — METOPROLOL SUCCINATE 50 MG/1
50 TABLET, EXTENDED RELEASE ORAL DAILY
Qty: 30 TABLET | Refills: 1 | Status: SHIPPED | OUTPATIENT
Start: 2022-05-16 | End: 2022-05-16

## 2022-05-16 RX ORDER — METOPROLOL SUCCINATE 50 MG/1
50 TABLET, EXTENDED RELEASE ORAL DAILY
Qty: 30 TABLET | Refills: 1 | Status: SHIPPED | OUTPATIENT
Start: 2022-05-16 | End: 2022-05-27 | Stop reason: SDUPTHER

## 2022-05-16 RX ORDER — GUAIFENESIN 100 MG/5ML
10 SYRUP ORAL EVERY 4 HOURS PRN
Qty: 180 ML | Refills: 0 | Status: SHIPPED | OUTPATIENT
Start: 2022-05-16 | End: 2022-05-27 | Stop reason: ALTCHOICE

## 2022-05-16 RX ORDER — METOPROLOL SUCCINATE 50 MG/1
50 TABLET, EXTENDED RELEASE ORAL DAILY
Qty: 90 TABLET | OUTPATIENT
Start: 2022-05-16

## 2022-05-16 RX ORDER — AMLODIPINE BESYLATE 10 MG/1
10 TABLET ORAL NIGHTLY
Qty: 30 TABLET | Refills: 1 | Status: SHIPPED | OUTPATIENT
Start: 2022-05-16 | End: 2022-05-16

## 2022-05-16 ASSESSMENT — ENCOUNTER SYMPTOMS
GASTROINTESTINAL NEGATIVE: 1
SHORTNESS OF BREATH: 1
COUGH: 1
RHINORRHEA: 1

## 2022-05-16 NOTE — PROGRESS NOTES
SUBJECTIVE:    Patient ID: Marilyn Nathan is a 54 y.o.male. No chief complaint on file. HPI:    Pt here with c/o cough, stuffy/runny nose, chills, SOB and dysgeusia for the last 3 days. He has taken otc ibu. He states his sxs are worsening. He is not covid/flu vaccinated. Patient's medications, allergies, past medical, surgical, social and family histories were reviewed and updated as appropriate in electronic medical record. Current Outpatient Medications on File Prior to Visit   Medication Sig Dispense Refill    nitroGLYCERIN (NITROSTAT) 0.4 MG SL tablet Place 1 tablet under the tongue every 5 minutes as needed for Chest pain up to max of 3 total doses. If no relief after 1 dose, call 911. 25 tablet 0    amLODIPine (NORVASC) 10 MG tablet Take 1 tablet by mouth nightly 30 tablet 1    aspirin EC 81 MG EC tablet Take 1 tablet by mouth daily 30 tablet 2    hydroCHLOROthiazide (HYDRODIURIL) 25 MG tablet Take 1 tablet by mouth daily 30 tablet 1    metoprolol succinate (TOPROL XL) 50 MG extended release tablet Take 1 tablet by mouth daily 30 tablet 1    cyclobenzaprine (FLEXERIL) 10 MG tablet Take 1 tablet by mouth 2 times daily as needed for Muscle spasms (muscle relaxer, pain) 30 tablet 0    hydrOXYzine (ATARAX) 25 MG tablet Take 1 tablet by mouth daily as needed for Anxiety 30 tablet 0     No current facility-administered medications on file prior to visit. Review of Systems   Constitutional: Positive for chills. Negative for fever. HENT: Positive for congestion and rhinorrhea. Respiratory: Positive for cough and shortness of breath. Cardiovascular: Negative. Gastrointestinal: Negative. Skin: Negative. Neurological: Negative. Psychiatric/Behavioral: Negative.         Past Medical History:   Diagnosis Date    Anxiety     Arthritis     Back pain     CAD (coronary artery disease)     Depression     Headache(784.0)     Hypertension      Past Surgical History: Microscopic Examination (no units)   Date Value   09/14/2021 Not Indicated     LDL Calculated (mg/dL)   Date Value   06/03/2021 153 (H)       Lab Results   Component Value Date    WBC 9.0 04/03/2022    NEUTROABS 6.0 04/03/2022    HGB 16.4 04/03/2022    HCT 47.0 04/03/2022    MCV 95.5 04/03/2022     04/03/2022     Lab Results   Component Value Date    TSH 1.73 06/03/2021       ASSESSMENT/PLAN:     1. Cough  - guaiFENesin (ALTARUSSIN) 100 MG/5ML syrup; Take 10 mLs by mouth every 4 hours as needed for Cough  Dispense: 180 mL; Refill: 0    2. Nasal congestion  - POCT Influenza A/B  - POCT COVID-19 Rapid, NAAT  - guaiFENesin (ALTARUSSIN) 100 MG/5ML syrup; Take 10 mLs by mouth every 4 hours as needed for Cough  Dispense: 180 mL; Refill: 0    3. Hypertension, unspecified type    4. Uncontrolled hypertension  - amLODIPine (NORVASC) 10 MG tablet; Take 1 tablet by mouth nightly  Dispense: 30 tablet; Refill: 1  - metoprolol succinate (TOPROL XL) 50 MG extended release tablet; Take 1 tablet by mouth daily  Dispense: 30 tablet; Refill: 1      No orders of the defined types were placed in this encounter.        Electronically signed by Michelle Mcknight on 5/16/2022 at 1:07 PM

## 2022-05-23 ENCOUNTER — APPOINTMENT (OUTPATIENT)
Dept: GENERAL RADIOLOGY | Facility: HOSPITAL | Age: 52
End: 2022-05-23

## 2022-05-23 ENCOUNTER — APPOINTMENT (OUTPATIENT)
Dept: CT IMAGING | Facility: HOSPITAL | Age: 52
End: 2022-05-23

## 2022-05-23 ENCOUNTER — HOSPITAL ENCOUNTER (EMERGENCY)
Facility: HOSPITAL | Age: 52
Discharge: HOME | End: 2022-05-23
Attending: EMERGENCY MEDICINE | Admitting: EMERGENCY MEDICINE

## 2022-05-23 VITALS
TEMPERATURE: 97.8 F | RESPIRATION RATE: 18 BRPM | HEART RATE: 84 BPM | SYSTOLIC BLOOD PRESSURE: 173 MMHG | BODY MASS INDEX: 34.86 KG/M2 | WEIGHT: 230 LBS | HEIGHT: 68 IN | OXYGEN SATURATION: 98 % | DIASTOLIC BLOOD PRESSURE: 106 MMHG

## 2022-05-23 DIAGNOSIS — I16.0 HYPERTENSIVE URGENCY: Primary | ICD-10-CM

## 2022-05-23 LAB
ALBUMIN SERPL-MCNC: 4.6 G/DL (ref 3.5–5.2)
ALBUMIN/GLOB SERPL: 1.7 G/DL
ALP SERPL-CCNC: 83 U/L (ref 39–117)
ALT SERPL W P-5'-P-CCNC: 36 U/L (ref 1–41)
ANION GAP SERPL CALCULATED.3IONS-SCNC: 15.5 MMOL/L (ref 5–15)
AST SERPL-CCNC: 32 U/L (ref 1–40)
BASOPHILS # BLD AUTO: 0.06 10*3/MM3 (ref 0–0.2)
BASOPHILS NFR BLD AUTO: 0.7 % (ref 0–1.5)
BILIRUB SERPL-MCNC: 0.5 MG/DL (ref 0–1.2)
BUN SERPL-MCNC: 14 MG/DL (ref 6–20)
BUN/CREAT SERPL: 14.3 (ref 7–25)
CALCIUM SPEC-SCNC: 9.7 MG/DL (ref 8.6–10.5)
CHLORIDE SERPL-SCNC: 101 MMOL/L (ref 98–107)
CO2 SERPL-SCNC: 23.5 MMOL/L (ref 22–29)
CREAT SERPL-MCNC: 0.98 MG/DL (ref 0.76–1.27)
D DIMER PPP FEU-MCNC: 0.29 MCGFEU/ML (ref 0–0.57)
DEPRECATED RDW RBC AUTO: 42.1 FL (ref 37–54)
EGFRCR SERPLBLD CKD-EPI 2021: 94.5 ML/MIN/1.73
EOSINOPHIL # BLD AUTO: 0.32 10*3/MM3 (ref 0–0.4)
EOSINOPHIL NFR BLD AUTO: 3.9 % (ref 0.3–6.2)
ERYTHROCYTE [DISTWIDTH] IN BLOOD BY AUTOMATED COUNT: 12.1 % (ref 12.3–15.4)
GLOBULIN UR ELPH-MCNC: 2.7 GM/DL
GLUCOSE SERPL-MCNC: 146 MG/DL (ref 65–99)
HCT VFR BLD AUTO: 44.5 % (ref 37.5–51)
HGB BLD-MCNC: 16 G/DL (ref 13–17.7)
HOLD SPECIMEN: NORMAL
HOLD SPECIMEN: NORMAL
IMM GRANULOCYTES # BLD AUTO: 0.02 10*3/MM3 (ref 0–0.05)
IMM GRANULOCYTES NFR BLD AUTO: 0.2 % (ref 0–0.5)
LYMPHOCYTES # BLD AUTO: 2.54 10*3/MM3 (ref 0.7–3.1)
LYMPHOCYTES NFR BLD AUTO: 31 % (ref 19.6–45.3)
MCH RBC QN AUTO: 34.5 PG (ref 26.6–33)
MCHC RBC AUTO-ENTMCNC: 36 G/DL (ref 31.5–35.7)
MCV RBC AUTO: 95.9 FL (ref 79–97)
MONOCYTES # BLD AUTO: 0.78 10*3/MM3 (ref 0.1–0.9)
MONOCYTES NFR BLD AUTO: 9.5 % (ref 5–12)
NEUTROPHILS NFR BLD AUTO: 4.48 10*3/MM3 (ref 1.7–7)
NEUTROPHILS NFR BLD AUTO: 54.7 % (ref 42.7–76)
NRBC BLD AUTO-RTO: 0 /100 WBC (ref 0–0.2)
PLATELET # BLD AUTO: 160 10*3/MM3 (ref 140–450)
PMV BLD AUTO: 10.3 FL (ref 6–12)
POTASSIUM SERPL-SCNC: 3.9 MMOL/L (ref 3.5–5.2)
PROT SERPL-MCNC: 7.3 G/DL (ref 6–8.5)
RBC # BLD AUTO: 4.64 10*6/MM3 (ref 4.14–5.8)
SODIUM SERPL-SCNC: 140 MMOL/L (ref 136–145)
TROPONIN T SERPL-MCNC: <0.01 NG/ML (ref 0–0.03)
TROPONIN T SERPL-MCNC: <0.01 NG/ML (ref 0–0.03)
WBC NRBC COR # BLD: 8.2 10*3/MM3 (ref 3.4–10.8)
WHOLE BLOOD HOLD COAG: NORMAL
WHOLE BLOOD HOLD SPECIMEN: NORMAL

## 2022-05-23 PROCEDURE — 85025 COMPLETE CBC W/AUTO DIFF WBC: CPT | Performed by: EMERGENCY MEDICINE

## 2022-05-23 PROCEDURE — 70498 CT ANGIOGRAPHY NECK: CPT

## 2022-05-23 PROCEDURE — 96375 TX/PRO/DX INJ NEW DRUG ADDON: CPT

## 2022-05-23 PROCEDURE — 99284 EMERGENCY DEPT VISIT MOD MDM: CPT

## 2022-05-23 PROCEDURE — 25010000002 DIAZEPAM PER 5 MG: Performed by: EMERGENCY MEDICINE

## 2022-05-23 PROCEDURE — 80053 COMPREHEN METABOLIC PANEL: CPT | Performed by: EMERGENCY MEDICINE

## 2022-05-23 PROCEDURE — 25010000002 HYDRALAZINE PER 20 MG: Performed by: EMERGENCY MEDICINE

## 2022-05-23 PROCEDURE — 85379 FIBRIN DEGRADATION QUANT: CPT | Performed by: EMERGENCY MEDICINE

## 2022-05-23 PROCEDURE — 25010000002 IOPAMIDOL 61 % SOLUTION: Performed by: EMERGENCY MEDICINE

## 2022-05-23 PROCEDURE — 96374 THER/PROPH/DIAG INJ IV PUSH: CPT

## 2022-05-23 PROCEDURE — 70496 CT ANGIOGRAPHY HEAD: CPT

## 2022-05-23 PROCEDURE — 84484 ASSAY OF TROPONIN QUANT: CPT | Performed by: EMERGENCY MEDICINE

## 2022-05-23 PROCEDURE — 70450 CT HEAD/BRAIN W/O DYE: CPT

## 2022-05-23 PROCEDURE — 25010000002 ONDANSETRON PER 1 MG: Performed by: EMERGENCY MEDICINE

## 2022-05-23 PROCEDURE — 71045 X-RAY EXAM CHEST 1 VIEW: CPT

## 2022-05-23 PROCEDURE — 93005 ELECTROCARDIOGRAM TRACING: CPT | Performed by: EMERGENCY MEDICINE

## 2022-05-23 RX ORDER — ONDANSETRON 2 MG/ML
4 INJECTION INTRAMUSCULAR; INTRAVENOUS ONCE
Status: COMPLETED | OUTPATIENT
Start: 2022-05-23 | End: 2022-05-23

## 2022-05-23 RX ORDER — ONDANSETRON 4 MG/1
4 TABLET, ORALLY DISINTEGRATING ORAL EVERY 8 HOURS PRN
Qty: 12 TABLET | Refills: 0 | Status: SHIPPED | OUTPATIENT
Start: 2022-05-23 | End: 2023-03-29

## 2022-05-23 RX ORDER — METOPROLOL TARTRATE 50 MG/1
50 TABLET, FILM COATED ORAL ONCE
Status: COMPLETED | OUTPATIENT
Start: 2022-05-23 | End: 2022-05-23

## 2022-05-23 RX ORDER — ASPIRIN 325 MG
325 TABLET ORAL ONCE
Status: COMPLETED | OUTPATIENT
Start: 2022-05-23 | End: 2022-05-23

## 2022-05-23 RX ORDER — MECLIZINE HYDROCHLORIDE 25 MG/1
50 TABLET ORAL ONCE
Status: COMPLETED | OUTPATIENT
Start: 2022-05-23 | End: 2022-05-23

## 2022-05-23 RX ORDER — LISINOPRIL 20 MG/1
20 TABLET ORAL ONCE
Status: COMPLETED | OUTPATIENT
Start: 2022-05-23 | End: 2022-05-23

## 2022-05-23 RX ORDER — SODIUM CHLORIDE 0.9 % (FLUSH) 0.9 %
10 SYRINGE (ML) INJECTION AS NEEDED
Status: DISCONTINUED | OUTPATIENT
Start: 2022-05-23 | End: 2022-05-23 | Stop reason: HOSPADM

## 2022-05-23 RX ORDER — HYDRALAZINE HYDROCHLORIDE 20 MG/ML
20 INJECTION INTRAMUSCULAR; INTRAVENOUS ONCE
Status: COMPLETED | OUTPATIENT
Start: 2022-05-23 | End: 2022-05-23

## 2022-05-23 RX ORDER — MECLIZINE HYDROCHLORIDE 25 MG/1
25 TABLET ORAL 3 TIMES DAILY PRN
Qty: 30 TABLET | Refills: 0 | Status: SHIPPED | OUTPATIENT
Start: 2022-05-23 | End: 2023-03-29

## 2022-05-23 RX ORDER — DIAZEPAM 5 MG/ML
5 INJECTION, SOLUTION INTRAMUSCULAR; INTRAVENOUS ONCE
Status: COMPLETED | OUTPATIENT
Start: 2022-05-23 | End: 2022-05-23

## 2022-05-23 RX ORDER — HYDRALAZINE HYDROCHLORIDE 10 MG/1
10 TABLET, FILM COATED ORAL 3 TIMES DAILY
Qty: 45 TABLET | Refills: 0 | Status: SHIPPED | OUTPATIENT
Start: 2022-05-23

## 2022-05-23 RX ADMIN — DIAZEPAM 5 MG: 5 INJECTION, SOLUTION INTRAMUSCULAR; INTRAVENOUS at 05:21

## 2022-05-23 RX ADMIN — LISINOPRIL 20 MG: 20 TABLET ORAL at 07:11

## 2022-05-23 RX ADMIN — METOPROLOL TARTRATE 50 MG: 50 TABLET ORAL at 07:11

## 2022-05-23 RX ADMIN — ASPIRIN 325 MG ORAL TABLET 325 MG: 325 PILL ORAL at 04:48

## 2022-05-23 RX ADMIN — IOPAMIDOL 100 ML: 612 INJECTION, SOLUTION INTRAVENOUS at 07:43

## 2022-05-23 RX ADMIN — MECLIZINE HYDROCHLORIDE 50 MG: 25 TABLET ORAL at 07:11

## 2022-05-23 RX ADMIN — ONDANSETRON 4 MG: 2 INJECTION INTRAMUSCULAR; INTRAVENOUS at 05:20

## 2022-05-23 RX ADMIN — HYDRALAZINE HYDROCHLORIDE 20 MG: 20 INJECTION INTRAMUSCULAR; INTRAVENOUS at 10:49

## 2022-05-23 RX ADMIN — SODIUM CHLORIDE 1000 ML: 9 INJECTION, SOLUTION INTRAVENOUS at 05:20

## 2022-05-23 NOTE — ED PROVIDER NOTES
"Subjective   51-year-old male presenting with several complaints.  He states that this morning just prior to arrival he was driving to work when he had onset of dizziness.  Had sensation that the room was spinning, felt like his eyes were \"bouncing\".  This was associated with profuse nausea and vomiting and diaphoresis.  After the symptoms started he started having some squeezing sensation to his chest.  His wife administered a nitroglycerin which resolved the chest pressure but the other symptoms persisted.  He denies any numbness, vision changes, difficulty swallowing, slurred speech.  He does note that he feels \"weak all over\".  Has never had symptoms like this before.  He does note that he did not take his prescription medications last night.          Review of Systems   HENT: Negative.    Eyes: Negative.    Respiratory: Negative.    Cardiovascular: Positive for chest pain.   Gastrointestinal: Positive for nausea and vomiting.   Genitourinary: Negative.    Musculoskeletal: Negative.    Skin: Negative.    Neurological: Positive for dizziness and weakness. Negative for facial asymmetry, numbness and headaches.   Psychiatric/Behavioral: Negative.        Past Medical History:   Diagnosis Date   • Anxiety    • Hypertension        No Known Allergies    Past Surgical History:   Procedure Laterality Date   • FRACTURE SURGERY Left     tib/fib       Family History   Problem Relation Age of Onset   • No Known Problems Mother    • No Known Problems Father        Social History     Socioeconomic History   • Marital status:    Tobacco Use   • Smoking status: Never Smoker   • Smokeless tobacco: Current User     Types: Snuff   Substance and Sexual Activity   • Alcohol use: Yes           Objective   Physical Exam  Vitals reviewed.   Constitutional:       General: He is not in acute distress.     Appearance: Normal appearance. He is not ill-appearing, toxic-appearing or diaphoretic.   HENT:      Head: Normocephalic and " atraumatic.      Right Ear: External ear normal.      Left Ear: External ear normal.      Nose: Nose normal.      Mouth/Throat:      Mouth: Mucous membranes are moist.      Pharynx: Oropharynx is clear.   Eyes:      Extraocular Movements: Extraocular movements intact.      Conjunctiva/sclera: Conjunctivae normal.      Pupils: Pupils are equal, round, and reactive to light.   Cardiovascular:      Rate and Rhythm: Normal rate and regular rhythm.      Pulses: Normal pulses.      Heart sounds: Normal heart sounds.   Pulmonary:      Effort: Pulmonary effort is normal. No respiratory distress.      Breath sounds: Normal breath sounds.   Abdominal:      General: Bowel sounds are normal. There is no distension.      Tenderness: There is no abdominal tenderness.   Musculoskeletal:         General: No swelling, tenderness or deformity. Normal range of motion.      Cervical back: Normal range of motion and neck supple.   Skin:     General: Skin is warm and dry.      Capillary Refill: Capillary refill takes less than 2 seconds.      Findings: No rash.   Neurological:      General: No focal deficit present.      Mental Status: He is alert and oriented to person, place, and time.      Comments: No nystagmus, cranial nerves intact, normal strength and sensation, gait normal   Psychiatric:         Mood and Affect: Mood normal.         Behavior: Behavior normal.         Procedures           ED Course  ED Course as of 05/23/22 1255   Mon May 23, 2022   0719 Troponin T: <0.010 [PF]   0719 D-Dimer, Quant: 0.29  WBC 8.20   RBC 4.64   Hemoglobin 16.0   Hematocrit 44.5   MCV 95.9   MCH 34.5   MCHC 36.0   RDW 12.1   RDW-SD 42.1   MPV 10.3   Platelets 160   Glucose 146   BUN 14   Creatinine 0.98   Sodium 140   Potassium 3.9   Chloride 101   CO2 23.5   Calcium 9.7   Total Protein 7.3   Albumin 4.60   ALT (SGPT) 36   AST (SGOT) 32   Alkaline Phosphatase 83   Total Bilirubin 0.5    [PF]   1251 Troponin T: <0.010 [PF]      ED Course User  Index  [PF] Mike Clement W, DO      CT Head Without Contrast    Result Date: 5/23/2022  FINAL REPORT TECHNIQUE: null CLINICAL HISTORY: dizziness COMPARISON: null FINDINGS: CT head. Technique: Unenhanced CT from the vertex to the skull base. Coronal reformations. Comparison: None Findings: No hemorrhage, mass focal infarct. Sulci and CSF spaces are within normal limits. Mild ethmoid sinus mucosal thickening. Mastoid air cells are clear. Calvarium and scalp are within normal limits.     Impression: Impression: No acute intracranial abnormality. Authenticated by Porfirio Daniels MD on 05/23/2022 06:50:15 AM    CT Angiogram Neck    Result Date: 5/23/2022  CT HEAD AND NECK ANGIO WITH CONTRAST 05/23/2022 7:32 AM  HISTORY: Dizziness, syncope..  TECHNIQUE: Thin section axial CT with IV contrast supplemented with multiplanar 3 D reconstructions of the head and neck. This study was performed with techniques to keep radiation doses as low as reasonably achievable, (ALARA). Individualized dose reduction techniques using automated exposure control or adjustment of mA and/or kV according to the patient size were employed.  FINDINGS: CTA:  Aortic arch:  Arch shows no significant narrowing. Great vessel origins are widely patent.  Right carotid:  No significant stenosis is seen of the cervical common or internal carotid artery. Mild-to-moderate right carotid bulb atherosclerotic disease resulting in 10% narrowing of the right carotid bulb by NASCET criteria. Retropharyngeal course of the right carotid system. Tortuous right cervical internal carotid artery.  Left carotid:  No significant stenosis is seen of the cervical common or internal carotid artery. Mild-to-moderate left carotid bulb atherosclerotic disease resulting in 50% narrowing of the left carotid bulb by NASCET criteria. Retropharyngeal course of the left carotid system. Tortuous left cervical internal carotid artery.  Vertebrals: Dominant right vertebral artery.  Diminutive caliber left vertebral artery. No significant stenosis is present. Left intracranial vertebral artery is diminutive in caliber and appears to be continuing as a left posterior inferior cerebellar artery. Right vertebral artery is dominant and appears to be continuing as the basilar artery. There is moderate stenosis of the distal basilar artery before bifurcation into the superior cerebellar artery. Bilateral ACAs, MCAs, PCAs and their branches are widely patent without significant stenosis, occlusion, aneurysm.      Impression: Bilateral carotid bulb atherosclerotic disease, resulting in 10% stenosis of the right carotid bulb and 50% stenosis of left carotid bulb by NASCET criteria. Dominant right vertebral artery with a diminutive caliber left vertebral artery. Moderate stenosis of the basilar artery. Other findings as above.  Images personally reviewed, interpreted and dictated by ASHISH Rosales.       This report was signed and finalized on 5/23/2022 10:12 AM by ASHISH Rosales.    XR Chest 1 View    Result Date: 5/23/2022  CLINICAL INDICATION:  Chest Pain Triage Protocol chest pain.  EXAMINATION TECHNIQUE: XR CHEST 1 VIEW-  COMPARISON: Radiographs of 03/06/2022.  FINDINGS: Lungs are clear. No pneumothorax or pleural effusion. Heart and mediastinal contours are within normal limits. Mildly low lung volumes. No acute osseous or soft tissue abnormality.      Impression: Mild hypoventilatory changes without acute process.  Images personally reviewed, interpreted and dictated by ASHISH Rosales.       This report was signed and finalized on 5/23/2022 10:12 AM by ASHISH Rosales.    CT Angiogram Head    Result Date: 5/23/2022  CT HEAD AND NECK ANGIO WITH CONTRAST 05/23/2022 7:32 AM  HISTORY: Dizziness, syncope..  TECHNIQUE: Thin section axial CT with IV contrast supplemented with multiplanar 3 D reconstructions of the head and neck. This study was performed with techniques to keep  radiation doses as low as reasonably achievable, (ALARA). Individualized dose reduction techniques using automated exposure control or adjustment of mA and/or kV according to the patient size were employed.  FINDINGS: CTA:  Aortic arch:  Arch shows no significant narrowing. Great vessel origins are widely patent.  Right carotid:  No significant stenosis is seen of the cervical common or internal carotid artery. Mild-to-moderate right carotid bulb atherosclerotic disease resulting in 10% narrowing of the right carotid bulb by NASCET criteria. Retropharyngeal course of the right carotid system. Tortuous right cervical internal carotid artery.  Left carotid:  No significant stenosis is seen of the cervical common or internal carotid artery. Mild-to-moderate left carotid bulb atherosclerotic disease resulting in 50% narrowing of the left carotid bulb by NASCET criteria. Retropharyngeal course of the left carotid system. Tortuous left cervical internal carotid artery.  Vertebrals: Dominant right vertebral artery. Diminutive caliber left vertebral artery. No significant stenosis is present. Left intracranial vertebral artery is diminutive in caliber and appears to be continuing as a left posterior inferior cerebellar artery. Right vertebral artery is dominant and appears to be continuing as the basilar artery. There is moderate stenosis of the distal basilar artery before bifurcation into the superior cerebellar artery. Bilateral ACAs, MCAs, PCAs and their branches are widely patent without significant stenosis, occlusion, aneurysm.      Impression: Bilateral carotid bulb atherosclerotic disease, resulting in 10% stenosis of the right carotid bulb and 50% stenosis of left carotid bulb by NASCET criteria. Dominant right vertebral artery with a diminutive caliber left vertebral artery. Moderate stenosis of the basilar artery. Other findings as above.  Images personally reviewed, interpreted and dictated by John Manning  ASHISH.       This report was signed and finalized on 5/23/2022 10:12 AM by ASHISH Rosales.                                               MDM  Number of Diagnoses or Management Options  Diagnosis management comments: 51-year-old male with dizziness and chest pain.  Well-developed, well-nourished man in no distress with exam as above.  He is notably hypertensive.  His vital signs are otherwise normal.  His exam is fairly nonfocal.  His history sounds most consistent with vertigo.  I am unsure the connection between vertigo and his chest pain.  Will obtain labs to include D-dimer.  We will get head imaging.  We will likely proceed with CTA imaging given all of his complaints.  Will give symptomatic treatment in the meantime.  Disposition pending.    DDx: Anxiety, ACS, vertigo, ICH    EKG interpreted by me: Sinus rhythm, normal rate, poor R wave progression, some nonspecific ST/T changes, this is an abnormal EKG, compared to previous this actually looks improved    06:57 EDT Work-up thus far is reassuring.  D-dimer is negative.  He feels somewhat improved.  Will give additional medications, repeat troponin, get CT angiograms.  Disposition pending.    12:55 EDT  I received care from Dr. Alejandro in regards to follow-up of labs and imaging.  Patient is symptom-free after blood pressure treatment.  At highest blood pressure was 234/184.  Blood pressure is proved to 160s systolic now.  Patient received a.m. blood pressure medications, 20 mg of hydralazine.  No evidence of ACS.  Dizziness has resolved with Valium and meclizine, no vomiting in emergency department.  CT angiogram of the head neck reveals some noncritical stenosis.  Patient requesting discharge home.  Advised patient to continue his lisinopril 40 mg daily, metoprolol 50 mg daily, amlodipine 10 mg daily, hydrochlorothiazide 50 mg daily.  We will add hydralazine 10 mg p.o.3 times daily.  Advised to keep his appointment with his PCP this Friday, return sooner  if persistent or worsening symptoms.  Final diagnoses:   Hypertensive urgency          Mike Clement,   05/23/22 4725

## 2022-05-25 NOTE — LETTER
[facility]  [address] [place] [address]  Phone: [phone]  Fax: [phone]          October 28, 2020    Tristen Jose [address]      Dear Leo Mead:    Please call and schedule an appointment to recheck your blood pressure with your primary care physician. If you have any questions or concerns, please don't hesitate to call.     Sincerely,        RAFI Fox hair removal not indicated

## 2022-05-27 ENCOUNTER — OFFICE VISIT (OUTPATIENT)
Dept: PRIMARY CARE CLINIC | Age: 52
End: 2022-05-27
Payer: COMMERCIAL

## 2022-05-27 VITALS
SYSTOLIC BLOOD PRESSURE: 171 MMHG | OXYGEN SATURATION: 97 % | HEART RATE: 112 BPM | RESPIRATION RATE: 16 BRPM | HEIGHT: 68 IN | DIASTOLIC BLOOD PRESSURE: 109 MMHG | WEIGHT: 244.6 LBS | TEMPERATURE: 99.1 F | BODY MASS INDEX: 37.07 KG/M2

## 2022-05-27 DIAGNOSIS — F32.0 MILD MAJOR DEPRESSION (HCC): ICD-10-CM

## 2022-05-27 DIAGNOSIS — M54.40 CHRONIC MIDLINE LOW BACK PAIN WITH SCIATICA, SCIATICA LATERALITY UNSPECIFIED: ICD-10-CM

## 2022-05-27 DIAGNOSIS — E78.00 PURE HYPERCHOLESTEROLEMIA: ICD-10-CM

## 2022-05-27 DIAGNOSIS — I10 UNCONTROLLED HYPERTENSION: ICD-10-CM

## 2022-05-27 DIAGNOSIS — R73.9 HYPERGLYCEMIA: ICD-10-CM

## 2022-05-27 DIAGNOSIS — G89.29 CHRONIC MIDLINE LOW BACK PAIN WITH SCIATICA, SCIATICA LATERALITY UNSPECIFIED: ICD-10-CM

## 2022-05-27 DIAGNOSIS — I10 UNCONTROLLED HYPERTENSION: Primary | ICD-10-CM

## 2022-05-27 DIAGNOSIS — R07.9 CHEST PAIN, UNSPECIFIED TYPE: ICD-10-CM

## 2022-05-27 DIAGNOSIS — I77.9 CAROTID ARTERY DISEASE WITHOUT CEREBRAL INFARCTION (HCC): ICD-10-CM

## 2022-05-27 PROCEDURE — 99204 OFFICE O/P NEW MOD 45 MIN: CPT | Performed by: PHYSICIAN ASSISTANT

## 2022-05-27 RX ORDER — ASPIRIN 325 MG
325 TABLET, DELAYED RELEASE (ENTERIC COATED) ORAL DAILY
Qty: 30 TABLET | Refills: 3 | Status: SHIPPED | OUTPATIENT
Start: 2022-05-27 | End: 2022-09-29 | Stop reason: SDUPTHER

## 2022-05-27 RX ORDER — BUPROPION HYDROCHLORIDE 150 MG/1
150 TABLET ORAL EVERY MORNING
Qty: 30 TABLET | Refills: 3 | Status: SHIPPED | OUTPATIENT
Start: 2022-05-27 | End: 2022-06-24 | Stop reason: SDUPTHER

## 2022-05-27 RX ORDER — AMLODIPINE BESYLATE 10 MG/1
10 TABLET ORAL NIGHTLY
Qty: 30 TABLET | Refills: 1 | Status: SHIPPED | OUTPATIENT
Start: 2022-05-27 | End: 2022-09-29 | Stop reason: SDUPTHER

## 2022-05-27 RX ORDER — IBUPROFEN 800 MG/1
800 TABLET ORAL 2 TIMES DAILY PRN
Qty: 180 TABLET | Refills: 1 | Status: SHIPPED | OUTPATIENT
Start: 2022-05-27

## 2022-05-27 RX ORDER — AMLODIPINE BESYLATE 10 MG/1
TABLET ORAL
Qty: 90 TABLET | OUTPATIENT
Start: 2022-05-27

## 2022-05-27 RX ORDER — METOPROLOL SUCCINATE 50 MG/1
50 TABLET, EXTENDED RELEASE ORAL DAILY
Qty: 30 TABLET | Refills: 1 | Status: SHIPPED | OUTPATIENT
Start: 2022-05-27 | End: 2022-09-29 | Stop reason: SDUPTHER

## 2022-05-27 SDOH — ECONOMIC STABILITY: FOOD INSECURITY: WITHIN THE PAST 12 MONTHS, YOU WORRIED THAT YOUR FOOD WOULD RUN OUT BEFORE YOU GOT MONEY TO BUY MORE.: NEVER TRUE

## 2022-05-27 SDOH — ECONOMIC STABILITY: FOOD INSECURITY: WITHIN THE PAST 12 MONTHS, THE FOOD YOU BOUGHT JUST DIDN'T LAST AND YOU DIDN'T HAVE MONEY TO GET MORE.: NEVER TRUE

## 2022-05-27 ASSESSMENT — ENCOUNTER SYMPTOMS
SHORTNESS OF BREATH: 0
ABDOMINAL PAIN: 0
EYE PAIN: 0
COUGH: 0
DIARRHEA: 0
SORE THROAT: 0
CONSTIPATION: 0

## 2022-05-27 ASSESSMENT — PATIENT HEALTH QUESTIONNAIRE - PHQ9
1. LITTLE INTEREST OR PLEASURE IN DOING THINGS: 0
SUM OF ALL RESPONSES TO PHQ QUESTIONS 1-9: 0
SUM OF ALL RESPONSES TO PHQ9 QUESTIONS 1 & 2: 0
2. FEELING DOWN, DEPRESSED OR HOPELESS: 0
SUM OF ALL RESPONSES TO PHQ QUESTIONS 1-9: 0

## 2022-05-27 ASSESSMENT — SOCIAL DETERMINANTS OF HEALTH (SDOH): HOW HARD IS IT FOR YOU TO PAY FOR THE VERY BASICS LIKE FOOD, HOUSING, MEDICAL CARE, AND HEATING?: HARD

## 2022-05-27 NOTE — PROGRESS NOTES
Chief Complaint   Patient presents with   [address] Hypertension       Have you seen any other physician or provider since your last visit yes - [address]    Have you had any other diagnostic tests since your last visit? yes - labs    Have you changed or stopped any medications since your last visit? no     I have recommended that this patient have a sigmoidoscopy but he declines at this time. I have discussed the risks and benefits of this examination with him. The patient verbalizes understanding. Diabetic retinal exam completed this year? Yes                       * If yes please have patient sign a records release to obtain record to update Health Maintenance                       * If no, please order referral for patient to be scheduled     Patient has had hypertension for several years. He has not been compliant with taking medications, without side effects from it. He has been following a low-sodium, is active and never exercises. Weight is stable, compared to last visit. His blood pressure is elevated 188/107 at this time. Patient was seen in [facility] ER recently and was told he was at risk for heart attack or stroke and needs referred to cardiology.

## 2022-05-27 NOTE — PROGRESS NOTES
Anjelica Tucker (:  1970) is a 54 y.o. male,New patient, here for evaluation of the following chief complaint(s):  Establish Care and Hypertension           Subjective   SUBJECTIVE/OBJECTIVE:  HPI   Mr. Abner Adams is a 54year old male with PMH uncontrolled HTN and Depression. He has been off all of his medications for over a month. He has had recent ED visits for chest pain and pressure and gas on his stomach. He denies any h/o smoking; he does chew tobacco. He has not had recent labs. Recent CT Head and Neck Angio with Contrast on 22:  \"Impression: Bilateral carotid bulb atherosclerotic disease, resulting in 10% stenosis of the right carotid bulb and 50% stenosis of left carotid bulb by NASCET criteria. Dominant right vertebral artery with a diminutive caliber left vertebral artery. Moderate stenosis of the basilar artery. Other findings as above. Images personally reviewed, interpreted and dictated by ANDRY Wasserman. This report was signed and finalized on 2022 10:12 AM by ANDRY Wasserman\". He has chronic back pain treated with advil and flexeril for a while; denies any injury but does work standing on feet all day. Past Medical History:   Diagnosis Date    Anxiety     Arthritis     Back pain     CAD (coronary artery disease)     Depression     Headache(784.0)     Hypertension       Past Surgical History:   Procedure Laterality Date    FINGER AMPUTATION      also has partial amputation of right index and ring finger    LEG SURGERY          Review of Systems   Constitutional: Negative for chills, fatigue and fever. HENT: Negative for congestion, ear pain, nosebleeds and sore throat. Eyes: Negative for pain and visual disturbance. Respiratory: Negative for cough and shortness of breath. Cardiovascular: Negative for chest pain and palpitations. Gastrointestinal: Negative for abdominal pain, constipation and diarrhea.    Genitourinary: Negative for difficulty Thought content normal.                 ASSESSMENT/PLAN:  1. Uncontrolled hypertension  He has been out of all medications. I have instructed him to restart medication ; strict blood pressure control and DASH diet and call with the readings next week. We discussed complications of noncompliance with medications and follow-up. - metoprolol succinate (TOPROL XL) 50 MG extended release tablet; Take 1 tablet by mouth daily  Dispense: 30 tablet; Refill: 1  - amLODIPine (NORVASC) 10 MG tablet; Take 1 tablet by mouth nightly  Dispense: 30 tablet; Refill: 1  - External Referral To Cardiology  - CBC with Auto Differential; Future  - Comprehensive Metabolic Panel; Future    2. Pure hypercholesterolemia  Denies ever being on cholesterol medication  - Lipid Panel; Future    3. Carotid artery disease without cerebral infarction (HCC)  Increase ASA to 325 mg daily  - External Referral To Vascular Surgery    4. Mild major depression (HCC)  - buPROPion (WELLBUTRIN XL) 150 MG extended release tablet; Take 1 tablet by mouth every morning  Dispense: 30 tablet; Refill: 3    5. Chest pain, unspecified type  He has NTG; To ED if worsening s/sx  - External Referral To Cardiology  - TSH; Future    6. Hyperglycemia  - Hemoglobin A1C; Future    7. Chronic midline low back pain with sciatica, sciatica laterality unspecified  - ibuprofen (ADVIL;MOTRIN) 800 MG tablet; Take 1 tablet by mouth 2 times daily as needed for Pain  Dispense: 180 tablet; Refill: 1        Return in about 2 weeks (around 6/10/2022) for HTN. An electronic signature was used to authenticate this note.     --Addie Moore PA-C

## 2022-06-17 ENCOUNTER — HOSPITAL ENCOUNTER (OUTPATIENT)
Facility: HOSPITAL | Age: 52
Discharge: HOME | End: 2022-06-17
Payer: COMMERCIAL

## 2022-06-17 DIAGNOSIS — R73.9 HYPERGLYCEMIA: ICD-10-CM

## 2022-06-17 DIAGNOSIS — R07.9 CHEST PAIN, UNSPECIFIED TYPE: ICD-10-CM

## 2022-06-17 DIAGNOSIS — I10 UNCONTROLLED HYPERTENSION: ICD-10-CM

## 2022-06-17 DIAGNOSIS — E78.00 PURE HYPERCHOLESTEROLEMIA: ICD-10-CM

## 2022-06-17 LAB
A/G RATIO: 1.9 (ref 0.8–2)
ALBUMIN SERPL-MCNC: 4.5 G/DL (ref 3.4–4.8)
ALP BLD-CCNC: 80 U/L (ref 25–100)
ALT SERPL-CCNC: 23 U/L (ref 4–36)
ANION GAP SERPL CALCULATED.3IONS-SCNC: 10 MMOL/L (ref 3–16)
AST SERPL-CCNC: 28 U/L (ref 8–33)
BASOPHILS ABSOLUTE: 0 K/UL (ref 0–0.1)
BASOPHILS RELATIVE PERCENT: 0.6 %
BILIRUB SERPL-MCNC: 0.5 MG/DL (ref 0.3–1.2)
BUN BLDV-MCNC: 10 MG/DL (ref 6–20)
CALCIUM SERPL-MCNC: 9.4 MG/DL (ref 8.5–10.5)
CHLORIDE BLD-SCNC: 102 MMOL/L (ref 98–107)
CHOLESTEROL, TOTAL: 154 MG/DL (ref 0–200)
CO2: 26 MMOL/L (ref 20–30)
CREAT SERPL-MCNC: 0.9 MG/DL (ref 0.4–1.2)
EOSINOPHILS ABSOLUTE: 0.2 K/UL (ref 0–0.4)
EOSINOPHILS RELATIVE PERCENT: 3.7 %
GFR AFRICAN AMERICAN: >59
GFR NON-AFRICAN AMERICAN: >59
GLOBULIN: 2.4 G/DL
GLUCOSE BLD-MCNC: 108 MG/DL (ref 74–106)
HBA1C MFR BLD: 5.5 %
HCT VFR BLD CALC: 41.4 % (ref 40–54)
HDLC SERPL-MCNC: 37 MG/DL (ref 40–60)
HEMOGLOBIN: 13.9 G/DL (ref 13–18)
IMMATURE GRANULOCYTES #: 0 K/UL
IMMATURE GRANULOCYTES %: 0.3 % (ref 0–5)
LDL CHOLESTEROL CALCULATED: 85 MG/DL
LYMPHOCYTES ABSOLUTE: 2.3 K/UL (ref 1.5–4)
LYMPHOCYTES RELATIVE PERCENT: 37.1 %
MCH RBC QN AUTO: 33 PG (ref 27–32)
MCHC RBC AUTO-ENTMCNC: 33.6 G/DL (ref 31–35)
MCV RBC AUTO: 98.3 FL (ref 80–100)
MONOCYTES ABSOLUTE: 0.6 K/UL (ref 0.2–0.8)
MONOCYTES RELATIVE PERCENT: 9.7 %
NEUTROPHILS ABSOLUTE: 3.1 K/UL (ref 2–7.5)
NEUTROPHILS RELATIVE PERCENT: 48.6 %
PDW BLD-RTO: 12.9 % (ref 11–16)
PLATELET # BLD: 147 K/UL (ref 150–400)
PMV BLD AUTO: 10.4 FL (ref 6–10)
POTASSIUM SERPL-SCNC: 4.2 MMOL/L (ref 3.4–5.1)
RBC # BLD: 4.21 M/UL (ref 4.5–6)
SODIUM BLD-SCNC: 138 MMOL/L (ref 136–145)
TOTAL PROTEIN: 6.9 G/DL (ref 6.4–8.3)
TRIGL SERPL-MCNC: 162 MG/DL (ref 0–249)
TSH SERPL DL<=0.05 MIU/L-ACNC: 1.96 UIU/ML (ref 0.27–4.2)
VLDLC SERPL CALC-MCNC: 32 MG/DL
WBC # BLD: 6.3 K/UL (ref 4–11)

## 2022-06-17 PROCEDURE — 85025 COMPLETE CBC W/AUTO DIFF WBC: CPT

## 2022-06-17 PROCEDURE — 36415 COLL VENOUS BLD VENIPUNCTURE: CPT

## 2022-06-17 PROCEDURE — 84443 ASSAY THYROID STIM HORMONE: CPT

## 2022-06-17 PROCEDURE — 80061 LIPID PANEL: CPT

## 2022-06-17 PROCEDURE — 80053 COMPREHEN METABOLIC PANEL: CPT

## 2022-06-17 PROCEDURE — 83036 HEMOGLOBIN GLYCOSYLATED A1C: CPT

## 2022-06-24 ENCOUNTER — OFFICE VISIT (OUTPATIENT)
Dept: PRIMARY CARE CLINIC | Age: 52
End: 2022-06-24
Payer: COMMERCIAL

## 2022-06-24 VITALS
DIASTOLIC BLOOD PRESSURE: 94 MMHG | HEART RATE: 89 BPM | BODY MASS INDEX: 36.68 KG/M2 | OXYGEN SATURATION: 97 % | WEIGHT: 242 LBS | SYSTOLIC BLOOD PRESSURE: 147 MMHG | HEIGHT: 68 IN

## 2022-06-24 DIAGNOSIS — F41.8 MIXED ANXIETY AND DEPRESSIVE DISORDER: ICD-10-CM

## 2022-06-24 DIAGNOSIS — R07.9 CHEST PAIN, UNSPECIFIED TYPE: ICD-10-CM

## 2022-06-24 DIAGNOSIS — Z12.11 SCREENING FOR COLON CANCER: ICD-10-CM

## 2022-06-24 DIAGNOSIS — Z12.5 SCREENING FOR PROSTATE CANCER: ICD-10-CM

## 2022-06-24 DIAGNOSIS — I10 PRIMARY HYPERTENSION: Primary | ICD-10-CM

## 2022-06-24 DIAGNOSIS — M79.604 BILATERAL LEG PAIN: ICD-10-CM

## 2022-06-24 DIAGNOSIS — M79.605 BILATERAL LEG PAIN: ICD-10-CM

## 2022-06-24 DIAGNOSIS — I77.9 CAROTID ARTERY DISEASE WITHOUT CEREBRAL INFARCTION (HCC): ICD-10-CM

## 2022-06-24 PROCEDURE — 99214 OFFICE O/P EST MOD 30 MIN: CPT | Performed by: PHYSICIAN ASSISTANT

## 2022-06-24 RX ORDER — NITROGLYCERIN 0.4 MG/1
0.4 TABLET SUBLINGUAL EVERY 5 MIN PRN
Qty: 25 TABLET | Refills: 0 | Status: SHIPPED | OUTPATIENT
Start: 2022-06-24 | End: 2022-09-29 | Stop reason: SDUPTHER

## 2022-06-24 RX ORDER — BUPROPION HYDROCHLORIDE 150 MG/1
150 TABLET ORAL EVERY MORNING
Qty: 30 TABLET | Refills: 3 | Status: SHIPPED | OUTPATIENT
Start: 2022-06-24 | End: 2022-09-29 | Stop reason: SDUPTHER

## 2022-06-24 RX ORDER — ROPINIROLE 0.5 MG/1
TABLET, FILM COATED ORAL
Qty: 90 TABLET | Refills: 3 | Status: SHIPPED | OUTPATIENT
Start: 2022-06-24 | End: 2022-09-29 | Stop reason: SDUPTHER

## 2022-06-24 RX ORDER — ESCITALOPRAM OXALATE 10 MG/1
10 TABLET ORAL DAILY
Qty: 30 TABLET | Refills: 3 | Status: SHIPPED | OUTPATIENT
Start: 2022-06-24 | End: 2022-09-29 | Stop reason: SDUPTHER

## 2022-06-24 ASSESSMENT — ENCOUNTER SYMPTOMS
COUGH: 0
SHORTNESS OF BREATH: 0
DIARRHEA: 0
CONSTIPATION: 0
EYE PAIN: 0
SORE THROAT: 0
ABDOMINAL PAIN: 0

## 2022-06-24 NOTE — PROGRESS NOTES
Health Maintenance Due This Visit   Colonoscopy Yes- Pt agreeable to Cologuard   Mammogram No   Annual Wellness Visit No   Microalbumin No   HgbA1C No   Diabetic Eye Exam No              Bone Dexa No                  Chief Complaint   Patient presents with    Follow-up    Hypertension       Have you seen any other physician or provider since your last visit no    Have you had any other diagnostic tests since your last visit? no    Have you changed or stopped any medications since your last visit? no
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
## 2022-09-29 ENCOUNTER — OFFICE VISIT (OUTPATIENT)
Dept: PRIMARY CARE CLINIC | Age: 52
End: 2022-09-29
Payer: COMMERCIAL

## 2022-09-29 VITALS
WEIGHT: 240.6 LBS | TEMPERATURE: 98 F | SYSTOLIC BLOOD PRESSURE: 169 MMHG | HEIGHT: 68 IN | HEART RATE: 82 BPM | DIASTOLIC BLOOD PRESSURE: 109 MMHG | OXYGEN SATURATION: 97 % | BODY MASS INDEX: 36.46 KG/M2

## 2022-09-29 DIAGNOSIS — F41.8 MIXED ANXIETY AND DEPRESSIVE DISORDER: Primary | ICD-10-CM

## 2022-09-29 DIAGNOSIS — M79.604 BILATERAL LEG PAIN: ICD-10-CM

## 2022-09-29 DIAGNOSIS — I10 UNCONTROLLED HYPERTENSION: ICD-10-CM

## 2022-09-29 DIAGNOSIS — Z91.14 NONCOMPLIANCE WITH MEDICATIONS: ICD-10-CM

## 2022-09-29 DIAGNOSIS — R09.89 DECREASED PEDAL PULSES: ICD-10-CM

## 2022-09-29 DIAGNOSIS — R07.9 CHEST PAIN, UNSPECIFIED TYPE: ICD-10-CM

## 2022-09-29 DIAGNOSIS — I77.9 CAROTID ARTERY DISEASE WITHOUT CEREBRAL INFARCTION (HCC): ICD-10-CM

## 2022-09-29 DIAGNOSIS — M79.605 BILATERAL LEG PAIN: ICD-10-CM

## 2022-09-29 PROCEDURE — 99214 OFFICE O/P EST MOD 30 MIN: CPT | Performed by: PHYSICIAN ASSISTANT

## 2022-09-29 RX ORDER — ATORVASTATIN CALCIUM 20 MG/1
20 TABLET, FILM COATED ORAL NIGHTLY
Qty: 90 TABLET | Refills: 2 | Status: SHIPPED | OUTPATIENT
Start: 2022-09-29 | End: 2022-12-28

## 2022-09-29 RX ORDER — ASPIRIN 325 MG
325 TABLET, DELAYED RELEASE (ENTERIC COATED) ORAL DAILY
Qty: 90 TABLET | Refills: 2 | Status: SHIPPED | OUTPATIENT
Start: 2022-09-29 | End: 2023-09-29

## 2022-09-29 RX ORDER — ROPINIROLE 0.5 MG/1
TABLET, FILM COATED ORAL
Qty: 90 TABLET | Refills: 3 | Status: SHIPPED | OUTPATIENT
Start: 2022-09-29

## 2022-09-29 RX ORDER — NITROGLYCERIN 0.4 MG/1
0.4 TABLET SUBLINGUAL EVERY 5 MIN PRN
Qty: 25 TABLET | Refills: 0 | Status: SHIPPED | OUTPATIENT
Start: 2022-09-29

## 2022-09-29 RX ORDER — AMLODIPINE BESYLATE 10 MG/1
10 TABLET ORAL NIGHTLY
Qty: 90 TABLET | Refills: 2 | Status: SHIPPED | OUTPATIENT
Start: 2022-09-29

## 2022-09-29 RX ORDER — HYDRALAZINE HYDROCHLORIDE 10 MG/1
10 TABLET, FILM COATED ORAL 3 TIMES DAILY
Qty: 90 TABLET | Refills: 2 | Status: SHIPPED | OUTPATIENT
Start: 2022-09-29

## 2022-09-29 RX ORDER — MECLIZINE HYDROCHLORIDE 25 MG/1
25 TABLET ORAL 3 TIMES DAILY PRN
Status: CANCELLED | OUTPATIENT
Start: 2022-09-29

## 2022-09-29 RX ORDER — ESCITALOPRAM OXALATE 10 MG/1
10 TABLET ORAL DAILY
Qty: 90 TABLET | Refills: 2 | Status: SHIPPED | OUTPATIENT
Start: 2022-09-29

## 2022-09-29 RX ORDER — BUPROPION HYDROCHLORIDE 150 MG/1
150 TABLET ORAL EVERY MORNING
Qty: 90 TABLET | Refills: 2 | Status: SHIPPED | OUTPATIENT
Start: 2022-09-29

## 2022-09-29 RX ORDER — METOPROLOL SUCCINATE 50 MG/1
50 TABLET, EXTENDED RELEASE ORAL DAILY
Qty: 90 TABLET | Refills: 2 | Status: SHIPPED | OUTPATIENT
Start: 2022-09-29 | End: 2022-11-01 | Stop reason: SDUPTHER

## 2022-09-29 ASSESSMENT — ENCOUNTER SYMPTOMS
EYE PAIN: 0
SHORTNESS OF BREATH: 0
CONSTIPATION: 0
COUGH: 0
ABDOMINAL PAIN: 0
DIARRHEA: 0
SORE THROAT: 0

## 2022-09-29 NOTE — PROGRESS NOTES
Health Maintenance Due This Visit   Colonoscopy Yes- ordered, not yet performed    Mammogram No   Annual Wellness Visit No   Microalbumin No   HgbA1C No   Diabetic Eye Exam No              Bone Dexa No                  Chief Complaint   Patient presents with    Follow-up    Hypertension    Medication Refill     Has been out of all medicines for 2 months    Have you seen any other physician or provider since your last visit no    Have you had any other diagnostic tests since your last visit? no    Have you changed or stopped any medications since your last visit? no

## 2022-09-29 NOTE — PROGRESS NOTES
06/24/22 (!) 147/94   05/27/22 (!) 171/109       BP (!) 169/109 (Site: Right Upper Arm, Position: Sitting)   Pulse 82   Temp 98 °F (36.7 °C) (Temporal)   Ht 5' 8\" (1.727 m)   Wt 240 lb 9.6 oz (109.1 kg)   SpO2 97%   BMI 36.58 kg/m²      Physical Exam  Vitals reviewed. Constitutional:       Appearance: Normal appearance. HENT:      Head: Normocephalic and atraumatic. Right Ear: External ear normal.      Left Ear: External ear normal.      Nose: Nose normal.      Mouth/Throat:      Mouth: Mucous membranes are moist.      Pharynx: Oropharynx is clear. Eyes:      Extraocular Movements: Extraocular movements intact. Conjunctiva/sclera: Conjunctivae normal.      Pupils: Pupils are equal, round, and reactive to light. Cardiovascular:      Rate and Rhythm: Normal rate and regular rhythm. Pulses: Decreased pulses (b/l pedal). Heart sounds: Normal heart sounds. Pulmonary:      Effort: Pulmonary effort is normal.      Breath sounds: Normal breath sounds. Abdominal:      General: Bowel sounds are normal.      Palpations: Abdomen is soft. Musculoskeletal:         General: Normal range of motion. Cervical back: Normal range of motion. Skin:     General: Skin is warm. Findings: No rash. Neurological:      General: No focal deficit present. Mental Status: He is alert. Psychiatric:         Mood and Affect: Mood normal.         Thought Content:  Thought content normal.       Lab Results   Component Value Date/Time     06/17/2022 08:47 AM    K 4.2 06/17/2022 08:47 AM    K 3.8 04/03/2022 12:20 PM     06/17/2022 08:47 AM    CO2 26 06/17/2022 08:47 AM    GLUCOSE 108 06/17/2022 08:47 AM    BUN 10 06/17/2022 08:47 AM    CREATININE 0.9 06/17/2022 08:47 AM    CALCIUM 9.4 06/17/2022 08:47 AM    PROT 6.9 06/17/2022 08:47 AM    LABALBU 4.5 06/17/2022 08:47 AM    BILITOT 0.5 06/17/2022 08:47 AM    ALT 23 06/17/2022 08:47 AM    AST 28 06/17/2022 08:47 AM       Hemoglobin Take 1 tablet by mouth at bedtime  Dispense: 90 tablet; Refill: 2    5. Decreased pedal pulses  - VL DUP LOWER EXTREMITY ARTERIES BILATERAL; Future    6. Chest pain, unspecified type  Patient was given the phone number of cardiology office to schedule on his own today as they have been unable to reach him due to incorrect phone number  - External Referral To Cardiology  NTG refilled     7. Noncompliance with medications  Long discussion with patient regarding need for antihypertensives and aspirin and that there is no reason he should ever be out of these medications; I have refilled these medications with a 90-day supply with refills. I have asked that he return for prostate screening lab work and a complete his Cologuard for routine health maintenance. Orders Placed This Encounter   Medications    nitroGLYCERIN (NITROSTAT) 0.4 MG SL tablet     Sig: Place 1 tablet under the tongue every 5 minutes as needed for Chest pain up to max of 3 total doses. If no relief after 1 dose, call 911.      Dispense:  25 tablet     Refill:  0    escitalopram (LEXAPRO) 10 MG tablet     Sig: Take 1 tablet by mouth daily     Dispense:  90 tablet     Refill:  2    buPROPion (WELLBUTRIN XL) 150 MG extended release tablet     Sig: Take 1 tablet by mouth every morning     Dispense:  90 tablet     Refill:  2    rOPINIRole (REQUIP) 0.5 MG tablet     Sig: Take one po qhs for 5 days then increase to to 2 pills po qhs     Dispense:  90 tablet     Refill:  3    hydrALAZINE (APRESOLINE) 10 MG tablet     Sig: Take 1 tablet by mouth 3 times daily     Dispense:  90 tablet     Refill:  2    metoprolol succinate (TOPROL XL) 50 MG extended release tablet     Sig: Take 1 tablet by mouth daily     Dispense:  90 tablet     Refill:  2    amLODIPine (NORVASC) 10 MG tablet     Sig: Take 1 tablet by mouth nightly     Dispense:  90 tablet     Refill:  2    aspirin 325 MG EC tablet     Sig: Take 1 tablet by mouth daily STOP 81 mg Aspirin     Dispense:  90 tablet     Refill:  2    atorvastatin (LIPITOR) 20 MG tablet     Sig: Take 1 tablet by mouth at bedtime     Dispense:  90 tablet     Refill:  2

## 2022-10-07 ENCOUNTER — HOSPITAL ENCOUNTER (OUTPATIENT)
Dept: ULTRASOUND IMAGING | Facility: HOSPITAL | Age: 52
Discharge: HOME | End: 2022-10-07
Payer: COMMERCIAL

## 2022-10-07 DIAGNOSIS — I73.9 PAD (PERIPHERAL ARTERY DISEASE) (HCC): Primary | ICD-10-CM

## 2022-10-07 DIAGNOSIS — R09.89 DECREASED PEDAL PULSES: ICD-10-CM

## 2022-10-07 DIAGNOSIS — M79.604 BILATERAL LEG PAIN: ICD-10-CM

## 2022-10-07 DIAGNOSIS — M79.605 BILATERAL LEG PAIN: ICD-10-CM

## 2022-10-07 PROCEDURE — 93925 LOWER EXTREMITY STUDY: CPT

## 2022-11-01 ENCOUNTER — OFFICE VISIT (OUTPATIENT)
Dept: PRIMARY CARE CLINIC | Age: 52
End: 2022-11-01
Payer: COMMERCIAL

## 2022-11-01 VITALS
BODY MASS INDEX: 36.34 KG/M2 | OXYGEN SATURATION: 99 % | DIASTOLIC BLOOD PRESSURE: 110 MMHG | SYSTOLIC BLOOD PRESSURE: 166 MMHG | HEART RATE: 81 BPM | HEIGHT: 68 IN | WEIGHT: 239.8 LBS

## 2022-11-01 DIAGNOSIS — I73.9 PERIPHERAL VASCULAR DISEASE (HCC): ICD-10-CM

## 2022-11-01 DIAGNOSIS — I77.9 CAROTID ARTERY DISEASE WITHOUT CEREBRAL INFARCTION (HCC): ICD-10-CM

## 2022-11-01 DIAGNOSIS — M54.16 LUMBAR BACK PAIN WITH RADICULOPATHY AFFECTING LEFT LOWER EXTREMITY: ICD-10-CM

## 2022-11-01 DIAGNOSIS — I10 UNCONTROLLED HYPERTENSION: ICD-10-CM

## 2022-11-01 DIAGNOSIS — M79.604 LEG PAIN, BILATERAL: Primary | ICD-10-CM

## 2022-11-01 DIAGNOSIS — M79.605 LEG PAIN, BILATERAL: Primary | ICD-10-CM

## 2022-11-01 PROCEDURE — 3074F SYST BP LT 130 MM HG: CPT | Performed by: PHYSICIAN ASSISTANT

## 2022-11-01 PROCEDURE — 3079F DIAST BP 80-89 MM HG: CPT | Performed by: PHYSICIAN ASSISTANT

## 2022-11-01 PROCEDURE — 99214 OFFICE O/P EST MOD 30 MIN: CPT | Performed by: PHYSICIAN ASSISTANT

## 2022-11-01 RX ORDER — GABAPENTIN 300 MG/1
300 CAPSULE ORAL EVERY EVENING
Qty: 30 CAPSULE | Refills: 0 | Status: SHIPPED | OUTPATIENT
Start: 2022-11-01 | End: 2022-12-01

## 2022-11-01 RX ORDER — METOPROLOL SUCCINATE 50 MG/1
TABLET, EXTENDED RELEASE ORAL
Qty: 135 TABLET | Refills: 0 | Status: SHIPPED | OUTPATIENT
Start: 2022-11-01 | End: 2022-11-08 | Stop reason: DRUGHIGH

## 2022-11-01 ASSESSMENT — ENCOUNTER SYMPTOMS
ABDOMINAL PAIN: 0
EYE PAIN: 0
COUGH: 0
SORE THROAT: 0
CONSTIPATION: 0
DIARRHEA: 0
SHORTNESS OF BREATH: 0

## 2022-11-01 NOTE — PROGRESS NOTES
SUBJECTIVE:    Patient ID: Phani Randall is a 50 y.o.male. Chief Complaint   Patient presents with    Follow-up    Hypertension         HPI:  Mr. Cecelia Cummings is a 16-year-old white male with past medical history of hypertension, anxiety and depression, oral tobacco use, RLS and carotid artery disease. His home blood pressures have been remaining high; however he is taking all antihypertensive medications only at night. He complains of bilateral leg pain ongoing for 8 years with worsening over the past 3-4 years. Non-smoker. Recent Bilateral Lower Extremity Arterial Dopller showing moderate peripheral vascular disease. Also has noted loose discoloration of feet and cold to the touch at times; pain is to the point he describes as excruciating without relief with ibuprofen. 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 He does note radicular symptoms down left lower extremity. Patient's medications, allergies, past medical, surgical, social and family histories were reviewed and updated as appropriate in electronic medical record. Outpatient Medications Marked as Taking for the 11/1/22 encounter (Office Visit) with Abdon Camacho PA-C   Medication Sig Dispense Refill    gabapentin (NEURONTIN) 300 MG capsule Take 1 capsule by mouth every evening for 30 days. Intended supply: 30 days 30 capsule 0    metoprolol succinate (TOPROL XL) 50 MG extended release tablet One and a half tablets daily or 75 mg daily 135 tablet 0    nitroGLYCERIN (NITROSTAT) 0.4 MG SL tablet Place 1 tablet under the tongue every 5 minutes as needed for Chest pain up to max of 3 total doses.  If no relief after 1 dose, call 911. 25 tablet 0    escitalopram (LEXAPRO) 10 MG tablet Take 1 tablet by mouth daily 90 tablet 2    buPROPion (WELLBUTRIN XL) 150 MG extended release tablet Take 1 tablet by mouth every morning 90 tablet 2 rOPINIRole (REQUIP) 0.5 MG tablet Take one po qhs for 5 days then increase to to 2 pills po qhs 90 tablet 3    hydrALAZINE (APRESOLINE) 10 MG tablet Take 1 tablet by mouth 3 times daily 90 tablet 2    amLODIPine (NORVASC) 10 MG tablet Take 1 tablet by mouth nightly 90 tablet 2    aspirin 325 MG EC tablet Take 1 tablet by mouth daily STOP 81 mg Aspirin 90 tablet 2    atorvastatin (LIPITOR) 20 MG tablet Take 1 tablet by mouth at bedtime 90 tablet 2    meclizine (ANTIVERT) 25 MG tablet Take 25 mg by mouth 3 times daily as needed      ibuprofen (ADVIL;MOTRIN) 800 MG tablet Take 1 tablet by mouth 2 times daily as needed for Pain 180 tablet 1        Review of Systems   Constitutional:  Negative for chills, fatigue and fever. HENT:  Negative for congestion, ear pain, nosebleeds and sore throat. Eyes:  Negative for pain and visual disturbance. Respiratory:  Negative for cough and shortness of breath. Cardiovascular:  Negative for chest pain and palpitations. Gastrointestinal:  Negative for abdominal pain, constipation and diarrhea. Genitourinary:  Negative for difficulty urinating and flank pain. Musculoskeletal:  Positive for arthralgias (b/l LE pain). Negative for gait problem and myalgias. Skin:  Negative for rash. Neurological:  Negative for syncope, light-headedness and headaches. Psychiatric/Behavioral:  Negative for behavioral problems, confusion and dysphoric mood. Past Medical History:   Diagnosis Date    Anxiety     Arthritis     Back pain     CAD (coronary artery disease)     Depression     Headache(784.0)     Hypertension      Past Surgical History:   Procedure Laterality Date    FINGER AMPUTATION      also has partial amputation of right index and ring finger    LEG SURGERY       No family history on file.    Social History     Tobacco Use   Smoking Status Never   Smokeless Tobacco Current    Types: Chew   Tobacco Comments    40 years       OBJECTIVE:   Wt Readings from Last 3 Encounters:   11/01/22 239 lb 12.8 oz (108.8 kg)   09/29/22 240 lb 9.6 oz (109.1 kg)   06/24/22 242 lb (109.8 kg)     BP Readings from Last 3 Encounters:   11/01/22 (!) 174/108   09/29/22 (!) 169/109   06/24/22 (!) 147/94       BP (!) 174/108 (Site: Right Upper Arm, Position: Sitting)   Pulse 81   Ht 5' 8\" (1.727 m)   Wt 239 lb 12.8 oz (108.8 kg)   SpO2 99%   BMI 36.46 kg/m²      Physical Exam  Vitals reviewed. Constitutional:       Appearance: Normal appearance. HENT:      Head: Normocephalic and atraumatic. Right Ear: External ear normal.      Left Ear: External ear normal.      Nose: Nose normal.      Mouth/Throat:      Mouth: Mucous membranes are moist.      Pharynx: Oropharynx is clear. Eyes:      Extraocular Movements: Extraocular movements intact. Conjunctiva/sclera: Conjunctivae normal.      Pupils: Pupils are equal, round, and reactive to light. Cardiovascular:      Rate and Rhythm: Normal rate and regular rhythm. Pulses: Normal pulses. Heart sounds: Normal heart sounds. Pulmonary:      Effort: Pulmonary effort is normal.      Breath sounds: Normal breath sounds. Abdominal:      General: Bowel sounds are normal.      Palpations: Abdomen is soft. Musculoskeletal:         General: Normal range of motion. Cervical back: Normal range of motion. Skin:     General: Skin is warm. Findings: No rash. Neurological:      General: No focal deficit present. Mental Status: He is alert. Psychiatric:         Mood and Affect: Mood normal.         Thought Content:  Thought content normal.       Lab Results   Component Value Date/Time     06/17/2022 08:47 AM    K 4.2 06/17/2022 08:47 AM    K 3.8 04/03/2022 12:20 PM     06/17/2022 08:47 AM    CO2 26 06/17/2022 08:47 AM    GLUCOSE 108 06/17/2022 08:47 AM    BUN 10 06/17/2022 08:47 AM    CREATININE 0.9 06/17/2022 08:47 AM    CALCIUM 9.4 06/17/2022 08:47 AM    PROT 6.9 06/17/2022 08:47 AM    LABALBU 4.5 06/17/2022 08:47 AM    BILITOT 0.5 06/17/2022 08:47 AM    ALT 23 06/17/2022 08:47 AM    AST 28 06/17/2022 08:47 AM       Hemoglobin A1C (%)   Date Value   06/17/2022 5.5     Microscopic Examination (no units)   Date Value   09/14/2021 Not Indicated     LDL Calculated (mg/dL)   Date Value   06/17/2022 85         Lab Results   Component Value Date/Time    WBC 6.3 06/17/2022 08:47 AM    NEUTROABS 3.1 06/17/2022 08:47 AM    HGB 13.9 06/17/2022 08:47 AM    HCT 41.4 06/17/2022 08:47 AM    MCV 98.3 06/17/2022 08:47 AM     06/17/2022 08:47 AM       Lab Results   Component Value Date    TSH 1.96 06/17/2022         ASSESSMENT/PLAN:     1. Leg pain, bilateral  Likely PVD but will RO radiculopathy   - gabapentin (NEURONTIN) 300 MG capsule; Take 1 capsule by mouth every evening for 30 days. Intended supply: 30 days  Dispense: 30 capsule; Refill: 0  - XR Lumbosacral Spine 2 or 3 VW; Future  - DRUG SCREEN MULTI URINE; Future    2. Peripheral vascular disease ([address]  Referred to vascular  Remain on ASA and statin  Maybe candidate for Pletal    3. Lumbar back pain with radiculopathy affecting left lower extremity  Xray as above and Gabapentin. Controlled substances monitoring: possible medication side effects, risk of tolerance and/or dependence, and alternative treatments discussed, no signs of potential drug abuse or diversion identified, [place]AR[place] report reviewed today- activity consistent with treatment plan, OARRS report reviewed today- 11/1/22, medication contract signed today, and random urine drug screen sent today. 1. Goal is to alleviate pain to a degree that the patient can participate in own ADLS social activity an improve function strength and endurance. 2. Risk and benefits were reviewed at length, including risk for addiction and possible side effects and interactions. Alternative therapy was discussed and will be a part of the patients overall care.   Including but not limited to, physical therapy, other medications as well as behavioral and activity modification and the use of other modalities (chiropractic care ect. ). 3. This patient does not exhibit a potential for abuse or addiction at this time. Will monitor closely. 4. UDS is compliant. 5. Bhavana Martinez completed and compliant. 6. Advised him that UDS and possible pill counts and frequent monitoring will be a part of his care. 7. Patient has or will sign a narcotic agreement with this office. he has been informed not to seek or obtain medication from other practitioners     4. Uncontrolled hypertension  Change medications to AM dosing and increase Toprol XL to 75 mg daily; Continue Amlodipine and Hydralazine  - metoprolol succinate (TOPROL XL) 50 MG extended release tablet; One and a half tablets daily or 75 mg daily  Dispense: 135 tablet; Refill: 0    5. Carotid artery disease without cerebral infarction [facility])  Recent CT Head and Neck Angio with Contrast on 5/23/22:  \"Impression: Bilateral carotid bulb atherosclerotic disease, resulting in 10% stenosis of the right carotid bulb and 50% stenosis of left carotid bulb by NASCET criteria. Dominant right vertebral artery with a diminutive caliber left vertebral artery. Moderate stenosis of the basilar artery. Other findings as above. Images personally reviewed, interpreted and dictated by ANDRY Wiggins. This report was signed and finalized on 5/23/2022 10:12 AM by ANDRY Wiggins\"  Remain on ASA, Statin; FUP Vascular Surgery      Orders Placed This Encounter   Medications    gabapentin (NEURONTIN) 300 MG capsule     Sig: Take 1 capsule by mouth every evening for 30 days.  Intended supply: 30 days     Dispense:  30 capsule     Refill:  0    metoprolol succinate (TOPROL XL) 50 MG extended release tablet     Sig: One and a half tablets daily or 75 mg daily     Dispense:  135 tablet     Refill:  0

## 2022-11-01 NOTE — PROGRESS NOTES
Health Maintenance Due This Visit   Colonoscopy Yes-ordered but not yet performed    Mammogram No   Annual Wellness Visit No   Microalbumin No   HgbA1C No   Diabetic Eye Exam No              Bone Dexa No                  Chief Complaint   Patient presents with    Follow-up    Hypertension       Have you seen any other physician or provider since your last visit no    Have you had any other diagnostic tests since your last visit? no    Have you changed or stopped any medications since your last visit? no

## 2022-11-04 DIAGNOSIS — M79.604 LEG PAIN, BILATERAL: ICD-10-CM

## 2022-11-04 DIAGNOSIS — M79.605 LEG PAIN, BILATERAL: ICD-10-CM

## 2022-11-04 RX ORDER — GABAPENTIN 300 MG/1
CAPSULE ORAL
Qty: 30 CAPSULE | OUTPATIENT
Start: 2022-11-04

## 2022-11-08 ENCOUNTER — TELEPHONE (OUTPATIENT)
Dept: PRIMARY CARE CLINIC | Age: 52
End: 2022-11-08

## 2022-11-08 DIAGNOSIS — I10 UNCONTROLLED HYPERTENSION: Primary | ICD-10-CM

## 2022-11-08 RX ORDER — METOPROLOL SUCCINATE 100 MG/1
100 TABLET, EXTENDED RELEASE ORAL DAILY
Qty: 30 TABLET | Refills: 1 | Status: SHIPPED | OUTPATIENT
Start: 2022-11-08

## 2022-11-08 NOTE — TELEPHONE ENCOUNTER
Per pharmacy Metoprolol ER not covered by insurance. 1.5 tabs daily exceeds max daily dose. Should we PA?

## 2022-11-08 NOTE — LETTER
[facility]  [address] [place] [address]  Phone: [phone]  Fax: [phone]    Belgica Dunbar PA-C        November 14, 2022    G[facility] Hand  [address] [address]      Dear Reyes Krebs: We have been unsuccessful at reaching you by phone. Please contact our office at your earliest convenience.         Sincerely,        eBlgica Dunbar PA-C

## 2022-12-01 ENCOUNTER — OFFICE VISIT (OUTPATIENT)
Dept: PRIMARY CARE CLINIC | Age: 52
End: 2022-12-01
Payer: COMMERCIAL

## 2022-12-01 VITALS
BODY MASS INDEX: 36.57 KG/M2 | DIASTOLIC BLOOD PRESSURE: 98 MMHG | HEART RATE: 65 BPM | OXYGEN SATURATION: 98 % | HEIGHT: 68 IN | WEIGHT: 241.3 LBS | SYSTOLIC BLOOD PRESSURE: 154 MMHG

## 2022-12-01 DIAGNOSIS — I77.9 CAROTID ARTERY DISEASE WITHOUT CEREBRAL INFARCTION (HCC): ICD-10-CM

## 2022-12-01 DIAGNOSIS — M79.605 LEG PAIN, BILATERAL: ICD-10-CM

## 2022-12-01 DIAGNOSIS — I10 PRIMARY HYPERTENSION: Primary | ICD-10-CM

## 2022-12-01 DIAGNOSIS — M79.604 LEG PAIN, BILATERAL: ICD-10-CM

## 2022-12-01 DIAGNOSIS — I73.9 PERIPHERAL VASCULAR DISEASE (HCC): ICD-10-CM

## 2022-12-01 PROCEDURE — 3079F DIAST BP 80-89 MM HG: CPT | Performed by: PHYSICIAN ASSISTANT

## 2022-12-01 PROCEDURE — 3074F SYST BP LT 130 MM HG: CPT | Performed by: PHYSICIAN ASSISTANT

## 2022-12-01 PROCEDURE — 99214 OFFICE O/P EST MOD 30 MIN: CPT | Performed by: PHYSICIAN ASSISTANT

## 2022-12-01 RX ORDER — LISINOPRIL 10 MG/1
10 TABLET ORAL DAILY
Qty: 90 TABLET | Refills: 1 | Status: SHIPPED | OUTPATIENT
Start: 2022-12-01

## 2022-12-01 ASSESSMENT — ENCOUNTER SYMPTOMS
ABDOMINAL PAIN: 0
DIARRHEA: 0
COUGH: 0
SHORTNESS OF BREATH: 0
SORE THROAT: 0
EYE PAIN: 0
CONSTIPATION: 0

## 2022-12-01 NOTE — PROGRESS NOTES
SUBJECTIVE:    Patient ID: Concepción Call is a 50 y.o.male. Chief Complaint   Patient presents with    Follow-up    Hypertension         HPI:  Mr. Bindu Ferris is a 53-year-old white male with past medical history of hypertension, anxiety and depression, oral tobacco use, RLS and carotid artery disease. His home blood pressures have been remaining high; he did switch his medications to the mornings. He is taking the Hydralazine as well in the morning. He denies any headaches, dizziness. His legs continue to bother him. He feels burning and throbbing pain below knees continues. He has noted little relief with Gabapentin. He denies any numbness or tingling. He has to move them to get relief. [phone] This is his mother in laws number; He has not gotten appt with  Vascular yet likely due to phone issues as his phone has been broken. He can go on a Friday or after 4 PM.  Bilateral lower extremity arterial Doppler did show moderate peripheral vascular disease. He is not a smoker and has remained on aspirin 325 mg. Patient's medications, allergies, past medical, surgical, social and family histories were reviewed and updated as appropriate in electronic medical record. Outpatient Medications Marked as Taking for the 12/1/22 encounter (Office Visit) with Kip Ramirez PA-C   Medication Sig Dispense Refill    lisinopril (PRINIVIL;ZESTRIL) 10 MG tablet Take 1 tablet by mouth daily 90 tablet 1    metoprolol succinate (TOPROL XL) 100 MG extended release tablet Take 1 tablet by mouth daily 30 tablet 1    nitroGLYCERIN (NITROSTAT) 0.4 MG SL tablet Place 1 tablet under the tongue every 5 minutes as needed for Chest pain up to max of 3 total doses.  If no relief after 1 dose, call 911. 25 tablet 0    escitalopram (LEXAPRO) 10 MG tablet Take 1 tablet by mouth daily 90 tablet 2    buPROPion (WELLBUTRIN XL) 150 MG extended release tablet Take 1 tablet by mouth every morning 90 tablet 2    rOPINIRole (REQUIP) 0.5 MG tablet Take one po qhs for 5 days then increase to to 2 pills po qhs 90 tablet 3    hydrALAZINE (APRESOLINE) 10 MG tablet Take 1 tablet by mouth 3 times daily 90 tablet 2    amLODIPine (NORVASC) 10 MG tablet Take 1 tablet by mouth nightly 90 tablet 2    aspirin 325 MG EC tablet Take 1 tablet by mouth daily STOP 81 mg Aspirin 90 tablet 2    atorvastatin (LIPITOR) 20 MG tablet Take 1 tablet by mouth at bedtime 90 tablet 2    meclizine (ANTIVERT) 25 MG tablet Take 25 mg by mouth 3 times daily as needed      ibuprofen (ADVIL;MOTRIN) 800 MG tablet Take 1 tablet by mouth 2 times daily as needed for Pain 180 tablet 1        Review of Systems   Constitutional:  Negative for chills, fatigue and fever. HENT:  Negative for congestion, ear pain, nosebleeds and sore throat. Eyes:  Negative for pain and visual disturbance. Respiratory:  Negative for cough and shortness of breath. Cardiovascular:  Negative for chest pain and palpitations. Gastrointestinal:  Negative for abdominal pain, constipation and diarrhea. Genitourinary:  Negative for difficulty urinating and flank pain. Musculoskeletal:  Negative for arthralgias, gait problem and myalgias. B/l leg pain   Skin:  Negative for rash. Neurological:  Negative for syncope, light-headedness and headaches. Psychiatric/Behavioral:  Negative for behavioral problems, confusion and dysphoric mood. Past Medical History:   Diagnosis Date    Anxiety     Arthritis     Back pain     CAD (coronary artery disease)     Depression     Headache(784.0)     Hypertension      Past Surgical History:   Procedure Laterality Date    FINGER AMPUTATION      also has partial amputation of right index and ring finger    LEG SURGERY       No family history on file.    Social History     Tobacco Use   Smoking Status Never   Smokeless Tobacco Current    Types: Chew   Tobacco Comments    40 years       OBJECTIVE:   Wt Readings from Last 3 Encounters:   12/01/22 241 lb 4.8 oz (109.5 kg)   11/01/22 239 lb 12.8 oz (108.8 kg)   09/29/22 240 lb 9.6 oz (109.1 kg)     BP Readings from Last 3 Encounters:   12/01/22 (!) 154/98   11/01/22 (!) 166/110   09/29/22 (!) 169/109       BP (!) 154/98 (Site: Left Upper Arm, Position: Sitting)   Pulse 65   Ht 5' 8\" (1.727 m)   Wt 241 lb 4.8 oz (109.5 kg)   SpO2 98%   BMI 36.69 kg/m²      Physical Exam  Vitals reviewed. Constitutional:       Appearance: Normal appearance. HENT:      Head: Normocephalic and atraumatic. Right Ear: External ear normal.      Left Ear: External ear normal.      Nose: Nose normal.      Mouth/Throat:      Mouth: Mucous membranes are moist.      Pharynx: Oropharynx is clear. Eyes:      Extraocular Movements: Extraocular movements intact. Conjunctiva/sclera: Conjunctivae normal.      Pupils: Pupils are equal, round, and reactive to light. Neck:      Vascular: No carotid bruit. Cardiovascular:      Rate and Rhythm: Normal rate and regular rhythm. Pulses: Decreased pulses. Dorsalis pedis pulses are 1+ on the right side and 1+ on the left side. Heart sounds: Normal heart sounds. Pulmonary:      Effort: Pulmonary effort is normal.      Breath sounds: Normal breath sounds. Abdominal:      General: Bowel sounds are normal.      Palpations: Abdomen is soft. Musculoskeletal:         General: Normal range of motion. Cervical back: Normal range of motion. Skin:     General: Skin is warm. Findings: No rash. Neurological:      General: No focal deficit present. Mental Status: He is alert. Psychiatric:         Mood and Affect: Mood normal.         Thought Content:  Thought content normal.       Lab Results   Component Value Date/Time     06/17/2022 08:47 AM    K 4.2 06/17/2022 08:47 AM    K 3.8 04/03/2022 12:20 PM     06/17/2022 08:47 AM    CO2 26 06/17/2022 08:47 AM    GLUCOSE 108 06/17/2022 08:47 AM    BUN 10 06/17/2022 08:47 AM    CREATININE 0.9 06/17/2022 08:47 AM    CALCIUM 9.4 06/17/2022 08:47 AM    PROT 6.9 06/17/2022 08:47 AM    LABALBU 4.5 06/17/2022 08:47 AM    BILITOT 0.5 06/17/2022 08:47 AM    ALT 23 06/17/2022 08:47 AM    AST 28 06/17/2022 08:47 AM       Hemoglobin A1C (%)   Date Value   06/17/2022 5.5     Microscopic Examination (no units)   Date Value   09/14/2021 Not Indicated     LDL Calculated (mg/dL)   Date Value   06/17/2022 85         Lab Results   Component Value Date/Time    WBC 6.3 06/17/2022 08:47 AM    NEUTROABS 3.1 06/17/2022 08:47 AM    HGB 13.9 06/17/2022 08:47 AM    HCT 41.4 06/17/2022 08:47 AM    MCV 98.3 06/17/2022 08:47 AM     06/17/2022 08:47 AM       Lab Results   Component Value Date    TSH 1.96 06/17/2022         ASSESSMENT/PLAN:     1. Primary hypertension  Not at goal since increase in metoprolol to 100 mg daily. He is to remain on amlodipine 10 mg daily and use hydralazine 10 mg as needed only. I have added lisinopril today. I have advised strict monitoring and he will follow-up or call if it is remaining high  - lisinopril (PRINIVIL;ZESTRIL) 10 MG tablet; Take 1 tablet by mouth daily  Dispense: 90 tablet; Refill: 1    2. Peripheral vascular disease ([facility] [address]  Referred to vascular I have asked that he remain on aspirin and statin  I have got an additional phone number so that we are able to contact him for vascular referral as patient has been without phone. 3. Carotid artery disease without cerebral infarction (HCC)  50% stenosis of left carotid bulb; dominant right vertebral artery with a diminutive caliber left vertebral artery and moderate stenosis of the basilar artery. I have referred him to vascular and asked that he remain on aspirin and statin  4.  Leg pain, bilateral  Likely secondary to PVD  Failed gabapentin   On restless leg medicine Requip  treatment      Orders Placed This Encounter   Medications    lisinopril (PRINIVIL;ZESTRIL) 10 MG tablet     Sig: Take 1 tablet by mouth daily     Dispense:  90 tablet Refill:  1

## 2022-12-22 ENCOUNTER — TELEPHONE (OUTPATIENT)
Dept: PRIMARY CARE CLINIC | Age: 52
End: 2022-12-22

## 2022-12-22 NOTE — TELEPHONE ENCOUNTER
Attempted to call patient to follow up on BP monitoring. No answer, voicemail not set up. Unable to leave a message.

## 2022-12-24 DIAGNOSIS — I10 UNCONTROLLED HYPERTENSION: ICD-10-CM

## 2022-12-27 RX ORDER — HYDRALAZINE HYDROCHLORIDE 10 MG/1
TABLET, FILM COATED ORAL
Qty: 90 TABLET | Refills: 0 | Status: SHIPPED | OUTPATIENT
Start: 2022-12-27

## 2023-01-18 ENCOUNTER — CARE COORDINATION (OUTPATIENT)
Dept: CARE COORDINATION | Age: 53
End: 2023-01-18

## 2023-01-18 NOTE — CARE COORDINATION
Olinda Ramos, RN  Manager Care Coordination  [phone]     I placed a call to Wing Prado to follow up on his blood pressure. No voicemail set up.

## 2023-02-07 ENCOUNTER — CARE COORDINATION (OUTPATIENT)
Dept: CARE COORDINATION | Age: 53
End: 2023-02-07

## 2023-02-07 NOTE — CARE COORDINATION
Mello Lares, RN  Manager Care Coordination  [phone]     I placed a call to Dania Ramos to follow up on blood pressure. Unable to leave message.

## 2023-02-20 DIAGNOSIS — I10 UNCONTROLLED HYPERTENSION: ICD-10-CM

## 2023-02-20 RX ORDER — METOPROLOL SUCCINATE 100 MG/1
100 TABLET, EXTENDED RELEASE ORAL DAILY
Qty: 30 TABLET | Refills: 0 | Status: SHIPPED | OUTPATIENT
Start: 2023-02-20

## 2023-03-28 DIAGNOSIS — M25.511 ARTHRALGIA OF RIGHT SHOULDER REGION: Primary | ICD-10-CM

## 2023-03-29 ENCOUNTER — OFFICE VISIT (OUTPATIENT)
Dept: ORTHOPEDIC SURGERY | Facility: CLINIC | Age: 53
End: 2023-03-29
Payer: OTHER MISCELLANEOUS

## 2023-03-29 VITALS
WEIGHT: 263 LBS | HEIGHT: 68 IN | SYSTOLIC BLOOD PRESSURE: 150 MMHG | DIASTOLIC BLOOD PRESSURE: 80 MMHG | BODY MASS INDEX: 39.86 KG/M2

## 2023-03-29 DIAGNOSIS — M25.511 ARTHRALGIA OF RIGHT SHOULDER REGION: Primary | ICD-10-CM

## 2023-03-29 DIAGNOSIS — S46.011A ROTATOR CUFF STRAIN, RIGHT, INITIAL ENCOUNTER: ICD-10-CM

## 2023-03-29 PROCEDURE — 99203 OFFICE O/P NEW LOW 30 MIN: CPT | Performed by: PHYSICIAN ASSISTANT

## 2023-03-29 RX ORDER — MELOXICAM 15 MG/1
TABLET ORAL
COMMUNITY
Start: 2023-03-24

## 2023-03-29 RX ORDER — METOPROLOL SUCCINATE 100 MG/1
1 TABLET, EXTENDED RELEASE ORAL DAILY
COMMUNITY
Start: 2023-01-20

## 2023-03-29 RX ORDER — IBUPROFEN 800 MG/1
800 TABLET ORAL 2 TIMES DAILY PRN
COMMUNITY
Start: 2022-12-27 | End: 2023-03-29 | Stop reason: ALTCHOICE

## 2023-03-29 RX ORDER — ASPIRIN 325 MG
325 TABLET ORAL
COMMUNITY
Start: 2022-09-29 | End: 2023-09-29

## 2023-03-29 RX ORDER — AMLODIPINE BESYLATE 10 MG/1
TABLET ORAL
COMMUNITY
Start: 2023-03-24

## 2023-03-29 RX ORDER — CYCLOBENZAPRINE HCL 10 MG
TABLET ORAL
COMMUNITY
Start: 2023-03-24

## 2023-03-29 RX ORDER — ESCITALOPRAM OXALATE 10 MG/1
TABLET ORAL
COMMUNITY
Start: 2023-03-29

## 2023-03-29 NOTE — PROGRESS NOTES
"Subjective   Patient ID: Andrés Denton is a 52 y.o. right hand dominant male  Pain of the Right Shoulder (States he has had pain for about a month, he was lifting pipes at work and thought it was just sore. On 3/21/2023 his shoulder popped out twice while lifting his arm at work.)         History of Present Illness  Patient presents with right shoulder that began approx.1 month ago while working at [employer]. He was lifting pipes repetitively and had to outstretch his right arm. Later that night, he began having right shoulder pain.  He assures me he was not having any right shoulder issues until the incident at work.  Patient also endorses that on 3-21-23.  He raised his arm up boards and felt like his shoulder \"popped in and out\"                                                 Past Medical History:   Diagnosis Date   • Anxiety    • Hypertension         Past Surgical History:   Procedure Laterality Date   • AMPUTATION  1995    right ring finger partial amputation   • FRACTURE SURGERY Left     tib/fib pt. is unsure the exact surgery it was when he was 17       Family History   Problem Relation Age of Onset   • No Known Problems Mother    • No Known Problems Father        Social History     Socioeconomic History   • Marital status:    Tobacco Use   • Smoking status: Never   • Smokeless tobacco: Current     Types: Snuff   Substance and Sexual Activity   • Alcohol use: Yes         Current Outpatient Medications:   •  aspirin 325 MG tablet, Take 1 tablet by mouth., Disp: , Rfl:   •  amLODIPine (NORVASC) 10 MG tablet, , Disp: , Rfl:   •  cyclobenzaprine (FLEXERIL) 10 MG tablet, , Disp: , Rfl:   •  hydrALAZINE (APRESOLINE) 10 MG tablet, Take 1 tablet by mouth 3 (Three) Times a Day., Disp: 45 tablet, Rfl: 0  •  ibuprofen (ADVIL,MOTRIN) 800 MG tablet, Take 1 tablet by mouth 2 (Two) Times a Day As Needed. for pain, Disp: , Rfl:   •  lisinopril (PRINIVIL,ZESTRIL) 20 MG tablet, Take 40 mg by mouth., Disp: " ", Rfl:   •  meloxicam (MOBIC) 15 MG tablet, , Disp: , Rfl:   •  metoprolol succinate XL (TOPROL-XL) 100 MG 24 hr tablet, Take 1 tablet by mouth Daily., Disp: , Rfl:     No Known Allergies    Review of Systems   Constitutional: Negative for fever.   HENT: Negative for dental problem and voice change.    Eyes: Negative for visual disturbance.   Respiratory: Negative for shortness of breath.    Cardiovascular: Negative for chest pain.   Gastrointestinal: Negative for abdominal pain.   Genitourinary: Negative for dysuria.   Musculoskeletal: Positive for arthralgias (right shoulder ). Negative for gait problem and joint swelling.   Skin: Negative for rash.   Neurological: Negative for speech difficulty.   Hematological: Does not bruise/bleed easily.   Psychiatric/Behavioral: Negative for confusion.       I have reviewed the medical and surgical history, family history, social history, medications, and/or allergies, and the review of systems of this report.    Objective   /80   Ht 172.7 cm (67.99\")   Wt 119 kg (263 lb)   BMI 40.00 kg/m²    Physical Exam  Vitals and nursing note reviewed.   Constitutional:       Appearance: Normal appearance.   Pulmonary:      Effort: Pulmonary effort is normal.   Musculoskeletal:      Right shoulder: Tenderness, bony tenderness and crepitus present. No deformity or effusion. Decreased range of motion (due to pain).   Neurological:      Mental Status: He is alert and oriented to person, place, and time.       Right Shoulder Exam     Tenderness   The patient is experiencing tenderness in the acromioclavicular joint and biceps tendon.    Range of Motion   Active abduction: 120   Passive abduction: 130   Forward flexion: 130     Tests   Apprehension: positive  Cross arm: positive  Impingement: positive    Other   Sensation: normal  Pulse: present             Neurologic Exam     Mental Status   Oriented to person, place, and time.            Assessment & Plan   Independent Review of " Radiographic Studies:    X-ray of the right shoulder 3 view performed in the clinic independently reviewed for the evaluation of shoulder pain.  No comparison films available to review.  No acute fracture or dislocation.      Procedures       Diagnoses and all orders for this visit:    1. Arthralgia of right shoulder region (Primary)  -     MRI Shoulder Right Without Contrast    2. Rotator cuff strain, right, initial encounter  -     MRI Shoulder Right Without Contrast       Orthopedic activities reviewed and patient expressed appreciation  Discussion of orthopedic goals  Risk, benefits, and merits of treatment alternatives reviewed with the patient and questions answered  Ice, heat, and/or modalities as beneficial    Recommendations/Plan:  Exercise, medications, injections, other patient advice, and return appointment as noted.  Patient is encouraged to call or return for any issues or concerns.  Work status form given to patient  Do not take ibuprofen and Mobic together.  Patient agreeable to call or return sooner for any concerns.        EMR Dragon-transcription disclaimer:  This encounter note is an electronic transcription of spoken language to printed text.  Electronic transcription of spoken language may permit erroneous or at times nonsensical words or phrases to be inadvertently transcribed.  Although I have reviewed the note for such errors, some may still exist

## 2023-05-15 ENCOUNTER — APPOINTMENT (OUTPATIENT)
Dept: CT IMAGING | Facility: HOSPITAL | Age: 53
End: 2023-05-15
Payer: MEDICAID

## 2023-05-15 ENCOUNTER — HOSPITAL ENCOUNTER (EMERGENCY)
Facility: HOSPITAL | Age: 53
Discharge: OTHER INSTITUTION | End: 2023-05-16
Attending: EMERGENCY MEDICINE
Payer: MEDICAID

## 2023-05-15 ENCOUNTER — APPOINTMENT (OUTPATIENT)
Dept: GENERAL RADIOLOGY | Facility: HOSPITAL | Age: 53
End: 2023-05-15
Payer: MEDICAID

## 2023-05-15 DIAGNOSIS — I10 ESSENTIAL HYPERTENSION: ICD-10-CM

## 2023-05-15 DIAGNOSIS — F10.230 ALCOHOL DEPENDENCE WITH UNCOMPLICATED WITHDRAWAL: Primary | ICD-10-CM

## 2023-05-15 LAB
ALBUMIN SERPL-MCNC: 4.6 G/DL (ref 3.5–5.2)
ALBUMIN/GLOB SERPL: 1.4 G/DL
ALP SERPL-CCNC: 83 U/L (ref 39–117)
ALT SERPL W P-5'-P-CCNC: 35 U/L (ref 1–41)
AMPHET+METHAMPHET UR QL: NEGATIVE
AMPHETAMINES UR QL: NEGATIVE
ANION GAP SERPL CALCULATED.3IONS-SCNC: 16.5 MMOL/L (ref 5–15)
APAP SERPL-MCNC: <5 MCG/ML (ref 0–30)
AST SERPL-CCNC: 43 U/L (ref 1–40)
BARBITURATES UR QL SCN: NEGATIVE
BASOPHILS # BLD AUTO: 0.03 10*3/MM3 (ref 0–0.2)
BASOPHILS NFR BLD AUTO: 0.4 % (ref 0–1.5)
BENZODIAZ UR QL SCN: NEGATIVE
BILIRUB SERPL-MCNC: 0.9 MG/DL (ref 0–1.2)
BILIRUB UR QL STRIP: NEGATIVE
BUN SERPL-MCNC: 3 MG/DL (ref 6–20)
BUN/CREAT SERPL: 3.5 (ref 7–25)
BUPRENORPHINE SERPL-MCNC: NEGATIVE NG/ML
CALCIUM SPEC-SCNC: 10.3 MG/DL (ref 8.6–10.5)
CANNABINOIDS SERPL QL: NEGATIVE
CHLORIDE SERPL-SCNC: 95 MMOL/L (ref 98–107)
CLARITY UR: CLEAR
CO2 SERPL-SCNC: 25.5 MMOL/L (ref 22–29)
COCAINE UR QL: NEGATIVE
COLOR UR: YELLOW
CREAT SERPL-MCNC: 0.86 MG/DL (ref 0.76–1.27)
DEPRECATED RDW RBC AUTO: 48 FL (ref 37–54)
EGFRCR SERPLBLD CKD-EPI 2021: 105.5 ML/MIN/1.73
EOSINOPHIL # BLD AUTO: 0.01 10*3/MM3 (ref 0–0.4)
EOSINOPHIL NFR BLD AUTO: 0.1 % (ref 0.3–6.2)
ERYTHROCYTE [DISTWIDTH] IN BLOOD BY AUTOMATED COUNT: 13.9 % (ref 12.3–15.4)
ETHANOL BLD-MCNC: <10 MG/DL (ref 0–10)
ETHANOL UR QL: <0.01 %
GLOBULIN UR ELPH-MCNC: 3.2 GM/DL
GLUCOSE SERPL-MCNC: 148 MG/DL (ref 65–99)
GLUCOSE UR STRIP-MCNC: NEGATIVE MG/DL
HCT VFR BLD AUTO: 49.3 % (ref 37.5–51)
HGB BLD-MCNC: 17.2 G/DL (ref 13–17.7)
HGB UR QL STRIP.AUTO: NEGATIVE
HOLD SPECIMEN: NORMAL
HOLD SPECIMEN: NORMAL
IMM GRANULOCYTES # BLD AUTO: 0.03 10*3/MM3 (ref 0–0.05)
IMM GRANULOCYTES NFR BLD AUTO: 0.4 % (ref 0–0.5)
KETONES UR QL STRIP: NEGATIVE
LEUKOCYTE ESTERASE UR QL STRIP.AUTO: NEGATIVE
LIPASE SERPL-CCNC: 51 U/L (ref 13–60)
LYMPHOCYTES # BLD AUTO: 0.71 10*3/MM3 (ref 0.7–3.1)
LYMPHOCYTES NFR BLD AUTO: 10.1 % (ref 19.6–45.3)
MAGNESIUM SERPL-MCNC: 1.4 MG/DL (ref 1.6–2.6)
MCH RBC QN AUTO: 33.5 PG (ref 26.6–33)
MCHC RBC AUTO-ENTMCNC: 34.9 G/DL (ref 31.5–35.7)
MCV RBC AUTO: 96.1 FL (ref 79–97)
METHADONE UR QL SCN: NEGATIVE
MONOCYTES # BLD AUTO: 0.61 10*3/MM3 (ref 0.1–0.9)
MONOCYTES NFR BLD AUTO: 8.7 % (ref 5–12)
NEUTROPHILS NFR BLD AUTO: 5.64 10*3/MM3 (ref 1.7–7)
NEUTROPHILS NFR BLD AUTO: 80.3 % (ref 42.7–76)
NITRITE UR QL STRIP: NEGATIVE
NRBC BLD AUTO-RTO: 0 /100 WBC (ref 0–0.2)
OPIATES UR QL: NEGATIVE
OXYCODONE UR QL SCN: NEGATIVE
PCP UR QL SCN: NEGATIVE
PH UR STRIP.AUTO: 8 [PH] (ref 5–8)
PLATELET # BLD AUTO: 142 10*3/MM3 (ref 140–450)
PMV BLD AUTO: 9.4 FL (ref 6–12)
POTASSIUM SERPL-SCNC: 3.4 MMOL/L (ref 3.5–5.2)
PROPOXYPH UR QL: NEGATIVE
PROT SERPL-MCNC: 7.8 G/DL (ref 6–8.5)
PROT UR QL STRIP: NEGATIVE
RBC # BLD AUTO: 5.13 10*6/MM3 (ref 4.14–5.8)
SALICYLATES SERPL-MCNC: <0.3 MG/DL
SODIUM SERPL-SCNC: 137 MMOL/L (ref 136–145)
SP GR UR STRIP: <=1.005 (ref 1–1.03)
TRICYCLICS UR QL SCN: NEGATIVE
TROPONIN T SERPL HS-MCNC: 25 NG/L
UROBILINOGEN UR QL STRIP: NORMAL
WBC NRBC COR # BLD: 7.03 10*3/MM3 (ref 3.4–10.8)
WHOLE BLOOD HOLD COAG: NORMAL
WHOLE BLOOD HOLD SPECIMEN: NORMAL

## 2023-05-15 PROCEDURE — 85025 COMPLETE CBC W/AUTO DIFF WBC: CPT | Performed by: EMERGENCY MEDICINE

## 2023-05-15 PROCEDURE — 96375 TX/PRO/DX INJ NEW DRUG ADDON: CPT

## 2023-05-15 PROCEDURE — 70450 CT HEAD/BRAIN W/O DYE: CPT

## 2023-05-15 PROCEDURE — 73630 X-RAY EXAM OF FOOT: CPT

## 2023-05-15 PROCEDURE — 99285 EMERGENCY DEPT VISIT HI MDM: CPT

## 2023-05-15 PROCEDURE — 80143 DRUG ASSAY ACETAMINOPHEN: CPT | Performed by: EMERGENCY MEDICINE

## 2023-05-15 PROCEDURE — 82077 ASSAY SPEC XCP UR&BREATH IA: CPT | Performed by: EMERGENCY MEDICINE

## 2023-05-15 PROCEDURE — 25010000002 DIAZEPAM PER 5 MG: Performed by: EMERGENCY MEDICINE

## 2023-05-15 PROCEDURE — 81003 URINALYSIS AUTO W/O SCOPE: CPT | Performed by: EMERGENCY MEDICINE

## 2023-05-15 PROCEDURE — 70486 CT MAXILLOFACIAL W/O DYE: CPT

## 2023-05-15 PROCEDURE — 83735 ASSAY OF MAGNESIUM: CPT | Performed by: EMERGENCY MEDICINE

## 2023-05-15 PROCEDURE — 80179 DRUG ASSAY SALICYLATE: CPT | Performed by: EMERGENCY MEDICINE

## 2023-05-15 PROCEDURE — 80306 DRUG TEST PRSMV INSTRMNT: CPT | Performed by: EMERGENCY MEDICINE

## 2023-05-15 PROCEDURE — 93005 ELECTROCARDIOGRAM TRACING: CPT | Performed by: EMERGENCY MEDICINE

## 2023-05-15 PROCEDURE — 84484 ASSAY OF TROPONIN QUANT: CPT | Performed by: EMERGENCY MEDICINE

## 2023-05-15 PROCEDURE — 80053 COMPREHEN METABOLIC PANEL: CPT | Performed by: EMERGENCY MEDICINE

## 2023-05-15 PROCEDURE — 25010000002 THIAMINE PER 100 MG: Performed by: EMERGENCY MEDICINE

## 2023-05-15 PROCEDURE — 96365 THER/PROPH/DIAG IV INF INIT: CPT

## 2023-05-15 PROCEDURE — 83690 ASSAY OF LIPASE: CPT | Performed by: EMERGENCY MEDICINE

## 2023-05-15 RX ORDER — DIAZEPAM 5 MG/ML
5 INJECTION, SOLUTION INTRAMUSCULAR; INTRAVENOUS ONCE
Status: COMPLETED | OUTPATIENT
Start: 2023-05-15 | End: 2023-05-15

## 2023-05-15 RX ORDER — CHLORDIAZEPOXIDE HYDROCHLORIDE 25 MG/1
50 CAPSULE, GELATIN COATED ORAL ONCE
Status: COMPLETED | OUTPATIENT
Start: 2023-05-15 | End: 2023-05-15

## 2023-05-15 RX ORDER — SODIUM CHLORIDE 0.9 % (FLUSH) 0.9 %
10 SYRINGE (ML) INJECTION AS NEEDED
Status: DISCONTINUED | OUTPATIENT
Start: 2023-05-15 | End: 2023-05-16 | Stop reason: HOSPADM

## 2023-05-15 RX ADMIN — FOLIC ACID 1000 ML/HR: 5 INJECTION, SOLUTION INTRAMUSCULAR; INTRAVENOUS; SUBCUTANEOUS at 19:19

## 2023-05-15 RX ADMIN — CHLORDIAZEPOXIDE HYDROCHLORIDE 50 MG: 25 CAPSULE ORAL at 19:01

## 2023-05-15 RX ADMIN — SODIUM CHLORIDE 1000 ML: 9 INJECTION, SOLUTION INTRAVENOUS at 19:01

## 2023-05-15 RX ADMIN — DIAZEPAM 5 MG: 5 INJECTION, SOLUTION INTRAMUSCULAR; INTRAVENOUS at 19:01

## 2023-05-15 NOTE — ED PROVIDER NOTES
HPI: Andrés Denton is a 52 y.o. male who presents to the emergency department complaining of multiple complaints.  Patient states that he drinks heavily a few times a week to help him sleep and get over some leg pain.  Had a particularly heavy week of drinking last week.  Last drink was about 18 hours ago.  He feels very shaky, nauseous, has had some vomiting.  He notes he wants to stop drinking altogether.  He has never been through withdrawal before.  He is also concerned because he had an episode of chest pain yesterday.  He denies fevers, chills, shortness of breath or other complaints.  No suicidal or homicidal ideations.  Patient does tell me that he has a wound to his right foot and he has bruising around his left eye, and he is unsure how he sustained these injuries.      REVIEW OF SYSTEMS: All other systems reviewed and are negative     PAST MEDICAL HISTORY:   Past Medical History:   Diagnosis Date   • Anxiety    • Depression    • Hypertension         FAMILY HISTORY:   Family History   Problem Relation Age of Onset   • No Known Problems Mother    • No Known Problems Father         SOCIAL HISTORY:   Social History     Socioeconomic History   • Marital status:    Tobacco Use   • Smoking status: Never   • Smokeless tobacco: Current     Types: Snuff   Vaping Use   • Vaping Use: Never used   Substance and Sexual Activity   • Alcohol use: Yes     Comment: 1-2 x weekly1/2 pint, ~ 30 beers and a pint of vodka this past weekend   • Drug use: Never   • Sexual activity: Defer        SURGICAL HISTORY:   Past Surgical History:   Procedure Laterality Date   • AMPUTATION  1995    right ring finger partial amputation   • FRACTURE SURGERY Left     tib/fib pt. is unsure the exact surgery it was when he was 17        ALLERGIES: Patient has no known allergies.       PHYSICAL EXAM:   VITAL SIGNS:   Vitals:    05/16/23 1335   BP:    Pulse: 86   Resp:    Temp:    SpO2: 91%      CONSTITUTIONAL: Awake, well  appearing, nontoxic   HENT: Contusion around the left eye with mild tenderness, normocephalic, oral mucosa moist, airway patent. Nares patent without drainage. External ears normal.   EYES: Conjunctivae clear, EOMI, PERRL   NECK: Trachea midline, nontender, supple   CARDIOVASCULAR: Tachycardic, Normal rhythm.  PULMONARY/CHEST: Normal work of breathing. Clear to auscultation, no rhonchi, wheezes, or rales.  ABDOMINAL: Nondistended, soft, nontender, no rebound or guarding.  NEUROLOGIC: Nonfocal, moves all four extremities, no gross sensory or motor deficits.   EXTREMITIES: Mild swelling and tenderness right heel  SKIN: Warm, Dry, No erythema, No rash, wound to the right heel  Lab Results (last 24 hours)     Procedure Component Value Units Date/Time    Ethanol [[id number]] Collected: 05/15/23 1836    Specimen: Blood Updated: 05/15/23 1901     Ethanol <10 mg/dL      Ethanol % <0.010 %     Narrative:      This result is for medical use only and should not be used for forensic purposes.    CBC & Differential [[id number]]  (Abnormal) Collected: 05/15/23 1836    Specimen: Blood Updated: 05/15/23 1845    Narrative:      The following orders were created for panel order CBC & Differential.  Procedure                               Abnormality         Status                     ---------                               -----------         ------                     CBC Auto Differential[[id number]]        Abnormal            Final result                 Please view results for these tests on the individual orders.    Comprehensive Metabolic Panel [[id number]]  (Abnormal) Collected: 05/15/23 1836    Specimen: Blood Updated: 05/15/23 1901     Glucose 148 mg/dL      BUN 3 mg/dL      Creatinine 0.86 mg/dL      Sodium 137 mmol/L      Potassium 3.4 mmol/L      Chloride 95 mmol/L      CO2 25.5 mmol/L      Calcium 10.3 mg/dL      Total Protein 7.8 g/dL      Albumin 4.6 g/dL      ALT (SGPT) 35 U/L      AST (SGOT) 43 U/L      Alkaline  Phosphatase 83 U/L      Total Bilirubin 0.9 mg/dL      Globulin 3.2 gm/dL      A/G Ratio 1.4 g/dL      BUN/Creatinine Ratio 3.5     Anion Gap 16.5 mmol/L      eGFR 105.5 mL/min/1.73     Narrative:      GFR Normal >60  Chronic Kidney Disease <60  Kidney Failure <15      Lipase [[id number]]  (Normal) Collected: 05/15/23 1836    Specimen: Blood Updated: 05/15/23 1901     Lipase 51 U/L     Single High Sensitivity Troponin T [[id number]]  (Abnormal) Collected: 05/15/23 1836    Specimen: Blood Updated: 05/15/23 1905     HS Troponin T 25 ng/L     Narrative:      High Sensitive Troponin T Reference Range:  <10.0 ng/L- Negative Female for AMI  <15.0 ng/L- Negative Male for AMI  >=10 - Abnormal Female indicating possible myocardial injury.  >=15 - Abnormal Male indicating possible myocardial injury.   Clinicians would have to utilize clinical acumen, EKG, Troponin, and serial changes to determine if it is an Acute Myocardial Infarction or myocardial injury due to an underlying chronic condition.         Acetaminophen Level [[id number]]  (Normal) Collected: 05/15/23 1836    Specimen: Blood Updated: 05/15/23 1901     Acetaminophen <5.0 mcg/mL     Narrative:      Toxic = Greater than 150 mcg/mL    Salicylate Level [[id number]]  (Normal) Collected: 05/15/23 1836    Specimen: Blood Updated: 05/15/23 1901     Salicylate <0.3 mg/dL     Magnesium [[id number]]  (Abnormal) Collected: 05/15/23 1836    Specimen: Blood Updated: 05/15/23 1901     Magnesium 1.4 mg/dL     CBC Auto Differential [[id number]]  (Abnormal) Collected: 05/15/23 1836    Specimen: Blood Updated: 05/15/23 1845     WBC 7.03 10*3/mm3      RBC 5.13 10*6/mm3      Hemoglobin 17.2 g/dL      Hematocrit 49.3 %      MCV 96.1 fL      MCH 33.5 pg      MCHC 34.9 g/dL      RDW 13.9 %      RDW-SD 48.0 fl      MPV 9.4 fL      Platelets 142 10*3/mm3      Neutrophil % 80.3 %      Lymphocyte % 10.1 %      Monocyte % 8.7 %      Eosinophil % 0.1 %      Basophil % 0.4 %      Immature Grans %  0.4 %      Neutrophils, Absolute 5.64 10*3/mm3      Lymphocytes, Absolute 0.71 10*3/mm3      Monocytes, Absolute 0.61 10*3/mm3      Eosinophils, Absolute 0.01 10*3/mm3      Basophils, Absolute 0.03 10*3/mm3      Immature Grans, Absolute 0.03 10*3/mm3      nRBC 0.0 /100 WBC     Urinalysis With Microscopic If Indicated (No Culture) - Urine, Clean Catch [[id number]]  (Normal) Collected: 05/15/23 2009    Specimen: Urine, Clean Catch Updated: 05/15/23 2016     Color, UA Yellow     Appearance, UA Clear     pH, UA 8.0     Specific Gravity, UA <=1.005     Glucose, UA Negative     Ketones, UA Negative     Bilirubin, UA Negative     Blood, UA Negative     Protein, UA Negative     Leuk Esterase, UA Negative     Nitrite, UA Negative     Urobilinogen, UA 1.0 E.U./dL    Narrative:      Urine microscopic not indicated.    Urine Drug Screen - Urine, Clean Catch [[id number]]  (Normal) Collected: 05/15/23 2009    Specimen: Urine, Clean Catch Updated: 05/15/23 2026     THC, Screen, Urine Negative     Phencyclidine (PCP), Urine Negative     Cocaine Screen, Urine Negative     Methamphetamine, Ur Negative     Opiate Screen Negative     Amphetamine Screen, Urine Negative     Benzodiazepine Screen, Urine Negative     Tricyclic Antidepressants Screen Negative     Methadone Screen, Urine Negative     Barbiturates Screen, Urine Negative     Oxycodone Screen, Urine Negative     Propoxyphene Screen Negative     Buprenorphine, Screen, Urine Negative    Narrative:      Limitations of this procedure include the possibility of false positives due to interfering substances in the urine sample. Clinical data should be correlated with any questionable result. Positive results should be considered Presumptive Positive until results are confirmed with another methodology such as HPLC or GCMS.    COVID-19 and FLU A/B PCR - Swab, Nasopharynx [[id number]]  (Normal) Collected: 05/16/23 0022    Specimen: Swab from Nasopharynx Updated: 05/16/23 0046     COVID19  Not Detected     Influenza A PCR Not Detected     Influenza B PCR Not Detected    Narrative:      Fact sheet for providers: https://www.fda.gov/media/905234/download    Fact sheet for patients: https://www.fda.gov/media/332303/download    Test performed by PCR.    Magnesium [[id number]]  (Abnormal) Collected: 05/16/23 0520    Specimen: Blood Updated: 05/16/23 0557     Magnesium 3.3 mg/dL             ED COURSE / MEDICAL DECISION MAKING:     Andrés Denton is a 52 y.o. male who presents to the emergency department for evaluation of alcohol abuse, withdrawal.  Well-developed, well-nourished man in no distress with exam as above.  He is notably hypertensive, tachycardic, diaphoretic.  His exam is notable for contusion and swelling around the left eye and a fairly superficial wound to right heel.  Will obtain clearance labs, imaging.  We will get IV fluids and benzodiazepines.  Disposition pending.    Differential diagnosis includes alcohol abuse, intoxication, withdrawal, pancreatitis, fracture, ICH among other etiologies.    EKG interpreted by me: Sinus tachycardia, incomplete left bundle branch, no obvious acute ST/T changes, this is an abnormal EKG    45 minutes of critical care provided. This time excludes other billable procedures. Time does include preparation of documents, medical consultations, review of old records, and direct bedside care. Patient is at high risk for life-threatening deterioration due to alcohol dependence with withdrawal.       Final diagnoses:   Alcohol dependence with uncomplicated withdrawal   Essential hypertension        Aaron Alejandro MD  05/16/23 3242

## 2023-05-16 VITALS
DIASTOLIC BLOOD PRESSURE: 98 MMHG | SYSTOLIC BLOOD PRESSURE: 136 MMHG | WEIGHT: 230 LBS | HEIGHT: 68 IN | RESPIRATION RATE: 18 BRPM | HEART RATE: 101 BPM | OXYGEN SATURATION: 99 % | BODY MASS INDEX: 34.86 KG/M2 | TEMPERATURE: 97.6 F

## 2023-05-16 LAB
FLUAV RNA RESP QL NAA+PROBE: NOT DETECTED
FLUBV RNA RESP QL NAA+PROBE: NOT DETECTED
MAGNESIUM SERPL-MCNC: 3.3 MG/DL (ref 1.6–2.6)
SARS-COV-2 RNA RESP QL NAA+PROBE: NOT DETECTED

## 2023-05-16 PROCEDURE — 87636 SARSCOV2 & INF A&B AMP PRB: CPT | Performed by: EMERGENCY MEDICINE

## 2023-05-16 PROCEDURE — 96367 TX/PROPH/DG ADDL SEQ IV INF: CPT

## 2023-05-16 PROCEDURE — 83735 ASSAY OF MAGNESIUM: CPT | Performed by: EMERGENCY MEDICINE

## 2023-05-16 PROCEDURE — 36415 COLL VENOUS BLD VENIPUNCTURE: CPT

## 2023-05-16 PROCEDURE — 25010000002 MAGNESIUM SULFATE 2 GM/50ML SOLUTION: Performed by: EMERGENCY MEDICINE

## 2023-05-16 PROCEDURE — 96375 TX/PRO/DX INJ NEW DRUG ADDON: CPT

## 2023-05-16 PROCEDURE — 25010000002 LORAZEPAM PER 2 MG: Performed by: EMERGENCY MEDICINE

## 2023-05-16 RX ORDER — CLONIDINE HYDROCHLORIDE 0.1 MG/1
0.1 TABLET ORAL ONCE
Status: COMPLETED | OUTPATIENT
Start: 2023-05-16 | End: 2023-05-16

## 2023-05-16 RX ORDER — AMLODIPINE BESYLATE 5 MG/1
10 TABLET ORAL ONCE
Status: COMPLETED | OUTPATIENT
Start: 2023-05-16 | End: 2023-05-16

## 2023-05-16 RX ORDER — METOPROLOL SUCCINATE 25 MG/1
100 TABLET, EXTENDED RELEASE ORAL ONCE
Status: COMPLETED | OUTPATIENT
Start: 2023-05-16 | End: 2023-05-16

## 2023-05-16 RX ORDER — LORAZEPAM 2 MG/ML
1 INJECTION INTRAMUSCULAR ONCE
Status: COMPLETED | OUTPATIENT
Start: 2023-05-16 | End: 2023-05-16

## 2023-05-16 RX ORDER — DIAZEPAM 5 MG/1
5 TABLET ORAL ONCE
Status: COMPLETED | OUTPATIENT
Start: 2023-05-16 | End: 2023-05-16

## 2023-05-16 RX ORDER — MAGNESIUM SULFATE HEPTAHYDRATE 40 MG/ML
4 INJECTION, SOLUTION INTRAVENOUS ONCE
Status: COMPLETED | OUTPATIENT
Start: 2023-05-16 | End: 2023-05-16

## 2023-05-16 RX ORDER — LISINOPRIL 20 MG/1
20 TABLET ORAL ONCE
Status: COMPLETED | OUTPATIENT
Start: 2023-05-16 | End: 2023-05-16

## 2023-05-16 RX ORDER — CHLORDIAZEPOXIDE HYDROCHLORIDE 25 MG/1
50 CAPSULE, GELATIN COATED ORAL ONCE
Status: COMPLETED | OUTPATIENT
Start: 2023-05-16 | End: 2023-05-16

## 2023-05-16 RX ADMIN — CHLORDIAZEPOXIDE HYDROCHLORIDE 50 MG: 25 CAPSULE ORAL at 11:04

## 2023-05-16 RX ADMIN — LISINOPRIL 20 MG: 20 TABLET ORAL at 01:56

## 2023-05-16 RX ADMIN — CLONIDINE HYDROCHLORIDE 0.1 MG: 0.1 TABLET ORAL at 00:21

## 2023-05-16 RX ADMIN — DIAZEPAM 5 MG: 5 TABLET ORAL at 11:04

## 2023-05-16 RX ADMIN — METOPROLOL SUCCINATE 100 MG: 25 TABLET, EXTENDED RELEASE ORAL at 03:52

## 2023-05-16 RX ADMIN — LORAZEPAM 1 MG: 2 INJECTION INTRAMUSCULAR; INTRAVENOUS at 00:21

## 2023-05-16 RX ADMIN — MAGNESIUM SULFATE HEPTAHYDRATE 4 G: 40 INJECTION, SOLUTION INTRAVENOUS at 04:29

## 2023-05-16 RX ADMIN — CLONIDINE HYDROCHLORIDE 0.1 MG: 0.1 TABLET ORAL at 11:04

## 2023-05-16 RX ADMIN — AMLODIPINE BESYLATE 10 MG: 5 TABLET ORAL at 01:56

## 2023-05-16 NOTE — ED NOTES
Called  at this time and spoke w/ Josseline requesting update for pt per GRACIE Smith request. She stated that she is waiting to hear back from [facility]. RN notified.

## 2023-05-16 NOTE — CONSULTS
[facility] Clinical [place] Withdrawal Assessment of Alcohol Scale (CIWA) 10:50 AM         Pulse or heart rate, taken for one minute: 93  Blood pressure: 146/112         NAUSEA AND VOMITING--Ask “Do you fell sick to your stomach? Have you vomited?” Observatoin.     0 no nausea and no vomiting     1 mild nausea with no vomiting     2     3     4 intermittent nausea with dry heaves     5     6     7 constant nausea, frequent dry heaves and vomiting          TREMOR--Arms extended and fingers spread apart.     Observation     0 no tremor     1 not visible, but can be felt fingertip to fingertip     2     3      4 moderate, with patient's arms extended     5     6     7 severe, even with arms not extended          PAROXYSMAL SWEATS--Observation     0 no sweat visible     1 barely perceptible sweating, palms moist     2     3     4 beads of sweat obvious on forehead     5     6     7 drenching sweats          ANXIETY--Ask “Do you feel nervous?” Observation.     0 no anxiety, at ease     1 mild anxious     2     3     4 moderately anxious, or guarded, so anxiety is inferred     5     6     7 equivalent to acute panic states as seen in severe delirium or acute schizophrenic reactions          TACTILE DISTURBANCES--Ask “Have you any itching, pins and needles sensations, any burning, any numbness, or do you feel bugs crawling on or under your skin?” Observation.     0 none     1 very mild itching, pins and needles, burning or numbness     2 mild itching, pins and needles, burning or numbness     3 moderate itching, pins and needles, burning or numbness     4 moderately severe hallucinations     5 severe hallucinations     6 extremely severe hallucinations     7 continuous hallucinations     AUDITORY DISTURBANCES--Ask “Are you more aware of sounds around you ? Are they harsh? Do they frighten you?  Are you hearing anything that is disturbing to you?  Are you hearing things you know are not there? Observation.     0 not present      1 very mild harshness or ability to frighten     2 mild harshness or ability to frighten     3 moderate harshness or ability to frighten     4 moderately severe hallucinations     5 severe hallucinations     6 extremely severe hallucinations     7 continuous hallucinations                VISUAL DISTURBANCES--Ask “Does the light appear to be too bright? Is its color different? Does it hurt your eyes?  Are you seeing anything that is disturbing to you? Are you seeing things you know are not there?' Observation     0 not present     1 very mild sensitivity     2 mild sensitivity     3 moderate sensitivity     4 moderately severe hallucinations     5 severe hallucinations     6 extremely severe hallucinations     7 continuous hallucinations          HEADACHE, FULLNESS IN HEAD--Ask “Does your head feel different? Does it feel like there is a band around your head?” Do not rate for dizziness or lightheadedness.  Otherwise, rate severity.     0 not present     1 very mild     2 mild     3 moderate     4 moderately severe     5 severe     6 very severe     7 extremely severe          AGITATION--Observation     0 normal activity     1 somewhat more than normal activity     2      3     4 moderately fidgety and restless     5     6     7 paces back and forth during most of the interview, or constantly thrashes about          ORIENTATION AND CLOUDING OF SENSORIUM--Ask    “What day is this? Where are you? Who am I?     0 oriented and can do serial additions     1 cannot do serial additions or is uncertain about date     2 disoriented for date by no more than 2 calendar days     3 disoriented for date by more than 2 calendar days     4 disoriented for place/or person                    Total CIWA-Ar Score: 14    Rater's Initials: MELANY

## 2023-05-16 NOTE — ED NOTES
At this time, spoke with Bertha with behavioral health and she stated we are still waiting to hear back from [facility]

## 2023-05-16 NOTE — CASE MANAGEMENT/SOCIAL WORK
Andrés Denton    1970      Care Transitions   Navigator is helping primary therapist with discharge planning for patient. KAM faxed the following referrals on this day (5/16/23):     Service Referring: DETOX    [facility]   Phone [phone]  KAM spoke with Suzanne and faxed referral to [phone]  Confirmed referral was received at 5116    [facility]   Phone [phone]  KAM spoke with Samaria and faxed referral to [phone]  Referral received and being reviewed at 1341    [place] [facility])  Phone [phone]        Sandra Jaeger MSW, CSW    Behavioral [facility]     Care Transitions Navigator

## 2023-05-16 NOTE — CONSULTS
08:50 - Therapist followed up with [facility] and Julissa from intake reports that she is reviewing the referral currently.     09:40 - Julissa with [facility] called back and stated that she felt the patient was inappropriate for admission d/t his high BP and tronopin levels. She also stated that it seemed the patient was more of a binge drinker and wouldn't need detox. Julissa reports she will present to Doctor anyway.     10:15 - Julissa from [facility] called back and stated that ALBERTINA [facility] has denied admission because she feels that the patient is too unstable for transfer at this time.     10:45 - Therapist represented to [facility] and [facility] reports that Dr. Hernandez denied patient until 24 hours from 7AM this morning d/t patient's medical status.     10:50 - Therapist spoke with patient and he is agreeable for therapist to send a referral to [facility] and [facility].     11:40 - Referrals were sent by [facility].     12:40 - Referrals were resent sent by [facility].     13:45 - Mary from [place] called and patient was accepted by Dr. Zavaleta. Therapist informed treatment teamand patient and arranged RN to RN and [facility] ETA is 17:00.

## 2023-05-16 NOTE — CONSULTS
Andrés Denton  1970    Preferred Pronouns: He/Him  Race/Ethnicity: White or   Martial Status:   Guardian Name/Contact/etc: Self  Pt Lives With:  Self and his youngest adult son  Occupation: Patient stated he recently lost his job and is currently unemployed  Appearance: disheveled, unkempt       Assessment Start and End: 22:17-22:40    Orientation: alert and oriented to person, place, and time     Is patient agreeable to treatment? Yes Therapist provided informed consent to patient explaining that confidentiality cannot be maintained if patient states intent, thought or plan to harm themself, someone else or if someone had harmed or plans to harm them. Patient stated she understood and consented for assessment.      Attention and Cooperation: Distractible and Cooperative    Presenting Problems: Patient reported that he came into the ER to get help with his drinking alcohol. Patient stated that he started drinking heavily on Saturday and did not quit until Sunday afternoon. Patient stated that he drank 30 beers one pint of Vodka and 6 Smirnoff Ice drinks. Patient stated he is wanting detox then go to a rehab to learn how to quit drinking.     Mood: anxious and depressed    Affect: Congruent to mood    Speech: Normal    Eye Contact: Closed    Psychomotor Movement: Restless    Depression: 10     Anxiety: 10    Sleep: Interrupted     Appetite: Fair     Delusions: patient presented with Linear thought process     Hallucinations: None     Homicidal Ideations: Absent     Current Stressors: employment concerns and family concerns. Patient stated that his wife  from him this weekend due to his alcohol use.     Established/Current Mental Healthcare/Services: Patient does not report any mental health services at this time.     Current Psychiatric Medications: patient does not report any psychiatric meds. Patient meds per chart  amLODIPine (NORVASC) 10 MG tablet  aspirin 325 MG  tablet  cyclobenzaprine (FLEXERIL) 10 MG tablet  escitalopram (LEXAPRO) 10 MG tablet  hydrALAZINE (APRESOLINE) 10 MG tablet  lisinopril (PRINIVIL,ZESTRIL) 20 MG tablet  meloxicam (MOBIC) 15 MG tablet  metoprolol succinate XL (TOPROL-XL) 100 MG 24 hr tablet    Hx of Psychiatric or Detox Hospitalizations:20 Years ago went to [facility] for alcohol detox    Most recent inpatient admission: 20 years ago      COLUMBIA-SUICIDE SEVERITY RATING SCALE  Psychiatric Inpatient Setting - Discharge Screener    Ask questions that are bold and underlined Discharge   Ask Questions 1 and 2 YES NO   1) Wish to be Dead:   Person endorses thoughts about a wish to be dead or not alive anymore, or wish to fall asleep and not wake up.  While you were here in the hospital, have you wished you were dead or wished you could go to sleep and not wake up?  X   2) Suicidal Thoughts:   General non-specific thoughts of wanting to end one's life/die by suicide, “I've thought about killing myself” without general thoughts of ways to kill oneself/associated methods, intent, or plan.   While you were here in the hospital, have you actually had thoughts about killing yourself?   X   If YES to 2, ask questions 3, 4, 5, and 6.  If NO to 2, go directly to question 6   3) Suicidal Thoughts with Method (without Specific Plan or Intent to Act):   Person endorses thoughts of suicide and has thought of a least one method during the assessment period. This is different than a specific plan with time, place or method details worked out. “I thought about taking an overdose but I never made a specific plan as to when where or how I would actually do it….and I would never go through with it.”   Have you been thinking about how you might kill yourself?      4) Suicidal Intent (without Specific Plan):   Active suicidal thoughts of killing oneself and patient reports having some intent to act on such thoughts, as opposed to “I have the thoughts but I definitely will not  do anything about them.”   Have you had these thoughts and had some intention of acting on them or do you have some intention of acting on them after you leave the hospital?      5) Suicide Intent with Specific Plan:   Thoughts of killing oneself with details of plan fully or partially worked out and person has some intent to carry it out.   Have you started to work out or worked out the details of how to kill yourself either for while you were here in the hospital or for after you leave the hospital? Do you intend to carry out this plan?        6) Suicide Behavior    While you were here in the hospital, have you done anything, started to do anything, or prepared to do anything to end your life?    Examples: Took pills, cut yourself, tried to hang yourself, took out pills but didn't swallow any because you changed your mind or someone took them from you, collected pills, secured a means of obtaining a gun, gave away valuables, wrote a will or suicide note, etc.  X     Suicidal: Absent Patient denies any ideation, plan or intent to harm himself. Patient stated that he wants to live.    Previous Attempts: Patient denies any previous attempts    Most Recent Attempt: N/A    PSYCHOSOCIAL HISTORY        Family Hx of Mental Health/Substance Abuse: No    Patient Trauma/Abuse History: Further details: patient does not disclose any history of abuse or neglect      Does this require reporting: N/A    Legal History / History of Violence: None known     Experience with Interpersonal Violence: No     History of Inappropriate Sexual Behavior: No    SUBSTANCE USE HISTORY:     Patient denies any illicit substance use or marijuana use.  Patient reports started drinking alcohol at the age of 20. Patient stated in his 20s-30s he would drink a case of beer a night. Patient stated now he will drink 1/2 to 1 pint of vodka once or twice a week.      Clinical [place] Withdrawal Assessment of Alcohol Scale (CIWA)    Pulse or heart rate,  taken for one minute: 120 Blood pressure:190/143    NAUSEA AND VOMITING--Ask “Do you fell sick to your stomach? Have you vomited?” Observatoin.  0 no nausea and no vomiting  1 mild nausea with no vomiting  2  3  4 intermittent nausea with dry heaves  5  6  7 constant nausea, frequent dry heaves and vomiting    TREMOR--Arms extended and fingers spread apart.  Observation  0 no tremor  1 not visible, but can be felt fingertip to fingertip  2  3  4 moderate, with patient's arms extended  5  6  7 severe, even with arms not extended    PAROXYSMAL SWEATS--Observation  0 no sweat visible  1 barely perceptible sweating, palms moist  2  3  4 beads of sweat obvious on forehead  5  6  7 drenching sweats    ANXIETY--Ask “Do you feel nervous?” Observation.  0 no anxiety, at ease  1 mild anxious  2  3  4 moderately anxious, or guarded, so anxiety is inferred  5  6  7 equivalent to acute panic states as seen in severe delirium or acute schizophrenic reactions    TACTILE DISTURBANCES--Ask “Have you any itching, pins and needles sensations, any burning, any numbness, or do you feel bugs crawling on or under your skin?” Observation.  0 none  1 very mild itching, pins and needles, burning or numbness  2 mild itching, pins and needles, burning or numbness  3 moderate itching, pins and needles, burning or numbness  4 moderately severe hallucinations  5 severe hallucinations  6 extremely severe hallucinations  7 continuous hallucinations  AUDITORY DISTURBANCES--Ask “Are you more aware of sounds around you ? Are they harsh? Do they frighten you?  Are you hearing anything that is disturbing to you?  Are you hearing things you know are not there? Observation.  0 not present  1 very mild harshness or ability to frighten  2 mild harshness or ability to frighten  3 moderate harshness or ability to frighten  4 moderately severe hallucinations  5 severe hallucinations  6 extremely severe hallucinations  7 continuous hallucinations       VISUAL  DISTURBANCES--Ask “Does the light appear to be too bright? Is its color different? Does it hurt your eyes?  Are you seeing anything that is disturbing to you? Are you seeing things you know are not there?' Observation  0 not present  1 very mild sensitivity  2 mild sensitivity  3 moderate sensitivity  4 moderately severe hallucinations  5 severe hallucinations  6 extremely severe hallucinations  7 continuous hallucinations    HEADACHE, FULLNESS IN HEAD--Ask “Does your head feel different? Does it feel like there is a band around your head?” Do not rate for dizziness or lightheadedness.  Otherwise, rate severity.  0 not present  1 very mild  2 mild  3 moderate  4 moderately severe  5 severe  6 very severe  7 extremely severe    AGITATION--Observation  0 normal activity  1 somewhat more than normal activity  2   3  4 moderately fidgety and restless  5  6  7 paces back and forth during most of the interview, or constantly thrashes about    ORIENTATION AND CLOUDING OF SENSORIUM--Ask   “What day is this? Where are you? Who am I?  0 oriented and can do serial additions  1 cannot do serial additions or is uncertain about date  2 disoriented for date by no more than 2 calendar days  3 disoriented for date by more than 2 calendar days  4 disoriented for place/or person        Total CIWA-Ar Score:10  Rater's Initials:JW    History of DT's: Yes, describe: Nausea, Shakes/Tremors, anxiety, cramping    History of Seizures: No    Current Medical Conditions or Biomedical Complications: Hypertension and DTs      DATA:   This therapist received a call from [facility] staff for a behavioral health consult.  The patient is agreeable to speak with the behavioral health team.  Met with patient at bedside. Patient is not under 1:1 security monitoring.  The attending treatment team is Dr. Clement and Amberly Morataya RN..    Patient presents today with chief compliant of substance abuse.      Patient reported that he came into the ER to  get help with his drinking alcohol. Patient stated that he started drinking heavily on Saturday and did not quit until Sunday afternoon. Patient stated that he drank 30 beers one pint of Vodka and 6 Smirnoff Ice drinks. Patient stated he is wanting detox then go to a rehab to learn how to quit drinking. Patient reports history of DTs such as nausea, shaking or tremors, Anxiety and cramping. Patient stated that he has been to the [facility] 20 years ago for Detox. CIWA was scored a 10 by Therapist. Patient stated that his depression and anxiety are at a 10 but does not endorse any SI intent or plan to harm himself. Patient stated that he wants to go to detox and then go to Rehab for Alcohol.       Safety plan of report to nearest hospital, or call police/911 if feeling unsafe, if having suicidal or homicidal thoughts, or if in emergent need of medications verbally reviewed with patient during assessment and suicide prevention/crisis hotlines verbally reviewed with patient during assessment.  Patient during assessment verbally agreed to safety plan. Patient reports to be agreeable for treatment recommendations.     ASSESSMENT:    Therapist consulted with patient and clinical descriptors are documented above.  Therapist completed CSSRS with patient for suicide risk assessment.  The results of patient’s CSSRS documented above. The patient's displays good insight, Fair impulse control and judgement appears fair.     PLAN:    At this time, therapist recommends inpatient treatment based upon the above assessment.  Therapist collaborated with the treatment team  who agree to adopt the recommendations.  Therapist discussed recommendations with patient and/or patient support systems, and patient is agreeable to the plan.  Patient is agreeable for referrals to be sent to facilities and agencies for treatment.    DISPOSITION PLAN TIMELINE:    0:10 Therapist spoke to Akanksha regarding detox availability. Akanksha reported beds were  available. Therapist asked about Patient's Blood Pressure being accept. Therapist was told that Patient's BP needed to be medically treated before referral could be reviewed.    0:15 Therapist updated Treatment team on Facility request to manage patient's BP.    01:23 Therapist updated Akanksha at the [facility] of patient's BP.    01:28 Therapist contacted [facility] for Detox availability. Nathanael at the [facility] reported that patient needed to have BP Medically treated before referral could be reviewed.    02:45 Therapist contacted Akanksha at [facility] regarding BP being 147/114. Akanksha reported that she would monitor chart and present when able. Therapist will follow up in an hour.    04:06 Therapist received call from Akanksha at [facility] regarding update on BP. Therapist will speak with Dr. Clement on patient care for detox and transport and call Akanksha to update.     04:17 Therapist returned call to Akanksha at [facility], gave BP update. Akanksha requested a Mag level redraw as patient's level was low and what plan was for patient's magnesium level. Therapist will follow up with doctor and update [facility] as able.     06:17 Updated Akanksha on Labs and Vital Signs for review of chart for detox.Akanksha requesting updated vitals as last vitals are an hour ago and response to being septic. Therapist will have to speak with RN and call Akanksha with update.    06:26 Therapist contacted the [facility] for Detox Placement since labs are with normal range. Therapist spoke with Luis and therapist reported updated vitals and labs. Therapist was told to Fax referral for detox.     06:36 Therapist updated Treatment team on referral status.     06:40 Akanksha contacted Therapist and gave referral to Dr. Hernandez for him to review.    07:00 Akanksha from [facility] contacted Therapist and reported Dr. Hernandez could not take patient at this time as he felt patient needed to be monitored for 24 more hours before being transferred. Therapist updated  Dr. Thorne and RN Amberly Morataya.     07:12 Therapist will hand off Patient to Day Shift Navigator to follow up on Detox placement.     Chente Rivera MA [facility]  05/16/2023

## 2023-05-16 NOTE — ED NOTES
At this time, attempted to contact behavioral health for an update with no answer and voicemail box was full. Will call back in 10 min.

## 2023-05-16 NOTE — ED NOTES
[facility] called at this time to let us know that ETA has been moved to 1800 due to inclement weather. Will update treatment team via chat.

## 2023-06-01 ENCOUNTER — HOSPITAL ENCOUNTER (EMERGENCY)
Facility: HOSPITAL | Age: 53
Discharge: HOME | End: 2023-06-02
Attending: EMERGENCY MEDICINE
Payer: MEDICAID

## 2023-06-01 DIAGNOSIS — F10.29 ALCOHOL DEPENDENCE WITH UNSPECIFIED ALCOHOL-INDUCED DISORDER: Primary | ICD-10-CM

## 2023-06-01 LAB
ALBUMIN SERPL-MCNC: 4 G/DL (ref 3.5–5.2)
ALBUMIN/GLOB SERPL: 1.4 G/DL
ALP SERPL-CCNC: 59 U/L (ref 39–117)
ALT SERPL W P-5'-P-CCNC: 75 U/L (ref 1–41)
AMPHET+METHAMPHET UR QL: NEGATIVE
AMPHETAMINES UR QL: NEGATIVE
ANION GAP SERPL CALCULATED.3IONS-SCNC: 14.6 MMOL/L (ref 5–15)
APAP SERPL-MCNC: <5 MCG/ML (ref 0–30)
AST SERPL-CCNC: 57 U/L (ref 1–40)
BARBITURATES UR QL SCN: NEGATIVE
BASOPHILS # BLD AUTO: 0.1 10*3/MM3 (ref 0–0.2)
BASOPHILS NFR BLD AUTO: 1.4 % (ref 0–1.5)
BENZODIAZ UR QL SCN: POSITIVE
BILIRUB SERPL-MCNC: 0.4 MG/DL (ref 0–1.2)
BILIRUB UR QL STRIP: NEGATIVE
BUN SERPL-MCNC: 11 MG/DL (ref 6–20)
BUN/CREAT SERPL: 13.3 (ref 7–25)
BUPRENORPHINE SERPL-MCNC: NEGATIVE NG/ML
CALCIUM SPEC-SCNC: 8.9 MG/DL (ref 8.6–10.5)
CANNABINOIDS SERPL QL: NEGATIVE
CHLORIDE SERPL-SCNC: 102 MMOL/L (ref 98–107)
CLARITY UR: CLEAR
CO2 SERPL-SCNC: 21.4 MMOL/L (ref 22–29)
COCAINE UR QL: NEGATIVE
COLOR UR: ABNORMAL
CREAT SERPL-MCNC: 0.83 MG/DL (ref 0.76–1.27)
DEPRECATED RDW RBC AUTO: 50.3 FL (ref 37–54)
EGFRCR SERPLBLD CKD-EPI 2021: 106.6 ML/MIN/1.73
EOSINOPHIL # BLD AUTO: 0.12 10*3/MM3 (ref 0–0.4)
EOSINOPHIL NFR BLD AUTO: 1.6 % (ref 0.3–6.2)
ERYTHROCYTE [DISTWIDTH] IN BLOOD BY AUTOMATED COUNT: 13.8 % (ref 12.3–15.4)
ETHANOL BLD-MCNC: 214 MG/DL (ref 0–10)
ETHANOL BLD-MCNC: 347 MG/DL (ref 0–10)
ETHANOL UR QL: 0.21 %
ETHANOL UR QL: 0.35 %
GLOBULIN UR ELPH-MCNC: 2.8 GM/DL
GLUCOSE SERPL-MCNC: 110 MG/DL (ref 65–99)
GLUCOSE UR STRIP-MCNC: NEGATIVE MG/DL
HCT VFR BLD AUTO: 47.6 % (ref 37.5–51)
HGB BLD-MCNC: 16.2 G/DL (ref 13–17.7)
HGB UR QL STRIP.AUTO: NEGATIVE
HOLD SPECIMEN: NORMAL
HOLD SPECIMEN: NORMAL
IMM GRANULOCYTES # BLD AUTO: 0.06 10*3/MM3 (ref 0–0.05)
IMM GRANULOCYTES NFR BLD AUTO: 0.8 % (ref 0–0.5)
KETONES UR QL STRIP: NEGATIVE
LEUKOCYTE ESTERASE UR QL STRIP.AUTO: NEGATIVE
LYMPHOCYTES # BLD AUTO: 3 10*3/MM3 (ref 0.7–3.1)
LYMPHOCYTES NFR BLD AUTO: 41.1 % (ref 19.6–45.3)
MAGNESIUM SERPL-MCNC: 2 MG/DL (ref 1.6–2.6)
MCH RBC QN AUTO: 33.4 PG (ref 26.6–33)
MCHC RBC AUTO-ENTMCNC: 34 G/DL (ref 31.5–35.7)
MCV RBC AUTO: 98.1 FL (ref 79–97)
METHADONE UR QL SCN: NEGATIVE
MONOCYTES # BLD AUTO: 0.48 10*3/MM3 (ref 0.1–0.9)
MONOCYTES NFR BLD AUTO: 6.6 % (ref 5–12)
NEUTROPHILS NFR BLD AUTO: 3.54 10*3/MM3 (ref 1.7–7)
NEUTROPHILS NFR BLD AUTO: 48.5 % (ref 42.7–76)
NITRITE UR QL STRIP: NEGATIVE
NRBC BLD AUTO-RTO: 0 /100 WBC (ref 0–0.2)
OPIATES UR QL: NEGATIVE
OXYCODONE UR QL SCN: NEGATIVE
PCP UR QL SCN: NEGATIVE
PH UR STRIP.AUTO: 5.5 [PH] (ref 5–8)
PLATELET # BLD AUTO: 321 10*3/MM3 (ref 140–450)
PMV BLD AUTO: 9.5 FL (ref 6–12)
POTASSIUM SERPL-SCNC: 4.5 MMOL/L (ref 3.5–5.2)
PROPOXYPH UR QL: NEGATIVE
PROT SERPL-MCNC: 6.8 G/DL (ref 6–8.5)
PROT UR QL STRIP: NEGATIVE
RBC # BLD AUTO: 4.85 10*6/MM3 (ref 4.14–5.8)
SALICYLATES SERPL-MCNC: <0.3 MG/DL
SARS-COV-2 RNA RESP QL NAA+PROBE: NOT DETECTED
SODIUM SERPL-SCNC: 138 MMOL/L (ref 136–145)
SP GR UR STRIP: 1.01 (ref 1–1.03)
TRICYCLICS UR QL SCN: NEGATIVE
UROBILINOGEN UR QL STRIP: ABNORMAL
WBC NRBC COR # BLD: 7.3 10*3/MM3 (ref 3.4–10.8)
WHOLE BLOOD HOLD COAG: NORMAL
WHOLE BLOOD HOLD SPECIMEN: NORMAL

## 2023-06-01 PROCEDURE — 87635 SARS-COV-2 COVID-19 AMP PRB: CPT | Performed by: EMERGENCY MEDICINE

## 2023-06-01 PROCEDURE — 80143 DRUG ASSAY ACETAMINOPHEN: CPT | Performed by: EMERGENCY MEDICINE

## 2023-06-01 PROCEDURE — 83735 ASSAY OF MAGNESIUM: CPT | Performed by: EMERGENCY MEDICINE

## 2023-06-01 PROCEDURE — 99285 EMERGENCY DEPT VISIT HI MDM: CPT

## 2023-06-01 PROCEDURE — 82077 ASSAY SPEC XCP UR&BREATH IA: CPT

## 2023-06-01 PROCEDURE — 93005 ELECTROCARDIOGRAM TRACING: CPT | Performed by: EMERGENCY MEDICINE

## 2023-06-01 PROCEDURE — 82077 ASSAY SPEC XCP UR&BREATH IA: CPT | Performed by: EMERGENCY MEDICINE

## 2023-06-01 PROCEDURE — 80179 DRUG ASSAY SALICYLATE: CPT | Performed by: EMERGENCY MEDICINE

## 2023-06-01 PROCEDURE — 81003 URINALYSIS AUTO W/O SCOPE: CPT | Performed by: EMERGENCY MEDICINE

## 2023-06-01 PROCEDURE — 85025 COMPLETE CBC W/AUTO DIFF WBC: CPT | Performed by: EMERGENCY MEDICINE

## 2023-06-01 PROCEDURE — 36415 COLL VENOUS BLD VENIPUNCTURE: CPT

## 2023-06-01 PROCEDURE — 80053 COMPREHEN METABOLIC PANEL: CPT | Performed by: EMERGENCY MEDICINE

## 2023-06-01 PROCEDURE — 80306 DRUG TEST PRSMV INSTRMNT: CPT | Performed by: EMERGENCY MEDICINE

## 2023-06-01 RX ORDER — SODIUM CHLORIDE 0.9 % (FLUSH) 0.9 %
10 SYRINGE (ML) INJECTION AS NEEDED
Status: DISCONTINUED | OUTPATIENT
Start: 2023-06-01 | End: 2023-06-02 | Stop reason: HOSPADM

## 2023-06-01 NOTE — ED PROVIDER NOTES
Subjective  History of Present Illness:    Patient is a 52-year-old male here today for alcohol intoxication.  He reports that he was discharged [facility] on Saturday after spending approximately 2 weeks due to alcohol detox.  He reports that he and his wife were arguing and he started drinking on memorial day and then again today.  He had approximately 1/5 of vodka a day.  He states that prior to his recent inpatient treatment, he would drink 1/2-1/5 of vodka a week as a social drinker and also help with his leg pain.  What prompted his recent admission was a couple day binge.  Patient is requesting to be admitted again to [facility] for detox.  Wife reports that he actually started drinking on Sunday.  She notified [facility] and was advised to come to the local emergency department to help in facilitating the transfer to the facility for a 90-day stay.    Nurses Notes reviewed and agree, including vitals, allergies, social history and prior medical history.     REVIEW OF SYSTEMS: All systems reviewed and not pertinent unless noted.  Review of Systems    Past Medical History:   Diagnosis Date   • Anxiety    • Depression    • Hypertension        Allergies:    Patient has no known allergies.      Past Surgical History:   Procedure Laterality Date   • AMPUTATION  1995    right ring finger partial amputation   • FRACTURE SURGERY Left     tib/fib pt. is unsure the exact surgery it was when he was 17         Social History     Socioeconomic History   • Marital status:    Tobacco Use   • Smoking status: Never   • Smokeless tobacco: Current     Types: Snuff   Vaping Use   • Vaping Use: Never used   Substance and Sexual Activity   • Alcohol use: Yes     Comment: 1-2 x weekly1/2 pint, ~ 30 beers and a pint of vodka this past weekend   • Drug use: Never   • Sexual activity: Defer         Family History   Problem Relation Age of Onset   • No Known Problems Mother    • No Known Problems Father   "      Objective  Physical Exam:  /90   Pulse 82   Temp 98.5 °F (36.9 °C) (Oral)   Resp 21   Ht 172.7 cm (68\")   Wt 104 kg (230 lb)   SpO2 92%   BMI 34.97 kg/m²      Physical Exam  Vitals and nursing note reviewed.   Constitutional:       Appearance: Normal appearance.   HENT:      Head: Normocephalic and atraumatic.      Right Ear: Tympanic membrane, ear canal and external ear normal.      Left Ear: Tympanic membrane, ear canal and external ear normal.      Nose: Nose normal.      Mouth/Throat:      Mouth: Mucous membranes are moist.      Pharynx: Oropharynx is clear.   Eyes:      Extraocular Movements: Extraocular movements intact.      Conjunctiva/sclera: Conjunctivae normal.      Pupils: Pupils are equal, round, and reactive to light.   Neck:      Vascular: No carotid bruit.   Cardiovascular:      Rate and Rhythm: Normal rate and regular rhythm.      Pulses: Normal pulses.      Heart sounds: Normal heart sounds.   Pulmonary:      Effort: Pulmonary effort is normal.      Breath sounds: Normal breath sounds.   Abdominal:      General: Abdomen is flat. Bowel sounds are normal. There is no distension.      Palpations: Abdomen is soft.      Tenderness: There is no abdominal tenderness.   Musculoskeletal:      Cervical back: Neck supple. No tenderness.      Right lower leg: No edema.      Left lower leg: No edema.   Lymphadenopathy:      Cervical: No cervical adenopathy.   Skin:     General: Skin is warm and dry.      Capillary Refill: Capillary refill takes less than 2 seconds.   Neurological:      General: No focal deficit present.      Mental Status: He is alert and oriented to person, place, and time.   Psychiatric:         Mood and Affect: Mood normal.         Behavior: Behavior normal.         Procedures    ED Course:    ED Course as of 06/02/23 0234   Thu Jun 01, 2023   1622 EKG interpreted by me.  Sinus rhythm.  Rate of 79.  Abnormal leads in V1.  There is incomplete right bundle branch block.  No " obvious ST or T wave changes.  Abnormal EKG [CG]      ED Course User Index  [CG] Joshua Franco JOYA,        Lab Results (last 24 hours)     Procedure Component Value Units Date/Time    CBC & Differential [[id number]]  (Abnormal) Collected: 06/01/23 1512    Specimen: Blood Updated: 06/01/23 1518    Narrative:      The following orders were created for panel order CBC & Differential.  Procedure                               Abnormality         Status                     ---------                               -----------         ------                     CBC Auto Differential[[id number]]        Abnormal            Final result                 Please view results for these tests on the individual orders.    Comprehensive Metabolic Panel [[id number]]  (Abnormal) Collected: 06/01/23 1512    Specimen: Blood Updated: 06/01/23 1541     Glucose 110 mg/dL      BUN 11 mg/dL      Creatinine 0.83 mg/dL      Sodium 138 mmol/L      Potassium 4.5 mmol/L      Comment: Slight hemolysis detected by analyzer. Results may be affected.        Chloride 102 mmol/L      CO2 21.4 mmol/L      Calcium 8.9 mg/dL      Total Protein 6.8 g/dL      Albumin 4.0 g/dL      ALT (SGPT) 75 U/L      AST (SGOT) 57 U/L      Comment: Slight hemolysis detected by analyzer. Results may be affected.        Alkaline Phosphatase 59 U/L      Total Bilirubin 0.4 mg/dL      Globulin 2.8 gm/dL      A/G Ratio 1.4 g/dL      BUN/Creatinine Ratio 13.3     Anion Gap 14.6 mmol/L      eGFR 106.6 mL/min/1.73     Narrative:      GFR Normal >60  Chronic Kidney Disease <60  Kidney Failure <15      Acetaminophen Level [[id number]]  (Normal) Collected: 06/01/23 1512    Specimen: Blood Updated: 06/01/23 1536     Acetaminophen <5.0 mcg/mL     Narrative:      Toxic = Greater than 150 mcg/mL    Ethanol [[id number]]  (Abnormal) Collected: 06/01/23 1512    Specimen: Blood Updated: 06/01/23 1536     Ethanol 347 mg/dL      Ethanol % 0.347 %     Narrative:      This result is for medical use  only and should not be used for forensic purposes.    Salicylate Level [[id number]]  (Normal) Collected: 06/01/23 1512    Specimen: Blood Updated: 06/01/23 1536     Salicylate <0.3 mg/dL     Magnesium [[id number]]  (Normal) Collected: 06/01/23 1512    Specimen: Blood Updated: 06/01/23 1541     Magnesium 2.0 mg/dL     CBC Auto Differential [[id number]]  (Abnormal) Collected: 06/01/23 1512    Specimen: Blood Updated: 06/01/23 1518     WBC 7.30 10*3/mm3      RBC 4.85 10*6/mm3      Hemoglobin 16.2 g/dL      Hematocrit 47.6 %      MCV 98.1 fL      MCH 33.4 pg      MCHC 34.0 g/dL      RDW 13.8 %      RDW-SD 50.3 fl      MPV 9.5 fL      Platelets 321 10*3/mm3      Neutrophil % 48.5 %      Lymphocyte % 41.1 %      Monocyte % 6.6 %      Eosinophil % 1.6 %      Basophil % 1.4 %      Immature Grans % 0.8 %      Neutrophils, Absolute 3.54 10*3/mm3      Lymphocytes, Absolute 3.00 10*3/mm3      Monocytes, Absolute 0.48 10*3/mm3      Eosinophils, Absolute 0.12 10*3/mm3      Basophils, Absolute 0.10 10*3/mm3      Immature Grans, Absolute 0.06 10*3/mm3      nRBC 0.0 /100 WBC     COVID-19,[facility] IN-HOUSE,Nasal Swab No Transport Media 3-4 HR TAT - Swab, Nasal Cavity [[id number]]  (Normal) Collected: 06/01/23 1512    Specimen: Swab from Nasal Cavity Updated: 06/01/23 1624     COVID19 Not Detected    Narrative:      Fact sheet for providers: https://www.fda.gov/media/384270/download     Fact sheet for patients: https://www.fda.gov/media/400201/download    Test performed by PCR.    Consider negative results in combination with clinical observations, patient history, and epidemiological information.    Urine Drug Screen - Urine, Clean Catch [[id number]]  (Abnormal) Collected: 06/01/23 1527    Specimen: Urine, Clean Catch Updated: 06/01/23 1614     THC, Screen, Urine Negative     Phencyclidine (PCP), Urine Negative     Cocaine Screen, Urine Negative     Methamphetamine, Ur Negative     Opiate Screen Negative     Amphetamine Screen, Urine  Negative     Benzodiazepine Screen, Urine Positive     Tricyclic Antidepressants Screen Negative     Methadone Screen, Urine Negative     Barbiturates Screen, Urine Negative     Oxycodone Screen, Urine Negative     Propoxyphene Screen Negative     Buprenorphine, Screen, Urine Negative    Narrative:      Limitations of this procedure include the possibility of false positives due to interfering substances in the urine sample. Clinical data should be correlated with any questionable result. Positive results should be considered Presumptive Positive until results are confirmed with another methodology such as HPLC or GCMS.    Urinalysis With Microscopic If Indicated (No Culture) - Urine, Clean Catch [[id number]]  (Abnormal) Collected: 06/01/23 1527    Specimen: Urine, Clean Catch Updated: 06/01/23 1541     Color, UA Jermyn     Appearance, UA Clear     pH, UA 5.5     Specific Gravity, UA 1.011     Glucose, UA Negative     Ketones, UA Negative     Bilirubin, UA Negative     Blood, UA Negative     Protein, UA Negative     Leuk Esterase, UA Negative     Nitrite, UA Negative     Urobilinogen, UA 0.2 E.U./dL    Narrative:      Urine microscopic not indicated.    Ethanol [[id number]]  (Abnormal) Collected: 06/01/23 2235    Specimen: Blood Updated: 06/01/23 2259     Ethanol 214 mg/dL      Ethanol % 0.214 %     Narrative:      This result is for medical use only and should not be used for forensic purposes.           No radiology results from the last 24 hrs       MDM    Initial impression of presenting illness: Alcohol withdrawal    DDX: includes but is not limited to: Alcohol intoxication, withdrawal, SI    Patient arrives hemodynamically stable with vitals interpreted by myself.     Pertinent features from physical exam: He is alert and appropriate during the exam.  Cooperative, but tearful during the history taking portion..    Initial diagnostic plan: Medical clearance labs    Results from initial plan were reviewed and  interpreted by me revealing alcohol level of 347.  He is positive for benzodiazepines, other labs are within their normal ranges.    Diagnostic information from other sources: Medical record    Interventions / Re-evaluation: Patient was monitored for more than 8 hours until he was evaluated by behavioral health when his repeat alcohol level was 214.  The initial plan was to have [facility] transferred back to [facility] however this was not an option as he was not acutely withdrawing from alcohol.  The patient was then agreeable to find other placement for rehab, however, due to issues involving availability of beds the patient ultimately decided that he would prefer to be discharged home and to follow-up with the behavioral health clinic here at [facility].    Results/clinical rationale were discussed with Dr. Toro and Aggie with behavioral health    Consultations/Discussion of results with other physicians: None    Disposition plan: Patient ultimately discharged home in stable condition to follow-up with the behavioral health outpatient clinic  -----    Final diagnoses:   Alcohol dependence with unspecified alcohol-induced disorder        Micheal Jones, APRN  06/02/23 0234

## 2023-06-01 NOTE — Clinical Note
[facility] EMERGENCY DEPARTMENT  [address] [address]  Phone: [phone]    Andrés Denton was seen and treated in our emergency department on 6/1/2023.  He may return to work on 06/03/2023.         Thank you for choosing [facility].    Hudson Toro MD

## 2023-06-02 VITALS
HEIGHT: 68 IN | DIASTOLIC BLOOD PRESSURE: 90 MMHG | OXYGEN SATURATION: 92 % | BODY MASS INDEX: 34.86 KG/M2 | RESPIRATION RATE: 21 BRPM | HEART RATE: 82 BPM | WEIGHT: 230 LBS | TEMPERATURE: 98.5 F | SYSTOLIC BLOOD PRESSURE: 154 MMHG

## 2023-06-02 NOTE — DISCHARGE INSTRUCTIONS
You were evaluated for alcohol dependence and issues around alcohol.  We observed you in the emergency department and had you speak with [facility].  They made some referrals and you ultimately have opted for outpatient management and discharge.  Would recommend following up with the resources outpatient that you were given by behavioral health for further treatment and evaluation of of alcohol use.  You are now stable for discharge
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
## 2023-06-15 ENCOUNTER — HOSPITAL ENCOUNTER (EMERGENCY)
Facility: HOSPITAL | Age: 53
Discharge: OTHER INSTITUTION | End: 2023-06-15
Attending: EMERGENCY MEDICINE
Payer: MEDICAID

## 2023-06-15 VITALS
SYSTOLIC BLOOD PRESSURE: 187 MMHG | DIASTOLIC BLOOD PRESSURE: 96 MMHG | TEMPERATURE: 98.6 F | HEIGHT: 68 IN | BODY MASS INDEX: 33.34 KG/M2 | RESPIRATION RATE: 17 BRPM | HEART RATE: 78 BPM | WEIGHT: 220 LBS | OXYGEN SATURATION: 95 %

## 2023-06-15 DIAGNOSIS — F10.920 ALCOHOLIC INTOXICATION WITHOUT COMPLICATION: ICD-10-CM

## 2023-06-15 DIAGNOSIS — R45.851 SUICIDAL IDEATION: Primary | ICD-10-CM

## 2023-06-15 PROBLEM — F32.9 MAJOR DEPRESSION: Status: ACTIVE | Noted: 2023-06-15

## 2023-06-15 LAB
ALBUMIN SERPL-MCNC: 4.3 G/DL (ref 3.5–5.2)
ALBUMIN/GLOB SERPL: 1.4 G/DL
ALP SERPL-CCNC: 73 U/L (ref 39–117)
ALT SERPL W P-5'-P-CCNC: 76 U/L (ref 1–41)
AMPHET+METHAMPHET UR QL: NEGATIVE
AMPHETAMINES UR QL: NEGATIVE
ANION GAP SERPL CALCULATED.3IONS-SCNC: 15.2 MMOL/L (ref 5–15)
APAP SERPL-MCNC: <5 MCG/ML (ref 0–30)
AST SERPL-CCNC: 65 U/L (ref 1–40)
BACTERIA UR QL AUTO: ABNORMAL /HPF
BARBITURATES UR QL SCN: NEGATIVE
BASOPHILS # BLD AUTO: 0.03 10*3/MM3 (ref 0–0.2)
BASOPHILS NFR BLD AUTO: 0.5 % (ref 0–1.5)
BENZODIAZ UR QL SCN: POSITIVE
BILIRUB SERPL-MCNC: 0.4 MG/DL (ref 0–1.2)
BILIRUB UR QL STRIP: NEGATIVE
BUN SERPL-MCNC: 5 MG/DL (ref 6–20)
BUN/CREAT SERPL: 5.7 (ref 7–25)
BUPRENORPHINE SERPL-MCNC: NEGATIVE NG/ML
CALCIUM SPEC-SCNC: 8.9 MG/DL (ref 8.6–10.5)
CANNABINOIDS SERPL QL: NEGATIVE
CHLORIDE SERPL-SCNC: 103 MMOL/L (ref 98–107)
CLARITY UR: ABNORMAL
CO2 SERPL-SCNC: 23.8 MMOL/L (ref 22–29)
COCAINE UR QL: NEGATIVE
COLOR UR: ABNORMAL
CREAT SERPL-MCNC: 0.87 MG/DL (ref 0.76–1.27)
DEPRECATED RDW RBC AUTO: 54.1 FL (ref 37–54)
EGFRCR SERPLBLD CKD-EPI 2021: 105.1 ML/MIN/1.73
EOSINOPHIL # BLD AUTO: 0.13 10*3/MM3 (ref 0–0.4)
EOSINOPHIL NFR BLD AUTO: 2.2 % (ref 0.3–6.2)
ERYTHROCYTE [DISTWIDTH] IN BLOOD BY AUTOMATED COUNT: 14.9 % (ref 12.3–15.4)
ETHANOL BLD-MCNC: 101 MG/DL (ref 0–10)
ETHANOL BLD-MCNC: 229 MG/DL (ref 0–10)
ETHANOL UR QL: 0.1 %
ETHANOL UR QL: 0.23 %
GLOBULIN UR ELPH-MCNC: 3.1 GM/DL
GLUCOSE SERPL-MCNC: 184 MG/DL (ref 65–99)
GLUCOSE UR STRIP-MCNC: NEGATIVE MG/DL
HCT VFR BLD AUTO: 48.7 % (ref 37.5–51)
HGB BLD-MCNC: 16.8 G/DL (ref 13–17.7)
HGB UR QL STRIP.AUTO: ABNORMAL
HOLD SPECIMEN: NORMAL
HOLD SPECIMEN: NORMAL
HYALINE CASTS UR QL AUTO: ABNORMAL /LPF
IMM GRANULOCYTES # BLD AUTO: 0.03 10*3/MM3 (ref 0–0.05)
IMM GRANULOCYTES NFR BLD AUTO: 0.5 % (ref 0–0.5)
KETONES UR QL STRIP: NEGATIVE
LEUKOCYTE ESTERASE UR QL STRIP.AUTO: NEGATIVE
LIPASE SERPL-CCNC: 47 U/L (ref 13–60)
LYMPHOCYTES # BLD AUTO: 2 10*3/MM3 (ref 0.7–3.1)
LYMPHOCYTES NFR BLD AUTO: 34 % (ref 19.6–45.3)
MCH RBC QN AUTO: 34.1 PG (ref 26.6–33)
MCHC RBC AUTO-ENTMCNC: 34.5 G/DL (ref 31.5–35.7)
MCV RBC AUTO: 99 FL (ref 79–97)
METHADONE UR QL SCN: NEGATIVE
MONOCYTES # BLD AUTO: 0.44 10*3/MM3 (ref 0.1–0.9)
MONOCYTES NFR BLD AUTO: 7.5 % (ref 5–12)
NEUTROPHILS NFR BLD AUTO: 3.25 10*3/MM3 (ref 1.7–7)
NEUTROPHILS NFR BLD AUTO: 55.3 % (ref 42.7–76)
NITRITE UR QL STRIP: NEGATIVE
NRBC BLD AUTO-RTO: 0 /100 WBC (ref 0–0.2)
OPIATES UR QL: NEGATIVE
OXYCODONE UR QL SCN: NEGATIVE
PCP UR QL SCN: NEGATIVE
PH UR STRIP.AUTO: 5 [PH] (ref 5–8)
PLATELET # BLD AUTO: 148 10*3/MM3 (ref 140–450)
PMV BLD AUTO: 9.1 FL (ref 6–12)
POTASSIUM SERPL-SCNC: 3.5 MMOL/L (ref 3.5–5.2)
PROPOXYPH UR QL: NEGATIVE
PROT SERPL-MCNC: 7.4 G/DL (ref 6–8.5)
PROT UR QL STRIP: ABNORMAL
RBC # BLD AUTO: 4.92 10*6/MM3 (ref 4.14–5.8)
RBC # UR STRIP: ABNORMAL /HPF
REF LAB TEST METHOD: ABNORMAL
SALICYLATES SERPL-MCNC: <0.3 MG/DL
SARS-COV-2 RNA RESP QL NAA+PROBE: NOT DETECTED
SODIUM SERPL-SCNC: 142 MMOL/L (ref 136–145)
SP GR UR STRIP: 1.02 (ref 1–1.03)
SQUAMOUS #/AREA URNS HPF: ABNORMAL /HPF
TRICYCLICS UR QL SCN: NEGATIVE
UROBILINOGEN UR QL STRIP: ABNORMAL
WBC # UR STRIP: ABNORMAL /HPF
WBC NRBC COR # BLD: 5.88 10*3/MM3 (ref 3.4–10.8)
WHOLE BLOOD HOLD COAG: NORMAL
WHOLE BLOOD HOLD SPECIMEN: NORMAL

## 2023-06-15 PROCEDURE — 83690 ASSAY OF LIPASE: CPT | Performed by: EMERGENCY MEDICINE

## 2023-06-15 PROCEDURE — 25010000002 KETOROLAC TROMETHAMINE PER 15 MG: Performed by: EMERGENCY MEDICINE

## 2023-06-15 PROCEDURE — 80306 DRUG TEST PRSMV INSTRMNT: CPT | Performed by: EMERGENCY MEDICINE

## 2023-06-15 PROCEDURE — 82077 ASSAY SPEC XCP UR&BREATH IA: CPT | Performed by: EMERGENCY MEDICINE

## 2023-06-15 PROCEDURE — 87635 SARS-COV-2 COVID-19 AMP PRB: CPT | Performed by: EMERGENCY MEDICINE

## 2023-06-15 PROCEDURE — 25010000002 ONDANSETRON PER 1 MG: Performed by: EMERGENCY MEDICINE

## 2023-06-15 PROCEDURE — 80053 COMPREHEN METABOLIC PANEL: CPT | Performed by: EMERGENCY MEDICINE

## 2023-06-15 PROCEDURE — 25010000002 DIAZEPAM PER 5 MG: Performed by: EMERGENCY MEDICINE

## 2023-06-15 PROCEDURE — 85025 COMPLETE CBC W/AUTO DIFF WBC: CPT | Performed by: EMERGENCY MEDICINE

## 2023-06-15 PROCEDURE — 93005 ELECTROCARDIOGRAM TRACING: CPT | Performed by: EMERGENCY MEDICINE

## 2023-06-15 PROCEDURE — 81001 URINALYSIS AUTO W/SCOPE: CPT | Performed by: EMERGENCY MEDICINE

## 2023-06-15 PROCEDURE — 80179 DRUG ASSAY SALICYLATE: CPT | Performed by: EMERGENCY MEDICINE

## 2023-06-15 PROCEDURE — 80143 DRUG ASSAY ACETAMINOPHEN: CPT | Performed by: EMERGENCY MEDICINE

## 2023-06-15 RX ORDER — KETOROLAC TROMETHAMINE 30 MG/ML
15 INJECTION, SOLUTION INTRAMUSCULAR; INTRAVENOUS ONCE
Status: COMPLETED | OUTPATIENT
Start: 2023-06-15 | End: 2023-06-15

## 2023-06-15 RX ORDER — ONDANSETRON 2 MG/ML
4 INJECTION INTRAMUSCULAR; INTRAVENOUS ONCE
Status: COMPLETED | OUTPATIENT
Start: 2023-06-15 | End: 2023-06-15

## 2023-06-15 RX ORDER — LISINOPRIL 20 MG/1
40 TABLET ORAL ONCE
Status: COMPLETED | OUTPATIENT
Start: 2023-06-15 | End: 2023-06-15

## 2023-06-15 RX ORDER — CHLORDIAZEPOXIDE HYDROCHLORIDE 25 MG/1
50 CAPSULE, GELATIN COATED ORAL ONCE
Status: COMPLETED | OUTPATIENT
Start: 2023-06-15 | End: 2023-06-15

## 2023-06-15 RX ORDER — HYDRALAZINE HYDROCHLORIDE 10 MG/1
10 TABLET, FILM COATED ORAL ONCE
Status: COMPLETED | OUTPATIENT
Start: 2023-06-15 | End: 2023-06-15

## 2023-06-15 RX ORDER — NICOTINE 21 MG/24HR
1 PATCH, TRANSDERMAL 24 HOURS TRANSDERMAL
Status: DISCONTINUED | OUTPATIENT
Start: 2023-06-15 | End: 2023-06-15 | Stop reason: HOSPADM

## 2023-06-15 RX ORDER — DIAZEPAM 5 MG/ML
5 INJECTION, SOLUTION INTRAMUSCULAR; INTRAVENOUS ONCE
Status: COMPLETED | OUTPATIENT
Start: 2023-06-15 | End: 2023-06-15

## 2023-06-15 RX ADMIN — ONDANSETRON 4 MG: 2 INJECTION INTRAMUSCULAR; INTRAVENOUS at 18:40

## 2023-06-15 RX ADMIN — HYDRALAZINE HYDROCHLORIDE 10 MG: 10 TABLET, FILM COATED ORAL at 18:05

## 2023-06-15 RX ADMIN — DIAZEPAM 5 MG: 5 INJECTION, SOLUTION INTRAMUSCULAR; INTRAVENOUS at 18:40

## 2023-06-15 RX ADMIN — Medication 1 PATCH: at 19:06

## 2023-06-15 RX ADMIN — SODIUM CHLORIDE 1000 ML: 9 INJECTION, SOLUTION INTRAVENOUS at 13:50

## 2023-06-15 RX ADMIN — LISINOPRIL 40 MG: 20 TABLET ORAL at 18:04

## 2023-06-15 RX ADMIN — KETOROLAC TROMETHAMINE 15 MG: 30 INJECTION, SOLUTION INTRAMUSCULAR; INTRAVENOUS at 14:16

## 2023-06-15 RX ADMIN — KETOROLAC TROMETHAMINE 15 MG: 30 INJECTION, SOLUTION INTRAMUSCULAR; INTRAVENOUS at 18:19

## 2023-06-15 RX ADMIN — CHLORDIAZEPOXIDE HYDROCHLORIDE 50 MG: 25 CAPSULE ORAL at 19:06
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
## 2023-06-15 NOTE — ED PROVIDER NOTES
"  HPI: Andrés Denton is a 52 y.o. male who presents to the emergency department complaining of alcohol abuse, suicidal ideations.  Patient states that he is significant stressors recently relational, financial and work-related.  He has been very depressed over this.  He notes that he is \"an alcoholic\" but has been drinking very heavily the last week or so.  Today he had an argument with his wife and he started to have suicidal ideations.  He was going to shoot himself or hang himself.  He denies any other complaints or concerns at this time.  He has never had suicidal ideation before.      REVIEW OF SYSTEMS: All other systems reviewed and are negative     PAST MEDICAL HISTORY:   Past Medical History:   Diagnosis Date   • Anxiety    • Depression    • Hypertension         FAMILY HISTORY:   Family History   Problem Relation Age of Onset   • No Known Problems Mother    • No Known Problems Father         SOCIAL HISTORY:   Social History     Socioeconomic History   • Marital status:    Tobacco Use   • Smoking status: Never   • Smokeless tobacco: Current     Types: Snuff   Vaping Use   • Vaping Use: Never used   Substance and Sexual Activity   • Alcohol use: Yes     Comment: 1-2 x weekly1/2 pint, ~ 30 beers and a pint of vodka this past weekend   • Drug use: Never   • Sexual activity: Defer        SURGICAL HISTORY:   Past Surgical History:   Procedure Laterality Date   • AMPUTATION  1995    right ring finger partial amputation   • FRACTURE SURGERY Left     tib/fib pt. is unsure the exact surgery it was when he was 17        ALLERGIES: Patient has no known allergies.       PHYSICAL EXAM:   VITAL SIGNS:   Vitals:    06/15/23 2028   BP: 171/99   Pulse: 81   Resp: 16   Temp:    SpO2: 94%      CONSTITUTIONAL: Awake, well appearing, nontoxic   HENT: Atraumatic, normocephalic, oral mucosa moist, airway patent. Nares patent without drainage. External ears normal.   EYES: Conjunctivae clear, EOMI, PERRL   NECK: " Trachea midline, nontender, supple   CARDIOVASCULAR: Tachycardic, Normal rhythm.  PULMONARY/CHEST: Normal work of breathing. Clear to auscultation, no rhonchi, wheezes, or rales.  ABDOMINAL: Nondistended, soft, nontender, no rebound or guarding.  NEUROLOGIC: Nonfocal, moves all four extremities, no gross sensory or motor deficits.   EXTREMITIES: No clubbing, cyanosis, or edema   SKIN: Warm, Dry, No erythema, No rash  PSYCH: Tearful, suicidal ideations      ED COURSE / MEDICAL DECISION MAKING:     Andrés Denton is a 52 y.o. male who presents to the emergency department for evaluation of alcohol abuse, suicidal ideations.  Well-developed, well-nourished man in no distress though tearful.  His vital signs notable for hypertension and mild tachycardia.  His exam is otherwise as above.  Will obtain clearance labs and consult behavioral health.  Disposition pending.    Differential diagnosis includes alcohol abuse, anxiety, depression, suicidal ideations among other etiologies.    EKG interpreted by me: Sinus tachycardia, no obvious acute ST changes, some nonspecific T waves, this is an abnormal EKG  21:14 EDT    I received care from physician in regards to behavioral health evaluation.  Patient will be accepted to [facility] under the care of Dr. Hernandez    Final diagnoses:   Suicidal ideation   Alcoholic intoxication without complication        Mike Clement, DO  06/15/23 6217

## 2023-06-16 NOTE — ED NOTES
2015 Night shift navigator took over patient care, introduced herself to patient.     2021 Isis from the [facility] requested new vitals be taken on the patient.     20:37 Juliet from [facility] contacted Therapist and reported that Dr. Hernandez was accepting patient to [facility]. RN to report to Deena at [phone] ext [phone].    20:40 [facility] ETA 2200    20:54 Therapist updated patient on acceptance to [facility]. Patient continues to be willing to go to facility voluntarily.    20:55 Therapist updated treatment team and facilitated RN to RN report.    Chente VICTORIA  06/15/2023     Chente Rivera  06/15/23 2104

## 2023-06-20 PROBLEM — F10.239 ALCOHOL DEPENDENCE WITH WITHDRAWAL: Status: ACTIVE | Noted: 2023-06-20

## 2023-06-20 PROBLEM — I10 HYPERTENSION, ESSENTIAL: Status: ACTIVE | Noted: 2023-06-20

## 2023-07-25 ENCOUNTER — OFFICE VISIT (OUTPATIENT)
Dept: PRIMARY CARE CLINIC | Age: 53
End: 2023-07-25
Payer: MEDICAID

## 2023-07-25 VITALS
HEART RATE: 73 BPM | OXYGEN SATURATION: 97 % | RESPIRATION RATE: 18 BRPM | WEIGHT: 240 LBS | BODY MASS INDEX: 36.49 KG/M2 | TEMPERATURE: 98 F | SYSTOLIC BLOOD PRESSURE: 138 MMHG | DIASTOLIC BLOOD PRESSURE: 87 MMHG

## 2023-07-25 DIAGNOSIS — M25.512 CHRONIC PAIN OF BOTH SHOULDERS: Primary | ICD-10-CM

## 2023-07-25 DIAGNOSIS — N52.9 ERECTILE DYSFUNCTION, UNSPECIFIED ERECTILE DYSFUNCTION TYPE: ICD-10-CM

## 2023-07-25 DIAGNOSIS — F10.10 ALCOHOL ABUSE: ICD-10-CM

## 2023-07-25 DIAGNOSIS — M25.511 CHRONIC PAIN OF BOTH SHOULDERS: Primary | ICD-10-CM

## 2023-07-25 DIAGNOSIS — R73.9 HYPERGLYCEMIA: ICD-10-CM

## 2023-07-25 DIAGNOSIS — G89.29 CHRONIC PAIN OF BOTH SHOULDERS: Primary | ICD-10-CM

## 2023-07-25 DIAGNOSIS — I10 PRIMARY HYPERTENSION: ICD-10-CM

## 2023-07-25 DIAGNOSIS — G47.33 OSA (OBSTRUCTIVE SLEEP APNEA): ICD-10-CM

## 2023-07-25 DIAGNOSIS — I73.9 PVD (PERIPHERAL VASCULAR DISEASE) (HCC): ICD-10-CM

## 2023-07-25 DIAGNOSIS — Z12.5 SCREENING FOR PROSTATE CANCER: ICD-10-CM

## 2023-07-25 DIAGNOSIS — I77.9 CAROTID ARTERY DISEASE WITHOUT CEREBRAL INFARCTION (HCC): ICD-10-CM

## 2023-07-25 DIAGNOSIS — F41.9 ANXIETY: ICD-10-CM

## 2023-07-25 PROCEDURE — 3074F SYST BP LT 130 MM HG: CPT | Performed by: INTERNAL MEDICINE

## 2023-07-25 PROCEDURE — G8427 DOCREV CUR MEDS BY ELIG CLIN: HCPCS | Performed by: INTERNAL MEDICINE

## 2023-07-25 PROCEDURE — 4004F PT TOBACCO SCREEN RCVD TLK: CPT | Performed by: INTERNAL MEDICINE

## 2023-07-25 PROCEDURE — 3017F COLORECTAL CA SCREEN DOC REV: CPT | Performed by: INTERNAL MEDICINE

## 2023-07-25 PROCEDURE — G8417 CALC BMI ABV UP PARAM F/U: HCPCS | Performed by: INTERNAL MEDICINE

## 2023-07-25 PROCEDURE — 99214 OFFICE O/P EST MOD 30 MIN: CPT | Performed by: INTERNAL MEDICINE

## 2023-07-25 PROCEDURE — 3078F DIAST BP <80 MM HG: CPT | Performed by: INTERNAL MEDICINE

## 2023-07-25 RX ORDER — HYDRALAZINE HYDROCHLORIDE 25 MG/1
25 TABLET, FILM COATED ORAL 2 TIMES DAILY
Qty: 60 TABLET | Refills: 1 | Status: SHIPPED | OUTPATIENT
Start: 2023-07-25

## 2023-07-25 RX ORDER — ASPIRIN 325 MG
325 TABLET ORAL DAILY
Qty: 9 TABLET | Refills: 3 | Status: SHIPPED | OUTPATIENT
Start: 2023-07-25

## 2023-07-25 RX ORDER — LISINOPRIL 20 MG/1
TABLET ORAL
COMMUNITY
Start: 2023-07-17

## 2023-07-25 RX ORDER — TRAMADOL HYDROCHLORIDE 50 MG/1
50 TABLET ORAL 2 TIMES DAILY PRN
Qty: 45 TABLET | Refills: 0 | Status: SHIPPED | OUTPATIENT
Start: 2023-07-25 | End: 2023-08-24

## 2023-07-25 RX ORDER — HYDROXYZINE HYDROCHLORIDE 25 MG/1
25 TABLET, FILM COATED ORAL 2 TIMES DAILY PRN
Qty: 30 TABLET | Refills: 0 | Status: SHIPPED | OUTPATIENT
Start: 2023-07-25 | End: 2023-08-24

## 2023-07-25 RX ORDER — ATORVASTATIN CALCIUM 20 MG/1
20 TABLET, FILM COATED ORAL NIGHTLY
Qty: 90 TABLET | Refills: 2 | Status: SHIPPED | OUTPATIENT
Start: 2023-07-25 | End: 2024-04-20

## 2023-07-25 RX ORDER — HYDRALAZINE HYDROCHLORIDE 25 MG/1
25 TABLET, FILM COATED ORAL 2 TIMES DAILY
Qty: 180 TABLET | OUTPATIENT
Start: 2023-07-25

## 2023-07-25 SDOH — ECONOMIC STABILITY: HOUSING INSECURITY
IN THE LAST 12 MONTHS, WAS THERE A TIME WHEN YOU DID NOT HAVE A STEADY PLACE TO SLEEP OR SLEPT IN A SHELTER (INCLUDING NOW)?: NO

## 2023-07-25 SDOH — ECONOMIC STABILITY: INCOME INSECURITY: HOW HARD IS IT FOR YOU TO PAY FOR THE VERY BASICS LIKE FOOD, HOUSING, MEDICAL CARE, AND HEATING?: NOT HARD AT ALL

## 2023-07-25 SDOH — ECONOMIC STABILITY: FOOD INSECURITY: WITHIN THE PAST 12 MONTHS, YOU WORRIED THAT YOUR FOOD WOULD RUN OUT BEFORE YOU GOT MONEY TO BUY MORE.: NEVER TRUE

## 2023-07-25 SDOH — ECONOMIC STABILITY: FOOD INSECURITY: WITHIN THE PAST 12 MONTHS, THE FOOD YOU BOUGHT JUST DIDN'T LAST AND YOU DIDN'T HAVE MONEY TO GET MORE.: NEVER TRUE

## 2023-07-25 ASSESSMENT — ENCOUNTER SYMPTOMS
EYE DISCHARGE: 0
COUGH: 0
VOMITING: 0
WHEEZING: 0
NAUSEA: 0
SHORTNESS OF BREATH: 0
SINUS PRESSURE: 0
ABDOMINAL PAIN: 0

## 2023-07-25 ASSESSMENT — PATIENT HEALTH QUESTIONNAIRE - PHQ9: DEPRESSION UNABLE TO ASSESS: URGENT/EMERGENT SITUATION

## 2023-07-25 NOTE — PROGRESS NOTES
Chief Complaint   Patient presents with    Follow-up    Shoulder Pain       Have you seen any other physician or provider since your last visit no    Have you had any other diagnostic tests since your last visit? no    Have you changed or stopped any medications since your last visit? no     Pt is here today for a follow up, was in a 30 day detox,at [facility] [employer]. pt is needing a referral for ortho for shoulder.
B12; Future  - Folate; Future    9. Screening for prostate cancer  Check PSA on a yearly basis. GOSIA every year if agreeable. - PSA Screening; Future    10. Hyperglycemia  Monitor A1c. Discussed appropriate diet. Discussed the importance of increased activity level and weight control.    - Hemoglobin A1C; Future        Orders Placed This Encounter   Medications    hydrOXYzine HCl (ATARAX) 25 MG tablet     Sig: Take 1 tablet by mouth 2 times daily as needed for Anxiety     Dispense:  30 tablet     Refill:  0    atorvastatin (LIPITOR) 20 MG tablet     Sig: Take 1 tablet by mouth at bedtime     Dispense:  90 tablet     Refill:  2    aspirin (KATHY ASPIRIN) 325 MG tablet     Sig: Take 1 tablet by mouth daily     Dispense:  9 tablet     Refill:  3    hydrALAZINE (APRESOLINE) 25 MG tablet     Sig: Take 1 tablet by mouth in the morning and at bedtime     Dispense:  60 tablet     Refill:  1    traMADol (ULTRAM) 50 MG tablet     Sig: Take 1 tablet by mouth 2 times daily as needed for Pain for up to 30 days. Intended supply: 3 days.  Take lowest dose possible to manage pain Max Daily Amount: 100 mg     Dispense:  45 tablet     Refill:  0     Reduce doses taken as pain becomes manageable

## 2023-07-31 ENCOUNTER — TELEPHONE (OUTPATIENT)
Dept: PRIMARY CARE CLINIC | Age: 53
End: 2023-07-31

## 2023-08-03 ASSESSMENT — ENCOUNTER SYMPTOMS: BACK PAIN: 1

## 2023-08-04 PROBLEM — M25.512 CHRONIC PAIN OF BOTH SHOULDERS: Status: ACTIVE | Noted: 2023-08-04

## 2023-08-04 PROBLEM — F41.9 ANXIETY: Status: ACTIVE | Noted: 2023-08-04

## 2023-08-04 PROBLEM — M25.511 CHRONIC PAIN OF BOTH SHOULDERS: Status: ACTIVE | Noted: 2023-08-04

## 2023-08-04 PROBLEM — G47.33 OSA (OBSTRUCTIVE SLEEP APNEA): Status: ACTIVE | Noted: 2023-08-04

## 2023-08-04 PROBLEM — I77.9 CAROTID ARTERY DISEASE WITHOUT CEREBRAL INFARCTION (HCC): Status: ACTIVE | Noted: 2023-08-04

## 2023-08-04 PROBLEM — I10 PRIMARY HYPERTENSION: Status: ACTIVE | Noted: 2023-08-04

## 2023-08-04 PROBLEM — I73.9 PVD (PERIPHERAL VASCULAR DISEASE) (HCC): Status: ACTIVE | Noted: 2023-08-04

## 2023-08-04 PROBLEM — G89.29 CHRONIC PAIN OF BOTH SHOULDERS: Status: ACTIVE | Noted: 2023-08-04

## 2023-08-04 NOTE — TELEPHONE ENCOUNTER
Pt states he received the prescription from whatever pharmacy that \"[facility] used\" States it is a 50mg pill that he takes every morning. He was at work and didn't have the bottle with him to give me pharmacy name but would call us back when he gets home.    I did call the facility to try and confirm but was only given the option of leaving a message

## 2023-08-16 DIAGNOSIS — M54.40 CHRONIC MIDLINE LOW BACK PAIN WITH SCIATICA, SCIATICA LATERALITY UNSPECIFIED: ICD-10-CM

## 2023-08-16 DIAGNOSIS — G89.29 CHRONIC MIDLINE LOW BACK PAIN WITH SCIATICA, SCIATICA LATERALITY UNSPECIFIED: ICD-10-CM

## 2023-08-17 RX ORDER — IBUPROFEN 800 MG/1
800 TABLET ORAL 2 TIMES DAILY PRN
Qty: 180 TABLET | Refills: 0 | Status: SHIPPED | OUTPATIENT
Start: 2023-08-17 | End: 2023-09-01

## 2023-08-22 ENCOUNTER — HOSPITAL ENCOUNTER (OUTPATIENT)
Facility: HOSPITAL | Age: 53
End: 2023-08-22
Payer: MEDICAID

## 2023-08-22 ENCOUNTER — APPOINTMENT (OUTPATIENT)
Dept: GENERAL RADIOLOGY | Facility: HOSPITAL | Age: 53
End: 2023-08-22
Attending: FAMILY MEDICINE
Payer: MEDICAID

## 2023-08-22 ENCOUNTER — HOSPITAL ENCOUNTER (EMERGENCY)
Facility: HOSPITAL | Age: 53
Discharge: ANOTHER HOSPITAL | End: 2023-08-22
Attending: FAMILY MEDICINE
Payer: MEDICAID

## 2023-08-22 VITALS
BODY MASS INDEX: 36.37 KG/M2 | TEMPERATURE: 99.7 F | RESPIRATION RATE: 16 BRPM | SYSTOLIC BLOOD PRESSURE: 126 MMHG | WEIGHT: 240 LBS | OXYGEN SATURATION: 98 % | DIASTOLIC BLOOD PRESSURE: 87 MMHG | HEART RATE: 93 BPM | HEIGHT: 68 IN
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
## 2023-08-22 DIAGNOSIS — R41.82 ALTERED MENTAL STATUS, UNSPECIFIED ALTERED MENTAL STATUS TYPE: ICD-10-CM

## 2023-08-22 LAB
GLUCOSE BLD-MCNC: 137 MG/DL (ref 74–106)
PERFORMED ON: ABNORMAL
SARS-COV-2 RDRP RESP QL NAA+PROBE: NOT DETECTED

## 2023-08-22 PROCEDURE — 36415 COLL VENOUS BLD VENIPUNCTURE: CPT

## 2023-08-22 PROCEDURE — 87635 SARS-COV-2 COVID-19 AMP PRB: CPT

## 2023-08-22 PROCEDURE — 2580000003 HC RX 258: Performed by: FAMILY MEDICINE

## 2023-08-22 PROCEDURE — 93005 ELECTROCARDIOGRAM TRACING: CPT

## 2023-08-22 PROCEDURE — 99285 EMERGENCY DEPT VISIT HI MDM: CPT

## 2023-08-22 PROCEDURE — 71045 X-RAY EXAM CHEST 1 VIEW: CPT

## 2023-08-22 PROCEDURE — 72170 X-RAY EXAM OF PELVIS: CPT

## 2023-08-22 RX ORDER — 0.9 % SODIUM CHLORIDE 0.9 %
1000 INTRAVENOUS SOLUTION INTRAVENOUS ONCE
Status: COMPLETED | OUTPATIENT
Start: 2023-08-22 | End: 2023-08-22

## 2023-08-22 RX ADMIN — SODIUM CHLORIDE 1000 ML: 9 INJECTION, SOLUTION INTRAVENOUS at 19:46

## 2023-08-22 NOTE — ED PROVIDER NOTES
Pain    LISINOPRIL (PRINIVIL;ZESTRIL) 20 MG TABLET    GIVE 1 TABLET BY MOUTH TWICE DAILY    METOPROLOL SUCCINATE (TOPROL XL) 100 MG EXTENDED RELEASE TABLET    TAKE 1 TABLET BY MOUTH DAILY    NITROGLYCERIN (NITROSTAT) 0.4 MG SL TABLET    Place 1 tablet under the tongue every 5 minutes as needed for Chest pain up to max of 3 total doses. If no relief after 1 dose, call 911. ROPINIROLE (REQUIP) 0.5 MG TABLET    Take one po qhs for 5 days then increase to to 2 pills po qhs    TRAMADOL (ULTRAM) 50 MG TABLET    Take 1 tablet by mouth 2 times daily as needed for Pain for up to 30 days. Intended supply: 3 days. Take lowest dose possible to manage pain Max Daily Amount: 100 mg       ALLERGIES     Patient has no known allergies. FAMILYHISTORY     No family history on file. SOCIAL HISTORY       Social History     Tobacco Use    Smoking status: Never    Smokeless tobacco: Current     Types: Chew    Tobacco comments:     40 years   Substance Use Topics    Alcohol use: Yes     Comment: drank a half pint today 9/14 trying to take his pain away    Drug use: Never       SCREENINGS                                     PHYSICAL EXAM  1 or more Elements     ED Triage Vitals   BP Temp Temp src Pulse Resp SpO2 Height Weight   -- -- -- -- -- -- -- --       Physical Exam    My pulse oximetry interpretation is which is within the normal range    GENERAL APPEARANCE: Awake and alert. Cooperative. Patient in acute distress. HEAD:  Atraumatic. EYES: EOM's grossly intact. Pupils sluggishly reactive  ENT: Mucous membranes are moist.  No trismus. NECK:  Trachea midline. HEART: Regular rate and rhythm  LUNGS: Respirations unlabored. CTAB  ABDOMEN: Soft. Non-tender. No guarding or rebound. EXTREMITIES: No acute deformities. SKIN: Warm and dry. NEUROLOGICAL: Moves all 4 extremities spontaneously. PSYCHIATRIC: Normal mood.         DIAGNOSTIC RESULTS   LABS:    Labs Reviewed   POCT GLUCOSE - Abnormal; Notable for the following

## 2023-08-22 NOTE — ED NOTES
MD Hubert Diana on telephone with [facility] [facility] at this time.       Stacia Alcantara [place]  08/22/23 1922
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
## 2023-08-23 NOTE — ED NOTES
Patient off unit with [facility] to [facility] Emergency Room at this time.       Rima Valentin  08/22/23 2130

## 2023-08-23 NOTE — ED NOTES
[address] [place] [address] arrived to bedside.  Patient submitted to a blood alcohol test.      Ankita Cash RN  08/22/23 7670
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
## 2023-08-23 NOTE — ED NOTES
RN on hold for 20 minutes with [place] to give report. Report given at 20:19. No further needs noted.       Rima Rodriguez  08/22/23 6650

## 2023-08-30 RX ORDER — HYDRALAZINE HYDROCHLORIDE 25 MG/1
25 TABLET, FILM COATED ORAL 2 TIMES DAILY
Qty: 180 TABLET | Refills: 0 | Status: SHIPPED | OUTPATIENT
Start: 2023-08-30

## 2023-08-31 ENCOUNTER — TELEPHONE (OUTPATIENT)
Dept: PRIMARY CARE CLINIC | Age: 53
End: 2023-08-31

## 2023-08-31 NOTE — TELEPHONE ENCOUNTER
----- Message from Geno Dalton sent at 8/31/2023  2:39 PM EDT -----  Subject: Refill Request    QUESTIONS  Name of Medication? ibuprofen (ADVIL;MOTRIN) 800 MG tablet  Patient-reported dosage and instructions? 800 MG/ once or twice daily  How many days do you have left? 0  Preferred Pharmacy? [id number]  Pharmacy phone number (if available)? [phone]  ---------------------------------------------------------------------------  --------------  CALL BACK INFO  What is the best way for the office to contact you? Do not leave any   message, patient will call back for answer  Preferred Call Back Phone Number? [phone]  ---------------------------------------------------------------------------  --------------  SCRIPT ANSWERS  Relationship to Patient?  Self

## 2023-09-01 ENCOUNTER — OFFICE VISIT (OUTPATIENT)
Dept: PRIMARY CARE CLINIC | Age: 53
End: 2023-09-01
Payer: MEDICAID

## 2023-09-01 ENCOUNTER — TELEPHONE (OUTPATIENT)
Dept: PRIMARY CARE CLINIC | Age: 53
End: 2023-09-01

## 2023-09-01 VITALS
OXYGEN SATURATION: 99 % | DIASTOLIC BLOOD PRESSURE: 126 MMHG | HEART RATE: 97 BPM | BODY MASS INDEX: 34.73 KG/M2 | SYSTOLIC BLOOD PRESSURE: 194 MMHG | WEIGHT: 228.4 LBS

## 2023-09-01 DIAGNOSIS — G47.00 INSOMNIA, UNSPECIFIED TYPE: ICD-10-CM

## 2023-09-01 DIAGNOSIS — M79.604 BILATERAL LEG PAIN: ICD-10-CM

## 2023-09-01 DIAGNOSIS — I73.9 PVD (PERIPHERAL VASCULAR DISEASE) (HCC): ICD-10-CM

## 2023-09-01 DIAGNOSIS — Z91.148 NONCOMPLIANCE WITH MEDICATION REGIMEN: ICD-10-CM

## 2023-09-01 DIAGNOSIS — I77.9 CAROTID ARTERY DISEASE WITHOUT CEREBRAL INFARCTION (HCC): ICD-10-CM

## 2023-09-01 DIAGNOSIS — F10.20 ALCOHOLISM (HCC): Primary | ICD-10-CM

## 2023-09-01 DIAGNOSIS — F41.1 GENERALIZED ANXIETY DISORDER: ICD-10-CM

## 2023-09-01 DIAGNOSIS — F10.20 ALCOHOLISM (HCC): ICD-10-CM

## 2023-09-01 DIAGNOSIS — M79.605 BILATERAL LEG PAIN: ICD-10-CM

## 2023-09-01 DIAGNOSIS — I16.0 ASYMPTOMATIC HYPERTENSIVE URGENCY: Primary | ICD-10-CM

## 2023-09-01 PROCEDURE — 3077F SYST BP >= 140 MM HG: CPT | Performed by: PHYSICIAN ASSISTANT

## 2023-09-01 PROCEDURE — 3080F DIAST BP >= 90 MM HG: CPT | Performed by: PHYSICIAN ASSISTANT

## 2023-09-01 PROCEDURE — 99214 OFFICE O/P EST MOD 30 MIN: CPT | Performed by: PHYSICIAN ASSISTANT

## 2023-09-01 RX ORDER — TRAZODONE HYDROCHLORIDE 50 MG/1
50 TABLET ORAL NIGHTLY PRN
Qty: 90 TABLET | Refills: 1 | Status: SHIPPED | OUTPATIENT
Start: 2023-09-01

## 2023-09-01 RX ORDER — LISINOPRIL 20 MG/1
20 TABLET ORAL 2 TIMES DAILY
Qty: 60 TABLET | Refills: 2 | Status: SHIPPED | OUTPATIENT
Start: 2023-09-01

## 2023-09-01 RX ORDER — CILOSTAZOL 50 MG/1
50 TABLET ORAL 2 TIMES DAILY
Qty: 60 TABLET | Refills: 0 | Status: SHIPPED | OUTPATIENT
Start: 2023-09-01 | End: 2023-09-01

## 2023-09-01 RX ORDER — METOPROLOL SUCCINATE 100 MG/1
100 TABLET, EXTENDED RELEASE ORAL DAILY
Qty: 30 TABLET | Refills: 3 | Status: SHIPPED | OUTPATIENT
Start: 2023-09-01

## 2023-09-01 RX ORDER — NALTREXONE HYDROCHLORIDE 50 MG/1
50 TABLET, FILM COATED ORAL DAILY
Qty: 30 TABLET | Refills: 2 | Status: SHIPPED | OUTPATIENT
Start: 2023-09-01

## 2023-09-01 RX ORDER — CILOSTAZOL 50 MG/1
TABLET ORAL
Qty: 180 TABLET | Refills: 0 | Status: SHIPPED | OUTPATIENT
Start: 2023-09-01 | End: 2023-10-01

## 2023-09-01 RX ORDER — AMLODIPINE BESYLATE 10 MG/1
10 TABLET ORAL NIGHTLY
Qty: 90 TABLET | Refills: 2 | Status: SHIPPED | OUTPATIENT
Start: 2023-09-01

## 2023-09-01 RX ORDER — FLUOXETINE HYDROCHLORIDE 20 MG/1
20 CAPSULE ORAL DAILY
Qty: 90 CAPSULE | Refills: 1 | Status: SHIPPED | OUTPATIENT
Start: 2023-09-01

## 2023-09-01 ASSESSMENT — ENCOUNTER SYMPTOMS
SORE THROAT: 0
DIARRHEA: 0
ABDOMINAL PAIN: 0
COUGH: 0
CONSTIPATION: 0
EYE PAIN: 0
SHORTNESS OF BREATH: 0

## 2023-09-01 NOTE — PROGRESS NOTES
Per Rene Kwon PA-C order, Blanca Allan was given 0.1mg orally of Clonidine Hydrchloride. No complications or reaction noted. Patient tolerated well. LOT#: [id number]  EXP:10/10/2023     Patient given second dose of Clonidine Hydrochloride 0.1mg.  YKJ#:[id number]  MOD:93/6472
cilostazol (PLETAL) 50 MG tablet; Take 1 tablet by mouth 2 times daily  Dispense: 60 tablet; Refill: 0      39 minutes were spent preparing, examining and educating patient and completing the visit today September 1, 2023.   Orders Placed This Encounter   Medications    FLUoxetine (PROZAC) 20 MG capsule     Sig: Take 1 capsule by mouth daily     Dispense:  90 capsule     Refill:  1    traZODone (DESYREL) 50 MG tablet     Sig: Take 1 tablet by mouth nightly as needed for Sleep     Dispense:  90 tablet     Refill:  1    amLODIPine (NORVASC) 10 MG tablet     Sig: Take 1 tablet by mouth nightly     Dispense:  90 tablet     Refill:  2    lisinopril (PRINIVIL;ZESTRIL) 20 MG tablet     Sig: Take 1 tablet by mouth in the morning and at bedtime     Dispense:  60 tablet     Refill:  2    metoprolol succinate (TOPROL XL) 100 MG extended release tablet     Sig: Take 1 tablet by mouth daily     Dispense:  30 tablet     Refill:  3    cilostazol (PLETAL) 50 MG tablet     Sig: Take 1 tablet by mouth 2 times daily     Dispense:  60 tablet     Refill:  0

## 2023-09-01 NOTE — TELEPHONE ENCOUNTER
Pt called stating he was supposed to have an rx for Naltrexone and something for pain in his legs sent to [facility] in [place]

## 2023-09-05 ENCOUNTER — HOSPITAL ENCOUNTER (EMERGENCY)
Facility: HOSPITAL | Age: 53
Discharge: HOME | End: 2023-09-05
Attending: EMERGENCY MEDICINE
Payer: MEDICAID

## 2023-09-05 VITALS
BODY MASS INDEX: 35.31 KG/M2 | HEIGHT: 67 IN | SYSTOLIC BLOOD PRESSURE: 168 MMHG | TEMPERATURE: 98.5 F | DIASTOLIC BLOOD PRESSURE: 125 MMHG | OXYGEN SATURATION: 96 % | WEIGHT: 225 LBS | RESPIRATION RATE: 16 BRPM | HEART RATE: 81 BPM

## 2023-09-05 DIAGNOSIS — H61.21 IMPACTED CERUMEN OF RIGHT EAR: Primary | ICD-10-CM

## 2023-09-05 PROCEDURE — 99283 EMERGENCY DEPT VISIT LOW MDM: CPT

## 2023-09-05 PROCEDURE — 6370000000 HC RX 637 (ALT 250 FOR IP): Performed by: EMERGENCY MEDICINE

## 2023-09-05 RX ADMIN — DOCUSATE SODIUM 100 MG: 50 LIQUID ORAL at 13:51

## 2023-09-05 ASSESSMENT — PAIN SCALES - GENERAL: PAINLEVEL_OUTOF10: 4

## 2023-09-05 ASSESSMENT — PAIN DESCRIPTION - FREQUENCY: FREQUENCY: CONTINUOUS

## 2023-09-05 ASSESSMENT — PAIN DESCRIPTION - PAIN TYPE: TYPE: ACUTE PAIN

## 2023-09-05 ASSESSMENT — LIFESTYLE VARIABLES
HOW MANY STANDARD DRINKS CONTAINING ALCOHOL DO YOU HAVE ON A TYPICAL DAY: PATIENT DOES NOT DRINK
HOW OFTEN DO YOU HAVE A DRINK CONTAINING ALCOHOL: NEVER

## 2023-09-05 ASSESSMENT — PAIN DESCRIPTION - ORIENTATION: ORIENTATION: RIGHT

## 2023-09-05 ASSESSMENT — PAIN - FUNCTIONAL ASSESSMENT: PAIN_FUNCTIONAL_ASSESSMENT: 0-10

## 2023-09-05 ASSESSMENT — PAIN DESCRIPTION - LOCATION: LOCATION: EAR

## 2023-09-05 NOTE — ED NOTES
Patient with c/o right ear being stopped up for 3 days, patient has used otc wax remover with no relief. Patient with c/o intermittent sharp pain with a cough.      Mike Fonseca RN  09/05/23 3246

## 2023-09-05 NOTE — ED NOTES
Patient refusing blood pressure recheck after multiple readings being 160-170's/100's. Patient states he has to go to work and will take his blood pressure medication once home. Discharge instructions given. No further questions or concerns at this time.      Rima Rollins  09/05/23 6716

## 2023-09-05 NOTE — ED PROVIDER NOTES
[id number] ENCOUNTER        Pt Name: Kendal Mondragon  MRN: [id number]  [address] [facility], [place] 1970  Date of evaluation: 9/5/2023  Provider: Angel Gale MD  PCP: Arian Coburn PA-C  Note Started: 1:35 PM EDT 9/5/23    CHIEF COMPLAINT       Chief Complaint   Patient presents with    Cerumen Impaction       HISTORY OF PRESENT ILLNESS: 1 or more Elements     History from : Patient    Limitations to history : None    Kendal Mondragon is a 48 y.o. male who presents planing of being unable to hear out of his right ear feeling like it is clogged and full of wax this started about 3 days ago he and his wife have been trying peroxide and Cerumenex at home they have gotten some wax removed but it has not helped enough to where he can hear he also now has developed some pain off and on like when he sneezes or coughs in the ear. Nursing Notes were all reviewed and agreed with or any disagreements were addressed in the HPI.     REVIEW OF SYSTEMS :      Review of Systems     systems reviewed and negative except as in HPI/MDM    SURGICAL HISTORY     Past Surgical History:   Procedure Laterality Date    FINGER AMPUTATION      also has partial amputation of right index and ring finger    LEG SURGERY         CURRENTMEDICATIONS       Previous Medications    AMLODIPINE (NORVASC) 10 MG TABLET    Take 1 tablet by mouth nightly    ASPIRIN (KATHY ASPIRIN) 325 MG TABLET    Take 1 tablet by mouth daily    ATORVASTATIN (LIPITOR) 20 MG TABLET    Take 1 tablet by mouth at bedtime    CILOSTAZOL (PLETAL) 50 MG TABLET    TAKE 1 TABLET BY MOUTH TWICE DAILY    FLUOXETINE (PROZAC) 20 MG CAPSULE    Take 1 capsule by mouth daily    HYDRALAZINE (APRESOLINE) 25 MG TABLET    TAKE 1 TABLET BY MOUTH IN THE MORNING AND AT BEDTIME    LISINOPRIL (PRINIVIL;ZESTRIL) 20 MG TABLET    Take 1 tablet by mouth in the morning and at bedtime    METOPROLOL SUCCINATE (TOPROL XL) 100 MG EXTENDED RELEASE TABLET    Take

## 2023-09-07 ENCOUNTER — HOSPITAL ENCOUNTER (OUTPATIENT)
Dept: ULTRASOUND IMAGING | Facility: HOSPITAL | Age: 53
Discharge: HOME | End: 2023-09-07
Payer: MEDICAID

## 2023-09-07 DIAGNOSIS — I73.9 PVD (PERIPHERAL VASCULAR DISEASE) (HCC): ICD-10-CM

## 2023-09-07 DIAGNOSIS — M79.605 BILATERAL LEG PAIN: ICD-10-CM

## 2023-09-07 DIAGNOSIS — M79.604 BILATERAL LEG PAIN: ICD-10-CM

## 2023-09-07 DIAGNOSIS — I77.9 CAROTID ARTERY DISEASE WITHOUT CEREBRAL INFARCTION (HCC): ICD-10-CM

## 2023-09-07 PROCEDURE — 93925 LOWER EXTREMITY STUDY: CPT

## 2023-09-07 PROCEDURE — 93880 EXTRACRANIAL BILAT STUDY: CPT

## 2023-09-22 ENCOUNTER — OFFICE VISIT (OUTPATIENT)
Dept: PRIMARY CARE CLINIC | Age: 53
End: 2023-09-22
Payer: MEDICAID

## 2023-09-22 VITALS
BODY MASS INDEX: 35.9 KG/M2 | WEIGHT: 229.2 LBS | SYSTOLIC BLOOD PRESSURE: 153 MMHG | OXYGEN SATURATION: 98 % | HEART RATE: 97 BPM | DIASTOLIC BLOOD PRESSURE: 95 MMHG

## 2023-09-22 DIAGNOSIS — M54.50 LUMBAR BACK PAIN: ICD-10-CM

## 2023-09-22 DIAGNOSIS — I77.9 CAROTID ARTERY DISEASE WITHOUT CEREBRAL INFARCTION (HCC): ICD-10-CM

## 2023-09-22 DIAGNOSIS — I10 PRIMARY HYPERTENSION: Primary | ICD-10-CM

## 2023-09-22 PROCEDURE — 3077F SYST BP >= 140 MM HG: CPT | Performed by: PHYSICIAN ASSISTANT

## 2023-09-22 PROCEDURE — 3080F DIAST BP >= 90 MM HG: CPT | Performed by: PHYSICIAN ASSISTANT

## 2023-09-22 PROCEDURE — 99214 OFFICE O/P EST MOD 30 MIN: CPT | Performed by: PHYSICIAN ASSISTANT

## 2023-09-22 RX ORDER — OMEPRAZOLE 20 MG/1
20 CAPSULE, DELAYED RELEASE ORAL
Qty: 90 CAPSULE | Refills: 1 | Status: SHIPPED | OUTPATIENT
Start: 2023-09-22

## 2023-09-22 RX ORDER — MELOXICAM 15 MG/1
15 TABLET ORAL DAILY
Qty: 30 TABLET | Refills: 3 | Status: SHIPPED | OUTPATIENT
Start: 2023-09-22

## 2023-09-22 RX ORDER — CYCLOBENZAPRINE HCL 10 MG
10 TABLET ORAL NIGHTLY PRN
Qty: 10 TABLET | Refills: 2 | Status: SHIPPED | OUTPATIENT
Start: 2023-09-22 | End: 2023-10-22

## 2023-09-22 ASSESSMENT — ENCOUNTER SYMPTOMS
ABDOMINAL PAIN: 0
SHORTNESS OF BREATH: 0
DIARRHEA: 0
EYE PAIN: 0
COUGH: 0
BACK PAIN: 1
CONSTIPATION: 0
SORE THROAT: 0

## 2023-09-22 NOTE — PROGRESS NOTES
Chief Complaint   Patient presents with    Follow-up     Pt is here to follow up about hypertension. Pt did report he took his medication for BP about 15 minutes ago. Have you seen any other physician or provider since your last visit no    Have you had any other diagnostic tests since your last visit? no    Have you changed or stopped any medications since your last visit? no     SUBJECTIVE:    Patient ID: Jocelin Sow is a 48 y.o.male. Chief Complaint   Patient presents with    Follow-up     Pt is here to follow up about hypertension. Pt did report he took his medication for BP about 15 minutes ago. HPI:  Mr. Jo Toribio is a 48year old male with past medical history of DAVIDA, HTN, PVD, carotid artery disease and RLS who presents to clinic today for follow up HTN. He did restart his medication of Lisnopril 20 mg, Toprol  mg daily, and Amlodipine 10 mg. He denies any headaches, dizziness, CP or SOA. He has not been using hydralazine as needed. He had recent Bilateral Carotid Doppler showing: \"IMPRESSION:  1. Right internal carotid artery less than 50% stenosis with mild  atherosclerotic plaque. 2. Left internal carotid artery less than 50% stenosis with mild atherosclerotic  plaque. 3. Antegrade flow in the right and left vertebral arteries\"  He has remained on ASA and Lipitor. He had BLE Lower Arterial Doppler that was normal.    He has chronic lower back pain due to DDD and is followed by Chiropractor. He is taking Ibuprofen multiple times a day. Xray Lumbar Spine was ordered 11/2022 and he never completed. He reports right sided radiculopathy. Patient's medications, allergies, past medical, surgical, social and family histories were reviewed and updated as appropriate in electronic medical record.         Outpatient Medications Marked as Taking for the 9/22/23 encounter (Office Visit) with Jhon Mares PA-C   Medication Sig Dispense Refill    meloxicam (MOBIC) 15 MG

## 2023-11-17 DIAGNOSIS — I10 PRIMARY HYPERTENSION: ICD-10-CM

## 2023-11-17 DIAGNOSIS — I16.0 ASYMPTOMATIC HYPERTENSIVE URGENCY: ICD-10-CM

## 2023-11-17 RX ORDER — LISINOPRIL 20 MG/1
20 TABLET ORAL 2 TIMES DAILY
Qty: 60 TABLET | Refills: 2 | OUTPATIENT
Start: 2023-11-17

## 2023-11-17 RX ORDER — METOPROLOL SUCCINATE 100 MG/1
100 TABLET, EXTENDED RELEASE ORAL DAILY
Qty: 30 TABLET | Refills: 3 | OUTPATIENT
Start: 2023-11-17

## 2023-11-17 RX ORDER — LISINOPRIL 10 MG/1
10 TABLET ORAL DAILY
Qty: 90 TABLET | Refills: 1 | OUTPATIENT
Start: 2023-11-17

## 2023-12-26 DIAGNOSIS — F41.8 MIXED ANXIETY AND DEPRESSIVE DISORDER: ICD-10-CM

## 2023-12-27 RX ORDER — BUPROPION HYDROCHLORIDE 150 MG/1
150 TABLET ORAL EVERY MORNING
Qty: 90 TABLET | Refills: 2 | OUTPATIENT
Start: 2023-12-27

## 2024-01-02 DIAGNOSIS — F41.8 MIXED ANXIETY AND DEPRESSIVE DISORDER: ICD-10-CM

## 2024-01-02 RX ORDER — ESCITALOPRAM OXALATE 10 MG/1
10 TABLET ORAL DAILY
Qty: 90 TABLET | Refills: 2 | OUTPATIENT
Start: 2024-01-02

## 2024-01-29 ENCOUNTER — HOSPITAL ENCOUNTER (EMERGENCY)
Facility: HOSPITAL | Age: 54
Discharge: LEFT BEFORE DISCHARGE | End: 2024-01-29
Attending: EMERGENCY MEDICINE
Payer: MEDICAID

## 2024-01-29 ENCOUNTER — APPOINTMENT (OUTPATIENT)
Dept: GENERAL RADIOLOGY | Facility: HOSPITAL | Age: 54
End: 2024-01-29
Attending: EMERGENCY MEDICINE
Payer: MEDICAID

## 2024-01-29 VITALS
WEIGHT: 230 LBS | HEART RATE: 92 BPM | DIASTOLIC BLOOD PRESSURE: 98 MMHG | RESPIRATION RATE: 17 BRPM | TEMPERATURE: 97.4 F | BODY MASS INDEX: 36.02 KG/M2 | SYSTOLIC BLOOD PRESSURE: 147 MMHG | OXYGEN SATURATION: 95 %

## 2024-01-29 DIAGNOSIS — R45.851 SUICIDAL IDEATION: Primary | ICD-10-CM

## 2024-01-29 DIAGNOSIS — F10.920 ACUTE ALCOHOLIC INTOXICATION WITHOUT COMPLICATION (HCC): ICD-10-CM

## 2024-01-29 DIAGNOSIS — R07.9 CHEST PAIN, UNSPECIFIED TYPE: ICD-10-CM

## 2024-01-29 LAB
ALBUMIN SERPL-MCNC: 4.2 G/DL (ref 3.4–4.8)
ALBUMIN/GLOB SERPL: 1.6 {RATIO} (ref 0.8–2)
ALP SERPL-CCNC: 74 U/L (ref 25–100)
ALT SERPL-CCNC: 38 U/L (ref 4–36)
AMPHET UR QL SCN: NORMAL
ANION GAP SERPL CALCULATED.3IONS-SCNC: 11 MMOL/L (ref 3–16)
APAP SERPL-MCNC: <15 UG/ML (ref 10–30)
AST SERPL-CCNC: 44 U/L (ref 8–33)
BARBITURATES UR QL SCN: NORMAL
BASOPHILS # BLD: 0 K/UL (ref 0–0.1)
BASOPHILS NFR BLD: 0.5 %
BENZODIAZ UR QL SCN: NORMAL
BILIRUB SERPL-MCNC: 0.4 MG/DL (ref 0.3–1.2)
BUN SERPL-MCNC: 11 MG/DL (ref 6–20)
BUPRENORPHINE QUAL, URINE: NORMAL
CALCIUM SERPL-MCNC: 9 MG/DL (ref 8.5–10.5)
CANNABINOIDS UR QL SCN: NORMAL
CHLORIDE SERPL-SCNC: 100 MMOL/L (ref 98–107)
CO2 SERPL-SCNC: 27 MMOL/L (ref 20–30)
COCAINE UR QL SCN: NORMAL
CREAT SERPL-MCNC: 0.8 MG/DL (ref 0.4–1.2)
DRUG SCREEN COMMENT UR-IMP: NORMAL
EOSINOPHIL # BLD: 0.1 K/UL (ref 0–0.4)
EOSINOPHIL NFR BLD: 1.5 %
ERYTHROCYTE [DISTWIDTH] IN BLOOD BY AUTOMATED COUNT: 13.8 % (ref 11–16)
ETHANOLAMINE SERPL-MCNC: 341 MG/DL (ref 0–0.08)
GFR SERPLBLD CREATININE-BSD FMLA CKD-EPI: >60 ML/MIN/{1.73_M2}
GLOBULIN SER CALC-MCNC: 2.7 G/DL
GLUCOSE SERPL-MCNC: 115 MG/DL (ref 74–106)
HCT VFR BLD AUTO: 45.3 % (ref 40–54)
HGB BLD-MCNC: 15.8 G/DL (ref 13–18)
IMM GRANULOCYTES # BLD: 0 K/UL
IMM GRANULOCYTES NFR BLD: 0.5 % (ref 0–5)
LYMPHOCYTES # BLD: 2.2 K/UL (ref 1.5–4)
LYMPHOCYTES NFR BLD: 27 %
MCH RBC QN AUTO: 33.3 PG (ref 27–32)
MCHC RBC AUTO-ENTMCNC: 34.9 G/DL (ref 31–35)
MCV RBC AUTO: 95.6 FL (ref 80–100)
METHADONE UR QL SCN: NORMAL
METHAMPHET UR QL SCN: NORMAL
MONOCYTES # BLD: 0.7 K/UL (ref 0.2–0.8)
MONOCYTES NFR BLD: 7.9 %
NEUTROPHILS # BLD: 5.2 K/UL (ref 2–7.5)
NEUTS SEG NFR BLD: 62.6 %
OPIATES UR QL SCN: NORMAL
OXYCODONE UR QL SCN: NORMAL
PCP UR QL SCN: NORMAL
PLATELET # BLD AUTO: 146 K/UL (ref 150–400)
PMV BLD AUTO: 9.6 FL (ref 6–10)
POTASSIUM SERPL-SCNC: 3.7 MMOL/L (ref 3.4–5.1)
PROT SERPL-MCNC: 6.9 G/DL (ref 6.4–8.3)
RBC # BLD AUTO: 4.74 M/UL (ref 4.5–6)
SALICYLATES SERPL-MCNC: <0.3 MG/DL (ref 15–30)
SODIUM SERPL-SCNC: 138 MMOL/L (ref 136–145)
TRICYCLICS UR QL SCN: NORMAL
TROPONIN, HIGH SENSITIVITY: 16 NG/L (ref 0–22)
TROPONIN, HIGH SENSITIVITY: 17 NG/L (ref 0–22)
WBC # BLD AUTO: 8.3 K/UL (ref 4–11)

## 2024-01-29 PROCEDURE — 80307 DRUG TEST PRSMV CHEM ANLYZR: CPT

## 2024-01-29 PROCEDURE — 36415 COLL VENOUS BLD VENIPUNCTURE: CPT

## 2024-01-29 PROCEDURE — 85025 COMPLETE CBC W/AUTO DIFF WBC: CPT

## 2024-01-29 PROCEDURE — 82077 ASSAY SPEC XCP UR&BREATH IA: CPT

## 2024-01-29 PROCEDURE — 80143 DRUG ASSAY ACETAMINOPHEN: CPT

## 2024-01-29 PROCEDURE — 80179 DRUG ASSAY SALICYLATE: CPT

## 2024-01-29 PROCEDURE — 99285 EMERGENCY DEPT VISIT HI MDM: CPT

## 2024-01-29 PROCEDURE — 84484 ASSAY OF TROPONIN QUANT: CPT

## 2024-01-29 PROCEDURE — 80053 COMPREHEN METABOLIC PANEL: CPT

## 2024-01-29 PROCEDURE — 71045 X-RAY EXAM CHEST 1 VIEW: CPT

## 2024-01-29 ASSESSMENT — PAIN DESCRIPTION - ORIENTATION: ORIENTATION: MID

## 2024-01-29 ASSESSMENT — PAIN SCALES - GENERAL: PAINLEVEL_OUTOF10: 5

## 2024-01-29 ASSESSMENT — LIFESTYLE VARIABLES
HOW OFTEN DO YOU HAVE A DRINK CONTAINING ALCOHOL: MONTHLY OR LESS
HOW MANY STANDARD DRINKS CONTAINING ALCOHOL DO YOU HAVE ON A TYPICAL DAY: 1 OR 2

## 2024-01-29 ASSESSMENT — PAIN DESCRIPTION - LOCATION: LOCATION: CHEST

## 2024-01-29 ASSESSMENT — PAIN DESCRIPTION - PAIN TYPE: TYPE: ACUTE PAIN

## 2024-01-29 ASSESSMENT — PAIN DESCRIPTION - DESCRIPTORS: DESCRIPTORS: PRESSURE

## 2024-01-29 NOTE — ED NOTES
While speaking with MD, pt stated that he sometimes wished he could die. Pt tells this RN that he did make that statement but would never hurt himself because \"God gave him this life to live\". Pt does not have a plan. Suicide precautions initiated at this time with sitter at bedside.

## 2024-01-29 NOTE — ED PROVIDER NOTES
wife told him today that she wants a divorce and he drank another half pint of vodka.  Patient's son kicked him out of his home and patient started walking outside and patient started having chest pain but patient denies any current chest pain.  Patient presents to the emergency department to make statements that he is nauseous and depressed and feels worthless and he wants to die but he is not suicidal.  Patient was placed on suicide precautions with a sitter.  Patient initially had an alcohol level of 341.  Serial troponin were negative.  Patient's lab workup was otherwise fairly unremarkable.  Patient was requesting to go home and get discharged.  I went to reevaluate patient and he appeared to be clinically sober with normal, steady gait.  Patient was fully alert and oriented.  Patient denied any thoughts of suicide to me as well as any auditory and visual hallucinations.  Patient stated to me that his daughter-in-law would come pick him up as his son was welcoming back into the house.  We took patient suicide precautions off as well as the sitter and I made patient aware that I would like to talk to family at bedside before discharge.  Patient stated that family will be here in 5 minutes.  Patient was found on security footage eloping from the emergency department.  We will contact social work, Samantha Dupree, to make her aware of the situation.    ED Medication Orders (From admission, onward)      None            Final Impression      1. Suicidal ideation    2. Chest pain, unspecified type    3. Acute alcoholic intoxication without complication (HCC)        DISPOSITION Eloped - Left Before Treatment Complete 01/29/2024 09:24:38 PM     (Please note that portions of this note may have been completed with a voice recognition program. Efforts were made to edit the dictations but occasionally words are mis-transcribed.)    Zaheer Nobles, DO   [employer]      Zaheer Nobles, DO  01/29/24 9885     
these except the naloxone to help him stop drinking. He ran out of all of his medicines two days ago.  He has been prescribed naltrexone.  Previous history of MI without cardiac stents.     After further talking with the patient, he states he is very depressed. He states that as he was walking outside today he \"just hoped I'd die.\" When asked if he is suicidal he says \"no, but I want to die.\" He states that he feels worthless and is just depressed. His wife has kicked him out before but after she did that yesterday again he started drinking a lot. He does not have a job and is still trying to find a job. I have ordered suicide precautions and a sitter and will have TelePsych see the patient after medically cleared.      Patient signed out to the oncoming provider.  Repeat troponin pending at time of signout      Maria Dolores Seymour MD (electronically signed)            Maria Dolores Seymour MD  01/29/24 5057

## 2024-01-29 NOTE — ED NOTES
Changed pt clothes to a paper gown. Cleared his room out. Now being a constant observe for him. Pt is in bed resting, watching tv and was pleasant acting during the room clean and clothing change.

## 2024-01-29 NOTE — PROGRESS NOTES
Messaged conduit telepsych consults with ADRIANA Noel to ask what the alcohol level needed to be before they do the assessment. No response yet.

## 2024-01-29 NOTE — PROGRESS NOTES
Sat in with Dr. Padilla to talk to pt about suicidal thoughts. PT claims he wouldn't actually kill himself but \"wishes he could die'.

## 2024-01-29 NOTE — ED TRIAGE NOTES
Pt arrives to ED via [facility] with complaints of chest pain. Pt states his wife kicked him out of the house and he drank a pint of liquor, pt with hx of alcoholism but had been sober and taking naltrexone. Pt found sitting in the ditch by neighbors who then called EMS.

## 2024-01-29 NOTE — FLOWSHEET NOTE
01/29/24 1708   SUICIDE PRECAUTIONS   Suicide Precautions Start Time 1708   Order for Suicide Precautions Obtained Yes   1:1 Continuous Supervision Yes   Ligature Risks Removed or Mitigated Yes   Patient Placed in Hospital Gown Without Ties Yes   Patient Belongings Removed and Secured Yes     Suicide precautions initiated as ordered at this time.

## 2024-01-29 NOTE — PROGRESS NOTES
Called the access center to ask about the alcohol level they require the pt to be under before they  do an assessment for suicide.

## 2024-01-29 NOTE — PROGRESS NOTES
Phone call to RN at [facility] regarding Ethanol lab.  RN stated that they are doing the draw right now to check his alcohol levels.  Informed RN, will wait for results prior to beginning the consult.

## 2024-01-30 NOTE — ED NOTES
Spoke with Ivet with tele-psych at this time. She states when pt ETOH level is around 100 to reorder tele-psych consult.

## 2024-01-30 NOTE — ED NOTES
Patient off suicide precautions per Dr Nobles @ this time.  Patient awaiting family to arrive to ED for D/C paperwork.

## 2024-02-16 ENCOUNTER — HOSPITAL ENCOUNTER (OUTPATIENT)
Facility: HOSPITAL | Age: 54
Discharge: HOME | End: 2024-02-16
Payer: MEDICAID

## 2024-02-16 LAB
FLUAV AG NPH QL: NEGATIVE
FLUBV AG NPH QL: NEGATIVE
SARS-COV-2 RDRP RESP QL NAA+PROBE: DETECTED

## 2024-02-16 PROCEDURE — 87804 INFLUENZA ASSAY W/OPTIC: CPT

## 2024-02-16 PROCEDURE — 87635 SARS-COV-2 COVID-19 AMP PRB: CPT

## 2024-02-26 ENCOUNTER — HOSPITAL ENCOUNTER (EMERGENCY)
Facility: HOSPITAL | Age: 54
Discharge: HOME | End: 2024-02-27
Attending: EMERGENCY MEDICINE
Payer: MEDICAID

## 2024-02-26 ENCOUNTER — APPOINTMENT (OUTPATIENT)
Dept: CT IMAGING | Facility: HOSPITAL | Age: 54
End: 2024-02-26
Attending: EMERGENCY MEDICINE
Payer: MEDICAID

## 2024-02-26 ENCOUNTER — APPOINTMENT (OUTPATIENT)
Dept: GENERAL RADIOLOGY | Facility: HOSPITAL | Age: 54
End: 2024-02-26
Attending: EMERGENCY MEDICINE
Payer: MEDICAID

## 2024-02-26 DIAGNOSIS — F10.920 ACUTE ALCOHOLIC INTOXICATION WITHOUT COMPLICATION (HCC): Primary | ICD-10-CM

## 2024-02-26 LAB
ALBUMIN SERPL-MCNC: 4.2 G/DL (ref 3.4–4.8)
ALBUMIN/GLOB SERPL: 1.7 {RATIO} (ref 0.8–2)
ALP SERPL-CCNC: 59 U/L (ref 25–100)
ALT SERPL-CCNC: 77 U/L (ref 4–36)
ANION GAP SERPL CALCULATED.3IONS-SCNC: 14 MMOL/L (ref 3–16)
AST SERPL-CCNC: 94 U/L (ref 8–33)
BASOPHILS # BLD: 0.1 K/UL (ref 0–0.1)
BASOPHILS NFR BLD: 0.8 %
BILIRUB SERPL-MCNC: 0.6 MG/DL (ref 0.3–1.2)
BUN SERPL-MCNC: 13 MG/DL (ref 6–20)
CALCIUM SERPL-MCNC: 8.3 MG/DL (ref 8.5–10.5)
CHLORIDE SERPL-SCNC: 96 MMOL/L (ref 98–107)
CO2 SERPL-SCNC: 25 MMOL/L (ref 20–30)
CREAT SERPL-MCNC: 1.2 MG/DL (ref 0.4–1.2)
EOSINOPHIL # BLD: 0.1 K/UL (ref 0–0.4)
EOSINOPHIL NFR BLD: 0.7 %
ERYTHROCYTE [DISTWIDTH] IN BLOOD BY AUTOMATED COUNT: 14.1 % (ref 11–16)
ETHANOLAMINE SERPL-MCNC: 431 MG/DL (ref 0–0.08)
GFR SERPLBLD CREATININE-BSD FMLA CKD-EPI: >60 ML/MIN/{1.73_M2}
GLOBULIN SER CALC-MCNC: 2.5 G/DL
GLUCOSE BLD-MCNC: 130 MG/DL (ref 74–106)
GLUCOSE SERPL-MCNC: 128 MG/DL (ref 74–106)
HCT VFR BLD AUTO: 49.2 % (ref 40–54)
HGB BLD-MCNC: 17.2 G/DL (ref 13–18)
IMM GRANULOCYTES # BLD: 0 K/UL
IMM GRANULOCYTES NFR BLD: 0.4 % (ref 0–5)
LYMPHOCYTES # BLD: 3.3 K/UL (ref 1.5–4)
LYMPHOCYTES NFR BLD: 38.4 %
MCH RBC QN AUTO: 34.7 PG (ref 27–32)
MCHC RBC AUTO-ENTMCNC: 35 G/DL (ref 31–35)
MCV RBC AUTO: 99.2 FL (ref 80–100)
MONOCYTES # BLD: 0.7 K/UL (ref 0.2–0.8)
MONOCYTES NFR BLD: 7.7 %
NEUTROPHILS # BLD: 4.4 K/UL (ref 2–7.5)
NEUTS SEG NFR BLD: 52 %
PERFORMED ON: ABNORMAL
PLATELET # BLD AUTO: 148 K/UL (ref 150–400)
PMV BLD AUTO: 9.6 FL (ref 6–10)
POTASSIUM SERPL-SCNC: 3.8 MMOL/L (ref 3.4–5.1)
PROT SERPL-MCNC: 6.7 G/DL (ref 6.4–8.3)
RBC # BLD AUTO: 4.96 M/UL (ref 4.5–6)
SODIUM SERPL-SCNC: 135 MMOL/L (ref 136–145)
TROPONIN, HIGH SENSITIVITY: 13 NG/L (ref 0–22)
WBC # BLD AUTO: 8.5 K/UL (ref 4–11)

## 2024-02-26 PROCEDURE — 96375 TX/PRO/DX INJ NEW DRUG ADDON: CPT

## 2024-02-26 PROCEDURE — 84484 ASSAY OF TROPONIN QUANT: CPT

## 2024-02-26 PROCEDURE — 80143 DRUG ASSAY ACETAMINOPHEN: CPT

## 2024-02-26 PROCEDURE — 80179 DRUG ASSAY SALICYLATE: CPT

## 2024-02-26 PROCEDURE — 6360000002 HC RX W HCPCS: Performed by: EMERGENCY MEDICINE

## 2024-02-26 PROCEDURE — 36415 COLL VENOUS BLD VENIPUNCTURE: CPT

## 2024-02-26 PROCEDURE — 70450 CT HEAD/BRAIN W/O DYE: CPT

## 2024-02-26 PROCEDURE — 2500000003 HC RX 250 WO HCPCS: Performed by: EMERGENCY MEDICINE

## 2024-02-26 PROCEDURE — 80053 COMPREHEN METABOLIC PANEL: CPT

## 2024-02-26 PROCEDURE — 71045 X-RAY EXAM CHEST 1 VIEW: CPT

## 2024-02-26 PROCEDURE — 85025 COMPLETE CBC W/AUTO DIFF WBC: CPT

## 2024-02-26 PROCEDURE — 99285 EMERGENCY DEPT VISIT HI MDM: CPT

## 2024-02-26 PROCEDURE — 82077 ASSAY SPEC XCP UR&BREATH IA: CPT

## 2024-02-26 PROCEDURE — 96374 THER/PROPH/DIAG INJ IV PUSH: CPT

## 2024-02-26 PROCEDURE — 2580000003 HC RX 258: Performed by: EMERGENCY MEDICINE

## 2024-02-26 RX ORDER — 0.9 % SODIUM CHLORIDE 0.9 %
1000 INTRAVENOUS SOLUTION INTRAVENOUS ONCE
Status: COMPLETED | OUTPATIENT
Start: 2024-02-26 | End: 2024-02-27

## 2024-02-26 RX ORDER — FOLIC ACID 5 MG/ML
1 INJECTION, SOLUTION INTRAMUSCULAR; INTRAVENOUS; SUBCUTANEOUS ONCE
Status: COMPLETED | OUTPATIENT
Start: 2024-02-26 | End: 2024-02-26

## 2024-02-26 RX ORDER — M-VIT,TX,IRON,MINS/CALC/FOLIC 27MG-0.4MG
1 TABLET ORAL ONCE
Status: DISCONTINUED | OUTPATIENT
Start: 2024-02-26 | End: 2024-02-27 | Stop reason: HOSPADM

## 2024-02-26 RX ORDER — THIAMINE HYDROCHLORIDE 100 MG/ML
100 INJECTION, SOLUTION INTRAMUSCULAR; INTRAVENOUS ONCE
Status: COMPLETED | OUTPATIENT
Start: 2024-02-26 | End: 2024-02-26

## 2024-02-26 RX ORDER — ONDANSETRON 2 MG/ML
4 INJECTION INTRAMUSCULAR; INTRAVENOUS ONCE
Status: COMPLETED | OUTPATIENT
Start: 2024-02-26 | End: 2024-02-26

## 2024-02-26 RX ADMIN — FOLIC ACID 1 MG: 5 INJECTION, SOLUTION INTRAMUSCULAR; INTRAVENOUS; SUBCUTANEOUS at 22:11

## 2024-02-26 RX ADMIN — THIAMINE HYDROCHLORIDE 100 MG: 100 INJECTION, SOLUTION INTRAMUSCULAR; INTRAVENOUS at 22:12

## 2024-02-26 RX ADMIN — SODIUM CHLORIDE 1000 ML: 9 INJECTION, SOLUTION INTRAVENOUS at 22:13

## 2024-02-26 RX ADMIN — ONDANSETRON 4 MG: 2 INJECTION INTRAMUSCULAR; INTRAVENOUS at 23:54

## 2024-02-26 ASSESSMENT — LIFESTYLE VARIABLES
HOW MANY STANDARD DRINKS CONTAINING ALCOHOL DO YOU HAVE ON A TYPICAL DAY: PATIENT UNABLE TO ANSWER
HOW OFTEN DO YOU HAVE A DRINK CONTAINING ALCOHOL: PATIENT UNABLE TO ANSWER

## 2024-02-27 VITALS
RESPIRATION RATE: 20 BRPM | DIASTOLIC BLOOD PRESSURE: 93 MMHG | OXYGEN SATURATION: 95 % | HEART RATE: 89 BPM | SYSTOLIC BLOOD PRESSURE: 161 MMHG | TEMPERATURE: 97.9 F

## 2024-02-27 LAB
AMPHET UR QL SCN: NORMAL
APAP SERPL-MCNC: NORMAL UG/ML (ref 10–30)
APAP SERPL-MCNC: NORMAL UG/ML (ref 10–30)
BARBITURATES UR QL SCN: NORMAL
BENZODIAZ UR QL SCN: NORMAL
BUPRENORPHINE QUAL, URINE: NORMAL
CANNABINOIDS UR QL SCN: NORMAL
COCAINE UR QL SCN: NORMAL
DRUG SCREEN COMMENT UR-IMP: NORMAL
ETHANOLAMINE SERPL-MCNC: 338 MG/DL (ref 0–0.08)
METHADONE UR QL SCN: NORMAL
METHAMPHET UR QL SCN: NORMAL
OPIATES UR QL SCN: NORMAL
OXYCODONE UR QL SCN: NORMAL
PCP UR QL SCN: NORMAL
PROPOXYPH UR QL SCN: NORMAL
SALICYLATES SERPL-MCNC: <0.3 MG/DL (ref 15–30)
SARS-COV-2 RDRP RESP QL NAA+PROBE: NOT DETECTED
TRICYCLICS UR QL SCN: NORMAL

## 2024-02-27 PROCEDURE — 80143 DRUG ASSAY ACETAMINOPHEN: CPT

## 2024-02-27 PROCEDURE — 2580000003 HC RX 258: Performed by: EMERGENCY MEDICINE

## 2024-02-27 PROCEDURE — 80307 DRUG TEST PRSMV CHEM ANLYZR: CPT

## 2024-02-27 PROCEDURE — 87635 SARS-COV-2 COVID-19 AMP PRB: CPT

## 2024-02-27 PROCEDURE — 82077 ASSAY SPEC XCP UR&BREATH IA: CPT

## 2024-02-27 PROCEDURE — 36415 COLL VENOUS BLD VENIPUNCTURE: CPT

## 2024-02-27 RX ORDER — 0.9 % SODIUM CHLORIDE 0.9 %
1000 INTRAVENOUS SOLUTION INTRAVENOUS ONCE
Status: COMPLETED | OUTPATIENT
Start: 2024-02-27 | End: 2024-02-27

## 2024-02-27 RX ADMIN — SODIUM CHLORIDE 1000 ML: 9 INJECTION, SOLUTION INTRAVENOUS at 00:32

## 2024-02-27 NOTE — ED PROVIDER NOTES
Emergency Department Encounter  Location: [facility] EMERGENCY DEPARTMENT    Patient: Kade Graham  MRN: [id number]  : 1970  Date of evaluation: 2024  ED Provider: Zaheer Nobles DO    7:00 a.m.  Kade Graham was checked out to me by Dr. Seymour. Please see his/her initial documentation for details of the patient's initial ED presentation, physical exam and completed studies.    In brief, Kade Graham is a 53 y.o. male that presented to the emergency department after being found down.      I have reviewed and interpreted all of the currently available lab results and diagnostics from this visit:  Results for orders placed or performed during the hospital encounter of 24   COVID-19, Rapid    Specimen: Nasopharyngeal Swab   Result Value Ref Range    SARS-CoV-2, NAAT Not Detected Not Detected   CBC with Auto Differential   Result Value Ref Range    WBC 8.5 4.0 - 11.0 K/uL    RBC 4.96 4.50 - 6.00 M/uL    Hemoglobin 17.2 13.0 - 18.0 g/dL    Hematocrit 49.2 40.0 - 54.0 %    MCV 99.2 80.0 - 100.0 fL    MCH 34.7 (H) 27.0 - 32.0 pg    MCHC 35.0 31.0 - 35.0 g/dL    RDW 14.1 11.0 - 16.0 %    Platelets 148 (L) 150 - 400 K/uL    MPV 9.6 6.0 - 10.0 fL    Neutrophils % 52.0 %    Immature Granulocytes % 0.4 0.0 - 5.0 %    Lymphocytes % 38.4 %    Monocytes % 7.7 %    Eosinophils % 0.7 %    Basophils % 0.8 %    Neutrophils Absolute 4.4 2.0 - 7.5 K/uL    Immature Granulocytes # 0.0 K/uL    Lymphocytes Absolute 3.3 1.5 - 4.0 K/uL    Monocytes Absolute 0.7 0.2 - 0.8 K/uL    Eosinophils Absolute 0.1 0.0 - 0.4 K/uL    Basophils Absolute 0.1 0.0 - 0.1 K/uL   CMP   Result Value Ref Range    Sodium 135 (L) 136 - 145 mmol/L    Potassium 3.8 3.4 - 5.1 mmol/L    Chloride 96 (L) 98 - 107 mmol/L    CO2 25 20 - 30 mmol/L    Anion Gap 14 3 - 16    Glucose 128 (H) 74 - 106 mg/dL    BUN 13 6 - 20 mg/dL    Creatinine 1.2 0.4 - 1.2 mg/dL    Est, Glom Filt Rate >60 >59    Calcium 8.3 (L) 8.5 - 10.5 mg/dL    Total Protein

## 2024-02-27 NOTE — ED NOTES
Called dietary for pt a lunch tray to be brought to ED.   Pt has had crackers and sandwich box prior

## 2024-02-27 NOTE — ED NOTES
Pt continues to require frequent stimulation to awoke to encourage breathing.   Pt awakes to verbal stimuli.    02/27/24 0800 02/27/24 0801 02/27/24 0802   Oxygen Therapy   SpO2 (!) 85 % (!) 77 % (!) 88 %   Pulse via Oximetry 82 beats per minute 103 beats per minute 99 beats per minute   O2 Device Nasal cannula  --   --    O2 Flow Rate (L/min) 2 L/min 2 L/min 2 L/min      02/27/24 0803 02/27/24 0805   Oxygen Therapy   SpO2 93 % 100 %   Pulse via Oximetry 79 beats per minute 90 beats per minute   O2 Device Nasal cannula  (Nasopharyngeal tube placed at this time. Nasal cannula  (NASALPHARYNGEAL TUBE IN PLACE.)   O2 Flow Rate (L/min) 2 L/min 2 L/min

## 2024-02-27 NOTE — ED NOTES
Spoke to [facility].They advised that pt's blood alcohol must be below 100 for them to present pt's case to

## 2024-02-27 NOTE — ED NOTES
Call from [facility] made regarding pt.However it was the wrong pt.Spoke to Dulce at [facility] and she advised to wait for R to call us back.   Detail Level: Generalized

## 2024-02-27 NOTE — ED NOTES
RN to room to awake again. Pt easily awakes to verbal, pt is positioned up in bed.    02/27/24 0751 02/27/24 0752   Oxygen Therapy   SpO2 (!) 89 % 94 %   Pulse via Oximetry 91 beats per minute 80 beats per minute

## 2024-02-27 NOTE — ED NOTES
02/27/24 0717 02/27/24 0718   Oxygen Therapy   SpO2 (!) 81 % 97 %   Pulse via Oximetry 96 beats per minute 108 beats per minute      02/27/24 0721 02/27/24 0723   Oxygen Therapy   SpO2 (!) 81 % 99 %   Pulse via Oximetry 80 beats per minute 117 beats per minute   Pt conitnues to have multiple episodes of hypoxia, and apnea with at rest, pt awakes to verbal stimuli and corrects when awake.

## 2024-02-27 NOTE — ED NOTES
53 year old male came in alcochol intoxication.Pt is homeless staying with his son.Pt stated he wanted to go to treatment.PT has been to [facility] in [place] in the past and it helped til he heard from his ex-wife again.Pt stated he feels worthless.I contacted [facility] in [place] and he completed his intake.will be transported later this afternoon.Will follow up if needed or requested.

## 2024-02-27 NOTE — ED NOTES
Pt DCd at this time, no distress noted. Pt going to [facility], transported by peer support.

## 2024-02-27 NOTE — ED NOTES
Spoke with Candis RN with [facility] at this time in regards to needing to speak to [facility] per pt request.

## 2024-02-27 NOTE — ED PROVIDER NOTES
[facility]  EMERGENCY DEPARTMENT ENCOUNTER        Pt Name: Kade Graham  MRN: [id number]  Birthdate 1970  Date of evaluation: 2/26/2024  Provider: Maria Dolores Seymour MD  PCP: Hosea Duenas MD  Note Started: 9:41 PM EST 2/26/24    CHIEF COMPLAINT       Chief Complaint   Patient presents with    Alcohol Intoxication     Pt. Brought in per [facility] .Was found sitting on curb at the liquor store.       HISTORY OF PRESENT ILLNESS: 1 or more Elements     History from : EMS    Limitations to history : Intoxication    Kade Graham is a 53 y.o. male who presents to the emergency department after being found down.  Per EMS he was found in the parking lot of a liquor store.  Patient is a known alcoholic.  They state the patient was able to walk at the scene but is slurring his words.  He told him he drank 1/5 of liquor today.  Glucose was 70 on scene    Nursing Notes were all reviewed and agreed with or any disagreements were addressed in the HPI.    REVIEW OF SYSTEMS :      Review of Systems    Unable to fully review due to patient's mental status    SURGICAL HISTORY     Past Surgical History:   Procedure Laterality Date    FINGER AMPUTATION      also has partial amputation of right index and ring finger    LEG SURGERY         CURRENTMEDICATIONS       Previous Medications    AMLODIPINE (NORVASC) 10 MG TABLET    Take 1 tablet by mouth nightly    ASPIRIN (KATHY ASPIRIN) 325 MG TABLET    Take 1 tablet by mouth daily    ATORVASTATIN (LIPITOR) 20 MG TABLET    Take 1 tablet by mouth at bedtime    CILOSTAZOL (PLETAL) 50 MG TABLET    TAKE 1 TABLET BY MOUTH TWICE DAILY    FLUOXETINE (PROZAC) 20 MG CAPSULE    Take 1 capsule by mouth daily    HYDRALAZINE (APRESOLINE) 25 MG TABLET    TAKE 1 TABLET BY MOUTH IN THE MORNING AND AT BEDTIME    LISINOPRIL (PRINIVIL;ZESTRIL) 20 MG TABLET    Take 1 tablet by mouth in the morning and at bedtime    MELOXICAM (MOBIC) 15 MG TABLET    Take 1 tablet by mouth

## 2024-02-27 NOTE — ED NOTES
Spoke to  regarding what [facility] said.She wants to repeat his blood alcohol.

## 2024-02-27 NOTE — ED NOTES
Spoke with [facility] and they advised to close pt's account at this time until his blood alcohol gets lower.When his blood alcohol gets below 100 they will reinitiate a new account with [facility].

## 2024-04-30 ENCOUNTER — APPOINTMENT (OUTPATIENT)
Dept: GENERAL RADIOLOGY | Facility: HOSPITAL | Age: 54
End: 2024-04-30
Payer: MEDICAID

## 2024-04-30 ENCOUNTER — APPOINTMENT (OUTPATIENT)
Dept: CT IMAGING | Facility: HOSPITAL | Age: 54
End: 2024-04-30
Payer: MEDICAID

## 2024-04-30 ENCOUNTER — HOSPITAL ENCOUNTER (INPATIENT)
Facility: HOSPITAL | Age: 54
LOS: 1 days | Discharge: ANOTHER HOSPITAL | End: 2024-05-02
Attending: STUDENT IN AN ORGANIZED HEALTH CARE EDUCATION/TRAINING PROGRAM | Admitting: STUDENT IN AN ORGANIZED HEALTH CARE EDUCATION/TRAINING PROGRAM
Payer: MEDICAID

## 2024-04-30 DIAGNOSIS — I21.4 NSTEMI, INITIAL EPISODE OF CARE: ICD-10-CM

## 2024-04-30 DIAGNOSIS — I20.0 UNSTABLE ANGINA: ICD-10-CM

## 2024-04-30 DIAGNOSIS — I16.1 HYPERTENSIVE EMERGENCY: Primary | ICD-10-CM

## 2024-04-30 DIAGNOSIS — R07.9 ACUTE CHEST PAIN: ICD-10-CM

## 2024-04-30 LAB
ALBUMIN SERPL-MCNC: 4 G/DL (ref 3.5–5.2)
ALBUMIN/GLOB SERPL: 1.1 G/DL
ALP SERPL-CCNC: 108 U/L (ref 39–117)
ALT SERPL W P-5'-P-CCNC: 18 U/L (ref 1–41)
ANION GAP SERPL CALCULATED.3IONS-SCNC: 13.6 MMOL/L (ref 5–15)
AST SERPL-CCNC: 18 U/L (ref 1–40)
BASOPHILS # BLD AUTO: 0.03 10*3/MM3 (ref 0–0.2)
BASOPHILS NFR BLD AUTO: 0.3 % (ref 0–1.5)
BILIRUB SERPL-MCNC: 0.5 MG/DL (ref 0–1.2)
BUN SERPL-MCNC: 13 MG/DL (ref 6–20)
BUN/CREAT SERPL: 14.4 (ref 7–25)
CALCIUM SPEC-SCNC: 9.6 MG/DL (ref 8.6–10.5)
CHLORIDE SERPL-SCNC: 101 MMOL/L (ref 98–107)
CO2 SERPL-SCNC: 23.4 MMOL/L (ref 22–29)
CREAT SERPL-MCNC: 0.9 MG/DL (ref 0.76–1.27)
DEPRECATED RDW RBC AUTO: 39 FL (ref 37–54)
EGFRCR SERPLBLD CKD-EPI 2021: 103.4 ML/MIN/1.73
EOSINOPHIL # BLD AUTO: 0.11 10*3/MM3 (ref 0–0.4)
EOSINOPHIL NFR BLD AUTO: 1 % (ref 0.3–6.2)
ERYTHROCYTE [DISTWIDTH] IN BLOOD BY AUTOMATED COUNT: 11.6 % (ref 12.3–15.4)
GLOBULIN UR ELPH-MCNC: 3.7 GM/DL
GLUCOSE SERPL-MCNC: 213 MG/DL (ref 65–99)
HCT VFR BLD AUTO: 39.8 % (ref 37.5–51)
HGB BLD-MCNC: 14.4 G/DL (ref 13–17.7)
HOLD SPECIMEN: NORMAL
HOLD SPECIMEN: NORMAL
IMM GRANULOCYTES # BLD AUTO: 0.04 10*3/MM3 (ref 0–0.05)
IMM GRANULOCYTES NFR BLD AUTO: 0.4 % (ref 0–0.5)
LYMPHOCYTES # BLD AUTO: 1.9 10*3/MM3 (ref 0.7–3.1)
LYMPHOCYTES NFR BLD AUTO: 17.3 % (ref 19.6–45.3)
MCH RBC QN AUTO: 33.5 PG (ref 26.6–33)
MCHC RBC AUTO-ENTMCNC: 36.2 G/DL (ref 31.5–35.7)
MCV RBC AUTO: 92.6 FL (ref 79–97)
MONOCYTES # BLD AUTO: 0.79 10*3/MM3 (ref 0.1–0.9)
MONOCYTES NFR BLD AUTO: 7.2 % (ref 5–12)
NEUTROPHILS NFR BLD AUTO: 73.8 % (ref 42.7–76)
NEUTROPHILS NFR BLD AUTO: 8.13 10*3/MM3 (ref 1.7–7)
NRBC BLD AUTO-RTO: 0 /100 WBC (ref 0–0.2)
PLATELET # BLD AUTO: 191 10*3/MM3 (ref 140–450)
PMV BLD AUTO: 9.8 FL (ref 6–12)
POTASSIUM SERPL-SCNC: 3.5 MMOL/L (ref 3.5–5.2)
PROT SERPL-MCNC: 7.7 G/DL (ref 6–8.5)
RBC # BLD AUTO: 4.3 10*6/MM3 (ref 4.14–5.8)
SODIUM SERPL-SCNC: 138 MMOL/L (ref 136–145)
TROPONIN T SERPL HS-MCNC: 20 NG/L
WBC NRBC COR # BLD AUTO: 11 10*3/MM3 (ref 3.4–10.8)
WHOLE BLOOD HOLD COAG: NORMAL
WHOLE BLOOD HOLD SPECIMEN: NORMAL

## 2024-04-30 PROCEDURE — 85025 COMPLETE CBC W/AUTO DIFF WBC: CPT | Performed by: STUDENT IN AN ORGANIZED HEALTH CARE EDUCATION/TRAINING PROGRAM

## 2024-04-30 PROCEDURE — 25510000001 IOPAMIDOL 61 % SOLUTION: Performed by: STUDENT IN AN ORGANIZED HEALTH CARE EDUCATION/TRAINING PROGRAM

## 2024-04-30 PROCEDURE — 25010000002 NITROGLYCERIN 200 MCG/ML SOLUTION: Performed by: STUDENT IN AN ORGANIZED HEALTH CARE EDUCATION/TRAINING PROGRAM

## 2024-04-30 PROCEDURE — 99291 CRITICAL CARE FIRST HOUR: CPT

## 2024-04-30 PROCEDURE — 25010000002 MORPHINE PER 10 MG: Performed by: STUDENT IN AN ORGANIZED HEALTH CARE EDUCATION/TRAINING PROGRAM

## 2024-04-30 PROCEDURE — 83036 HEMOGLOBIN GLYCOSYLATED A1C: CPT | Performed by: STUDENT IN AN ORGANIZED HEALTH CARE EDUCATION/TRAINING PROGRAM

## 2024-04-30 PROCEDURE — 71275 CT ANGIOGRAPHY CHEST: CPT

## 2024-04-30 PROCEDURE — 71045 X-RAY EXAM CHEST 1 VIEW: CPT

## 2024-04-30 PROCEDURE — 84145 PROCALCITONIN (PCT): CPT | Performed by: STUDENT IN AN ORGANIZED HEALTH CARE EDUCATION/TRAINING PROGRAM

## 2024-04-30 PROCEDURE — 84484 ASSAY OF TROPONIN QUANT: CPT | Performed by: STUDENT IN AN ORGANIZED HEALTH CARE EDUCATION/TRAINING PROGRAM

## 2024-04-30 PROCEDURE — 80053 COMPREHEN METABOLIC PANEL: CPT | Performed by: STUDENT IN AN ORGANIZED HEALTH CARE EDUCATION/TRAINING PROGRAM

## 2024-04-30 PROCEDURE — 25010000002 ONDANSETRON PER 1 MG: Performed by: STUDENT IN AN ORGANIZED HEALTH CARE EDUCATION/TRAINING PROGRAM

## 2024-04-30 PROCEDURE — 93005 ELECTROCARDIOGRAM TRACING: CPT | Performed by: STUDENT IN AN ORGANIZED HEALTH CARE EDUCATION/TRAINING PROGRAM

## 2024-04-30 PROCEDURE — 36415 COLL VENOUS BLD VENIPUNCTURE: CPT

## 2024-04-30 RX ORDER — NITROGLYCERIN 0.4 MG/1
0.4 TABLET SUBLINGUAL
Status: DISCONTINUED | OUTPATIENT
Start: 2024-04-30 | End: 2024-05-02 | Stop reason: HOSPADM

## 2024-04-30 RX ORDER — NITROGLYCERIN 20 MG/100ML
5-200 INJECTION INTRAVENOUS
Status: DISCONTINUED | OUTPATIENT
Start: 2024-04-30 | End: 2024-05-02

## 2024-04-30 RX ORDER — ASPIRIN 325 MG
325 TABLET ORAL ONCE
Status: COMPLETED | OUTPATIENT
Start: 2024-04-30 | End: 2024-04-30

## 2024-04-30 RX ORDER — ONDANSETRON 2 MG/ML
4 INJECTION INTRAMUSCULAR; INTRAVENOUS ONCE
Status: COMPLETED | OUTPATIENT
Start: 2024-04-30 | End: 2024-04-30

## 2024-04-30 RX ORDER — SODIUM CHLORIDE 0.9 % (FLUSH) 0.9 %
10 SYRINGE (ML) INJECTION AS NEEDED
Status: DISCONTINUED | OUTPATIENT
Start: 2024-04-30 | End: 2024-05-02 | Stop reason: HOSPADM

## 2024-04-30 RX ADMIN — NITROGLYCERIN 10 MCG/MIN: 20 INJECTION INTRAVENOUS at 23:12

## 2024-04-30 RX ADMIN — NITROGLYCERIN 0.4 MG: 0.4 TABLET SUBLINGUAL at 22:01

## 2024-04-30 RX ADMIN — IOPAMIDOL 100 ML: 612 INJECTION, SOLUTION INTRAVENOUS at 22:13

## 2024-04-30 RX ADMIN — ASPIRIN 325 MG: 325 TABLET ORAL at 22:34

## 2024-04-30 RX ADMIN — ONDANSETRON 4 MG: 2 INJECTION INTRAMUSCULAR; INTRAVENOUS at 22:36

## 2024-04-30 RX ADMIN — MORPHINE SULFATE 4 MG: 4 INJECTION, SOLUTION INTRAMUSCULAR; INTRAVENOUS at 22:20

## 2024-04-30 NOTE — Clinical Note
Hemostasis started on the right radial artery. R-Band was used in achieving hemostasis. Radial compression device applied to vessel. Hemostasis achieved successfully. Closure device additional comment: 13 cc @ 12:01

## 2024-04-30 NOTE — Clinical Note
A 6 fr sheath was successfully inserted using micropuncture technique with ultrasound guidance into the right radial artery. Sheath insertion not delayed.

## 2024-05-01 ENCOUNTER — APPOINTMENT (OUTPATIENT)
Dept: CARDIOLOGY | Facility: HOSPITAL | Age: 54
End: 2024-05-01
Payer: MEDICAID

## 2024-05-01 LAB
AMPHET+METHAMPHET UR QL: NEGATIVE
AMPHETAMINES UR QL: NEGATIVE
ANION GAP SERPL CALCULATED.3IONS-SCNC: 11.8 MMOL/L (ref 5–15)
APTT PPP: 30.2 SECONDS (ref 70–100)
ASCENDING AORTA: 3.6 CM
BARBITURATES UR QL SCN: NEGATIVE
BASOPHILS # BLD AUTO: 0.02 10*3/MM3 (ref 0–0.2)
BASOPHILS NFR BLD AUTO: 0.2 % (ref 0–1.5)
BENZODIAZ UR QL SCN: NEGATIVE
BH CV ECHO MEAS - AO MAX PG: 17.8 MMHG
BH CV ECHO MEAS - AO MEAN PG: 8 MMHG
BH CV ECHO MEAS - AO ROOT DIAM: 3.7 CM
BH CV ECHO MEAS - AO V2 MAX: 211 CM/SEC
BH CV ECHO MEAS - AO V2 VTI: 34.7 CM
BH CV ECHO MEAS - AVA(I,D): 2.19 CM2
BH CV ECHO MEAS - EDV(CUBED): 117.6 ML
BH CV ECHO MEAS - EDV(MOD-SP2): 52.6 ML
BH CV ECHO MEAS - EDV(MOD-SP4): 124 ML
BH CV ECHO MEAS - EF(MOD-BP): 56.6 %
BH CV ECHO MEAS - EF(MOD-SP2): 41.4 %
BH CV ECHO MEAS - EF(MOD-SP4): 69.6 %
BH CV ECHO MEAS - EF_3D-VOL: 51 %
BH CV ECHO MEAS - ESV(CUBED): 51.9 ML
BH CV ECHO MEAS - ESV(MOD-SP2): 30.8 ML
BH CV ECHO MEAS - ESV(MOD-SP4): 37.7 ML
BH CV ECHO MEAS - FS: 23.9 %
BH CV ECHO MEAS - IVS/LVPW: 1.03 CM
BH CV ECHO MEAS - IVSD: 1.45 CM
BH CV ECHO MEAS - LA DIMENSION: 4.4 CM
BH CV ECHO MEAS - LAT PEAK E' VEL: 9.3 CM/SEC
BH CV ECHO MEAS - LV DIASTOLIC VOL/BSA (35-75): 56.8 CM2
BH CV ECHO MEAS - LV MASS(C)D: 291.5 GRAMS
BH CV ECHO MEAS - LV MAX PG: 4.1 MMHG
BH CV ECHO MEAS - LV MEAN PG: 2 MMHG
BH CV ECHO MEAS - LV SYSTOLIC VOL/BSA (12-30): 17.3 CM2
BH CV ECHO MEAS - LV V1 MAX: 101 CM/SEC
BH CV ECHO MEAS - LV V1 VTI: 21.5 CM
BH CV ECHO MEAS - LVIDD: 4.9 CM
BH CV ECHO MEAS - LVIDS: 3.7 CM
BH CV ECHO MEAS - LVOT AREA: 3.5 CM2
BH CV ECHO MEAS - LVOT DIAM: 2.12 CM
BH CV ECHO MEAS - LVPWD: 1.41 CM
BH CV ECHO MEAS - MED PEAK E' VEL: 6.4 CM/SEC
BH CV ECHO MEAS - MV A MAX VEL: 84.8 CM/SEC
BH CV ECHO MEAS - MV DEC SLOPE: 336 CM/SEC2
BH CV ECHO MEAS - MV DEC TIME: 0.25 SEC
BH CV ECHO MEAS - MV E MAX VEL: 84.8 CM/SEC
BH CV ECHO MEAS - MV E/A: 1
BH CV ECHO MEAS - MV MAX PG: 3 MMHG
BH CV ECHO MEAS - MV MEAN PG: 1.5 MMHG
BH CV ECHO MEAS - MV V2 VTI: 26.6 CM
BH CV ECHO MEAS - MVA(VTI): 2.9 CM2
BH CV ECHO MEAS - PA ACC TIME: 0.12 SEC
BH CV ECHO MEAS - PA V2 MAX: 102 CM/SEC
BH CV ECHO MEAS - RAP SYSTOLE: 3 MMHG
BH CV ECHO MEAS - RV MAX PG: 2.18 MMHG
BH CV ECHO MEAS - RV V1 MAX: 73.8 CM/SEC
BH CV ECHO MEAS - RV V1 VTI: 14.4 CM
BH CV ECHO MEAS - SV(LVOT): 75.9 ML
BH CV ECHO MEAS - SV(MOD-SP2): 21.8 ML
BH CV ECHO MEAS - SV(MOD-SP4): 86.3 ML
BH CV ECHO MEAS - SVI(LVOT): 34.7 ML/M2
BH CV ECHO MEAS - SVI(MOD-SP2): 10 ML/M2
BH CV ECHO MEAS - SVI(MOD-SP4): 39.5 ML/M2
BH CV ECHO MEAS - TAPSE (>1.6): 3.1 CM
BH CV ECHO MEASUREMENTS AVERAGE E/E' RATIO: 10.8
BH CV XLRA - RV BASE: 3.5 CM
BH CV XLRA - RV LENGTH: 9.3 CM
BH CV XLRA - RV MID: 3.6 CM
BH CV XLRA - TDI S': 15.9 CM/SEC
BUN SERPL-MCNC: 11 MG/DL (ref 6–20)
BUN/CREAT SERPL: 14.9 (ref 7–25)
BUPRENORPHINE SERPL-MCNC: NEGATIVE NG/ML
CALCIUM SPEC-SCNC: 8.9 MG/DL (ref 8.6–10.5)
CANNABINOIDS SERPL QL: NEGATIVE
CHLORIDE SERPL-SCNC: 101 MMOL/L (ref 98–107)
CO2 SERPL-SCNC: 22.2 MMOL/L (ref 22–29)
COCAINE UR QL: NEGATIVE
CREAT SERPL-MCNC: 0.74 MG/DL (ref 0.76–1.27)
DEPRECATED RDW RBC AUTO: 39.6 FL (ref 37–54)
EGFRCR SERPLBLD CKD-EPI 2021: 109.7 ML/MIN/1.73
EOSINOPHIL # BLD AUTO: 0.08 10*3/MM3 (ref 0–0.4)
EOSINOPHIL NFR BLD AUTO: 1 % (ref 0.3–6.2)
ERYTHROCYTE [DISTWIDTH] IN BLOOD BY AUTOMATED COUNT: 11.6 % (ref 12.3–15.4)
ETHANOL BLD-MCNC: <10 MG/DL (ref 0–10)
ETHANOL UR QL: <0.01 %
FENTANYL UR-MCNC: NEGATIVE NG/ML
FLUAV SUBTYP SPEC NAA+PROBE: NOT DETECTED
FLUBV RNA ISLT QL NAA+PROBE: NOT DETECTED
GEN 5 2HR TROPONIN T REFLEX: 29 NG/L
GLUCOSE BLDC GLUCOMTR-MCNC: 122 MG/DL (ref 70–130)
GLUCOSE BLDC GLUCOMTR-MCNC: 124 MG/DL (ref 70–130)
GLUCOSE BLDC GLUCOMTR-MCNC: 137 MG/DL (ref 70–130)
GLUCOSE BLDC GLUCOMTR-MCNC: 162 MG/DL (ref 70–130)
GLUCOSE SERPL-MCNC: 162 MG/DL (ref 65–99)
HBA1C MFR BLD: 6.7 % (ref 4.8–5.6)
HCT VFR BLD AUTO: 33.7 % (ref 37.5–51)
HGB BLD-MCNC: 12.1 G/DL (ref 13–17.7)
IMM GRANULOCYTES # BLD AUTO: 0.02 10*3/MM3 (ref 0–0.05)
IMM GRANULOCYTES NFR BLD AUTO: 0.2 % (ref 0–0.5)
INR PPP: 1.06 (ref 0.9–1.1)
LEFT ATRIUM VOLUME INDEX: 19.7 ML/M2
LYMPHOCYTES # BLD AUTO: 1.82 10*3/MM3 (ref 0.7–3.1)
LYMPHOCYTES NFR BLD AUTO: 22.6 % (ref 19.6–45.3)
MCH RBC QN AUTO: 33.3 PG (ref 26.6–33)
MCHC RBC AUTO-ENTMCNC: 35.9 G/DL (ref 31.5–35.7)
MCV RBC AUTO: 92.8 FL (ref 79–97)
METHADONE UR QL SCN: NEGATIVE
MONOCYTES # BLD AUTO: 0.74 10*3/MM3 (ref 0.1–0.9)
MONOCYTES NFR BLD AUTO: 9.2 % (ref 5–12)
MRSA DNA SPEC QL NAA+PROBE: NORMAL
NEUTROPHILS NFR BLD AUTO: 5.39 10*3/MM3 (ref 1.7–7)
NEUTROPHILS NFR BLD AUTO: 66.8 % (ref 42.7–76)
NRBC BLD AUTO-RTO: 0 /100 WBC (ref 0–0.2)
OPIATES UR QL: POSITIVE
OXYCODONE UR QL SCN: NEGATIVE
PCP UR QL SCN: NEGATIVE
PLATELET # BLD AUTO: 163 10*3/MM3 (ref 140–450)
PMV BLD AUTO: 9.7 FL (ref 6–12)
POTASSIUM SERPL-SCNC: 3.4 MMOL/L (ref 3.5–5.2)
POTASSIUM SERPL-SCNC: 4.1 MMOL/L (ref 3.5–5.2)
PROCALCITONIN SERPL-MCNC: 0.06 NG/ML (ref 0–0.25)
PROTHROMBIN TIME: 14.3 SECONDS (ref 12.3–15.1)
RBC # BLD AUTO: 3.63 10*6/MM3 (ref 4.14–5.8)
SARS-COV-2 RNA RESP QL NAA+PROBE: NOT DETECTED
SODIUM SERPL-SCNC: 135 MMOL/L (ref 136–145)
TRICYCLICS UR QL SCN: NEGATIVE
TROPONIN T DELTA: 9 NG/L
TROPONIN T SERPL HS-MCNC: 33 NG/L
TROPONIN T SERPL HS-MCNC: 33 NG/L
UFH PPP CHRO-ACNC: 0.1 IU/ML (ref 0.3–0.7)
UFH PPP CHRO-ACNC: 0.1 IU/ML (ref 0.3–0.7)
WBC NRBC COR # BLD AUTO: 8.07 10*3/MM3 (ref 3.4–10.8)

## 2024-05-01 PROCEDURE — 93306 TTE W/DOPPLER COMPLETE: CPT | Performed by: INTERNAL MEDICINE

## 2024-05-01 PROCEDURE — 85730 THROMBOPLASTIN TIME PARTIAL: CPT | Performed by: STUDENT IN AN ORGANIZED HEALTH CARE EDUCATION/TRAINING PROGRAM

## 2024-05-01 PROCEDURE — 85610 PROTHROMBIN TIME: CPT | Performed by: STUDENT IN AN ORGANIZED HEALTH CARE EDUCATION/TRAINING PROGRAM

## 2024-05-01 PROCEDURE — 25010000002 HEPARIN (PORCINE) PER 1000 UNITS: Performed by: STUDENT IN AN ORGANIZED HEALTH CARE EDUCATION/TRAINING PROGRAM

## 2024-05-01 PROCEDURE — 82948 REAGENT STRIP/BLOOD GLUCOSE: CPT | Performed by: STUDENT IN AN ORGANIZED HEALTH CARE EDUCATION/TRAINING PROGRAM

## 2024-05-01 PROCEDURE — 63710000001 INSULIN GLARGINE PER 5 UNITS: Performed by: STUDENT IN AN ORGANIZED HEALTH CARE EDUCATION/TRAINING PROGRAM

## 2024-05-01 PROCEDURE — 25010000002 ENOXAPARIN PER 10 MG: Performed by: FAMILY MEDICINE

## 2024-05-01 PROCEDURE — 80048 BASIC METABOLIC PNL TOTAL CA: CPT | Performed by: STUDENT IN AN ORGANIZED HEALTH CARE EDUCATION/TRAINING PROGRAM

## 2024-05-01 PROCEDURE — 63710000001 INSULIN LISPRO (HUMAN) PER 5 UNITS: Performed by: STUDENT IN AN ORGANIZED HEALTH CARE EDUCATION/TRAINING PROGRAM

## 2024-05-01 PROCEDURE — 99254 IP/OBS CNSLTJ NEW/EST MOD 60: CPT | Performed by: INTERNAL MEDICINE

## 2024-05-01 PROCEDURE — 84484 ASSAY OF TROPONIN QUANT: CPT | Performed by: STUDENT IN AN ORGANIZED HEALTH CARE EDUCATION/TRAINING PROGRAM

## 2024-05-01 PROCEDURE — 87636 SARSCOV2 & INF A&B AMP PRB: CPT | Performed by: FAMILY MEDICINE

## 2024-05-01 PROCEDURE — 87081 CULTURE SCREEN ONLY: CPT | Performed by: FAMILY MEDICINE

## 2024-05-01 PROCEDURE — 99223 1ST HOSP IP/OBS HIGH 75: CPT | Performed by: STUDENT IN AN ORGANIZED HEALTH CARE EDUCATION/TRAINING PROGRAM

## 2024-05-01 PROCEDURE — 82948 REAGENT STRIP/BLOOD GLUCOSE: CPT

## 2024-05-01 PROCEDURE — 87641 MR-STAPH DNA AMP PROBE: CPT | Performed by: FAMILY MEDICINE

## 2024-05-01 PROCEDURE — 85520 HEPARIN ASSAY: CPT | Performed by: STUDENT IN AN ORGANIZED HEALTH CARE EDUCATION/TRAINING PROGRAM

## 2024-05-01 PROCEDURE — 85025 COMPLETE CBC W/AUTO DIFF WBC: CPT | Performed by: STUDENT IN AN ORGANIZED HEALTH CARE EDUCATION/TRAINING PROGRAM

## 2024-05-01 PROCEDURE — 93306 TTE W/DOPPLER COMPLETE: CPT

## 2024-05-01 PROCEDURE — 82077 ASSAY SPEC XCP UR&BREATH IA: CPT | Performed by: FAMILY MEDICINE

## 2024-05-01 PROCEDURE — 94660 CPAP INITIATION&MGMT: CPT

## 2024-05-01 PROCEDURE — 80307 DRUG TEST PRSMV CHEM ANLYZR: CPT | Performed by: FAMILY MEDICINE

## 2024-05-01 PROCEDURE — 94799 UNLISTED PULMONARY SVC/PX: CPT

## 2024-05-01 PROCEDURE — 84132 ASSAY OF SERUM POTASSIUM: CPT | Performed by: STUDENT IN AN ORGANIZED HEALTH CARE EDUCATION/TRAINING PROGRAM

## 2024-05-01 RX ORDER — METOPROLOL SUCCINATE 100 MG/1
100 TABLET, EXTENDED RELEASE ORAL DAILY
Status: DISCONTINUED | OUTPATIENT
Start: 2024-05-01 | End: 2024-05-02

## 2024-05-01 RX ORDER — ALBUTEROL SULFATE 90 UG/1
2 AEROSOL, METERED RESPIRATORY (INHALATION) EVERY 4 HOURS PRN
Status: DISCONTINUED | OUTPATIENT
Start: 2024-05-01 | End: 2024-05-02 | Stop reason: HOSPADM

## 2024-05-01 RX ORDER — IBUPROFEN 600 MG/1
1 TABLET ORAL
Status: DISCONTINUED | OUTPATIENT
Start: 2024-05-01 | End: 2024-05-02 | Stop reason: HOSPADM

## 2024-05-01 RX ORDER — CHOLECALCIFEROL (VITAMIN D3) 125 MCG
5 CAPSULE ORAL NIGHTLY PRN
COMMUNITY
End: 2024-05-02 | Stop reason: HOSPADM

## 2024-05-01 RX ORDER — SODIUM CHLORIDE 0.9 % (FLUSH) 0.9 %
10 SYRINGE (ML) INJECTION EVERY 12 HOURS SCHEDULED
Status: DISCONTINUED | OUTPATIENT
Start: 2024-05-01 | End: 2024-05-02 | Stop reason: HOSPADM

## 2024-05-01 RX ORDER — HYDROCHLOROTHIAZIDE 12.5 MG/1
12.5 TABLET ORAL
Status: DISCONTINUED | OUTPATIENT
Start: 2024-05-01 | End: 2024-05-02 | Stop reason: HOSPADM

## 2024-05-01 RX ORDER — ONDANSETRON 2 MG/ML
4 INJECTION INTRAMUSCULAR; INTRAVENOUS EVERY 6 HOURS PRN
Status: DISCONTINUED | OUTPATIENT
Start: 2024-05-01 | End: 2024-05-02 | Stop reason: HOSPADM

## 2024-05-01 RX ORDER — ACETAMINOPHEN 650 MG/1
650 SUPPOSITORY RECTAL EVERY 4 HOURS PRN
Status: DISCONTINUED | OUTPATIENT
Start: 2024-05-01 | End: 2024-05-02 | Stop reason: HOSPADM

## 2024-05-01 RX ORDER — POTASSIUM CHLORIDE 20 MEQ/1
40 TABLET, EXTENDED RELEASE ORAL EVERY 4 HOURS
Status: COMPLETED | OUTPATIENT
Start: 2024-05-01 | End: 2024-05-01

## 2024-05-01 RX ORDER — HEPARIN SODIUM 1000 [USP'U]/ML
2000 INJECTION, SOLUTION INTRAVENOUS; SUBCUTANEOUS AS NEEDED
Status: DISCONTINUED | OUTPATIENT
Start: 2024-05-01 | End: 2024-05-01

## 2024-05-01 RX ORDER — ASPIRIN 81 MG/1
81 TABLET ORAL DAILY
Status: DISCONTINUED | OUTPATIENT
Start: 2024-05-01 | End: 2024-05-02 | Stop reason: HOSPADM

## 2024-05-01 RX ORDER — ROPINIROLE 0.5 MG/1
1 TABLET, FILM COATED ORAL NIGHTLY
COMMUNITY
End: 2024-05-02 | Stop reason: HOSPADM

## 2024-05-01 RX ORDER — NICOTINE POLACRILEX 4 MG
15 LOZENGE BUCCAL
Status: DISCONTINUED | OUTPATIENT
Start: 2024-05-01 | End: 2024-05-02 | Stop reason: HOSPADM

## 2024-05-01 RX ORDER — ENOXAPARIN SODIUM 100 MG/ML
40 INJECTION SUBCUTANEOUS DAILY
Status: DISCONTINUED | OUTPATIENT
Start: 2024-05-01 | End: 2024-05-01

## 2024-05-01 RX ORDER — CILOSTAZOL 50 MG/1
50 TABLET ORAL 2 TIMES DAILY
COMMUNITY

## 2024-05-01 RX ORDER — SODIUM CHLORIDE 9 MG/ML
40 INJECTION, SOLUTION INTRAVENOUS AS NEEDED
Status: DISCONTINUED | OUTPATIENT
Start: 2024-05-01 | End: 2024-05-02 | Stop reason: HOSPADM

## 2024-05-01 RX ORDER — ACETAMINOPHEN 160 MG/5ML
650 SOLUTION ORAL EVERY 4 HOURS PRN
Status: DISCONTINUED | OUTPATIENT
Start: 2024-05-01 | End: 2024-05-02 | Stop reason: HOSPADM

## 2024-05-01 RX ORDER — LISINOPRIL 20 MG/1
20 TABLET ORAL
Status: DISCONTINUED | OUTPATIENT
Start: 2024-05-01 | End: 2024-05-02 | Stop reason: HOSPADM

## 2024-05-01 RX ORDER — ISOSORBIDE MONONITRATE 60 MG/1
60 TABLET, EXTENDED RELEASE ORAL
Status: DISCONTINUED | OUTPATIENT
Start: 2024-05-01 | End: 2024-05-02

## 2024-05-01 RX ORDER — ASPIRIN 325 MG
325 TABLET ORAL DAILY
COMMUNITY

## 2024-05-01 RX ORDER — HEPARIN SODIUM 10000 [USP'U]/100ML
9.1 INJECTION, SOLUTION INTRAVENOUS
Status: DISCONTINUED | OUTPATIENT
Start: 2024-05-01 | End: 2024-05-01

## 2024-05-01 RX ORDER — DEXTROSE MONOHYDRATE 25 G/50ML
10-50 INJECTION, SOLUTION INTRAVENOUS
Status: DISCONTINUED | OUTPATIENT
Start: 2024-05-01 | End: 2024-05-02 | Stop reason: HOSPADM

## 2024-05-01 RX ORDER — INSULIN LISPRO 100 [IU]/ML
1-200 INJECTION, SOLUTION INTRAVENOUS; SUBCUTANEOUS AS NEEDED
Status: DISCONTINUED | OUTPATIENT
Start: 2024-05-01 | End: 2024-05-02 | Stop reason: HOSPADM

## 2024-05-01 RX ORDER — MELOXICAM 15 MG/1
15 TABLET ORAL DAILY
COMMUNITY
End: 2024-05-02 | Stop reason: HOSPADM

## 2024-05-01 RX ORDER — DISULFIRAM 250 MG/1
500 TABLET ORAL DAILY
COMMUNITY

## 2024-05-01 RX ORDER — INSULIN LISPRO 100 [IU]/ML
1-200 INJECTION, SOLUTION INTRAVENOUS; SUBCUTANEOUS
Status: DISCONTINUED | OUTPATIENT
Start: 2024-05-01 | End: 2024-05-02 | Stop reason: HOSPADM

## 2024-05-01 RX ORDER — ACETAMINOPHEN 325 MG/1
650 TABLET ORAL EVERY 4 HOURS PRN
Status: DISCONTINUED | OUTPATIENT
Start: 2024-05-01 | End: 2024-05-02 | Stop reason: HOSPADM

## 2024-05-01 RX ORDER — HEPARIN SODIUM 1000 [USP'U]/ML
4000 INJECTION, SOLUTION INTRAVENOUS; SUBCUTANEOUS ONCE
Status: COMPLETED | OUTPATIENT
Start: 2024-05-01 | End: 2024-05-01

## 2024-05-01 RX ORDER — FLUOXETINE HYDROCHLORIDE 20 MG/1
20 CAPSULE ORAL DAILY
Status: DISCONTINUED | OUTPATIENT
Start: 2024-05-01 | End: 2024-05-02 | Stop reason: HOSPADM

## 2024-05-01 RX ORDER — ENOXAPARIN SODIUM 100 MG/ML
40 INJECTION SUBCUTANEOUS EVERY 24 HOURS
Status: DISCONTINUED | OUTPATIENT
Start: 2024-05-01 | End: 2024-05-02 | Stop reason: HOSPADM

## 2024-05-01 RX ORDER — HEPARIN SODIUM 1000 [USP'U]/ML
4000 INJECTION, SOLUTION INTRAVENOUS; SUBCUTANEOUS AS NEEDED
Status: DISCONTINUED | OUTPATIENT
Start: 2024-05-01 | End: 2024-05-01

## 2024-05-01 RX ORDER — SODIUM CHLORIDE 0.9 % (FLUSH) 0.9 %
10 SYRINGE (ML) INJECTION AS NEEDED
Status: DISCONTINUED | OUTPATIENT
Start: 2024-05-01 | End: 2024-05-02 | Stop reason: HOSPADM

## 2024-05-01 RX ORDER — HYDRALAZINE HYDROCHLORIDE 25 MG/1
25 TABLET, FILM COATED ORAL EVERY 8 HOURS SCHEDULED
Status: DISCONTINUED | OUTPATIENT
Start: 2024-05-01 | End: 2024-05-02 | Stop reason: HOSPADM

## 2024-05-01 RX ORDER — NITROGLYCERIN 0.4 MG/1
0.4 TABLET SUBLINGUAL
Status: DISCONTINUED | OUTPATIENT
Start: 2024-05-01 | End: 2024-05-02 | Stop reason: HOSPADM

## 2024-05-01 RX ORDER — ATORVASTATIN CALCIUM 20 MG/1
20 TABLET, FILM COATED ORAL DAILY
Status: DISCONTINUED | OUTPATIENT
Start: 2024-05-01 | End: 2024-05-02 | Stop reason: HOSPADM

## 2024-05-01 RX ADMIN — LISINOPRIL 20 MG: 20 TABLET ORAL at 09:51

## 2024-05-01 RX ADMIN — ATORVASTATIN CALCIUM 20 MG: 20 TABLET, FILM COATED ORAL at 09:49

## 2024-05-01 RX ADMIN — POTASSIUM CHLORIDE 40 MEQ: 1500 TABLET, EXTENDED RELEASE ORAL at 07:00

## 2024-05-01 RX ADMIN — Medication 10 ML: at 22:28

## 2024-05-01 RX ADMIN — INSULIN GLARGINE 9 UNITS: 100 INJECTION, SOLUTION SUBCUTANEOUS at 22:28

## 2024-05-01 RX ADMIN — ACETAMINOPHEN 650 MG: 325 TABLET, FILM COATED ORAL at 03:27

## 2024-05-01 RX ADMIN — ACETAMINOPHEN 650 MG: 325 TABLET, FILM COATED ORAL at 16:23

## 2024-05-01 RX ADMIN — INSULIN LISPRO 10 UNITS: 100 INJECTION, SOLUTION INTRAVENOUS; SUBCUTANEOUS at 13:24

## 2024-05-01 RX ADMIN — POTASSIUM CHLORIDE 40 MEQ: 1500 TABLET, EXTENDED RELEASE ORAL at 03:27

## 2024-05-01 RX ADMIN — HEPARIN SODIUM 9.1 UNITS/KG/HR: 10000 INJECTION, SOLUTION INTRAVENOUS at 03:49

## 2024-05-01 RX ADMIN — HYDROCHLOROTHIAZIDE 12.5 MG: 12.5 CAPSULE ORAL at 09:49

## 2024-05-01 RX ADMIN — ACETAMINOPHEN 650 MG: 325 TABLET, FILM COATED ORAL at 10:02

## 2024-05-01 RX ADMIN — Medication 10 ML: at 09:49

## 2024-05-01 RX ADMIN — FLUOXETINE HYDROCHLORIDE 20 MG: 20 CAPSULE ORAL at 09:49

## 2024-05-01 RX ADMIN — METOPROLOL SUCCINATE 100 MG: 100 TABLET, EXTENDED RELEASE ORAL at 09:49

## 2024-05-01 RX ADMIN — HYDRALAZINE HYDROCHLORIDE 25 MG: 25 TABLET ORAL at 18:02

## 2024-05-01 RX ADMIN — INSULIN LISPRO 7 UNITS: 100 INJECTION, SOLUTION INTRAVENOUS; SUBCUTANEOUS at 18:02

## 2024-05-01 RX ADMIN — INSULIN LISPRO 1 UNITS: 100 INJECTION, SOLUTION INTRAVENOUS; SUBCUTANEOUS at 22:29

## 2024-05-01 RX ADMIN — ISOSORBIDE MONONITRATE 60 MG: 60 TABLET, EXTENDED RELEASE ORAL at 09:51

## 2024-05-01 RX ADMIN — HEPARIN SODIUM 4000 UNITS: 1000 INJECTION, SOLUTION INTRAVENOUS; SUBCUTANEOUS at 03:48

## 2024-05-01 RX ADMIN — ENOXAPARIN SODIUM 40 MG: 100 INJECTION SUBCUTANEOUS at 09:49

## 2024-05-01 RX ADMIN — ASPIRIN 81 MG: 81 TABLET, COATED ORAL at 09:49

## 2024-05-01 RX ADMIN — INSULIN GLARGINE 9 UNITS: 100 INJECTION, SOLUTION SUBCUTANEOUS at 09:49

## 2024-05-01 NOTE — H&P
[facility]   HISTORY AND PHYSICAL      Name:  Andrés Denton   Age:  53 y.o.  Sex:  male  :  1970  MRN:  [id number]   Visit Number:  [id number]  Admission Date:  2024  Date Of Service:  24  Primary Care Physician:  Aisha Barba APRN    Chief Complaint:     Chest pain    History Of Presenting Illness:      Andrés Denton is a 53-year-old man with past medical history of alcohol use disorder, hypertension, anxiety and depression.  Presented to [facility] ED on 2024 with concern for chest pain which started around 9 PM while he was walking home from Alcoholics Anonymous reportedly.  Described it as a tearing sensation/stabbing pain, feels like something is sitting on his chest.  Says he has not had any alcohol for 2 months.  Denied fevers or chills, cough, shortness of air, abdominal pain, N/V/D, leg swelling or pain.    ED summary: Afebrile, tachycardic which improved, significantly hypertensive as high as 253/165 which improved with nitro drip.  EKG sinus tachycardia rate 120, nonspecific ST-T wave changes.  High-sensitivity troponin 20, 29, ACS not suspected.  Blood glucose 213.  Procalcitonin not elevated.  White count 11.  CT angio chest no pulmonary embolism, does show bronchitis and mild bronchopneumonia, mildly aneurysmal thoracic aorta, coronary artery disease.  Clinically, pneumonia not suspected. Provided aspirin 325 mg, morphine, Zofran, started on nitro drip.    Review Of Systems:    All systems were reviewed and negative except as mentioned in history of presenting illness, assessment and plan.    Past Medical History: Patient  has a past medical history of Alcohol abuse, Anxiety, Depression, Hypertension, Suicidal thoughts, and Withdrawal symptoms, alcohol.    Past Surgical History: Patient  has a past surgical history that includes Fracture surgery (Left) and Amputation ().    Social History: Patient  reports that he has never smoked. His  smokeless tobacco use includes snuff. He reports current alcohol use. He reports current drug use.    Family History:  Patient's family history has been reviewed and found to be noncontributory.     Allergies:      Patient has no known allergies.    Home Medications:    Prior to Admission Medications       Prescriptions Last Dose Informant Patient Reported? Taking?    albuterol sulfate  (90 Base) MCG/ACT inhaler   No No    Inhale 2 puffs Every 4 (Four) Hours As Needed for Wheezing or Shortness of Air.    amLODIPine (NORVASC) 10 MG tablet   No No    Take 1 tablet by mouth Daily. Indications: High Blood Pressure Disorder    atorvastatin (LIPITOR) 20 MG tablet   Yes No    Take 1 tablet by mouth every night at bedtime.    FLUoxetine (PROzac) 20 MG capsule   No No    Take 1 capsule by mouth Daily. Indications: Depression    lisinopril (PRINIVIL,ZESTRIL) 20 MG tablet   No No    Take 1 tablet by mouth 2 (Two) Times a Day. Indications: High Blood Pressure Disorder    methylPREDNISolone (MEDROL) 4 MG dose pack   No No    Take as directed on package instructions.    metoprolol succinate XL (TOPROL-XL) 100 MG 24 hr tablet   No No    Take 1 tablet by mouth Daily. Indications: High Blood Pressure Disorder          ED Medications:    Medications   sodium chloride 0.9 % flush 10 mL (has no administration in time range)   nitroglycerin (NITROSTAT) SL tablet 0.4 mg (0.4 mg Sublingual Given 4/30/24 2201)   nitroglycerin (TRIDIL) 200 mcg/ml infusion (5 mcg/min Intravenous Rate/Dose Change 5/1/24 0108)   aspirin tablet 325 mg (325 mg Oral Given 4/30/24 2234)   morphine injection 4 mg (4 mg Intravenous Given 4/30/24 2220)   iopamidol (ISOVUE-300) 61 % injection 100 mL (100 mL Intravenous Given 4/30/24 2213)   ondansetron (ZOFRAN) injection 4 mg (4 mg Intravenous Given 4/30/24 2236)     Vital Signs:  Temp:  [97.9 °F (36.6 °C)] 97.9 °F (36.6 °C)  Heart Rate:  [] 105  Resp:  [11-22] 17  BP: (137-253)/() 137/85         "04/30/24 2143   Weight: 107 kg (235 lb)     Body mass index is 36.81 kg/m².    Physical Exam:     Most recent vital Signs: /85 (BP Location: Left arm, Patient Position: Sitting)   Pulse 105   Temp 97.9 °F (36.6 °C) (Oral)   Resp 17   Ht 170.2 cm (67\")   Wt 107 kg (235 lb)   SpO2 93%   BMI 36.81 kg/m²     Physical Exam  Constitutional:       General: He is not in acute distress.     Appearance: He is obese. He is ill-appearing. He is not toxic-appearing.   HENT:      Mouth/Throat:      Mouth: Mucous membranes are moist.   Eyes:      Extraocular Movements: Extraocular movements intact.   Cardiovascular:      Rate and Rhythm: Regular rhythm. Tachycardia present.      Pulses: Normal pulses.      Heart sounds: Normal heart sounds.   Pulmonary:      Effort: Pulmonary effort is normal.      Breath sounds: Normal breath sounds.   Abdominal:      Palpations: Abdomen is soft.      Tenderness: There is no abdominal tenderness.   Musculoskeletal:      Right lower leg: No edema.      Left lower leg: No edema.   Skin:     General: Skin is warm.      Capillary Refill: Capillary refill takes less than 2 seconds.   Neurological:      General: No focal deficit present.      Mental Status: He is alert and oriented to person, place, and time.   Psychiatric:         Mood and Affect: Mood normal.         Thought Content: Thought content normal.         Laboratory data:    I have reviewed the labs done in the emergency room.    Results from last 7 days   Lab Units 04/30/24  2150   SODIUM mmol/L 138   POTASSIUM mmol/L 3.5   CHLORIDE mmol/L 101   CO2 mmol/L 23.4   BUN mg/dL 13   CREATININE mg/dL 0.90   CALCIUM mg/dL 9.6   BILIRUBIN mg/dL 0.5   ALK PHOS U/L 108   ALT (SGPT) U/L 18   AST (SGOT) U/L 18   GLUCOSE mg/dL 213*     Results from last 7 days   Lab Units 04/30/24  2150   WBC 10*3/mm3 11.00*   HEMOGLOBIN g/dL 14.4   HEMATOCRIT % 39.8   PLATELETS 10*3/mm3 191         Results from last 7 days   Lab Units 04/30/24  2348 " "04/30/24  2150   HSTROP T ng/L 29* 20                           Invalid input(s): \"USDES\", \"NITRITITE\", \"BACT\", \"EP\"    Pain Management Panel  More data exists         Latest Ref Rng & Units 6/15/2023 6/1/2023   Pain Management Panel   Amphetamine, Urine Qual Negative Negative  Negative    Barbiturates Screen, Urine Negative Negative  Negative    Benzodiazepine Screen, Urine Negative Positive  Positive    Buprenorphine, Screen, Urine Negative Negative  Negative    Cocaine Screen, Urine Negative Negative  Negative    Methadone Screen , Urine Negative Negative  Negative    Methamphetamine, Ur Negative Negative  Negative        EKG:      EKG sinus tachycardia rate 120, nonspecific ST-T wave changes.      Radiology:    CT Angiogram Chest    Result Date: 4/30/2024  FINAL REPORT TECHNIQUE: Multiple axial CT images were obtained through the chest following IV contrast using a CTA/PE protocol.  3D/MIP reconstruction images were also performed. This study was performed with techniques to keep radiation doses as low as reasonably achievable (ALARA). Individualized dose reduction techniques using automated exposure control or adjustment of mA and/or kV according to the patient's size were employed. CLINICAL HISTORY: Chest pain, hypertension and tachycardia FINDINGS: PAs and aorta: No pulmonary embolus.  There is a mildly aneurysmal ascending thoracic aorta measuring 4.1 cm.  There is coronary artery disease.  Heart/mediastinum: No evidence for right heart strain.  No pericardial effusion.    There is borderline cardiomegaly.  Lungs: There is bronchial wall thickening with areas of peripheral mucous plugging and tree-in-bud nodularity present within the posterior segment of the right upper lobe and bilateral lower lobes.    Lymph nodes: No pathologically enlarged thoracic lymph nodes.  Pleura: No pneumothorax or pleural effusion.  Chest Wall: No chest wall contusion.  Bones: No acute fracture.  Upper abdomen: No acute findings " in the upper abdomen.     No pulmonary embolus.     Bronchitis and mild bronchopneumonia. Mildly aneurysmal thoracic aorta.  Coronary artery disease. Authenticated and Electronically Signed by Bam Nieto MD on 04/30/2024 11:13:38 PM     Assessment/Plan:    ICU observation admission 4/30/2024 with hypertensive emergency and associated chest pain with elevated troponins, ACS not initially suspected.  Also with thoracic aortic aneurysm without dissection, hyperglycemia.    At time of admission resting in bed, pleasantly conversational.  No chest pain at rest.  He did provide additional history that he has been having episodic chest pain with exertion for weeks.  Blood pressure recently has been good around 140 systolic at home.    Chest pain  Hypertensive emergency  NSTEMI  Nitro drip.  Heparin.  Echocardiogram.  Cardiology consultation, recommendations appreciated.    Hyperglycemia  Subcutaneous insulin protocol.  A1c.    Thoracic aortic aneurysm  Recommend follow-up.    Risk Assessment: High  DVT Prophylaxis: Lovenox  Code Status: Full code  Diet: N.p.o.    Advance Care Planning   ACP discussion was held with the patient during this visit. Patient does not have an advance directive, information provided.           Brian Joseph Kerley, DO  05/01/24  01:19 EDT    Dictated utilizing Dragon dictation.

## 2024-05-01 NOTE — PAYOR COMM NOTE
"Marta Denton (53 y.o. Male)       Date of Birth   1970    Social Security Number       Address   [address] [id number]    Home Phone       MRN   [id number]       Buddhism   None    Marital Status                               Admission Date   24    Admission Type   Emergency    Admitting Provider   Kerley, Brian Joseph, DO    Attending Provider   Ene Jacob DO    Department, Room/Bed   [facility] INTENSIVE CARE, I03/       Discharge Date       Discharge Disposition       Discharge Destination                                 Attending Provider: Ene Jacob DO    Allergies: No Known Allergies    Isolation: None   Infection: None   Code Status: CPR    Ht: 170.2 cm (67.01\")   Wt: 109 kg (240 lb 4.8 oz)    Admission Cmt: None   Principal Problem: Hypertensive emergency [I16.1]                   Active Insurance as of 2024       Primary Coverage       Payor Plan Insurance Group Employer/Plan Group    [facility] COMMUNITY PLAN [facility] COMMUNITY PLAN [facility]       Payor Plan Address Payor Plan Phone Number Payor Plan Fax Number Effective Dates    [address]   2023 - None Entered    [facility] [address]         Subscriber Name Subscriber Birth Date Member ID       MARTA DENTON 1970 [id number]                     Emergency Contacts        (Rel.) Home Phone Work Phone Mobile Phone    Aashish Denton (Son) [phone] -- --    JOHN DENTON (Spouse) [phone] -- [phone]                 History & Physical        Kerley, Brian Joseph, DO at 24 0119            [facility] HOSPITALIST   HISTORY AND PHYSICAL      Name:  Marta Denton   Age:  53 y.o.  Sex:  male  :  1970  MRN:  [id number]   Visit Number:  [id number]  Admission Date:  2024  Date Of Service:  24  Primary Care Physician:  Aisha Barba, APRN    Chief Complaint:     Chest pain    History Of " Presenting Illness:      Andrés Denton is a 53-year-old man with past medical history of alcohol use disorder, hypertension, anxiety and depression.  Presented to [facility] ED on 4/30/2024 with concern for chest pain which started around 9 PM while he was walking home from Alcoholics Anonymous reportedly.  Described it as a tearing sensation/stabbing pain, feels like something is sitting on his chest.  Says he has not had any alcohol for 2 months.  Denied fevers or chills, cough, shortness of air, abdominal pain, N/V/D, leg swelling or pain.    ED summary: Afebrile, tachycardic which improved, significantly hypertensive as high as 253/165 which improved with nitro drip.  EKG sinus tachycardia rate 120, nonspecific ST-T wave changes.  High-sensitivity troponin 20, 29, ACS not suspected.  Blood glucose 213.  Procalcitonin not elevated.  White count 11.  CT angio chest no pulmonary embolism, does show bronchitis and mild bronchopneumonia, mildly aneurysmal thoracic aorta, coronary artery disease.  Clinically, pneumonia not suspected. Provided aspirin 325 mg, morphine, Zofran, started on nitro drip.    Review Of Systems:    All systems were reviewed and negative except as mentioned in history of presenting illness, assessment and plan.    Past Medical History: Patient  has a past medical history of Alcohol abuse, Anxiety, Depression, Hypertension, Suicidal thoughts, and Withdrawal symptoms, alcohol.    Past Surgical History: Patient  has a past surgical history that includes Fracture surgery (Left) and Amputation (1995).    Social History: Patient  reports that he has never smoked. His smokeless tobacco use includes snuff. He reports current alcohol use. He reports current drug use.    Family History:  Patient's family history has been reviewed and found to be noncontributory.     Allergies:      Patient has no known allergies.    Home Medications:    Prior to Admission Medications       Prescriptions Last Dose Informant  "Patient Reported? Taking?    albuterol sulfate  (90 Base) MCG/ACT inhaler   No No    Inhale 2 puffs Every 4 (Four) Hours As Needed for Wheezing or Shortness of Air.    amLODIPine (NORVASC) 10 MG tablet   No No    Take 1 tablet by mouth Daily. Indications: High Blood Pressure Disorder    atorvastatin (LIPITOR) 20 MG tablet   Yes No    Take 1 tablet by mouth every night at bedtime.    FLUoxetine (PROzac) 20 MG capsule   No No    Take 1 capsule by mouth Daily. Indications: Depression    lisinopril (PRINIVIL,ZESTRIL) 20 MG tablet   No No    Take 1 tablet by mouth 2 (Two) Times a Day. Indications: High Blood Pressure Disorder    methylPREDNISolone (MEDROL) 4 MG dose pack   No No    Take as directed on package instructions.    metoprolol succinate XL (TOPROL-XL) 100 MG 24 hr tablet   No No    Take 1 tablet by mouth Daily. Indications: High Blood Pressure Disorder          ED Medications:    Medications   sodium chloride 0.9 % flush 10 mL (has no administration in time range)   nitroglycerin (NITROSTAT) SL tablet 0.4 mg (0.4 mg Sublingual Given 4/30/24 2201)   nitroglycerin (TRIDIL) 200 mcg/ml infusion (5 mcg/min Intravenous Rate/Dose Change 5/1/24 0108)   aspirin tablet 325 mg (325 mg Oral Given 4/30/24 2234)   morphine injection 4 mg (4 mg Intravenous Given 4/30/24 2220)   iopamidol (ISOVUE-300) 61 % injection 100 mL (100 mL Intravenous Given 4/30/24 2213)   ondansetron (ZOFRAN) injection 4 mg (4 mg Intravenous Given 4/30/24 2236)     Vital Signs:  Temp:  [97.9 °F (36.6 °C)] 97.9 °F (36.6 °C)  Heart Rate:  [] 105  Resp:  [11-22] 17  BP: (137-253)/() 137/85        04/30/24 2143   Weight: 107 kg (235 lb)     Body mass index is 36.81 kg/m².    Physical Exam:     Most recent vital Signs: /85 (BP Location: Left arm, Patient Position: Sitting)   Pulse 105   Temp 97.9 °F (36.6 °C) (Oral)   Resp 17   Ht 170.2 cm (67\")   Wt 107 kg (235 lb)   SpO2 93%   BMI 36.81 kg/m²     Physical " "Exam  Constitutional:       General: He is not in acute distress.     Appearance: He is obese. He is ill-appearing. He is not toxic-appearing.   HENT:      Mouth/Throat:      Mouth: Mucous membranes are moist.   Eyes:      Extraocular Movements: Extraocular movements intact.   Cardiovascular:      Rate and Rhythm: Regular rhythm. Tachycardia present.      Pulses: Normal pulses.      Heart sounds: Normal heart sounds.   Pulmonary:      Effort: Pulmonary effort is normal.      Breath sounds: Normal breath sounds.   Abdominal:      Palpations: Abdomen is soft.      Tenderness: There is no abdominal tenderness.   Musculoskeletal:      Right lower leg: No edema.      Left lower leg: No edema.   Skin:     General: Skin is warm.      Capillary Refill: Capillary refill takes less than 2 seconds.   Neurological:      General: No focal deficit present.      Mental Status: He is alert and oriented to person, place, and time.   Psychiatric:         Mood and Affect: Mood normal.         Thought Content: Thought content normal.         Laboratory data:    I have reviewed the labs done in the emergency room.    Results from last 7 days   Lab Units 04/30/24  2150   SODIUM mmol/L 138   POTASSIUM mmol/L 3.5   CHLORIDE mmol/L 101   CO2 mmol/L 23.4   BUN mg/dL 13   CREATININE mg/dL 0.90   CALCIUM mg/dL 9.6   BILIRUBIN mg/dL 0.5   ALK PHOS U/L 108   ALT (SGPT) U/L 18   AST (SGOT) U/L 18   GLUCOSE mg/dL 213*     Results from last 7 days   Lab Units 04/30/24  2150   WBC 10*3/mm3 11.00*   HEMOGLOBIN g/dL 14.4   HEMATOCRIT % 39.8   PLATELETS 10*3/mm3 191         Results from last 7 days   Lab Units 04/30/24  2348 04/30/24  2150   HSTROP T ng/L 29* 20                           Invalid input(s): \"USDES\", \"NITRITITE\", \"BACT\", \"EP\"    Pain Management Panel  More data exists         Latest Ref Rng & Units 6/15/2023 6/1/2023   Pain Management Panel   Amphetamine, Urine Qual Negative Negative  Negative    Barbiturates Screen, Urine Negative " Negative  Negative    Benzodiazepine Screen, Urine Negative Positive  Positive    Buprenorphine, Screen, Urine Negative Negative  Negative    Cocaine Screen, Urine Negative Negative  Negative    Methadone Screen , Urine Negative Negative  Negative    Methamphetamine, Ur Negative Negative  Negative        EKG:      EKG sinus tachycardia rate 120, nonspecific ST-T wave changes.      Radiology:    CT Angiogram Chest    Result Date: 4/30/2024  FINAL REPORT TECHNIQUE: Multiple axial CT images were obtained through the chest following IV contrast using a CTA/PE protocol.  3D/MIP reconstruction images were also performed. This study was performed with techniques to keep radiation doses as low as reasonably achievable (ALARA). Individualized dose reduction techniques using automated exposure control or adjustment of mA and/or kV according to the patient's size were employed. CLINICAL HISTORY: Chest pain, hypertension and tachycardia FINDINGS: PAs and aorta: No pulmonary embolus.  There is a mildly aneurysmal ascending thoracic aorta measuring 4.1 cm.  There is coronary artery disease.  Heart/mediastinum: No evidence for right heart strain.  No pericardial effusion.    There is borderline cardiomegaly.  Lungs: There is bronchial wall thickening with areas of peripheral mucous plugging and tree-in-bud nodularity present within the posterior segment of the right upper lobe and bilateral lower lobes.    Lymph nodes: No pathologically enlarged thoracic lymph nodes.  Pleura: No pneumothorax or pleural effusion.  Chest Wall: No chest wall contusion.  Bones: No acute fracture.  Upper abdomen: No acute findings in the upper abdomen.     No pulmonary embolus.     Bronchitis and mild bronchopneumonia. Mildly aneurysmal thoracic aorta.  Coronary artery disease. Authenticated and Electronically Signed by Bam Nieto MD on 04/30/2024 11:13:38 PM     Assessment/Plan:    ICU observation admission 4/30/2024 with hypertensive emergency and  associated chest pain with elevated troponins, ACS not initially suspected.  Also with thoracic aortic aneurysm without dissection, hyperglycemia.    At time of admission resting in bed, pleasantly conversational.  No chest pain at rest.  He did provide additional history that he has been having episodic chest pain with exertion for weeks.  Blood pressure recently has been good around 140 systolic at home.    Chest pain  Hypertensive emergency  NSTEMI  Nitro drip.  Heparin.  Echocardiogram.  Cardiology consultation, recommendations appreciated.    Hyperglycemia  Subcutaneous insulin protocol.  A1c.    Thoracic aortic aneurysm  Recommend follow-up.    Risk Assessment: High  DVT Prophylaxis: Lovenox  Code Status: Full code  Diet: N.p.o.    Advance Care Planning  ACP discussion was held with the patient during this visit. Patient does not have an advance directive, information provided.           Brian Joseph Kerley, DO  05/01/24  01:19 EDT    Dictated utilizing Dragon dictation.    Electronically signed by Kerley, Brian Joseph, DO at 05/01/24 0256       Vital Signs (last 2 days)       Date/Time Temp Temp src Pulse Resp BP Patient Position SpO2    05/01/24 1137 -- -- 67 -- -- -- 98    05/01/24 1136 -- -- 68 -- -- -- --    05/01/24 1135 -- -- -- -- 160/109 -- --    05/01/24 1100 -- -- 65 -- -- -- --    05/01/24 1030 -- -- 82 -- 181/117 -- 97    05/01/24 1015 -- -- 79 -- 174/98 -- 97    05/01/24 1001 -- -- 90 -- 172/110 -- 92    05/01/24 1000 -- -- 91 -- 184/120 -- 98    05/01/24 0949 -- -- 82 -- 172/110 -- --    05/01/24 0930 -- -- 89 -- 174/117 -- 98    05/01/24 0915 -- -- 92 -- 182/116 -- 99    05/01/24 0900 -- -- 79 -- 171/107 -- 99    05/01/24 0845 -- -- 82 -- 166/109 -- 98    05/01/24 0830 -- -- 101 -- 166/107 -- 96    05/01/24 0815 -- -- 82 -- 123/96 -- 98    05/01/24 0800 -- -- 74 -- 144/94 -- 97    05/01/24 0750 97.7 (36.5) Oral 86 -- 180/101 -- --    05/01/24 0745 -- -- 80 -- 180/101 -- 98    05/01/24 0730 --  -- 80 -- 174/114 -- 99    05/01/24 0715 -- -- 85 -- 164/104 -- 98    05/01/24 0700 -- -- 96 -- 174/94 -- 98    05/01/24 0645 -- -- 80 -- 143/108 -- 95    05/01/24 0630 -- -- 73 -- 152/91 -- 91    05/01/24 0500 -- -- 76 -- 155/95 -- 97    05/01/24 0430 -- -- 83 -- 180/112 -- 92    05/01/24 0415 -- -- 85 -- 165/118 -- 95    05/01/24 0400 98.5 (36.9) Oral 92 -- 174/114 -- 97    05/01/24 0359 -- -- 92 -- 160/111 -- --    05/01/24 0345 -- -- 84 -- 160/111 -- 95    05/01/24 0330 98.2 (36.8) Oral 96 19 177/113 Sitting 95    05/01/24 0321 -- -- 98 -- 174/110 -- --    05/01/24 0319 -- -- 86 -- -- -- 94    05/01/24 0318 -- -- 90 -- -- -- 94    05/01/24 0317 -- -- 93 -- -- -- 94    05/01/24 0316 -- -- 93 -- -- -- 93    05/01/24 0315 -- -- 93 -- 174/110 -- 94    05/01/24 0314 -- -- 89 -- -- -- 94    05/01/24 0313 -- -- 93 -- -- -- 95    05/01/24 0312 -- -- 92 -- -- -- 94    05/01/24 0311 -- -- 92 -- -- -- 95    05/01/24 0310 -- -- 90 -- -- -- 95    05/01/24 0309 -- -- 93 -- -- -- 95    05/01/24 0308 -- -- 92 -- -- -- 95    05/01/24 0307 -- -- 90 -- -- -- 95    05/01/24 0306 -- -- 89 -- -- -- 95    05/01/24 0305 -- -- 95 -- -- -- 95    05/01/24 0304 -- -- 88 -- -- -- 96    05/01/24 0303 -- -- 102 -- -- -- 97    05/01/24 0302 -- -- 101 -- -- -- 97    05/01/24 0301 -- -- 98 -- -- -- 96    05/01/24 0300 -- -- 101 -- 185/122 -- 96    05/01/24 0259 -- -- 95 -- -- -- 97    05/01/24 0258 -- -- 96 -- -- -- 96    05/01/24 0257 -- -- 87 -- -- -- 96    05/01/24 0256 -- -- 98 -- -- -- 96    05/01/24 0255 -- -- 95 -- -- -- 96    05/01/24 0254 -- -- 95 -- -- -- 96    05/01/24 0253 -- -- 94 -- -- -- 96    05/01/24 0252 -- -- 92 -- -- -- 96    05/01/24 0251 -- -- 89 -- -- -- 94    05/01/24 0250 -- -- 92 -- -- -- 96    05/01/24 0249 -- -- 94 -- -- -- 95    05/01/24 0248 -- -- 93 -- -- -- 97    05/01/24 0247 -- -- 109 -- -- -- 97    05/01/24 0246 -- -- 94 -- -- -- 98    05/01/24 0245 -- -- 91 -- 170/113 -- 97    05/01/24 0244 -- -- 90 -- -- -- 96     05/01/24 0243 -- -- 90 -- -- -- 97    05/01/24 0242 -- -- 92 -- -- -- 97    05/01/24 0241 -- -- 93 -- -- -- 98    05/01/24 0240 -- -- 92 -- -- -- 98    05/01/24 0239 -- -- 94 -- -- -- 98    05/01/24 0238 -- -- 98 -- -- -- 97    05/01/24 0237 -- -- 92 -- -- -- 98    05/01/24 0236 -- -- 92 -- 159/113 -- 98    05/01/24 0235 -- -- 95 -- -- -- 98    05/01/24 0234 -- -- 96 -- -- -- 98    05/01/24 0233 -- -- 105 -- -- -- 97    05/01/24 0232 -- -- 100 -- -- -- 98    05/01/24 0231 -- -- 95 -- -- -- 97    05/01/24 0230 -- -- 92 -- -- -- --    05/01/24 0205 -- -- 92 17 187/106 Sitting 93 05/01/24 0100 -- -- 105 17 137/85 Sitting 93 04/30/24 2332 -- -- 104 -- 192/116 -- 92 04/30/24 2315 -- -- 99 11 214/124 Sitting 93 04/30/24 2312 -- -- 98 -- 205/127 -- 93 04/30/24 2302 -- -- 101 -- 253/165 -- 92 04/30/24 2232 -- -- 104 -- 193/119 -- 94 04/30/24 2215 -- -- -- -- 198/124 Lying 94    04/30/24 2202 -- -- 115 -- 215/129 -- 95    04/30/24 2152 -- -- 122 -- 167/138 -- 95 04/30/24 2143 97.9 (36.6) Oral 130 22 240/155 Sitting 96          Facility-Administered Medications as of 5/1/2024   Medication Dose Route Frequency Provider Last Rate Last Admin    acetaminophen (TYLENOL) tablet 650 mg  650 mg Oral Q4H PRN Kerley, Brian Joseph, DO   650 mg at 05/01/24 1002    Or    acetaminophen (TYLENOL) 160 MG/5ML oral solution 650 mg  650 mg Oral Q4H PRN Kerley, Brian Joseph, DO        Or    acetaminophen (TYLENOL) suppository 650 mg  650 mg Rectal Q4H PRN Kerley, Brian Joseph, DO        albuterol sulfate HFA (PROVENTIL HFA;VENTOLIN HFA;PROAIR HFA) inhaler 2 puff  2 puff Inhalation Q4H PRN Kerley, Brian Joseph, DO        aspirin EC tablet 81 mg  81 mg Oral Daily Kerley, Brian Joseph, DO   81 mg at 05/01/24 0949    [COMPLETED] aspirin tablet 325 mg  325 mg Oral Once Bola Aleman MD   325 mg at 04/30/24 2234    atorvastatin (LIPITOR) tablet 20 mg  20 mg Oral Daily Kerley, Brian Joseph, DO   20 mg at 05/01/24 0949     Calcium Replacement - Follow Nurse / BPA Driven Protocol   Does not apply PRN Kerley, Brian Joseph, DO        dextrose (D50W) (25 g/50 mL) IV injection 10-50 mL  10-50 mL Intravenous Q15 Min PRN Kerley, Brian Joseph, DO        dextrose (GLUTOSE) oral gel 15 g  15 g Oral Q15 Min PRN Kerley, Brian Joseph, DO        Enoxaparin Sodium (LOVENOX) syringe 40 mg  40 mg Subcutaneous Q24H Ene Jacob DO   40 mg at 05/01/24 0949    FLUoxetine (PROzac) capsule 20 mg  20 mg Oral Daily Kerley, Brian Joseph, DO   20 mg at 05/01/24 0949    Glucagon (GLUCAGEN) injection 1 mg  1 mg Intramuscular Q15 Min PRN Kerley, Brian Joseph, DO        [COMPLETED] heparin (porcine) injection 4,000 Units  4,000 Units Intravenous Once Kerley, Brian Joseph, DO   4,000 Units at 05/01/24 0348    lisinopril (PRINIVIL,ZESTRIL) tablet 20 mg  20 mg Oral Q24H Ene Jacob, DO   20 mg at 05/01/24 0951    And    hydroCHLOROthiazide oral 12.5 mg  12.5 mg Oral Q24H Ene Jacob DO   12.5 mg at 05/01/24 0949    insulin glargine (LANTUS, SEMGLEE) injection 1-200 Units  1-200 Units Subcutaneous BID - Glucommander Kerley, Brian Joseph, DO   9 Units at 05/01/24 0949    insulin lispro (humaLOG) injection 1-200 Units  1-200 Units Subcutaneous 4x Daily With Meals & Nightly Kerley, Brian Joseph, DO        insulin lispro (humaLOG) injection 1-200 Units  1-200 Units Subcutaneous PRN Kerley, Brian Joseph, DO        [COMPLETED] iopamidol (ISOVUE-300) 61 % injection 100 mL  100 mL Intravenous Once in imaging Bloa Aleman MD   100 mL at 04/30/24 2213    isosorbide mononitrate (IMDUR) 24 hr tablet 60 mg  60 mg Oral Q24H Ene Jacob, DO   60 mg at 05/01/24 0951    Magnesium Standard Dose Replacement - Follow Nurse / BPA Driven Protocol   Does not apply PRN Kerley, Brian Joseph, DO        metoprolol succinate XL (TOPROL-XL) 24 hr tablet 100 mg  100 mg Oral Daily Kerley, Brian Joseph, DO   100 mg at 05/01/24 0949    [COMPLETED]  morphine injection 4 mg  4 mg Intravenous Once Bola Aleman MD   4 mg at 04/30/24 2220    nitroglycerin (NITROSTAT) SL tablet 0.4 mg  0.4 mg Sublingual Q5 Min PRN Kerley, Brian Joseph, DO   0.4 mg at 04/30/24 2201    nitroglycerin (NITROSTAT) SL tablet 0.4 mg  0.4 mg Sublingual Q5 Min PRN Kerley, Brian Joseph, DO        nitroglycerin (TRIDIL) 200 mcg/ml infusion  5-200 mcg/min Intravenous Titrated Kerley, Brian Joseph, DO 7.5 mL/hr at 05/01/24 1009 25 mcg/min at 05/01/24 1009    [COMPLETED] ondansetron (ZOFRAN) injection 4 mg  4 mg Intravenous Once Bola Aleman MD   4 mg at 04/30/24 2236    ondansetron (ZOFRAN) injection 4 mg  4 mg Intravenous Q6H PRN Kerley, Brian Joseph, DO        Pharmacy to Dose enoxaparin (LOVENOX)   Does not apply Continuous PRN Ene Jacob DO        Phosphorus Replacement - Follow Nurse / BPA Driven Protocol   Does not apply PRN Kerley, Brian Joseph, DO        [COMPLETED] potassium chloride (KLOR-CON M20) CR tablet 40 mEq  40 mEq Oral Q4H Kerley, Brian Joseph, DO   40 mEq at 05/01/24 0700    Potassium Replacement - Follow Nurse / BPA Driven Protocol   Does not apply PRN Kerley, Brian Joseph, DO        sodium chloride 0.9 % flush 10 mL  10 mL Intravenous PRN Kerley, Brian Joseph, DO        sodium chloride 0.9 % flush 10 mL  10 mL Intravenous Q12H Kerley, Brian Joseph, DO   10 mL at 05/01/24 0949    sodium chloride 0.9 % flush 10 mL  10 mL Intravenous PRN Kerley, Brian Joseph, DO        sodium chloride 0.9 % infusion 40 mL  40 mL Intravenous PRN Kerley, Brian Joseph, DO         Lab Results (last 48 hours)       Procedure Component Value Units Date/Time    Fentanyl, Urine - Urine, Clean Catch [[id number]]  (Normal) Collected: 05/01/24 1133    Specimen: Urine, Clean Catch Updated: 05/01/24 1158     Fentanyl, Urine Negative    Narrative:      Negative Threshold:      Fentanyl 5 ng/mL     The normal value for the drug tested is negative. This report includes final unconfirmed  screening results to be used for medical treatment purposes only. Unconfirmed results must not be used for non-medical purposes such as employment or legal testing. Clinical consideration should be applied to any drug of abuse test, particularly when unconfirmed results are used.           Urine Drug Screen - Urine, Clean Catch [[id number]]  (Abnormal) Collected: 05/01/24 1133    Specimen: Urine, Clean Catch Updated: 05/01/24 1156     THC, Screen, Urine Negative     Phencyclidine (PCP), Urine Negative     Cocaine Screen, Urine Negative     Methamphetamine, Ur Negative     Opiate Screen Positive     Amphetamine Screen, Urine Negative     Benzodiazepine Screen, Urine Negative     Tricyclic Antidepressants Screen Negative     Methadone Screen, Urine Negative     Barbiturates Screen, Urine Negative     Oxycodone Screen, Urine Negative     Buprenorphine, Screen, Urine Negative    Narrative:      Cutoff For Drugs Screened:    Amphetamines               500 ng/ml  Barbiturates               200 ng/ml  Benzodiazepines            150 ng/ml  Cocaine                    150 ng/ml  Methadone                  200 ng/ml  Opiates                    100 ng/ml  Phencyclidine               25 ng/ml  THC                         50 ng/ml  Methamphetamine            500 ng/ml  Tricyclic Antidepressants  300 ng/ml  Oxycodone                  100 ng/ml  Buprenorphine               10 ng/ml    The normal value for all drugs tested is negative. This report includes unconfirmed screening results, with the cutoff values listed, to be used for medical treatment purposes only.  Unconfirmed results must not be used for non-medical purposes such as employment or legal testing.  Clinical consideration should be applied to any drug of abuse test, particularly when unconfirmed results are used.      Potassium [[id number]]  (Normal) Collected: 05/01/24 1132    Specimen: Blood Updated: 05/01/24 1154     Potassium 4.1 mmol/L     POC Glucose 4x Daily  Before Meals & at Bedtime [[id number]]  (Abnormal) Collected: 05/01/24 1136    Specimen: Blood Updated: 05/01/24 1148     Glucose 137 mg/dL      Comment: Serial Number: [id number]:  [id number]       COVID PRE-OP / PRE-PROCEDURE SCREENING ORDER (NO ISOLATION) - Swab, Nasopharynx [[id number]]  (Normal) Collected: 05/01/24 1007    Specimen: Swab from Nasopharynx Updated: 05/01/24 1035    Narrative:      The following orders were created for panel order COVID PRE-OP / PRE-PROCEDURE SCREENING ORDER (NO ISOLATION) - Swab, Nasopharynx.  Procedure                               Abnormality         Status                     ---------                               -----------         ------                     COVID-19 and FLU A/B PCR...[[id number]]  Normal              Final result                 Please view results for these tests on the individual orders.    COVID-19 and FLU A/B PCR, 1 HR TAT - Swab, Nasopharynx [[id number]]  (Normal) Collected: 05/01/24 1007    Specimen: Swab from Nasopharynx Updated: 05/01/24 1035     COVID19 Not Detected     Influenza A PCR Not Detected     Influenza B PCR Not Detected    Narrative:      Fact sheet for providers: https://www.fda.gov/media/906276/download    Fact sheet for patients: https://www.fda.gov/media/577186/download    Test performed by PCR.    Heparin Anti-Xa [[id number]]  (Abnormal) Collected: 05/01/24 1005    Specimen: Blood Updated: 05/01/24 1032     Heparin Anti-Xa (UFH) 0.10 IU/ml     Acinetobacter Screen - Swab, Axilla, Left [[id number]] Collected: 05/01/24 0907    Specimen: Swab from Axilla, Left Updated: 05/01/24 0907    MRSA Screen, PCR (Inpatient) - Swab, Nares [[id number]] Collected: 05/01/24 0907    Specimen: Swab from Nares Updated: 05/01/24 0907    VRE Culture - Swab, Per Rectum [[id number]] Collected: 05/01/24 0907    Specimen: Swab from Per Rectum Updated: 05/01/24 0907    Ethanol [[id number]] Collected: 05/01/24 0307    Specimen: Blood Updated: 05/01/24 0801      Ethanol <10 mg/dL      Ethanol % <0.010 %     Narrative:      This result is for medical use only and should not be used for forensic purposes.    POC Glucose 4x Daily Before Meals & at Bedtime [[id number]]  (Abnormal) Collected: 05/01/24 0727    Specimen: Blood Updated: 05/01/24 0754     Glucose 162 mg/dL      Comment: Serial Number: [id number]:  [id number]       High Sensitivity Troponin T [[id number]]  (Abnormal) Collected: 05/01/24 0307    Specimen: Blood Updated: 05/01/24 0334     HS Troponin T 33 ng/L     Narrative:      High Sensitive Troponin T Reference Range:  <14.0 ng/L- Negative Female for AMI  <22.0 ng/L- Negative Male for AMI  >=14 - Abnormal Female indicating possible myocardial injury.  >=22 - Abnormal Male indicating possible myocardial injury.   Clinicians would have to utilize clinical acumen, EKG, Troponin, and serial changes to determine if it is an Acute Myocardial Infarction or myocardial injury due to an underlying chronic condition.         Basic Metabolic Panel [[id number]]  (Abnormal) Collected: 05/01/24 0307    Specimen: Blood Updated: 05/01/24 0334     Glucose 162 mg/dL      BUN 11 mg/dL      Creatinine 0.74 mg/dL      Sodium 135 mmol/L      Potassium 3.4 mmol/L      Chloride 101 mmol/L      CO2 22.2 mmol/L      Calcium 8.9 mg/dL      BUN/Creatinine Ratio 14.9     Anion Gap 11.8 mmol/L      eGFR 109.7 mL/min/1.73     Narrative:      GFR Normal >60  Chronic Kidney Disease <60  Kidney Failure <15      Heparin Anti-Xa [[id number]]  (Abnormal) Collected: 05/01/24 0307    Specimen: Blood Updated: 05/01/24 0331     Heparin Anti-Xa (UFH) 0.10 IU/ml     Protime-INR [[id number]]  (Normal) Collected: 05/01/24 0307    Specimen: Blood Updated: 05/01/24 0331     Protime 14.3 Seconds      INR 1.06    Narrative:      Suggested INR therapeutic range for stable oral anticoagulant therapy:    Low Intensity therapy:   1.5-2.0  Moderate Intensity therapy:   2.0-3.0  High Intensity therapy:   2.5-4.0    aPTT  [[id number]]  (Abnormal) Collected: 05/01/24 0307    Specimen: Blood Updated: 05/01/24 0331     PTT 30.2 seconds     CBC Auto Differential [[id number]]  (Abnormal) Collected: 05/01/24 0307    Specimen: Blood Updated: 05/01/24 0318     WBC 8.07 10*3/mm3      RBC 3.63 10*6/mm3      Hemoglobin 12.1 g/dL      Hematocrit 33.7 %      MCV 92.8 fL      MCH 33.3 pg      MCHC 35.9 g/dL      RDW 11.6 %      RDW-SD 39.6 fl      MPV 9.7 fL      Platelets 163 10*3/mm3      Neutrophil % 66.8 %      Lymphocyte % 22.6 %      Monocyte % 9.2 %      Eosinophil % 1.0 %      Basophil % 0.2 %      Immature Grans % 0.2 %      Neutrophils, Absolute 5.39 10*3/mm3      Lymphocytes, Absolute 1.82 10*3/mm3      Monocytes, Absolute 0.74 10*3/mm3      Eosinophils, Absolute 0.08 10*3/mm3      Basophils, Absolute 0.02 10*3/mm3      Immature Grans, Absolute 0.02 10*3/mm3      nRBC 0.0 /100 WBC     Hemoglobin A1c [[id number]]  (Abnormal) Collected: 04/30/24 2150    Specimen: Blood Updated: 05/01/24 0213     Hemoglobin A1C 6.70 %     Narrative:      Hemoglobin A1C Ranges:    Increased Risk for Diabetes  5.7% to 6.4%  Diabetes                     >= 6.5%  Diabetic Goal                < 7.0%    High Sensitivity Troponin T [[id number]]  (Abnormal) Collected: 05/01/24 0141    Specimen: Blood Updated: 05/01/24 0206     HS Troponin T 33 ng/L     Narrative:      High Sensitive Troponin T Reference Range:  <14.0 ng/L- Negative Female for AMI  <22.0 ng/L- Negative Male for AMI  >=14 - Abnormal Female indicating possible myocardial injury.  >=22 - Abnormal Male indicating possible myocardial injury.   Clinicians would have to utilize clinical acumen, EKG, Troponin, and serial changes to determine if it is an Acute Myocardial Infarction or myocardial injury due to an underlying chronic condition.         High Sensitivity Troponin T 2Hr [[id number]]  (Abnormal) Collected: 04/30/24 2348    Specimen: Blood Updated: 05/01/24 0019     HS Troponin T 29 ng/L      Troponin T  "Delta 9 ng/L     Narrative:      High Sensitive Troponin T Reference Range:  <14.0 ng/L- Negative Female for AMI  <22.0 ng/L- Negative Male for AMI  >=14 - Abnormal Female indicating possible myocardial injury.  >=22 - Abnormal Male indicating possible myocardial injury.   Clinicians would have to utilize clinical acumen, EKG, Troponin, and serial changes to determine if it is an Acute Myocardial Infarction or myocardial injury due to an underlying chronic condition.         Procalcitonin [[id number]]  (Normal) Collected: 04/30/24 2150    Specimen: Blood Updated: 05/01/24 0003     Procalcitonin 0.06 ng/mL     Narrative:      As a Marker for Sepsis (Non-Neonates):    1. <0.5 ng/mL represents a low risk of severe sepsis and/or septic shock.  2. >2 ng/mL represents a high risk of severe sepsis and/or septic shock.    As a Marker for Lower Respiratory Tract Infections that require antibiotic therapy:    PCT on Admission    Antibiotic Therapy       6-12 Hrs later    >0.5                Strongly Recommended  >0.25 - <0.5        Recommended   0.1 - 0.25          Discouraged              Remeasure/reassess PCT  <0.1                Strongly Discouraged     Remeasure/reassess PCT    As 28 day mortality risk marker: \"Change in Procalcitonin Result\" (>80% or <=80%) if Day 0 (or Day 1) and Day 4 values are available. Refer to [email or web address]    Change in PCT <=80%  A decrease of PCT levels below or equal to 80% defines a positive change in PCT test result representing a higher risk for 28-day all-cause mortality of patients diagnosed with severe sepsis for septic shock.    Change in PCT >80%  A decrease of PCT levels of more than 80% defines a negative change in PCT result representing a lower risk for 28-day all-cause mortality of patients diagnosed with severe sepsis or septic shock.       Comprehensive Metabolic Panel [[id number]]  (Abnormal) Collected: 04/30/24 2150    Specimen: Blood Updated: 04/30/24 " 2221     Glucose 213 mg/dL      Comment: Glucose >180, Hemoglobin A1C recommended.        BUN 13 mg/dL      Creatinine 0.90 mg/dL      Sodium 138 mmol/L      Potassium 3.5 mmol/L      Chloride 101 mmol/L      CO2 23.4 mmol/L      Calcium 9.6 mg/dL      Total Protein 7.7 g/dL      Albumin 4.0 g/dL      ALT (SGPT) 18 U/L      AST (SGOT) 18 U/L      Alkaline Phosphatase 108 U/L      Total Bilirubin 0.5 mg/dL      Globulin 3.7 gm/dL      A/G Ratio 1.1 g/dL      BUN/Creatinine Ratio 14.4     Anion Gap 13.6 mmol/L      eGFR 103.4 mL/min/1.73     Narrative:      GFR Normal >60  Chronic Kidney Disease <60  Kidney Failure <15      High Sensitivity Troponin T [[id number]]  (Normal) Collected: 04/30/24 2150    Specimen: Blood Updated: 04/30/24 2215     HS Troponin T 20 ng/L     Narrative:      High Sensitive Troponin T Reference Range:  <14.0 ng/L- Negative Female for AMI  <22.0 ng/L- Negative Male for AMI  >=14 - Abnormal Female indicating possible myocardial injury.  >=22 - Abnormal Male indicating possible myocardial injury.   Clinicians would have to utilize clinical acumen, EKG, Troponin, and serial changes to determine if it is an Acute Myocardial Infarction or myocardial injury due to an underlying chronic condition.         [place] Draw [[id number]] Collected: 04/30/24 2150    Specimen: Blood Updated: 04/30/24 2201    Narrative:      The following orders were created for panel order [place] Draw.  Procedure                               Abnormality         Status                     ---------                               -----------         ------                     Green Top (Gel)[[id number]]                                  Final result               Lavender Top[[id number]]                                     Final result               Gold Top - SST[[id number]]                                   Final result               Light Blue Top[[id number]]                                   Final result                 Please  view results for these tests on the individual orders.    Green Top (Gel) [[id number]] Collected: 04/30/24 2150    Specimen: Blood Updated: 04/30/24 2201     Extra Tube Hold for add-ons.     Comment: Auto resulted.       Lavender Top [[id number]] Collected: 04/30/24 2150    Specimen: Blood Updated: 04/30/24 2201     Extra Tube hold for add-on     Comment: Auto resulted       Gold Top - SST [[id number]] Collected: 04/30/24 2150    Specimen: Blood Updated: 04/30/24 2201     Extra Tube Hold for add-ons.     Comment: Auto resulted.       Light Blue Top [[id number]] Collected: 04/30/24 2150    Specimen: Blood Updated: 04/30/24 2201     Extra Tube Hold for add-ons.     Comment: Auto resulted       CBC & Differential [[id number]]  (Abnormal) Collected: 04/30/24 2150    Specimen: Blood Updated: 04/30/24 2157    Narrative:      The following orders were created for panel order CBC & Differential.  Procedure                               Abnormality         Status                     ---------                               -----------         ------                     CBC Auto Differential[[id number]]        Abnormal            Final result                 Please view results for these tests on the individual orders.    CBC Auto Differential [[id number]]  (Abnormal) Collected: 04/30/24 2150    Specimen: Blood Updated: 04/30/24 2157     WBC 11.00 10*3/mm3      RBC 4.30 10*6/mm3      Hemoglobin 14.4 g/dL      Hematocrit 39.8 %      MCV 92.6 fL      MCH 33.5 pg      MCHC 36.2 g/dL      RDW 11.6 %      RDW-SD 39.0 fl      MPV 9.8 fL      Platelets 191 10*3/mm3      Neutrophil % 73.8 %      Lymphocyte % 17.3 %      Monocyte % 7.2 %      Eosinophil % 1.0 %      Basophil % 0.3 %      Immature Grans % 0.4 %      Neutrophils, Absolute 8.13 10*3/mm3      Lymphocytes, Absolute 1.90 10*3/mm3      Monocytes, Absolute 0.79 10*3/mm3      Eosinophils, Absolute 0.11 10*3/mm3      Basophils, Absolute 0.03 10*3/mm3      Immature Grans, Absolute 0.04  10*3/mm3      nRBC 0.0 /100 WBC           Imaging Results (Last 48 Hours)       Procedure Component Value Units Date/Time    XR Chest 1 View [[id number]] Collected: 05/01/24 1023     Updated: 05/01/24 1027    Narrative:      PROCEDURE: XR CHEST 1 VW-        HISTORY: Tearing, stabbing pain in Chest, Chest Pain Triage Protocol     COMPARISON: May 2022.     FINDINGS: The heart is normal in size. The mediastinum is unremarkable.  The lungs are clear. There is no pneumothorax. There are no acute  osseous abnormalities.       Impression:      No acute cardiopulmonary process.                       Images were reviewed, interpreted, and dictated by Dr. Mary Horton MD  Transcribed by Stephania Cooper PA-C.     This report was signed and finalized on 5/1/2024 10:25 AM by Mary Horton MD.       CT Angiogram Chest [[id number]] Collected: 04/30/24 2255     Updated: 04/30/24 2314    Narrative:      FINAL REPORT    TECHNIQUE:  Multiple axial CT images were obtained through the chest  following IV contrast using a CTA/PE protocol.  3D/MIP  reconstruction images were also performed. This study was  performed with techniques to keep radiation doses as low as  reasonably achievable (ALARA). Individualized dose reduction  techniques using automated exposure control or adjustment of mA  and/or kV according to the patient's size were employed.    CLINICAL HISTORY:  Chest pain, hypertension and tachycardia    FINDINGS:  PAs and aorta: No pulmonary embolus.  There is a mildly  aneurysmal ascending thoracic aorta measuring 4.1 cm.  There is  coronary artery disease.  Heart/mediastinum: No evidence for  right heart strain.  No pericardial effusion.    There is  borderline cardiomegaly.  Lungs: There is bronchial wall  thickening with areas of peripheral mucous plugging and  tree-in-bud nodularity present within the posterior segment of  the right upper lobe and bilateral lower lobes.    Lymph nodes:  No pathologically enlarged thoracic  "lymph nodes.  Pleura: No  pneumothorax or pleural effusion.  Chest Wall: No chest wall  contusion.  Bones: No acute fracture.  Upper abdomen: No acute  findings in the upper abdomen.      Impression:      No pulmonary embolus.     Bronchitis and mild bronchopneumonia.  Mildly aneurysmal thoracic aorta.  Coronary artery disease.    Authenticated and Electronically Signed by Bam Nieto MD on  04/30/2024 11:13:38 PM          Orders (last 48 hrs)        Start     Ordered    05/02/24 0600  CBC & Differential  Every Third Day,   Status:  Canceled      Comments: Discontinue After Heparin Stopped      05/01/24 0254    05/01/24 1230  Inpatient Admission  Once         05/01/24 1230    05/01/24 1140  Fentanyl, Urine - Urine, Clean Catch  Once         05/01/24 1139    05/01/24 1133  Potassium  Timed         05/01/24 0232    05/01/24 1000  Heparin Anti-Xa  Once         05/01/24 0338    05/01/24 0935  Diet: Cardiac; Healthy Heart (2-3 Na+); Fluid Consistency: Thin (IDDSI 0)  Diet Effective Now         05/01/24 0934    05/01/24 0900  atorvastatin (LIPITOR) tablet 20 mg  Daily         05/01/24 0233    05/01/24 0900  FLUoxetine (PROzac) capsule 20 mg  Daily         05/01/24 0233    05/01/24 0900  metoprolol succinate XL (TOPROL-XL) 24 hr tablet 100 mg  Daily         05/01/24 0233    05/01/24 0900  sodium chloride 0.9 % flush 10 mL  Every 12 Hours Scheduled         05/01/24 0233    05/01/24 0900  Enoxaparin Sodium (LOVENOX) syringe 40 mg  Daily,   Status:  Discontinued         05/01/24 0233    05/01/24 0900  insulin glargine (LANTUS, SEMGLEE) injection 1-200 Units  2 Times Daily - Glucommander         05/01/24 0136    05/01/24 0900  aspirin EC tablet 81 mg  Daily         05/01/24 0257    05/01/24 0900  lisinopril (PRINIVIL,ZESTRIL) tablet 20 mg  Every 24 Hours Scheduled        Placed in \"And\" Linked Group    05/01/24 0750    05/01/24 0900  hydroCHLOROthiazide oral 12.5 mg  Every 24 Hours Scheduled        Placed in \"And\" Linked " Group    05/01/24 0750    05/01/24 0900  isosorbide mononitrate (IMDUR) 24 hr tablet 60 mg  Every 24 Hours Scheduled         05/01/24 0751    05/01/24 0900  Enoxaparin Sodium (LOVENOX) syringe 40 mg  Every 24 Hours         05/01/24 0756    05/01/24 0800  Oral Care  2 Times Daily       05/01/24 0233    05/01/24 0800  insulin lispro (humaLOG) injection 1-200 Units  4 Times Daily With Meals & Nightly         05/01/24 0136    05/01/24 0752  Pharmacy to Dose enoxaparin (LOVENOX)  Continuous PRN         05/01/24 0752    05/01/24 0749  COVID PRE-OP / PRE-PROCEDURE SCREENING ORDER (NO ISOLATION) - Swab, Nasopharynx  Once         05/01/24 0748    05/01/24 0749  COVID-19 and FLU A/B PCR, 1 HR TAT - Swab, Nasopharynx  PROCEDURE ONCE         05/01/24 0748    05/01/24 0748  Urine Drug Screen - Urine, Clean Catch  Once         05/01/24 0747    05/01/24 0748  Ethanol  Once         05/01/24 0747    05/01/24 0741  MRSA Screen, PCR (Inpatient) - Swab, Nares  Once         05/01/24 0741    05/01/24 0741  VRE Culture - Swab, Per Rectum  Once         05/01/24 0741    05/01/24 0741  Acinetobacter Screen - Swab, Axilla, Left  Once         05/01/24 0741    05/01/24 0700  POC Glucose 4x Daily Before Meals & at Bedtime  4 Times Daily Before Meals & at Bedtime      Comments: Complete no more than 45 minutes prior to patient eating      05/01/24 0136    05/01/24 0600  Basic Metabolic Panel  Daily       05/01/24 0233    05/01/24 0600  CBC Auto Differential  Daily       05/01/24 0233    05/01/24 0600  High Sensitivity Troponin T  Morning Draw         05/01/24 0230    05/01/24 0400  Vital Signs  Every 4 Hours       05/01/24 0233    05/01/24 0345  heparin (porcine) injection 4,000 Units  Once         05/01/24 0254    05/01/24 0345  heparin 62680 units/250 ml (100 units/ml) in D5W  Titrated,   Status:  Discontinued         05/01/24 0254    05/01/24 0330  potassium chloride (KLOR-CON M20) CR tablet 40 mEq  Every 4 Hours         05/01/24 0232     05/01/24 0255  Initiate & Follow Heparin Protocol  Continuous,   Status:  Canceled         05/01/24 0254    05/01/24 0255  Notify Provider Platelet Count Less Than 82890  Continuous,   Status:  Canceled        Comments: Open Order Report to View Parameters Requiring Provider Notification    05/01/24 0254    05/01/24 0255  Stop Infusion & Notify Provider if Bleeding Occurs  Continuous,   Status:  Canceled        Comments: Open Order Report to View Parameters Requiring Provider Notification    05/01/24 0254    05/01/24 0255  CBC & Differential  STAT,   Status:  Canceled        Comments: Prior to Initial Heparin Bolus      05/01/24 0254    05/01/24 0255  Heparin Anti-Xa  STAT        Comments: Prior to Initial Heparin Bolus      05/01/24 0254    05/01/24 0255  Protime-INR  STAT        Comments: Prior to Initial Heparin Bolus      05/01/24 0254    05/01/24 0255  aPTT  STAT        Comments: Prior to Initial Heparin Bolus      05/01/24 0254    05/01/24 0255  CBC Auto Differential  PROCEDURE ONCE,   Status:  Canceled        Comments: Prior to Initial Heparin Bolus      05/01/24 0254    05/01/24 0255  Inpatient Cardiology Consult  Once        Specialty:  Cardiology  Provider:  Tejas Marroquin MD    05/01/24 0254    05/01/24 0254  heparin (porcine) injection 4,000 Units  As Needed,   Status:  Discontinued         05/01/24 0254 05/01/24 0254  heparin (porcine) injection 2,000 Units  As Needed,   Status:  Discontinued         05/01/24 0254 05/01/24 0254  Pharmacy to Dose Heparin  Continuous PRN,   Status:  Discontinued         05/01/24 0254    05/01/24 0234  Intake & Output  Every Shift       05/01/24 0233    05/01/24 0234  Weigh Patient  Once         05/01/24 0233    05/01/24 0234  Insert Peripheral IV  Once         05/01/24 0233    05/01/24 0234  Saline Lock & Maintain IV Access  Continuous,   Status:  Canceled         05/01/24 0233    05/01/24 0234  Place Sequential Compression Device  Once         05/01/24 0233     "05/01/24 0234  Maintain Sequential Compression Device  Continuous         05/01/24 0233 05/01/24 0234  Continuous Cardiac Monitoring  Continuous        Comments: Follow Standing Orders As Outlined in Process Instructions (Open Order Report to View Full Instructions)    05/01/24 0233    05/01/24 0234  Maintain IV Access  Continuous         05/01/24 0233 05/01/24 0234  Telemetry - Place Orders & Notify Provider of Results When Patient Experiences Acute Chest Pain, Dysrhythmia or Respiratory Distress  Continuous        Comments: Open Order Report to View Parameters Requiring Provider Notification    05/01/24 0233    05/01/24 0234  May Be Off Telemetry for Tests  Continuous         05/01/24 0233 05/01/24 0234  Notify Provider (With Default Parameters)  Continuous        Comments: Open Order Report to View Parameters Requiring Provider Notification    05/01/24 0233 05/01/24 0234  NPO Diet NPO Type: Strict NPO  Diet Effective Now,   Status:  Canceled         05/01/24 0233 05/01/24 0234  Inpatient Case Management  Consult  Once        Provider:  (Not yet assigned)    05/01/24 0233 05/01/24 0234  Bowel Regimen Not Indicated  Once         05/01/24 0233 05/01/24 0234  Adult Transthoracic Echo Complete W/ Cont if Necessary Per Protocol  Once         05/01/24 0233 05/01/24 0233  albuterol sulfate HFA (PROVENTIL HFA;VENTOLIN HFA;PROAIR HFA) inhaler 2 puff  Every 4 Hours PRN         05/01/24 0233 05/01/24 0233  sodium chloride 0.9 % flush 10 mL  As Needed         05/01/24 0233 05/01/24 0233  sodium chloride 0.9 % infusion 40 mL  As Needed         05/01/24 0233 05/01/24 0233  acetaminophen (TYLENOL) tablet 650 mg  Every 4 Hours PRN        Placed in \"Or\" Linked Group    05/01/24 0233    05/01/24 0233  acetaminophen (TYLENOL) 160 MG/5ML oral solution 650 mg  Every 4 Hours PRN        Placed in \"Or\" Linked Group    05/01/24 0233    05/01/24 0233  acetaminophen (TYLENOL) suppository 650 " "mg  Every 4 Hours PRN        Placed in \"Or\" Linked Group    05/01/24 0233    05/01/24 0233  ondansetron (ZOFRAN) injection 4 mg  Every 6 Hours PRN         05/01/24 0233    05/01/24 0233  nitroglycerin (NITROSTAT) SL tablet 0.4 mg  Every 5 Minutes PRN         05/01/24 0233    05/01/24 0233  Potassium Replacement - Follow Nurse / BPA Driven Protocol  As Needed         05/01/24 0233    05/01/24 0233  Magnesium Standard Dose Replacement - Follow Nurse / BPA Driven Protocol  As Needed         05/01/24 0233    05/01/24 0233  Phosphorus Replacement - Follow Nurse / BPA Driven Protocol  As Needed         05/01/24 0233    05/01/24 0233  Calcium Replacement - Follow Nurse / BPA Driven Protocol  As Needed         05/01/24 0233    05/01/24 0137  Initiate Glucommander™ SQ  Once         05/01/24 0136    05/01/24 0137  Patient is on Glucommander  Continuous         05/01/24 0136    05/01/24 0137  RN to Order STAT Glucose (Lab Performed) for POC Glucose <10 or >600  Continuous        Comments: Do NOT Delay Treatment of Unstable Patient to Obtain Glucose Sample From Lab  Notify Provider of Results    05/01/24 0136    05/01/24 0137  Hemoglobin A1c  Once         05/01/24 0136    05/01/24 0136  dextrose (GLUTOSE) oral gel 15 g  Every 15 Minutes PRN         05/01/24 0136    05/01/24 0136  dextrose (D50W) (25 g/50 mL) IV injection 10-50 mL  Every 15 Minutes PRN         05/01/24 0136    05/01/24 0136  Glucagon (GLUCAGEN) injection 1 mg  Every 15 Minutes PRN         05/01/24 0136    05/01/24 0136  insulin lispro (humaLOG) injection 1-200 Units  As Needed         05/01/24 0136    05/01/24 0135  Code Status and Medical Interventions:  Continuous         05/01/24 0135    05/01/24 0125  High Sensitivity Troponin T  Once         05/01/24 0124    04/30/24 2350  High Sensitivity Troponin T 2Hr  PROCEDURE ONCE         04/30/24 2215    04/30/24 2341  Procalcitonin  Once         04/30/24 2340    04/30/24 2337  Initiate Observation Status  Once      "    04/30/24 2337    04/30/24 2315  nitroglycerin (TRIDIL) 200 mcg/ml infusion  Titrated         04/30/24 2259    04/30/24 2303  Critical Care  Once        Comments: This order was created via procedure documentation    04/30/24 2302 04/30/24 2241  ondansetron (ZOFRAN) injection 4 mg  Once         04/30/24 2225 04/30/24 2229  iopamidol (ISOVUE-300) 61 % injection 100 mL  Once in Imaging         04/30/24 2213 04/30/24 2221  morphine injection 4 mg  Once         04/30/24 2205 04/30/24 2204  aspirin tablet 325 mg  Once         04/30/24 2148 04/30/24 2154  nitroglycerin (NITROSTAT) SL tablet 0.4 mg  Every 5 Minutes PRN         04/30/24 2154 04/30/24 2154  CT Angiogram Chest  1 Time Imaging         04/30/24 2154 04/30/24 2149  NPO Diet NPO Type: Strict NPO  Diet Effective Now,   Status:  Canceled         04/30/24 2148 04/30/24 2149  Undress & Gown  Once         04/30/24 2148 04/30/24 2149  Cardiac Monitoring  Continuous,   Status:  Canceled        Comments: Follow Standing Orders As Outlined in Process Instructions (Open Order Report to View Full Instructions)    04/30/24 2148 04/30/24 2149  Continuous Pulse Oximetry  Continuous         04/30/24 2148 04/30/24 2149  ECG 12 Lead ED Triage Standing Order; Chest Pain  Once         04/30/24 2148 04/30/24 2149  XR Chest 1 View  1 Time Imaging         04/30/24 2148 04/30/24 2149  Insert Peripheral IV  Once         04/30/24 2148 04/30/24 2149  [place] Draw  Once         04/30/24 2148 04/30/24 2149  CBC & Differential  Once         04/30/24 2148 04/30/24 2149  Comprehensive Metabolic Panel  Once         04/30/24 2148 04/30/24 2149  High Sensitivity Troponin T  Once         04/30/24 2148 04/30/24 2149  Green Top (Gel)  PROCEDURE ONCE         04/30/24 2148 04/30/24 2149  Lavender Top  PROCEDURE ONCE         04/30/24 2148 04/30/24 2149  Gold Top - SST  PROCEDURE ONCE         04/30/24 2148 04/30/24 2149  Light Blue Top   PROCEDURE ONCE         04/30/24 2148 04/30/24 2149  CBC Auto Differential  PROCEDURE ONCE         04/30/24 2148 04/30/24 2148  sodium chloride 0.9 % flush 10 mL  As Needed         04/30/24 2148    Unscheduled  Oxygen Therapy- Nasal Cannula; Titrate 1-6 LPM Per SpO2; 90 - 95%  Continuous PRN       04/30/24 2148    Unscheduled  ECG 12 Lead ED Triage Standing Order; Chest Pain  As Needed      Comments: Persistent, Unrelieved or Recurrent Chest Pain    04/30/24 2148    Unscheduled  Up With Assistance  As Needed       05/01/24 0233    Unscheduled  POC Glucose PRN  As Needed      Comments: Complete no more than 45 minutes prior to patient eating      05/01/24 0136    Unscheduled  Treat Hypoglycemia As Recommended By Glucommander™ & Notify Provider of Treatment  As Needed      Comments: Follow Hypoglycemia Orders As Outlined in Process Instructions (Open Order Report to View Full Instructions)  Notify Provider Any Time Hypoglycemia Treatment is Administered    05/01/24 0136    --  aspirin 325 MG tablet  Daily         05/01/24 0256    --  cilostazol (PLETAL) 50 MG tablet  2 Times Daily         05/01/24 1035    --  disulfiram (ANTABUSE) 250 MG tablet  Daily         05/01/24 1035    --  melatonin 5 MG tablet tablet  Nightly PRN         05/01/24 1035    --  meloxicam (MOBIC) 15 MG tablet  Daily         05/01/24 1035    --  rOPINIRole (REQUIP) 0.5 MG tablet  Nightly         05/01/24 1035                  Physician Progress Notes (last 48 hours)  Notes from 04/29/24 1235 through 05/01/24 1235   No notes of this type exist for this encounter.          Consult Notes (last 48 hours)        Tejas Marroquin MD at 05/01/24 1026        Consult Orders    1. Inpatient Cardiology Consult [[id number]] ordered by Kerley, Brian Joseph, DO at 05/01/24 0254                      [facility] Cardiology Consult Note    Andrés Denton  1970  [id number]  05/01/24    Requesting Physician: No ref. provider  "found    Chief Complaint: Chest pain    History of Present Illness:   Mr. Andrés Denton is a 53 y.o. male who is being seen by Cardiology for evaluation of chest pain.  The patient has a past medical history significant for hypertension, chronic EtOH use, and obesity with a BMI 37.  He does not have any significant past cardiac history.  He presented to the [facility] for evaluation of chest pain.  The patient reports he has been experiencing chest pain for approximately the past 1 year.  He reports a \"dull and squeezing\" discomfort located in the central left anterior chest.  He reports the chest discomfort was initially brought on with higher levels of exertion.  Over the past year, his chest pain has progressed to the point where he now experiences chest discomfort with any level of exertion.  No associated nausea, vomiting, or diaphoresis.  He denies dyspnea.  Given recurrent chest pain while walking home yesterday evening, he presented to the emergency department for evaluation.    Initial ECG on presentation showed sinus rhythm with nonspecific ST changes.  On presentation, he was found to be severely hypertensive with an initial blood pressure 240/155 mmHg.  A high-sensitivity troponin was found to be 29, and has been stable at 33 on repeat troponins.  He was admitted by the hospitalist service, and started on a nitro drip for blood pressure control.  Cardiology has been consulted for further recommendations.    Prior Cardiac Testin. Last Coronary Angio: None  2. Prior Stress Testing: None  3. Last Echo: None  4. Prior Holter Monitor: None    Review of Systems:   Review of Systems   Constitutional:  Negative for activity change, appetite change, chills, diaphoresis, fatigue, fever, unexpected weight gain and unexpected weight loss.   Eyes:  Negative for blurred vision and double vision.   Respiratory:  Positive for chest tightness. Negative for cough, shortness of breath and " wheezing.    Cardiovascular:  Negative for chest pain, palpitations and leg swelling.   Gastrointestinal:  Negative for abdominal pain, anal bleeding, blood in stool and GERD.   Endocrine: Negative for cold intolerance and heat intolerance.   Genitourinary:  Negative for hematuria.   Neurological:  Negative for dizziness, syncope, weakness and light-headedness.   Hematological:  Does not bruise/bleed easily.   Psychiatric/Behavioral:  Negative for depressed mood and stress. The patient is not nervous/anxious.        Past Medical History:   Past Medical History:   Diagnosis Date    Alcohol abuse     Anxiety     Depression     Hypertension     Suicidal thoughts     Withdrawal symptoms, alcohol        Past Surgical History:   Past Surgical History:   Procedure Laterality Date    AMPUTATION  1995    right ring finger partial amputation    FRACTURE SURGERY Left     tib/fib pt. is unsure the exact surgery it was when he was 17       Family History:   Family History   Problem Relation Age of Onset    Alcohol abuse Mother     Alcohol abuse Father     Alcohol abuse Maternal Uncle        Social History:   Social History     Socioeconomic History    Marital status:     Number of children: 2    Years of education: 12    Highest education level: High school graduate   Tobacco Use    Smoking status: Never    Smokeless tobacco: Current     Types: Snuff    Tobacco comments:     One can per day- tobacco use starting at age 10- 44  years   Vaping Use    Vaping status: Never Used   Substance and Sexual Activity    Alcohol use: Yes     Comment: 1-2 x weekly1/2 pint, ~ 30 beers and a pint of vodka this past weekend    Drug use: Yes     Comment: alcohol    Sexual activity: Defer     Partners: Female       Medications:     Current Facility-Administered Medications:     acetaminophen (TYLENOL) tablet 650 mg, 650 mg, Oral, Q4H PRN, 650 mg at 05/01/24 1002 **OR** acetaminophen (TYLENOL) 160 MG/5ML oral solution 650 mg, 650 mg, Oral,  Q4H PRN **OR** acetaminophen (TYLENOL) suppository 650 mg, 650 mg, Rectal, Q4H PRN, Kerley, Brian Joseph, DO    albuterol sulfate HFA (PROVENTIL HFA;VENTOLIN HFA;PROAIR HFA) inhaler 2 puff, 2 puff, Inhalation, Q4H PRN, Kerley, Brian Joseph, DO    aspirin EC tablet 81 mg, 81 mg, Oral, Daily, Kerley, Brian Joseph, DO, 81 mg at 05/01/24 0949    atorvastatin (LIPITOR) tablet 20 mg, 20 mg, Oral, Daily, Kerley, Brian Joseph, DO, 20 mg at 05/01/24 0949    Calcium Replacement - Follow Nurse / BPA Driven Protocol, , Does not apply, PRN, Kerley, Brian Joseph, DO    dextrose (D50W) (25 g/50 mL) IV injection 10-50 mL, 10-50 mL, Intravenous, Q15 Min PRN, Kerley, Brian Joseph, DO    dextrose (GLUTOSE) oral gel 15 g, 15 g, Oral, Q15 Min PRN, Kerley, Brian Joseph, DO    Enoxaparin Sodium (LOVENOX) syringe 40 mg, 40 mg, Subcutaneous, Q24H, Ene Jacob DO, 40 mg at 05/01/24 0949    FLUoxetine (PROzac) capsule 20 mg, 20 mg, Oral, Daily, Kerley, Brian Joseph, DO, 20 mg at 05/01/24 0949    Glucagon (GLUCAGEN) injection 1 mg, 1 mg, Intramuscular, Q15 Min PRN, Kerley, Brian Joseph, DO    lisinopril (PRINIVIL,ZESTRIL) tablet 20 mg, 20 mg, Oral, Q24H, 20 mg at 05/01/24 0951 **AND** hydroCHLOROthiazide oral 12.5 mg, 12.5 mg, Oral, Q24H, Ene Jacob DO, 12.5 mg at 05/01/24 0949    insulin glargine (LANTUS, SEMGLEE) injection 1-200 Units, 1-200 Units, Subcutaneous, BID - Glucommander, Kerley, Brian Joseph, DO, 9 Units at 05/01/24 0949    insulin lispro (humaLOG) injection 1-200 Units, 1-200 Units, Subcutaneous, 4x Daily With Meals & Nightly, Kerley, Brian Joseph, DO    insulin lispro (humaLOG) injection 1-200 Units, 1-200 Units, Subcutaneous, PRN, Kerley, Brian Joseph, DO    isosorbide mononitrate (IMDUR) 24 hr tablet 60 mg, 60 mg, Oral, Q24H, Ene Jacob DO, 60 mg at 05/01/24 0951    Magnesium Standard Dose Replacement - Follow Nurse / BPA Driven Protocol, , Does not apply, PRN, Kerley, Brian Joseph, DO     "metoprolol succinate XL (TOPROL-XL) 24 hr tablet 100 mg, 100 mg, Oral, Daily, Kerley, Brian Joseph, DO, 100 mg at 05/01/24 0949    nitroglycerin (NITROSTAT) SL tablet 0.4 mg, 0.4 mg, Sublingual, Q5 Min PRN, Kerley, Brian Joseph, DO, 0.4 mg at 04/30/24 2201    nitroglycerin (NITROSTAT) SL tablet 0.4 mg, 0.4 mg, Sublingual, Q5 Min PRN, Kerley, Brian Joseph, DO    nitroglycerin (TRIDIL) 200 mcg/ml infusion, 5-200 mcg/min, Intravenous, Titrated, Kerley, Brian Joseph, DO, Last Rate: 7.5 mL/hr at 05/01/24 1009, 25 mcg/min at 05/01/24 1009    ondansetron (ZOFRAN) injection 4 mg, 4 mg, Intravenous, Q6H PRN, Kerley, Brian Joseph, DO    Pharmacy to Dose enoxaparin (LOVENOX), , Does not apply, Continuous PRN, Ene Jacob DO    Phosphorus Replacement - Follow Nurse / BPA Driven Protocol, , Does not apply, PRN, Kerley, Brian Joseph, DO    Potassium Replacement - Follow Nurse / BPA Driven Protocol, , Does not apply, PRN, Kerley, Brian Joseph, DO    sodium chloride 0.9 % flush 10 mL, 10 mL, Intravenous, PRN, Kerley, Brian Joseph, DO    sodium chloride 0.9 % flush 10 mL, 10 mL, Intravenous, Q12H, Kerley, Brian Joseph, DO, 10 mL at 05/01/24 0949    sodium chloride 0.9 % flush 10 mL, 10 mL, Intravenous, PRN, Kerley, Brian Joseph, DO    sodium chloride 0.9 % infusion 40 mL, 40 mL, Intravenous, PRN, Kerley, Brian Joseph, DO    Allergies:   No Known Allergies    Physical Exam:  Vital Signs:   Vitals:    05/01/24 0949 05/01/24 1000 05/01/24 1001 05/01/24 1015   BP: (!) 172/110 (!) 184/120 (!) 172/110 174/98   BP Location:       Patient Position:       Pulse: 82 91 90 79   Resp:       Temp:       TempSrc:       SpO2:  98% 92% 97%   Weight:   109 kg (240 lb 4.8 oz)    Height:   170.2 cm (67.01\")        Physical Exam  Vitals and nursing note reviewed.   Constitutional:       General: He is not in acute distress.     Appearance: He is obese. He is not diaphoretic.   HENT:      Head: Normocephalic and atraumatic.   Cardiovascular: "      Rate and Rhythm: Normal rate and regular rhythm.      Heart sounds: No murmur heard.  Pulmonary:      Effort: Pulmonary effort is normal. No respiratory distress.      Breath sounds: Normal breath sounds. No stridor. No wheezing, rhonchi or rales.   Abdominal:      General: Bowel sounds are normal. There is no distension.      Palpations: Abdomen is soft.      Tenderness: There is no abdominal tenderness. There is no guarding or rebound.   Musculoskeletal:         General: No swelling. Normal range of motion.      Cervical back: Neck supple. No tenderness.   Skin:     General: Skin is warm and dry.   Neurological:      General: No focal deficit present.      Mental Status: He is alert and oriented to person, place, and time.   Psychiatric:         Mood and Affect: Mood normal.         Behavior: Behavior normal.         Results Review:   Results from last 7 days   Lab Units 05/01/24  0307 04/30/24  2150   SODIUM mmol/L 135* 138   POTASSIUM mmol/L 3.4* 3.5   CHLORIDE mmol/L 101 101   CO2 mmol/L 22.2 23.4   BUN mg/dL 11 13   CREATININE mg/dL 0.74* 0.90   CALCIUM mg/dL 8.9 9.6   BILIRUBIN mg/dL  --  0.5   ALK PHOS U/L  --  108   ALT (SGPT) U/L  --  18   AST (SGOT) U/L  --  18   GLUCOSE mg/dL 162* 213*     Results from last 7 days   Lab Units 05/01/24  0307 05/01/24  0141 04/30/24  2348   HSTROP T ng/L 33* 33* 29*     Results from last 7 days   Lab Units 05/01/24  0307 04/30/24  2150   WBC 10*3/mm3 8.07 11.00*   HEMOGLOBIN g/dL 12.1* 14.4   HEMATOCRIT % 33.7* 39.8   PLATELETS 10*3/mm3 163 191     Results from last 7 days   Lab Units 05/01/24  0307   INR  1.06   APTT seconds 30.2*               I personally viewed and interpreted the patient's EKG/Telemetry data     Assessment / Plan:     1.  Hypertensive emergency  -- Currently on nitro drip  -- Continue home metoprolol  -- Agree with starting lisinopril and isosorbide  -- Wean and discontinue nitro drip today as able    2.  Chest pain  -- Progressive chest  discomfort over the past year, symptoms concerning for unstable angina  -- Initial ECG shows nonspecific ST changes  -- Troponin level minimally elevated in a plateau fashion, potentially related to hypertension (as above) in the setting of suspected underlying CAD  -- Discussed the risks and benefits of coronary angiography with online PCI as indicated, and the patient is agreeable to proceed  -- Plan to work on blood pressure control today, coronary angiography tomorrow morning  -- Continue aspirin  -- Consider increasing statin to high intensity dosing pending results of coronary angiogram tomorrow morning        Thank you for allowing me to participate in the care of your patient. Please do not hesitate to contact me with additional questions or concerns.     WILLIAN Marroquin MD  Interventional Cardiology   05/01/24  10:26 EDT     Electronically signed by Tejas Marroquin MD at 05/01/24 9662

## 2024-05-01 NOTE — CASE MANAGEMENT/SOCIAL WORK
Discharge Planning Assessment   Junior     Patient Name: Andrés Denton  MRN: [id number]  Today's Date: 5/1/2024    Admit Date: 4/30/2024    Plan: Met with pt for dcp, pt provided demographics. He does not have an AD, POA, or home DME. Primary listed is Aisha Barba, pt states he has appt. this week to establish with new primary at [facility]. He uses [facility] pharmacy, is agreeable to meds to bed. Pt is living in a group [facility]ber Living Home and has been there for 1 month. He plans to return there at [place]. Food bag eligible, has transportation and resource needs; will provide resources and follow.   Discharge Needs Assessment       Row Name 05/01/24 1304       Living Environment    People in Home facility resident    Unique Family Situation [facility]ber Living Home    Duration at Residence 1 month    Potentially Unsafe Housing Conditions none    In the past 12 months has the electric, gas, oil, or water company threatened to shut off services in your home? No    Primary Care Provided by self    Family Caregiver if Needed child(ernesto), adult    Quality of Family Relationships helpful    Able to Return to Prior Arrangements yes       Resource/Environmental Concerns    Resource/Environmental Concerns financial;reliable transportation    Financial Concerns unemployed    Transportation Concerns no car       Transportation Needs    In the past 12 months, has lack of transportation kept you from medical appointments or from getting medications? yes    In the past 12 months, has lack of transportation kept you from meetings, work, or from getting things needed for daily living? Yes       Food Insecurity    Within the past 12 months, you worried that your food would run out before you got the money to buy more. Sometimes    Within the past 12 months, the food you bought just didn't last and you didn't have money to get more. Never true       Transition Planning    Patient/Family Anticipates Transition to home        Discharge Needs Assessment    Readmission Within the Last 30 Days no previous admission in last 30 days    Concerns to be Addressed discharge planning    Anticipated Changes Related to Illness none    Equipment Needed After Discharge none                   Discharge Plan       Row Name 05/01/24 1312       Plan    Plan Met with pt for dcp, pt provided demographics. He does not have an AD, POA, or home DME. Primary listed is Aisha Barba, pt states he has appt. this week to establish with new primary at [facility] [place]. He uses [facility] pharmacy, is agreeable to meds to bed. Pt is living in a group Sober Living Home and has been there for 1 month. He plans to return there at [place]. Food bag eligible, has transportation and resource needs; will provide resources and follow.                  Continued Care and Services - Admitted Since 4/30/2024    No active coordination exists for this encounter.          Demographic Summary       Row Name 05/01/24 1302       General Information    Admission Type inpatient    Arrived From emergency department    Referral Source admission list    Reason for Consult discharge planning    Preferred Language English       Contact Information    Permission Granted to Share Info With family/designee                   Functional Status       Row Name 05/01/24 1303       Functional Status    Usual Activity Tolerance moderate    Current Activity Tolerance fair       Physical Activity    On average, how many days per week do you engage in moderate to strenuous exercise (like a brisk walk)? 5 days    On average, how many minutes do you engage in exercise at this level? 10 min    Number of minutes of exercise per week 50       Functional Status, IADL    Medications independent    Meal Preparation independent    Housekeeping independent    Laundry independent    Shopping independent       Mental Status    General Appearance WDL WDL       Mental Status Summary    Recent Changes in Mental  Status/Cognitive Functioning no changes       Employment/    Employment Status disabled                   Psychosocial    No documentation.                  Abuse/Neglect    No documentation.                  Legal    No documentation.                  Substance Abuse    No documentation.                  Patient Forms    No documentation.                     Pam Lewis RN

## 2024-05-01 NOTE — PLAN OF CARE
Goal Outcome Evaluation:  Plan of Care Reviewed With: patient        Progress: improving  Outcome Evaluation: denies chest pain,  New PO meds started for hypertension. VSS, pt sleeping in recliner at this time.  Plan for heart cath in AM if BP remains under control

## 2024-05-01 NOTE — PROGRESS NOTES
"Pharmacy Consult - Enoxaparin Dosing  Andrés Denton is a 53 y.o. male who has been consulted to dose enoxaparin for VTE ppx.     Allergies  Patient has no known allergies.    Relevant clinical data and objective history reviewed:   [Ht: 170.2 cm (67\"); Wt: 109 kg (240 lb 1.3 oz)]  Body mass index is 37.6 kg/m².  Estimated Creatinine Clearance: 139.1 mL/min (A) (by C-G formula based on SCr of 0.74 mg/dL (L)).  Results from last 7 days   Lab Units 05/01/24  0307 04/30/24  2150   HEMOGLOBIN g/dL 12.1* 14.4   HEMATOCRIT % 33.7* 39.8   PLATELETS 10*3/mm3 163 191   CREATININE mg/dL 0.74* 0.90       Asessment/Plan  Initiate enoxaparin 40mg SQ every 24 hours  Pharmacy will monitor Mr. Denton's renal function and clinical status and adjust the enoxaparin dose and/or frequency as needed.    Thanks,     Ifeanyi Gustafson, PharmD  5/1/2024 07:54 EDT    "

## 2024-05-01 NOTE — ED NOTES
10 mins after initiating nitro gtt pt states pain is gone, but he still feels pressure. Will titrate gtt according to protocol.

## 2024-05-01 NOTE — ED PROVIDER NOTES
EMERGENCY DEPARTMENT ENCOUNTER    Pt Name: Andrés Denton  MRN: [id number]  Pt :   1970  Room Number:    Date of encounter:  2024  PCP: Aisha Barba APRN  ED Provider: Bola Aleman MD    Historian: Patient      HPI:  Chief Complaint: Chest pain        Context: Andrés Denton is a 53 y.o. male who presents to the ED c/o chest pain.  Patient states that this morning he had some chest pain but then this evening a little after 9 PM he had sudden onset of severe chest pain.  States that it feels like a tearing sensation/stabbing pain along with feels like something is sitting on his chest.  Has had chest pains in the past but never this bad.  Denies any heart problems only issues with his blood pressure, which he is currently on medication for and denies missing any doses.  States that he has history of alcohol abuse but has been sober for 2 months.  Was actually walking home from [employer] whenever the chest pain started.      PAST MEDICAL HISTORY  Past Medical History:   Diagnosis Date    Alcohol abuse     Anxiety     Depression     Hypertension     Suicidal thoughts     Withdrawal symptoms, alcohol          PAST SURGICAL HISTORY  Past Surgical History:   Procedure Laterality Date    AMPUTATION      right ring finger partial amputation    FRACTURE SURGERY Left     tib/fib pt. is unsure the exact surgery it was when he was 17         FAMILY HISTORY  Family History   Problem Relation Age of Onset    Alcohol abuse Mother     Alcohol abuse Father     Alcohol abuse Maternal Uncle          SOCIAL HISTORY  Social History     Socioeconomic History    Marital status:     Number of children: 2    Years of education: 12    Highest education level: High school graduate   Tobacco Use    Smoking status: Never    Smokeless tobacco: Current     Types: Snuff    Tobacco comments:     One can per day- tobacco use starting at age 10- 44  years   Vaping Use     Vaping status: Never Used   Substance and Sexual Activity    Alcohol use: Yes     Comment: 1-2 x weekly1/2 pint, ~ 30 beers and a pint of vodka this past weekend    Drug use: Yes     Comment: alcohol    Sexual activity: Defer     Partners: Female         ALLERGIES  Patient has no known allergies.        REVIEW OF SYSTEMS  Review of Systems     All systems reviewed and negative except for those discussed in HPI.       PHYSICAL EXAM    I have reviewed the triage vital signs and nursing notes.    ED Triage Vitals [04/30/24 2143]   Temp Heart Rate Resp BP SpO2   97.9 °F (36.6 °C) (!) 130 22 (!) 240/155 96 %      Temp src Heart Rate Source Patient Position BP Location FiO2 (%)   Oral Monitor Sitting Left arm --       Physical Exam    General:  Awake, alert, overweight, anxious appearing in visible discomfort  HEENT: Atraumatic, normocephalic, EOMI, PERRLA, mucous membranes moist  NECK:  Supple, atraumatic  Cardiovascular: Tachycardic, regular rhythm, no murmurs, rubs, or gallops.  Extremities well perfused   Respiratory:  Regular rate, clear lungs to auscultation bilaterally.  No rhonchi, rales, wheezing  Abdominal:  Soft, nondistended, nontender.  No guarding or rebound.  No palpable masses  Extremity:  No visible bony abnormalities in all 4 extremities.  Full range of motion of all extremities.  Skin:  Warm and dry.  No rashes  Neuro:  AAOx3, GCS 15. Cranial nerves 2-12 grossly intact.  No focal strength or sensation deficits.        LAB RESULTS  Recent Results (from the past 24 hour(s))   Comprehensive Metabolic Panel    Collection Time: 04/30/24  9:50 PM    Specimen: Blood   Result Value Ref Range    Glucose 213 (H) 65 - 99 mg/dL    BUN 13 6 - 20 mg/dL    Creatinine 0.90 0.76 - 1.27 mg/dL    Sodium 138 136 - 145 mmol/L    Potassium 3.5 3.5 - 5.2 mmol/L    Chloride 101 98 - 107 mmol/L    CO2 23.4 22.0 - 29.0 mmol/L    Calcium 9.6 8.6 - 10.5 mg/dL    Total Protein 7.7 6.0 - 8.5 g/dL    Albumin 4.0 3.5 - 5.2 g/dL    ALT  (SGPT) 18 1 - 41 U/L    AST (SGOT) 18 1 - 40 U/L    Alkaline Phosphatase 108 39 - 117 U/L    Total Bilirubin 0.5 0.0 - 1.2 mg/dL    Globulin 3.7 gm/dL    A/G Ratio 1.1 g/dL    BUN/Creatinine Ratio 14.4 7.0 - 25.0    Anion Gap 13.6 5.0 - 15.0 mmol/L    eGFR 103.4 >60.0 mL/min/1.73   High Sensitivity Troponin T    Collection Time: 04/30/24  9:50 PM    Specimen: Blood   Result Value Ref Range    HS Troponin T 20 <22 ng/L   Green Top (Gel)    Collection Time: 04/30/24  9:50 PM   Result Value Ref Range    Extra Tube Hold for add-ons.    Lavender Top    Collection Time: 04/30/24  9:50 PM   Result Value Ref Range    Extra Tube hold for add-on    Gold Top - SST    Collection Time: 04/30/24  9:50 PM   Result Value Ref Range    Extra Tube Hold for add-ons.    Light Blue Top    Collection Time: 04/30/24  9:50 PM   Result Value Ref Range    Extra Tube Hold for add-ons.    CBC Auto Differential    Collection Time: 04/30/24  9:50 PM    Specimen: Blood   Result Value Ref Range    WBC 11.00 (H) 3.40 - 10.80 10*3/mm3    RBC 4.30 4.14 - 5.80 10*6/mm3    Hemoglobin 14.4 13.0 - 17.7 g/dL    Hematocrit 39.8 37.5 - 51.0 %    MCV 92.6 79.0 - 97.0 fL    MCH 33.5 (H) 26.6 - 33.0 pg    MCHC 36.2 (H) 31.5 - 35.7 g/dL    RDW 11.6 (L) 12.3 - 15.4 %    RDW-SD 39.0 37.0 - 54.0 fl    MPV 9.8 6.0 - 12.0 fL    Platelets 191 140 - 450 10*3/mm3    Neutrophil % 73.8 42.7 - 76.0 %    Lymphocyte % 17.3 (L) 19.6 - 45.3 %    Monocyte % 7.2 5.0 - 12.0 %    Eosinophil % 1.0 0.3 - 6.2 %    Basophil % 0.3 0.0 - 1.5 %    Immature Grans % 0.4 0.0 - 0.5 %    Neutrophils, Absolute 8.13 (H) 1.70 - 7.00 10*3/mm3    Lymphocytes, Absolute 1.90 0.70 - 3.10 10*3/mm3    Monocytes, Absolute 0.79 0.10 - 0.90 10*3/mm3    Eosinophils, Absolute 0.11 0.00 - 0.40 10*3/mm3    Basophils, Absolute 0.03 0.00 - 0.20 10*3/mm3    Immature Grans, Absolute 0.04 0.00 - 0.05 10*3/mm3    nRBC 0.0 0.0 - 0.2 /100 WBC   Procalcitonin    Collection Time: 04/30/24  9:50 PM    Specimen: Blood    Result Value Ref Range    Procalcitonin 0.06 0.00 - 0.25 ng/mL   High Sensitivity Troponin T 2Hr    Collection Time: 04/30/24 11:48 PM    Specimen: Blood   Result Value Ref Range    HS Troponin T 29 (H) <22 ng/L    Troponin T Delta 9 (C) >=-4 - <+4 ng/L       If labs were ordered, I independently reviewed the results and considered them in treating the patient.        RADIOLOGY  CT Angiogram Chest    Result Date: 4/30/2024  FINAL REPORT TECHNIQUE: Multiple axial CT images were obtained through the chest following IV contrast using a CTA/PE protocol.  3D/MIP reconstruction images were also performed. This study was performed with techniques to keep radiation doses as low as reasonably achievable (ALARA). Individualized dose reduction techniques using automated exposure control or adjustment of mA and/or kV according to the patient's size were employed. CLINICAL HISTORY: Chest pain, hypertension and tachycardia FINDINGS: PAs and aorta: No pulmonary embolus.  There is a mildly aneurysmal ascending thoracic aorta measuring 4.1 cm.  There is coronary artery disease.  Heart/mediastinum: No evidence for right heart strain.  No pericardial effusion.    There is borderline cardiomegaly.  Lungs: There is bronchial wall thickening with areas of peripheral mucous plugging and tree-in-bud nodularity present within the posterior segment of the right upper lobe and bilateral lower lobes.    Lymph nodes: No pathologically enlarged thoracic lymph nodes.  Pleura: No pneumothorax or pleural effusion.  Chest Wall: No chest wall contusion.  Bones: No acute fracture.  Upper abdomen: No acute findings in the upper abdomen.     No pulmonary embolus.     Bronchitis and mild bronchopneumonia. Mildly aneurysmal thoracic aorta.  Coronary artery disease. Authenticated and Electronically Signed by Bam Nieto MD on 04/30/2024 11:13:38 PM     I ordered and independently reviewed the above noted radiographic studies.     See radiologist's  dictation for official interpretation.        PROCEDURES    Critical Care    Performed by: Bola Aleman MD  Authorized by: Bola Aleman MD    Critical care provider statement:     Critical care time (minutes):  35    Critical care time was exclusive of:  Separately billable procedures and treating other patients and teaching time    Critical care was necessary to treat or prevent imminent or life-threatening deterioration of the following conditions:  Cardiac failure and circulatory failure    Critical care was time spent personally by me on the following activities:  Ordering and performing treatments and interventions, ordering and review of radiographic studies, ordering and review of laboratory studies, pulse oximetry, re-evaluation of patient's condition, obtaining history from patient or surrogate, evaluation of patient's response to treatment, examination of patient and development of treatment plan with patient or surrogate    I assumed direction of critical care for this patient from another provider in my specialty: no      Care discussed with: admitting provider        ECG 12 Lead ED Triage Standing Order; Chest Pain   Final Result          MEDICATIONS GIVEN IN ER    Medications   sodium chloride 0.9 % flush 10 mL (has no administration in time range)   nitroglycerin (NITROSTAT) SL tablet 0.4 mg (0.4 mg Sublingual Given 4/30/24 2201)   nitroglycerin (TRIDIL) 200 mcg/ml infusion (5 mcg/min Intravenous Rate/Dose Change 5/1/24 0108)   aspirin tablet 325 mg (325 mg Oral Given 4/30/24 2234)   morphine injection 4 mg (4 mg Intravenous Given 4/30/24 2220)   iopamidol (ISOVUE-300) 61 % injection 100 mL (100 mL Intravenous Given 4/30/24 2213)   ondansetron (ZOFRAN) injection 4 mg (4 mg Intravenous Given 4/30/24 2236)         MEDICAL DECISION MAKING, PROGRESS, and CONSULTS    All labs, if obtained, have been independently reviewed by me.  All radiology studies, if obtained, have been reviewed by me and the  radiologist dictating the report.  All EKG's, if obtained, have been independently viewed and interpreted by me.      Discussion below represents my analysis of pertinent findings related to patient's condition, differential diagnosis, treatment plan and final disposition.    Andrés Denton is a 53 y.o. male who presents to the ED c/o chest pain.  Severely hypertensive on arrival with blood pressure 240/155 along with tachycardic with heart rate of 130.  Differential diagnosis includes but is not limited to aortic dissection, MI, musculoskeletal chest pain, GERD, stable angina, unstable angina, pneumothorax, rib fracture, and pulmonary embolism. Labs obtained including CBC, CMP, Troponin profile. Chest xray and EKG obtained.      EKG independently interpretted by myself and shows sinus tachycardia with rate of 120 along with evidence of left axis deviation but no evidence of acute ischemic changes/STEMI.  Patient placed on cardiac monitoring.    Patient given sublingual nitroglycerin.  Patient also given 4 mg of IV morphine for pain control.    Chest x-ray personally reviewed and interpreted showing evidence of possible cardiomegaly along with vascular congestive changes but no focal pneumonia or significant pulmonary edema.  Possible element of widened mediastinum.  CTA chest emergently obtained which showed no evidence of acute dissection but did show evidence of CAD along with mild aneurysmal thoracic aorta, and bronchitis type changes.    Labs personally reviewed showing leukocytosis of 11.  Normal initial troponin, will trend with 2-hour.    Chest pain responded to nitroglycerin and dropped to around a 4 out of 10 in severity along with improvement of hypertension.  However once nitro wore off hypertension worsened again with systolics rising above 200 along with return of pain.  Patient started on nitro drip.  Secondary troponin of 29 with positive delta change of 9.    Patient to be admitted for  unstable angina and hypertensive emergency.            HEART Score: 6                      Orders placed during this visit:  Orders Placed This Encounter   Procedures    Critical Care    XR Chest 1 View    CT Angiogram Chest    [place] Draw    Comprehensive Metabolic Panel    High Sensitivity Troponin T    CBC Auto Differential    High Sensitivity Troponin T 2Hr    Procalcitonin    High Sensitivity Troponin T    NPO Diet NPO Type: Strict NPO    Undress & Gown    Continuous Pulse Oximetry    Oxygen Therapy- Nasal Cannula; Titrate 1-6 LPM Per SpO2; 90 - 95%    ECG 12 Lead ED Triage Standing Order; Chest Pain    ECG 12 Lead ED Triage Standing Order; Chest Pain    Insert Peripheral IV    Initiate Observation Status    CBC & Differential    Green Top (Gel)    Lavender Top    Gold Top - SST    Light Blue Top         ED Course:    Consultants:                  Shared Decision Making:  After my consideration of clinical presentation and any laboratory/radiology studies obtained, I discussed the findings with the patient/patient representative who is in agreement with the treatment plan and the final disposition.   Risks and benefits of discharge and/or observation/admission were discussed.      AS OF 01:26 EDT VITALS:    BP - 137/85  HR - 105  TEMP - 97.9 °F (36.6 °C) (Oral)  O2 SATS - 93%                  DIAGNOSIS  Final diagnoses:   Hypertensive emergency   Unstable angina   Acute chest pain         DISPOSITION  Admit      Please note that portions of this document were completed with voice recognition software.        Bola Aleman MD  05/01/24 0218

## 2024-05-01 NOTE — CONSULTS
"     [facility] Cardiology Consult Note    Andrés Denton  1970  [id number]  24    Requesting Physician: No ref. provider found    Chief Complaint: Chest pain    History of Present Illness:   Mr. Andrés Denton is a 53 y.o. male who is being seen by Cardiology for evaluation of chest pain.  The patient has a past medical history significant for hypertension, chronic EtOH use, and obesity with a BMI 37.  He does not have any significant past cardiac history.  He presented to the [facility] for evaluation of chest pain.  The patient reports he has been experiencing chest pain for approximately the past 1 year.  He reports a \"dull and squeezing\" discomfort located in the central left anterior chest.  He reports the chest discomfort was initially brought on with higher levels of exertion.  Over the past year, his chest pain has progressed to the point where he now experiences chest discomfort with any level of exertion.  No associated nausea, vomiting, or diaphoresis.  He denies dyspnea.  Given recurrent chest pain while walking home yesterday evening, he presented to the emergency department for evaluation.    Initial ECG on presentation showed sinus rhythm with nonspecific ST changes.  On presentation, he was found to be severely hypertensive with an initial blood pressure 240/155 mmHg.  A high-sensitivity troponin was found to be 29, and has been stable at 33 on repeat troponins.  He was admitted by the hospitalist service, and started on a nitro drip for blood pressure control.  Cardiology has been consulted for further recommendations.    Prior Cardiac Testin. Last Coronary Angio: None  2. Prior Stress Testing: None  3. Last Echo: None  4. Prior Holter Monitor: None    Review of Systems:   Review of Systems   Constitutional:  Negative for activity change, appetite change, chills, diaphoresis, fatigue, fever, unexpected weight gain and unexpected " weight loss.   Eyes:  Negative for blurred vision and double vision.   Respiratory:  Positive for chest tightness. Negative for cough, shortness of breath and wheezing.    Cardiovascular:  Negative for chest pain, palpitations and leg swelling.   Gastrointestinal:  Negative for abdominal pain, anal bleeding, blood in stool and GERD.   Endocrine: Negative for cold intolerance and heat intolerance.   Genitourinary:  Negative for hematuria.   Neurological:  Negative for dizziness, syncope, weakness and light-headedness.   Hematological:  Does not bruise/bleed easily.   Psychiatric/Behavioral:  Negative for depressed mood and stress. The patient is not nervous/anxious.        Past Medical History:   Past Medical History:   Diagnosis Date    Alcohol abuse     Anxiety     Depression     Hypertension     Suicidal thoughts     Withdrawal symptoms, alcohol        Past Surgical History:   Past Surgical History:   Procedure Laterality Date    AMPUTATION  1995    right ring finger partial amputation    FRACTURE SURGERY Left     tib/fib pt. is unsure the exact surgery it was when he was 17       Family History:   Family History   Problem Relation Age of Onset    Alcohol abuse Mother     Alcohol abuse Father     Alcohol abuse Maternal Uncle        Social History:   Social History     Socioeconomic History    Marital status:     Number of children: 2    Years of education: 12    Highest education level: High school graduate   Tobacco Use    Smoking status: Never    Smokeless tobacco: Current     Types: Snuff    Tobacco comments:     One can per day- tobacco use starting at age 10- 44  years   Vaping Use    Vaping status: Never Used   Substance and Sexual Activity    Alcohol use: Yes     Comment: 1-2 x weekly1/2 pint, ~ 30 beers and a pint of vodka this past weekend    Drug use: Yes     Comment: alcohol    Sexual activity: Defer     Partners: Female       Medications:     Current Facility-Administered Medications:      acetaminophen (TYLENOL) tablet 650 mg, 650 mg, Oral, Q4H PRN, 650 mg at 05/01/24 1002 **OR** acetaminophen (TYLENOL) 160 MG/5ML oral solution 650 mg, 650 mg, Oral, Q4H PRN **OR** acetaminophen (TYLENOL) suppository 650 mg, 650 mg, Rectal, Q4H PRN, Kerley, Brian Joseph, DO    albuterol sulfate HFA (PROVENTIL HFA;VENTOLIN HFA;PROAIR HFA) inhaler 2 puff, 2 puff, Inhalation, Q4H PRN, Kerley, Brian Joseph, DO    aspirin EC tablet 81 mg, 81 mg, Oral, Daily, Kerley, Brian Joseph, DO, 81 mg at 05/01/24 0949    atorvastatin (LIPITOR) tablet 20 mg, 20 mg, Oral, Daily, Kerley, Brian Joseph, DO, 20 mg at 05/01/24 0949    Calcium Replacement - Follow Nurse / BPA Driven Protocol, , Does not apply, PRN, Kerley, Brian Joseph, DO    dextrose (D50W) (25 g/50 mL) IV injection 10-50 mL, 10-50 mL, Intravenous, Q15 Min PRN, Kerley, Brian Joseph, DO    dextrose (GLUTOSE) oral gel 15 g, 15 g, Oral, Q15 Min PRN, Kerley, Brian Joseph, DO    Enoxaparin Sodium (LOVENOX) syringe 40 mg, 40 mg, Subcutaneous, Q24H, Ene Jacob DO, 40 mg at 05/01/24 0949    FLUoxetine (PROzac) capsule 20 mg, 20 mg, Oral, Daily, Kerley, Brian Joseph, DO, 20 mg at 05/01/24 0949    Glucagon (GLUCAGEN) injection 1 mg, 1 mg, Intramuscular, Q15 Min PRN, Kerley, Brian Joseph, DO    lisinopril (PRINIVIL,ZESTRIL) tablet 20 mg, 20 mg, Oral, Q24H, 20 mg at 05/01/24 0951 **AND** hydroCHLOROthiazide oral 12.5 mg, 12.5 mg, Oral, Q24H, Ene Jacob, DO, 12.5 mg at 05/01/24 0949    insulin glargine (LANTUS, SEMGLEE) injection 1-200 Units, 1-200 Units, Subcutaneous, BID - Glucommander, Kerley, Brian Joseph, DO, 9 Units at 05/01/24 0949    insulin lispro (humaLOG) injection 1-200 Units, 1-200 Units, Subcutaneous, 4x Daily With Meals & Nightly, Kerley, Brian Joseph, DO    insulin lispro (humaLOG) injection 1-200 Units, 1-200 Units, Subcutaneous, PRN, Kerley, Brian Joseph, DO    isosorbide mononitrate (IMDUR) 24 hr tablet 60 mg, 60 mg, Oral, Q24H, Ene Jacob  "DO Alphonso, 60 mg at 05/01/24 0951    Magnesium Standard Dose Replacement - Follow Nurse / BPA Driven Protocol, , Does not apply, PRN, Kerley, Brian Joseph, DO    metoprolol succinate XL (TOPROL-XL) 24 hr tablet 100 mg, 100 mg, Oral, Daily, Kerley, Brian Joseph, DO, 100 mg at 05/01/24 0949    nitroglycerin (NITROSTAT) SL tablet 0.4 mg, 0.4 mg, Sublingual, Q5 Min PRN, Kerley, Brian Joseph, DO, 0.4 mg at 04/30/24 2201    nitroglycerin (NITROSTAT) SL tablet 0.4 mg, 0.4 mg, Sublingual, Q5 Min PRN, Kerley, Brian Joseph, DO    nitroglycerin (TRIDIL) 200 mcg/ml infusion, 5-200 mcg/min, Intravenous, Titrated, Kerley, Brian Joseph, DO, Last Rate: 7.5 mL/hr at 05/01/24 1009, 25 mcg/min at 05/01/24 1009    ondansetron (ZOFRAN) injection 4 mg, 4 mg, Intravenous, Q6H PRN, Kerley, Brian Joseph, DO    Pharmacy to Dose enoxaparin (LOVENOX), , Does not apply, Continuous PRN, Ene Jacob DO    Phosphorus Replacement - Follow Nurse / BPA Driven Protocol, , Does not apply, PRN, Kerley, Brian Joseph, DO    Potassium Replacement - Follow Nurse / BPA Driven Protocol, , Does not apply, PRN, Kerley, Brian Joseph, DO    sodium chloride 0.9 % flush 10 mL, 10 mL, Intravenous, PRN, Kerley, Brian Joseph, DO    sodium chloride 0.9 % flush 10 mL, 10 mL, Intravenous, Q12H, Kerley, Brian Joseph, DO, 10 mL at 05/01/24 0949    sodium chloride 0.9 % flush 10 mL, 10 mL, Intravenous, PRN, Kerley, Brian Joseph, DO    sodium chloride 0.9 % infusion 40 mL, 40 mL, Intravenous, PRN, Kerley, Brian Joseph, DO    Allergies:   No Known Allergies    Physical Exam:  Vital Signs:   Vitals:    05/01/24 0949 05/01/24 1000 05/01/24 1001 05/01/24 1015   BP: (!) 172/110 (!) 184/120 (!) 172/110 174/98   BP Location:       Patient Position:       Pulse: 82 91 90 79   Resp:       Temp:       TempSrc:       SpO2:  98% 92% 97%   Weight:   109 kg (240 lb 4.8 oz)    Height:   170.2 cm (67.01\")        Physical Exam  Vitals and nursing note reviewed.   Constitutional: "       General: He is not in acute distress.     Appearance: He is obese. He is not diaphoretic.   HENT:      Head: Normocephalic and atraumatic.   Cardiovascular:      Rate and Rhythm: Normal rate and regular rhythm.      Heart sounds: No murmur heard.  Pulmonary:      Effort: Pulmonary effort is normal. No respiratory distress.      Breath sounds: Normal breath sounds. No stridor. No wheezing, rhonchi or rales.   Abdominal:      General: Bowel sounds are normal. There is no distension.      Palpations: Abdomen is soft.      Tenderness: There is no abdominal tenderness. There is no guarding or rebound.   Musculoskeletal:         General: No swelling. Normal range of motion.      Cervical back: Neck supple. No tenderness.   Skin:     General: Skin is warm and dry.   Neurological:      General: No focal deficit present.      Mental Status: He is alert and oriented to person, place, and time.   Psychiatric:         Mood and Affect: Mood normal.         Behavior: Behavior normal.         Results Review:   Results from last 7 days   Lab Units 05/01/24  0307 04/30/24  2150   SODIUM mmol/L 135* 138   POTASSIUM mmol/L 3.4* 3.5   CHLORIDE mmol/L 101 101   CO2 mmol/L 22.2 23.4   BUN mg/dL 11 13   CREATININE mg/dL 0.74* 0.90   CALCIUM mg/dL 8.9 9.6   BILIRUBIN mg/dL  --  0.5   ALK PHOS U/L  --  108   ALT (SGPT) U/L  --  18   AST (SGOT) U/L  --  18   GLUCOSE mg/dL 162* 213*     Results from last 7 days   Lab Units 05/01/24  0307 05/01/24  0141 04/30/24  2348   HSTROP T ng/L 33* 33* 29*     Results from last 7 days   Lab Units 05/01/24  0307 04/30/24  2150   WBC 10*3/mm3 8.07 11.00*   HEMOGLOBIN g/dL 12.1* 14.4   HEMATOCRIT % 33.7* 39.8   PLATELETS 10*3/mm3 163 191     Results from last 7 days   Lab Units 05/01/24  0307   INR  1.06   APTT seconds 30.2*               I personally viewed and interpreted the patient's EKG/Telemetry data     Assessment / Plan:     1.  Hypertensive emergency  -- Currently on nitro drip  -- Continue  home metoprolol  -- Agree with starting lisinopril and isosorbide  -- Wean and discontinue nitro drip today as able    2.  Chest pain  -- Progressive chest discomfort over the past year, symptoms concerning for unstable angina  -- Initial ECG shows nonspecific ST changes  -- Troponin level minimally elevated in a plateau fashion, potentially related to hypertension (as above) in the setting of suspected underlying CAD  -- Discussed the risks and benefits of coronary angiography with online PCI as indicated, and the patient is agreeable to proceed  -- Plan to work on blood pressure control today, coronary angiography tomorrow morning  -- Continue aspirin  -- Consider increasing statin to high intensity dosing pending results of coronary angiogram tomorrow morning        Thank you for allowing me to participate in the care of your patient. Please do not hesitate to contact me with additional questions or concerns.     WILLIAN Marroquin MD  Interventional Cardiology   05/01/24  10:26 EDT

## 2024-05-01 NOTE — H&P (VIEW-ONLY)
"     [facility] Cardiology Consult Note    Andrés Denton  1970  [id number]  24    Requesting Physician: No ref. provider found    Chief Complaint: Chest pain    History of Present Illness:   Mr. Andrés Denton is a 53 y.o. male who is being seen by Cardiology for evaluation of chest pain.  The patient has a past medical history significant for hypertension, chronic EtOH use, and obesity with a BMI 37.  He does not have any significant past cardiac history.  He presented to the [facility] for evaluation of chest pain.  The patient reports he has been experiencing chest pain for approximately the past 1 year.  He reports a \"dull and squeezing\" discomfort located in the central left anterior chest.  He reports the chest discomfort was initially brought on with higher levels of exertion.  Over the past year, his chest pain has progressed to the point where he now experiences chest discomfort with any level of exertion.  No associated nausea, vomiting, or diaphoresis.  He denies dyspnea.  Given recurrent chest pain while walking home yesterday evening, he presented to the emergency department for evaluation.    Initial ECG on presentation showed sinus rhythm with nonspecific ST changes.  On presentation, he was found to be severely hypertensive with an initial blood pressure 240/155 mmHg.  A high-sensitivity troponin was found to be 29, and has been stable at 33 on repeat troponins.  He was admitted by the hospitalist service, and started on a nitro drip for blood pressure control.  Cardiology has been consulted for further recommendations.    Prior Cardiac Testin. Last Coronary Angio: None  2. Prior Stress Testing: None  3. Last Echo: None  4. Prior Holter Monitor: None    Review of Systems:   Review of Systems   Constitutional:  Negative for activity change, appetite change, chills, diaphoresis, fatigue, fever, unexpected weight gain and unexpected " weight loss.   Eyes:  Negative for blurred vision and double vision.   Respiratory:  Positive for chest tightness. Negative for cough, shortness of breath and wheezing.    Cardiovascular:  Negative for chest pain, palpitations and leg swelling.   Gastrointestinal:  Negative for abdominal pain, anal bleeding, blood in stool and GERD.   Endocrine: Negative for cold intolerance and heat intolerance.   Genitourinary:  Negative for hematuria.   Neurological:  Negative for dizziness, syncope, weakness and light-headedness.   Hematological:  Does not bruise/bleed easily.   Psychiatric/Behavioral:  Negative for depressed mood and stress. The patient is not nervous/anxious.        Past Medical History:   Past Medical History:   Diagnosis Date    Alcohol abuse     Anxiety     Depression     Hypertension     Suicidal thoughts     Withdrawal symptoms, alcohol        Past Surgical History:   Past Surgical History:   Procedure Laterality Date    AMPUTATION  1995    right ring finger partial amputation    FRACTURE SURGERY Left     tib/fib pt. is unsure the exact surgery it was when he was 17       Family History:   Family History   Problem Relation Age of Onset    Alcohol abuse Mother     Alcohol abuse Father     Alcohol abuse Maternal Uncle        Social History:   Social History     Socioeconomic History    Marital status:     Number of children: 2    Years of education: 12    Highest education level: High school graduate   Tobacco Use    Smoking status: Never    Smokeless tobacco: Current     Types: Snuff    Tobacco comments:     One can per day- tobacco use starting at age 10- 44  years   Vaping Use    Vaping status: Never Used   Substance and Sexual Activity    Alcohol use: Yes     Comment: 1-2 x weekly1/2 pint, ~ 30 beers and a pint of vodka this past weekend    Drug use: Yes     Comment: alcohol    Sexual activity: Defer     Partners: Female       Medications:     Current Facility-Administered Medications:      acetaminophen (TYLENOL) tablet 650 mg, 650 mg, Oral, Q4H PRN, 650 mg at 05/01/24 1002 **OR** acetaminophen (TYLENOL) 160 MG/5ML oral solution 650 mg, 650 mg, Oral, Q4H PRN **OR** acetaminophen (TYLENOL) suppository 650 mg, 650 mg, Rectal, Q4H PRN, Kerley, Brian Joseph, DO    albuterol sulfate HFA (PROVENTIL HFA;VENTOLIN HFA;PROAIR HFA) inhaler 2 puff, 2 puff, Inhalation, Q4H PRN, Kerley, Brian Joseph, DO    aspirin EC tablet 81 mg, 81 mg, Oral, Daily, Kerley, Brian Joseph, DO, 81 mg at 05/01/24 0949    atorvastatin (LIPITOR) tablet 20 mg, 20 mg, Oral, Daily, Kerley, Brian Joseph, DO, 20 mg at 05/01/24 0949    Calcium Replacement - Follow Nurse / BPA Driven Protocol, , Does not apply, PRN, Kerley, Brian Joseph, DO    dextrose (D50W) (25 g/50 mL) IV injection 10-50 mL, 10-50 mL, Intravenous, Q15 Min PRN, Kerley, Brian Joseph, DO    dextrose (GLUTOSE) oral gel 15 g, 15 g, Oral, Q15 Min PRN, Kerley, Brian Joseph, DO    Enoxaparin Sodium (LOVENOX) syringe 40 mg, 40 mg, Subcutaneous, Q24H, Ene Jacob DO, 40 mg at 05/01/24 0949    FLUoxetine (PROzac) capsule 20 mg, 20 mg, Oral, Daily, Kerley, Brian Joseph, DO, 20 mg at 05/01/24 0949    Glucagon (GLUCAGEN) injection 1 mg, 1 mg, Intramuscular, Q15 Min PRN, Kerley, Brian Joseph, DO    lisinopril (PRINIVIL,ZESTRIL) tablet 20 mg, 20 mg, Oral, Q24H, 20 mg at 05/01/24 0951 **AND** hydroCHLOROthiazide oral 12.5 mg, 12.5 mg, Oral, Q24H, Ene Jacob, DO, 12.5 mg at 05/01/24 0949    insulin glargine (LANTUS, SEMGLEE) injection 1-200 Units, 1-200 Units, Subcutaneous, BID - Glucommander, Kerley, Brian Joseph, DO, 9 Units at 05/01/24 0949    insulin lispro (humaLOG) injection 1-200 Units, 1-200 Units, Subcutaneous, 4x Daily With Meals & Nightly, Kerley, Brian Joseph, DO    insulin lispro (humaLOG) injection 1-200 Units, 1-200 Units, Subcutaneous, PRN, Kerley, Brian Joseph, DO    isosorbide mononitrate (IMDUR) 24 hr tablet 60 mg, 60 mg, Oral, Q24H, Ene Jacob  "DO Alphonso, 60 mg at 05/01/24 0951    Magnesium Standard Dose Replacement - Follow Nurse / BPA Driven Protocol, , Does not apply, PRN, Kerley, Brian Joseph, DO    metoprolol succinate XL (TOPROL-XL) 24 hr tablet 100 mg, 100 mg, Oral, Daily, Kerley, Brian Joseph, DO, 100 mg at 05/01/24 0949    nitroglycerin (NITROSTAT) SL tablet 0.4 mg, 0.4 mg, Sublingual, Q5 Min PRN, Kerley, Brian Joseph, DO, 0.4 mg at 04/30/24 2201    nitroglycerin (NITROSTAT) SL tablet 0.4 mg, 0.4 mg, Sublingual, Q5 Min PRN, Kerley, Brian Joseph, DO    nitroglycerin (TRIDIL) 200 mcg/ml infusion, 5-200 mcg/min, Intravenous, Titrated, Kerley, Brian Joseph, DO, Last Rate: 7.5 mL/hr at 05/01/24 1009, 25 mcg/min at 05/01/24 1009    ondansetron (ZOFRAN) injection 4 mg, 4 mg, Intravenous, Q6H PRN, Kerley, Brian Joseph, DO    Pharmacy to Dose enoxaparin (LOVENOX), , Does not apply, Continuous PRN, Ene Jacob DO    Phosphorus Replacement - Follow Nurse / BPA Driven Protocol, , Does not apply, PRN, Kerley, Brian Joseph, DO    Potassium Replacement - Follow Nurse / BPA Driven Protocol, , Does not apply, PRN, Kerley, Brian Joseph, DO    sodium chloride 0.9 % flush 10 mL, 10 mL, Intravenous, PRN, Kerley, Brian Joseph, DO    sodium chloride 0.9 % flush 10 mL, 10 mL, Intravenous, Q12H, Kerley, Brian Joseph, DO, 10 mL at 05/01/24 0949    sodium chloride 0.9 % flush 10 mL, 10 mL, Intravenous, PRN, Kerley, Brian Joseph, DO    sodium chloride 0.9 % infusion 40 mL, 40 mL, Intravenous, PRN, Kerley, Brian Joseph, DO    Allergies:   No Known Allergies    Physical Exam:  Vital Signs:   Vitals:    05/01/24 0949 05/01/24 1000 05/01/24 1001 05/01/24 1015   BP: (!) 172/110 (!) 184/120 (!) 172/110 174/98   BP Location:       Patient Position:       Pulse: 82 91 90 79   Resp:       Temp:       TempSrc:       SpO2:  98% 92% 97%   Weight:   109 kg (240 lb 4.8 oz)    Height:   170.2 cm (67.01\")        Physical Exam  Vitals and nursing note reviewed.   Constitutional: "       General: He is not in acute distress.     Appearance: He is obese. He is not diaphoretic.   HENT:      Head: Normocephalic and atraumatic.   Cardiovascular:      Rate and Rhythm: Normal rate and regular rhythm.      Heart sounds: No murmur heard.  Pulmonary:      Effort: Pulmonary effort is normal. No respiratory distress.      Breath sounds: Normal breath sounds. No stridor. No wheezing, rhonchi or rales.   Abdominal:      General: Bowel sounds are normal. There is no distension.      Palpations: Abdomen is soft.      Tenderness: There is no abdominal tenderness. There is no guarding or rebound.   Musculoskeletal:         General: No swelling. Normal range of motion.      Cervical back: Neck supple. No tenderness.   Skin:     General: Skin is warm and dry.   Neurological:      General: No focal deficit present.      Mental Status: He is alert and oriented to person, place, and time.   Psychiatric:         Mood and Affect: Mood normal.         Behavior: Behavior normal.         Results Review:   Results from last 7 days   Lab Units 05/01/24  0307 04/30/24  2150   SODIUM mmol/L 135* 138   POTASSIUM mmol/L 3.4* 3.5   CHLORIDE mmol/L 101 101   CO2 mmol/L 22.2 23.4   BUN mg/dL 11 13   CREATININE mg/dL 0.74* 0.90   CALCIUM mg/dL 8.9 9.6   BILIRUBIN mg/dL  --  0.5   ALK PHOS U/L  --  108   ALT (SGPT) U/L  --  18   AST (SGOT) U/L  --  18   GLUCOSE mg/dL 162* 213*     Results from last 7 days   Lab Units 05/01/24  0307 05/01/24  0141 04/30/24  2348   HSTROP T ng/L 33* 33* 29*     Results from last 7 days   Lab Units 05/01/24  0307 04/30/24  2150   WBC 10*3/mm3 8.07 11.00*   HEMOGLOBIN g/dL 12.1* 14.4   HEMATOCRIT % 33.7* 39.8   PLATELETS 10*3/mm3 163 191     Results from last 7 days   Lab Units 05/01/24  0307   INR  1.06   APTT seconds 30.2*               I personally viewed and interpreted the patient's EKG/Telemetry data     Assessment / Plan:     1.  Hypertensive emergency  -- Currently on nitro drip  -- Continue  home metoprolol  -- Agree with starting lisinopril and isosorbide  -- Wean and discontinue nitro drip today as able    2.  Chest pain  -- Progressive chest discomfort over the past year, symptoms concerning for unstable angina  -- Initial ECG shows nonspecific ST changes  -- Troponin level minimally elevated in a plateau fashion, potentially related to hypertension (as above) in the setting of suspected underlying CAD  -- Discussed the risks and benefits of coronary angiography with online PCI as indicated, and the patient is agreeable to proceed  -- Plan to work on blood pressure control today, coronary angiography tomorrow morning  -- Continue aspirin  -- Consider increasing statin to high intensity dosing pending results of coronary angiogram tomorrow morning        Thank you for allowing me to participate in the care of your patient. Please do not hesitate to contact me with additional questions or concerns.     WILLIAN Marroquin MD  Interventional Cardiology   05/01/24  10:26 EDT

## 2024-05-01 NOTE — ED NOTES
Pt taken to ICU bed 3 by this RN via stretcher. Nitro gtt infusing at 5 mcg/min. Pt belongings bagged and placed in bed with pt. Pt transported without incident. No acute distress noted.

## 2024-05-01 NOTE — PLAN OF CARE
Goal Outcome Evaluation:  Plan of Care Reviewed With: patient        Progress: improving  Outcome Evaluation: NPO through the night.  No chest pain currently.

## 2024-05-02 VITALS
SYSTOLIC BLOOD PRESSURE: 114 MMHG | DIASTOLIC BLOOD PRESSURE: 61 MMHG | WEIGHT: 241.4 LBS | RESPIRATION RATE: 16 BRPM | BODY MASS INDEX: 37.89 KG/M2 | HEIGHT: 67 IN | OXYGEN SATURATION: 94 % | HEART RATE: 72 BPM | TEMPERATURE: 98 F

## 2024-05-02 PROBLEM — I21.4 NSTEMI, INITIAL EPISODE OF CARE: Status: ACTIVE | Noted: 2024-04-30

## 2024-05-02 LAB
ANION GAP SERPL CALCULATED.3IONS-SCNC: 6.1 MMOL/L (ref 5–15)
BASOPHILS # BLD AUTO: 0.02 10*3/MM3 (ref 0–0.2)
BASOPHILS NFR BLD AUTO: 0.2 % (ref 0–1.5)
BUN SERPL-MCNC: 8 MG/DL (ref 6–20)
BUN/CREAT SERPL: 10.1 (ref 7–25)
CALCIUM SPEC-SCNC: 9.1 MG/DL (ref 8.6–10.5)
CHLORIDE SERPL-SCNC: 100 MMOL/L (ref 98–107)
CO2 SERPL-SCNC: 26.9 MMOL/L (ref 22–29)
CREAT SERPL-MCNC: 0.79 MG/DL (ref 0.76–1.27)
DEPRECATED RDW RBC AUTO: 41.8 FL (ref 37–54)
EGFRCR SERPLBLD CKD-EPI 2021: 107.6 ML/MIN/1.73
EOSINOPHIL # BLD AUTO: 0.12 10*3/MM3 (ref 0–0.4)
EOSINOPHIL NFR BLD AUTO: 1.4 % (ref 0.3–6.2)
ERYTHROCYTE [DISTWIDTH] IN BLOOD BY AUTOMATED COUNT: 11.7 % (ref 12.3–15.4)
GLUCOSE BLDC GLUCOMTR-MCNC: 126 MG/DL (ref 70–130)
GLUCOSE BLDC GLUCOMTR-MCNC: 130 MG/DL (ref 70–130)
GLUCOSE BLDC GLUCOMTR-MCNC: 185 MG/DL (ref 70–130)
GLUCOSE SERPL-MCNC: 124 MG/DL (ref 65–99)
HCT VFR BLD AUTO: 34.9 % (ref 37.5–51)
HGB BLD-MCNC: 12.1 G/DL (ref 13–17.7)
IMM GRANULOCYTES # BLD AUTO: 0.03 10*3/MM3 (ref 0–0.05)
IMM GRANULOCYTES NFR BLD AUTO: 0.4 % (ref 0–0.5)
LYMPHOCYTES # BLD AUTO: 1.66 10*3/MM3 (ref 0.7–3.1)
LYMPHOCYTES NFR BLD AUTO: 19.6 % (ref 19.6–45.3)
MCH RBC QN AUTO: 33.6 PG (ref 26.6–33)
MCHC RBC AUTO-ENTMCNC: 34.7 G/DL (ref 31.5–35.7)
MCV RBC AUTO: 96.9 FL (ref 79–97)
MONOCYTES # BLD AUTO: 0.79 10*3/MM3 (ref 0.1–0.9)
MONOCYTES NFR BLD AUTO: 9.3 % (ref 5–12)
NEUTROPHILS NFR BLD AUTO: 5.85 10*3/MM3 (ref 1.7–7)
NEUTROPHILS NFR BLD AUTO: 69.1 % (ref 42.7–76)
NRBC BLD AUTO-RTO: 0 /100 WBC (ref 0–0.2)
PLATELET # BLD AUTO: 177 10*3/MM3 (ref 140–450)
PMV BLD AUTO: 10 FL (ref 6–12)
POTASSIUM SERPL-SCNC: 4.6 MMOL/L (ref 3.5–5.2)
RBC # BLD AUTO: 3.6 10*6/MM3 (ref 4.14–5.8)
SODIUM SERPL-SCNC: 133 MMOL/L (ref 136–145)
WBC NRBC COR # BLD AUTO: 8.47 10*3/MM3 (ref 3.4–10.8)

## 2024-05-02 PROCEDURE — C1769 GUIDE WIRE: HCPCS | Performed by: INTERNAL MEDICINE

## 2024-05-02 PROCEDURE — C1887 CATHETER, GUIDING: HCPCS | Performed by: INTERNAL MEDICINE

## 2024-05-02 PROCEDURE — 99152 MOD SED SAME PHYS/QHP 5/>YRS: CPT | Performed by: INTERNAL MEDICINE

## 2024-05-02 PROCEDURE — 80048 BASIC METABOLIC PNL TOTAL CA: CPT | Performed by: STUDENT IN AN ORGANIZED HEALTH CARE EDUCATION/TRAINING PROGRAM

## 2024-05-02 PROCEDURE — 99233 SBSQ HOSP IP/OBS HIGH 50: CPT | Performed by: INTERNAL MEDICINE

## 2024-05-02 PROCEDURE — 82948 REAGENT STRIP/BLOOD GLUCOSE: CPT

## 2024-05-02 PROCEDURE — 82948 REAGENT STRIP/BLOOD GLUCOSE: CPT | Performed by: STUDENT IN AN ORGANIZED HEALTH CARE EDUCATION/TRAINING PROGRAM

## 2024-05-02 PROCEDURE — 25010000002 MIDAZOLAM PER 1MG: Performed by: INTERNAL MEDICINE

## 2024-05-02 PROCEDURE — 25010000002 LIDOCAINE 1 % SOLUTION: Performed by: INTERNAL MEDICINE

## 2024-05-02 PROCEDURE — C1894 INTRO/SHEATH, NON-LASER: HCPCS | Performed by: INTERNAL MEDICINE

## 2024-05-02 PROCEDURE — 25010000002 FENTANYL CITRATE (PF) 50 MCG/ML SOLUTION: Performed by: INTERNAL MEDICINE

## 2024-05-02 PROCEDURE — 93571 IV DOP VEL&/PRESS C FLO 1ST: CPT | Performed by: INTERNAL MEDICINE

## 2024-05-02 PROCEDURE — 93458 L HRT ARTERY/VENTRICLE ANGIO: CPT | Performed by: INTERNAL MEDICINE

## 2024-05-02 PROCEDURE — 85025 COMPLETE CBC W/AUTO DIFF WBC: CPT | Performed by: STUDENT IN AN ORGANIZED HEALTH CARE EDUCATION/TRAINING PROGRAM

## 2024-05-02 PROCEDURE — 93799 UNLISTED CV SVC/PROCEDURE: CPT | Performed by: INTERNAL MEDICINE

## 2024-05-02 PROCEDURE — 25010000002 HEPARIN (PORCINE) PER 1000 UNITS: Performed by: INTERNAL MEDICINE

## 2024-05-02 PROCEDURE — 25810000003 SODIUM CHLORIDE 0.9 % SOLUTION: Performed by: INTERNAL MEDICINE

## 2024-05-02 PROCEDURE — 25510000001 IOPAMIDOL PER 1 ML: Performed by: INTERNAL MEDICINE

## 2024-05-02 PROCEDURE — B2111ZZ FLUOROSCOPY OF MULTIPLE CORONARY ARTERIES USING LOW OSMOLAR CONTRAST: ICD-10-PCS | Performed by: INTERNAL MEDICINE

## 2024-05-02 PROCEDURE — 99153 MOD SED SAME PHYS/QHP EA: CPT | Performed by: INTERNAL MEDICINE

## 2024-05-02 PROCEDURE — 4A023N7 MEASUREMENT OF CARDIAC SAMPLING AND PRESSURE, LEFT HEART, PERCUTANEOUS APPROACH: ICD-10-PCS | Performed by: INTERNAL MEDICINE

## 2024-05-02 PROCEDURE — 63710000001 INSULIN GLARGINE PER 5 UNITS: Performed by: STUDENT IN AN ORGANIZED HEALTH CARE EDUCATION/TRAINING PROGRAM

## 2024-05-02 PROCEDURE — 99238 HOSP IP/OBS DSCHRG MGMT 30/<: CPT | Performed by: FAMILY MEDICINE

## 2024-05-02 RX ORDER — HYDRALAZINE HYDROCHLORIDE 25 MG/1
25 TABLET, FILM COATED ORAL EVERY 8 HOURS SCHEDULED
Start: 2024-05-02

## 2024-05-02 RX ORDER — FENTANYL CITRATE 50 UG/ML
INJECTION, SOLUTION INTRAMUSCULAR; INTRAVENOUS
Status: DISCONTINUED | OUTPATIENT
Start: 2024-05-02 | End: 2024-05-02 | Stop reason: HOSPADM

## 2024-05-02 RX ORDER — CARVEDILOL 25 MG/1
25 TABLET ORAL 2 TIMES DAILY WITH MEALS
Status: DISCONTINUED | OUTPATIENT
Start: 2024-05-03 | End: 2024-05-02 | Stop reason: HOSPADM

## 2024-05-02 RX ORDER — NITROGLYCERIN 0.4 MG/1
0.4 TABLET SUBLINGUAL
Status: DISCONTINUED | OUTPATIENT
Start: 2024-05-02 | End: 2024-05-02 | Stop reason: HOSPADM

## 2024-05-02 RX ORDER — HEPARIN SODIUM 1000 [USP'U]/ML
INJECTION, SOLUTION INTRAVENOUS; SUBCUTANEOUS
Status: DISCONTINUED | OUTPATIENT
Start: 2024-05-02 | End: 2024-05-02 | Stop reason: HOSPADM

## 2024-05-02 RX ORDER — HYDROCHLOROTHIAZIDE 12.5 MG/1
12.5 CAPSULE, GELATIN COATED ORAL
Start: 2024-05-03

## 2024-05-02 RX ORDER — CLONIDINE HYDROCHLORIDE 0.2 MG/1
0.2 TABLET ORAL EVERY 12 HOURS SCHEDULED
Status: DISCONTINUED | OUTPATIENT
Start: 2024-05-02 | End: 2024-05-02 | Stop reason: HOSPADM

## 2024-05-02 RX ORDER — ISOSORBIDE MONONITRATE 120 MG/1
120 TABLET, EXTENDED RELEASE ORAL
Start: 2024-05-03

## 2024-05-02 RX ORDER — CARVEDILOL 25 MG/1
25 TABLET ORAL 2 TIMES DAILY WITH MEALS
Start: 2024-05-03

## 2024-05-02 RX ORDER — MIDAZOLAM HYDROCHLORIDE 2 MG/2ML
INJECTION, SOLUTION INTRAMUSCULAR; INTRAVENOUS
Status: DISCONTINUED | OUTPATIENT
Start: 2024-05-02 | End: 2024-05-02 | Stop reason: HOSPADM

## 2024-05-02 RX ORDER — ISOSORBIDE MONONITRATE 60 MG/1
120 TABLET, EXTENDED RELEASE ORAL
Status: DISCONTINUED | OUTPATIENT
Start: 2024-05-03 | End: 2024-05-02 | Stop reason: HOSPADM

## 2024-05-02 RX ORDER — LIDOCAINE HYDROCHLORIDE 10 MG/ML
INJECTION, SOLUTION INFILTRATION; PERINEURAL
Status: DISCONTINUED | OUTPATIENT
Start: 2024-05-02 | End: 2024-05-02 | Stop reason: HOSPADM

## 2024-05-02 RX ORDER — ACETAMINOPHEN 325 MG/1
650 TABLET ORAL EVERY 4 HOURS PRN
Status: DISCONTINUED | OUTPATIENT
Start: 2024-05-02 | End: 2024-05-02 | Stop reason: HOSPADM

## 2024-05-02 RX ORDER — CLONIDINE HYDROCHLORIDE 0.2 MG/1
0.2 TABLET ORAL EVERY 12 HOURS SCHEDULED
Start: 2024-05-02

## 2024-05-02 RX ORDER — VERAPAMIL HYDROCHLORIDE 2.5 MG/ML
INJECTION, SOLUTION INTRAVENOUS
Status: DISCONTINUED | OUTPATIENT
Start: 2024-05-02 | End: 2024-05-02 | Stop reason: HOSPADM

## 2024-05-02 RX ORDER — SODIUM CHLORIDE 9 MG/ML
3 INJECTION, SOLUTION INTRAVENOUS CONTINUOUS
Status: DISCONTINUED | OUTPATIENT
Start: 2024-05-02 | End: 2024-05-02 | Stop reason: HOSPADM

## 2024-05-02 RX ORDER — LISINOPRIL 20 MG/1
20 TABLET ORAL
Start: 2024-05-03

## 2024-05-02 RX ADMIN — LISINOPRIL 20 MG: 20 TABLET ORAL at 08:18

## 2024-05-02 RX ADMIN — HYDRALAZINE HYDROCHLORIDE 25 MG: 25 TABLET ORAL at 06:57

## 2024-05-02 RX ADMIN — HYDROCHLOROTHIAZIDE 12.5 MG: 12.5 CAPSULE ORAL at 08:18

## 2024-05-02 RX ADMIN — INSULIN GLARGINE 8 UNITS: 100 INJECTION, SOLUTION SUBCUTANEOUS at 09:25

## 2024-05-02 RX ADMIN — SODIUM CHLORIDE 3 ML/KG/HR: 9 INJECTION, SOLUTION INTRAVENOUS at 14:11

## 2024-05-02 RX ADMIN — ASPIRIN 81 MG: 81 TABLET, COATED ORAL at 08:18

## 2024-05-02 RX ADMIN — ATORVASTATIN CALCIUM 20 MG: 20 TABLET, FILM COATED ORAL at 08:18

## 2024-05-02 RX ADMIN — ISOSORBIDE MONONITRATE 60 MG: 60 TABLET, EXTENDED RELEASE ORAL at 08:18

## 2024-05-02 RX ADMIN — FLUOXETINE HYDROCHLORIDE 20 MG: 20 CAPSULE ORAL at 08:18

## 2024-05-02 RX ADMIN — CLONIDINE HYDROCHLORIDE 0.2 MG: 0.2 TABLET ORAL at 09:24

## 2024-05-02 RX ADMIN — ACETAMINOPHEN 650 MG: 325 TABLET, FILM COATED ORAL at 14:16

## 2024-05-02 RX ADMIN — SODIUM CHLORIDE 330 ML: 9 INJECTION, SOLUTION INTRAVENOUS at 14:10

## 2024-05-02 RX ADMIN — Medication 10 ML: at 08:18

## 2024-05-02 RX ADMIN — METOPROLOL SUCCINATE 100 MG: 100 TABLET, EXTENDED RELEASE ORAL at 08:18

## 2024-05-02 RX ADMIN — HYDRALAZINE HYDROCHLORIDE 25 MG: 25 TABLET ORAL at 14:17

## 2024-05-02 NOTE — CASE MANAGEMENT/SOCIAL WORK
Met with pt. Provided resource list for [facility] booklet. Pt plans to return to [facility] when discharged.

## 2024-05-02 NOTE — PROGRESS NOTES
[facility] Cardiology IP Progress Note    Andrés Denton  1970  [id number]  05/02/24    Subjective:   Mr. Andrés Denton is a 53 y.o. male seen in follow-up for hypertension and coronary artery disease.  On review of overnight vitals, remains hypertensive with systolic BP up to the 180s.  Denies any recurrent chest pain or chest discomfort overnight.  Tolerated coronary angiogram this morning without difficulty.  No new complaints.    Review of Systems:   Review of Systems   Constitutional:  Negative for activity change, appetite change, chills, diaphoresis, fatigue, fever, unexpected weight gain and unexpected weight loss.   Respiratory:  Negative for cough, chest tightness, shortness of breath and wheezing.    Cardiovascular:  Negative for chest pain, palpitations and leg swelling.   Gastrointestinal:  Negative for abdominal pain, anal bleeding, blood in stool and GERD.   Neurological:  Negative for dizziness, syncope, weakness and light-headedness.       I have reviewed and/or updated the patient's past medical, past surgical, family history, social history, problem list and allergies as appropriate in the chart.     Physical Exam:  Vital Signs:   Vitals:    05/02/24 1216 05/02/24 1219 05/02/24 1221 05/02/24 1230   BP:   138/91 114/82   BP Location:       Patient Position:       Pulse: 70 66 67 69   Resp:       Temp:       TempSrc:       SpO2:  95% 97% 97%   Weight:       Height:           Physical Exam  Vitals and nursing note reviewed.   Constitutional:       General: He is not in acute distress.     Appearance: He is obese. He is not diaphoretic.   HENT:      Head: Normocephalic and atraumatic.   Cardiovascular:      Rate and Rhythm: Normal rate and regular rhythm.      Heart sounds: No murmur heard.  Pulmonary:      Effort: Pulmonary effort is normal. No respiratory distress.      Breath sounds: Normal breath sounds. No stridor. No wheezing, rhonchi or rales.   Abdominal:       General: Bowel sounds are normal. There is no distension.      Palpations: Abdomen is soft.      Tenderness: There is no abdominal tenderness. There is no guarding or rebound.   Musculoskeletal:         General: No swelling. Normal range of motion.      Cervical back: Neck supple. No tenderness.   Skin:     General: Skin is warm and dry.   Neurological:      General: No focal deficit present.      Mental Status: He is alert and oriented to person, place, and time.   Psychiatric:         Mood and Affect: Mood normal.         Behavior: Behavior normal.         Results Review:   Results from last 7 days   Lab Units 05/02/24  0446 05/01/24  0307 04/30/24  2150   SODIUM mmol/L 133*   < > 138   POTASSIUM mmol/L 4.6   < > 3.5   CHLORIDE mmol/L 100   < > 101   CO2 mmol/L 26.9   < > 23.4   BUN mg/dL 8   < > 13   CREATININE mg/dL 0.79   < > 0.90   CALCIUM mg/dL 9.1   < > 9.6   BILIRUBIN mg/dL  --   --  0.5   ALK PHOS U/L  --   --  108   ALT (SGPT) U/L  --   --  18   AST (SGOT) U/L  --   --  18   GLUCOSE mg/dL 124*   < > 213*    < > = values in this interval not displayed.     Results from last 7 days   Lab Units 05/01/24  0307 05/01/24  0141 04/30/24  2348   HSTROP T ng/L 33* 33* 29*     Results from last 7 days   Lab Units 05/02/24  0446 05/01/24  0307 04/30/24  2150   WBC 10*3/mm3 8.47 8.07 11.00*   HEMOGLOBIN g/dL 12.1* 12.1* 14.4   HEMATOCRIT % 34.9* 33.7* 39.8   PLATELETS 10*3/mm3 177 163 191     Results from last 7 days   Lab Units 05/01/24  0307   INR  1.06   APTT seconds 30.2*               I personally viewed and interpreted the patient's EKG/Telemetry data     Medications:   )  Current Facility-Administered Medications:     acetaminophen (TYLENOL) tablet 650 mg, 650 mg, Oral, Q4H PRN, 650 mg at 05/01/24 1623 **OR** acetaminophen (TYLENOL) 160 MG/5ML oral solution 650 mg, 650 mg, Oral, Q4H PRN **OR** acetaminophen (TYLENOL) suppository 650 mg, 650 mg, Rectal, Q4H PRN, Tejas Marroquin MD    acetaminophen  (TYLENOL) tablet 650 mg, 650 mg, Oral, Q4H PRN, Tejas Marroquin MD    albuterol sulfate HFA (PROVENTIL HFA;VENTOLIN HFA;PROAIR HFA) inhaler 2 puff, 2 puff, Inhalation, Q4H PRN, Tejas Marroquin MD    aspirin EC tablet 81 mg, 81 mg, Oral, Daily, Tejas Marroquin MD, 81 mg at 05/02/24 0818    atorvastatin (LIPITOR) tablet 20 mg, 20 mg, Oral, Daily, Tejas Marroquin MD, 20 mg at 05/02/24 0818    Calcium Replacement - Follow Nurse / BPA Driven Protocol, , Does not apply, PRN, Tejas Marroquin MD    cloNIDine (CATAPRES) tablet 0.2 mg, 0.2 mg, Oral, Q12H, Tejas Marroquin MD, 0.2 mg at 05/02/24 0924    dextrose (D50W) (25 g/50 mL) IV injection 10-50 mL, 10-50 mL, Intravenous, Q15 Min PRN, Tejas Marroquin MD    dextrose (GLUTOSE) oral gel 15 g, 15 g, Oral, Q15 Min PRN, Tejas Marroquin MD    Enoxaparin Sodium (LOVENOX) syringe 40 mg, 40 mg, Subcutaneous, Q24H, Tejas Marroquin MD, 40 mg at 05/01/24 0949    FLUoxetine (PROzac) capsule 20 mg, 20 mg, Oral, Daily, Tejas Marroquin MD, 20 mg at 05/02/24 0818    Glucagon (GLUCAGEN) injection 1 mg, 1 mg, Intramuscular, Q15 Min PRN, Tejas Marroquin MD    hydrALAZINE (APRESOLINE) tablet 25 mg, 25 mg, Oral, Q8H, Tejsa Marroquin MD, 25 mg at 05/02/24 0657    lisinopril (PRINIVIL,ZESTRIL) tablet 20 mg, 20 mg, Oral, Q24H, 20 mg at 05/02/24 0818 **AND** hydroCHLOROthiazide oral 12.5 mg, 12.5 mg, Oral, Q24H, Tejas Marroquin MD, 12.5 mg at 05/02/24 0818    insulin glargine (LANTUS, SEMGLEE) injection 1-200 Units, 1-200 Units, Subcutaneous, BID - Glucommander, Tejas Marroquin MD, 8 Units at 05/02/24 0925    insulin lispro (humaLOG) injection 1-200 Units, 1-200 Units, Subcutaneous, 4x Daily With Meals & Nightly, Tejas Marroquin MD, 1 Units at 05/01/24 2229    insulin lispro (humaLOG) injection 1-200 Units, 1-200 Units, Subcutaneous, PRN, Tejas Marroquin MD    isosorbide mononitrate (IMDUR) 24 hr tablet 60 mg, 60 mg, Oral, Q24H, Tejas Marroquin MD,  60 mg at 05/02/24 0818    Magnesium Standard Dose Replacement - Follow Nurse / BPA Driven Protocol, , Does not apply, PRLopez MARTÍNEZ Bryon Scott, MD    metoprolol succinate XL (TOPROL-XL) 24 hr tablet 100 mg, 100 mg, Oral, Daily, Tejas Marroquin MD, 100 mg at 05/02/24 0818    nitroglycerin (NITROSTAT) SL tablet 0.4 mg, 0.4 mg, Sublingual, Q5 Min PRN, Tejas Marroquin MD, 0.4 mg at 04/30/24 2201    nitroglycerin (NITROSTAT) SL tablet 0.4 mg, 0.4 mg, Sublingual, Q5 Min PRN, Tejas Marroquin MD    nitroglycerin (NITROSTAT) SL tablet 0.4 mg, 0.4 mg, Sublingual, Q5 Min PRN, Tejas Marroquin MD    nitroglycerin (TRIDIL) 200 mcg/ml infusion, 5-200 mcg/min, Intravenous, Titrated, Tejas Marroquin MD, Last Rate: 3 mL/hr at 05/02/24 0829, 10 mcg/min at 05/02/24 0829    ondansetron (ZOFRAN) injection 4 mg, 4 mg, Intravenous, Q6H PRN, Tejas Marroquin MD    Pharmacy to Dose enoxaparin (LOVENOX), , Does not apply, Continuous PRN, Tejas Marroquin MD    Phosphorus Replacement - Follow Nurse / BPA Driven Protocol, , Does not apply, PRLopez MARTÍNEZ Bryon Scott, MD    Potassium Replacement - Follow Nurse / BPA Driven Protocol, , Does not apply, PRN, Tejas Marroquin MD    sodium chloride 0.9 % bolus 330 mL, 3 mL/kg, Intravenous, Once Over 1 Hour, Tejas Marroquin MD    sodium chloride 0.9 % flush 10 mL, 10 mL, Intravenous, PRN, Tejas Marroquin MD    sodium chloride 0.9 % flush 10 mL, 10 mL, Intravenous, Q12H, Tejas Marroquin MD, 10 mL at 05/02/24 0818    sodium chloride 0.9 % flush 10 mL, 10 mL, Intravenous, PRN, Tejas Marroquin MD    sodium chloride 0.9 % infusion 40 mL, 40 mL, Intravenous, PRN, Tejas Marroquin MD    sodium chloride 0.9 % infusion, 3 mL/kg/hr, Intravenous, Continuous, Tejas Marroquin MD    Assessment:  1.  Multivessel coronary artery disease  2.  Uncontrolled hypertension  3.  Type 2 diabetes mellitus  4.  Dyslipidemia  5.  Obesity, BMI 38    Plan:  1.  Multivessel coronary artery  disease  --Coronary angiogram this morning revealed severe disease involving the LAD, LCx, large caliber OM1, and mid RCA disease found to be hemodynamically significant on contrast RFR  -- Case discussed with CT surgery at [facility], plan for transfer for evaluation of surgical revascularization in the setting of type II DM  -- Echocardiogram shows normal LV systolic function, no significant valvular abnormalities  -- Continue aspirin and high intensity statin  -- Continue beta-blocker and isosorbide as antianginals    2.  Uncontrolled hypertension  -- BP remains poorly controlled  -- Continue lisinopril/HCTZ  -- Change metoprolol to carvedilol 25 mg twice daily  -- Uptitrate isosorbide  -- Recommend weaning and discontinuation of nitroglycerin drip today, if needed can uptitrate scheduled hydralazine in order to discontinue nitroglycerin drip    Thank you for allowing me to participate in the care of your patient. Please to not hesitate to contact me with additional questions or concerns.     WILLIAN Marroquin MD  Interventional Cardiology   05/02/24  12:39 EDT

## 2024-05-02 NOTE — DISCHARGE SUMMARY
[facility]      Name:  Andrés Denton   Age:  53 y.o.  Sex:  male  :  1970  MRN:  [id number]   Visit Number:  [id number]    Admission Date:  2024  Date of Discharge:  2024  Primary Care Physician:  Aisha Barba, ALBERTINA    Important issues to note:    Transfer to [facility] in [place] for CT surgery evaluation  Needs follow-up with PCP for treatment of hypertension and diabetes upon discharge    Discharge Diagnoses:     Multivessel coronary artery disease  Hypertensive urgency  NSTEMI  Hyperglycemia concern for new onset diabetes mellitus    Problem List:     Active Hospital Problems    Diagnosis  POA    **Hypertensive emergency [I16.1]  Yes    NSTEMI, initial episode of care [I21.4]  Unknown      Resolved Hospital Problems   No resolved problems to display.     Presenting Problem:    Chief Complaint   Patient presents with    Chest Pain      Consults:     Consulting Physician(s)         Provider   Role Specialty     Tejas Marroquin MD      Consulting Physician Cardiology          Procedures Performed:    Procedure(s):  Coronary angiography    History of presenting illness/Hospital Course:    53-year-old with history of hypertension, suspected diabetes, chronic tobacco use, chronic alcohol use currently in remission, who presented due to multiple complaints.  Patient developed intermittent chest pains, shortness of breath primarily with exertion over the last few weeks to a month.    Patient had presented to emergency room severely hypertensive, had elevated troponins, no ischemic EKG findings.  He is also noted to be hyperglycemic A1c greater than 6.5.  He was admitted to the hospital service and placed in the ICU while on nitroglycerin drip.    Patient required multiple antihypertensives to be initiated.  He was seen in consultation with cardiology who recommended coronary angiography once blood pressure had improved.  2D  echocardiogram was largely unremarkable with no valvular abnormalities.  He underwent coronary angiography on 5/2/2024 with evidence of multivessel coronary artery disease.  Recommendation is for evaluation by CT surgery for CABG.  Patient is pending transfer to [facility] in [place] under the care of Dr. Mccollum.    Vital Signs:    Temp:  [97.7 °F (36.5 °C)-98.4 °F (36.9 °C)] 98 °F (36.7 °C)  Heart Rate:  [54-89] 68  Resp:  [16-24] 17  BP: (103-182)/() 137/87    Physical Exam:    General Appearance:  Alert and cooperative.  NAD   Head:  Atraumatic and normocephalic.   Eyes: Conjunctivae and sclerae normal, no icterus. No pallor.   Ears:  Ears with no abnormalities noted.   Throat: No oral lesions, no thrush, oral mucosa moist.   Neck: Supple, trachea midline, no thyromegaly.   Back:   No kyphoscoliosis present. No tenderness to palpation.   Lungs:   Breath sounds heard bilaterally equally.  No crackles or wheezing. No Pleural rub or bronchial breathing.   Heart:  Normal S1 and S2, no murmur, no gallop, no rub. No JVD.   Abdomen:   Normal bowel sounds, no masses, no organomegaly. Soft, nontender, nondistended, no rebound tenderness.   Extremities: Supple, no edema, no cyanosis, no clubbing.   Pulses: Pulses palpable bilaterally.   Skin: No bleeding or rash.   Neurologic: Alert and oriented x 3. No facial asymmetry. Moves all four limbs. No tremors.     Pertinent Lab Results:     Results from last 7 days   Lab Units 05/02/24  0446 05/01/24  1132 05/01/24  0307 04/30/24  2150   SODIUM mmol/L 133*  --  135* 138   POTASSIUM mmol/L 4.6 4.1 3.4* 3.5   CHLORIDE mmol/L 100  --  101 101   CO2 mmol/L 26.9  --  22.2 23.4   BUN mg/dL 8  --  11 13   CREATININE mg/dL 0.79  --  0.74* 0.90   CALCIUM mg/dL 9.1  --  8.9 9.6   BILIRUBIN mg/dL  --   --   --  0.5   ALK PHOS U/L  --   --   --  108   ALT (SGPT) U/L  --   --   --  18   AST (SGOT) U/L  --   --   --  18   GLUCOSE mg/dL 124*  --  162* 213*     Results from last 7  days   Lab Units 05/02/24  0446 05/01/24  0307 04/30/24  2150   WBC 10*3/mm3 8.47 8.07 11.00*   HEMOGLOBIN g/dL 12.1* 12.1* 14.4   HEMATOCRIT % 34.9* 33.7* 39.8   PLATELETS 10*3/mm3 177 163 191     Results from last 7 days   Lab Units 05/01/24  0307   INR  1.06     Results from last 7 days   Lab Units 05/01/24  0307 05/01/24  0141 04/30/24  2348   HSTROP T ng/L 33* 33* 29*                           Pertinent Radiology Results:    Imaging Results (All)       Procedure Component Value Units Date/Time    XR Chest 1 View [[id number]] Collected: 05/01/24 1023     Updated: 05/01/24 1027    Narrative:      PROCEDURE: XR CHEST 1 VW-        HISTORY: Tearing, stabbing pain in Chest, Chest Pain Triage Protocol     COMPARISON: May 2022.     FINDINGS: The heart is normal in size. The mediastinum is unremarkable.  The lungs are clear. There is no pneumothorax. There are no acute  osseous abnormalities.       Impression:      No acute cardiopulmonary process.                       Images were reviewed, interpreted, and dictated by Dr. Mary Horton MD  Transcribed by Stephania Cooper PA-C.     This report was signed and finalized on 5/1/2024 10:25 AM by Mary Horton MD.       CT Angiogram Chest [[id number]] Collected: 04/30/24 2255     Updated: 04/30/24 2314    Narrative:      FINAL REPORT    TECHNIQUE:  Multiple axial CT images were obtained through the chest  following IV contrast using a CTA/PE protocol.  3D/MIP  reconstruction images were also performed. This study was  performed with techniques to keep radiation doses as low as  reasonably achievable (ALARA). Individualized dose reduction  techniques using automated exposure control or adjustment of mA  and/or kV according to the patient's size were employed.    CLINICAL HISTORY:  Chest pain, hypertension and tachycardia    FINDINGS:  PAs and aorta: No pulmonary embolus.  There is a mildly  aneurysmal ascending thoracic aorta measuring 4.1 cm.  There is  coronary artery  disease.  Heart/mediastinum: No evidence for  right heart strain.  No pericardial effusion.    There is  borderline cardiomegaly.  Lungs: There is bronchial wall  thickening with areas of peripheral mucous plugging and  tree-in-bud nodularity present within the posterior segment of  the right upper lobe and bilateral lower lobes.    Lymph nodes:  No pathologically enlarged thoracic lymph nodes.  Pleura: No  pneumothorax or pleural effusion.  Chest Wall: No chest wall  contusion.  Bones: No acute fracture.  Upper abdomen: No acute  findings in the upper abdomen.      Impression:      No pulmonary embolus.     Bronchitis and mild bronchopneumonia.  Mildly aneurysmal thoracic aorta.  Coronary artery disease.    Authenticated and Electronically Signed by Bam Nieto MD on  04/30/2024 11:13:38 PM            Echo:    Results for orders placed during the hospital encounter of 04/30/24    Adult Transthoracic Echo Complete W/ Cont if Necessary Per Protocol    Interpretation Summary    Left ventricular systolic function is normal. Calculated left ventricular EF = 56.6% Left ventricular ejection fraction appears to be 56 - 60%. Left ventricular diastolic function was normal.    Left ventricular wall thickness is consistent with mild concentric hypertrophy.    Normal right ventricular size and function.    No hemodynamically significant valvular dysfunction.    Mild dilation of the proximal aorta is present measuring 3.6 cm..    Cannot exclude the presence of an atrial septal defect.  Recommend limited echo with bubble study.    Condition on Discharge:      Stable.    Code status during the hospital stay:    Code Status and Medical Interventions:   Ordered at: 05/02/24 1231     Level Of Support Discussed With:    Patient     Code Status (Patient has no pulse and is not breathing):    CPR (Attempt to Resuscitate)     Medical Interventions (Patient has pulse or is breathing):    Full Support     Discharge  Disposition:        Discharge Medications:       Discharge Medications        ASK your doctor about these medications        Instructions Start Date   albuterol sulfate  (90 Base) MCG/ACT inhaler  Commonly known as: PROVENTIL HFA;VENTOLIN HFA;PROAIR HFA   2 puffs, Inhalation, Every 4 Hours PRN      amLODIPine 10 MG tablet  Commonly known as: NORVASC   10 mg, Oral, Daily      aspirin 325 MG tablet   325 mg, Oral, Daily      atorvastatin 20 MG tablet  Commonly known as: LIPITOR   20 mg, Oral, Every Night at Bedtime      cilostazol 50 MG tablet  Commonly known as: PLETAL   50 mg, Oral, 2 Times Daily      disulfiram 250 MG tablet  Commonly known as: ANTABUSE   500 mg, Oral, Daily      FLUoxetine 20 MG capsule  Commonly known as: PROzac   20 mg, Oral, Daily      lisinopril 20 MG tablet  Commonly known as: PRINIVIL,ZESTRIL   20 mg, Oral, 2 Times Daily      melatonin 5 MG tablet tablet   5 mg, Oral, Nightly PRN      meloxicam 15 MG tablet  Commonly known as: MOBIC   15 mg, Oral, Daily      methylPREDNISolone 4 MG dose pack  Commonly known as: MEDROL   Take as directed on package instructions.      metoprolol succinate  MG 24 hr tablet  Commonly known as: TOPROL-XL   100 mg, Oral, Daily      rOPINIRole 0.5 MG tablet  Commonly known as: REQUIP   1 mg, Oral, Nightly, Take 1 hour before bedtime.             Discharge Diet:   Cardiac    Activity at Discharge:     As tolerated  Follow-up Appointments:      No future appointments.  Test Results Pending at Discharge:    Pending Labs       Order Current Status    Acinetobacter Screen - Swab, Axilla, Left In process    VRE Culture - Swab, Per Rectum Preliminary result               Ene Jacob DO  05/02/24  15:15 EDT    Time: I spent 18 minutes on this discharge activity which included: face-to-face encounter with the patient, reviewing the data in the system, coordination of the care with the nursing staff as well as consultants, documentation, and entering  orders.     Dictated utilizing Dragon dictation.

## 2024-05-02 NOTE — PROGRESS NOTES
[facility]OSPITAL[facility]    PROGRESS NOTE    Name:  Andrés Denton   Age:  53 y.o.  Sex:  male  :  1970  MRN:  [id number]   Visit Number:  [id number]  Admission Date:  2024  Date Of Service:  24  Primary Care Physician:  Aisha Barba APRN     LOS: 1 day :    Chief Complaint:      Chest pain hypertension    Subjective:    Patient overall feeling well at rest, he notes his biggest issue was with exertion he was developing the chest pain and shortness of breath.  Agreeable to heart cath today    Hospital Course:    53-year-old with history of hypertension, suspected diabetes, chronic tobacco use, chronic alcohol use currently in remission, who presented due to multiple complaints.  Patient developed intermittent chest pains, shortness of breath primarily with exertion over the last few weeks to a month.    Patient had presented to emergency room severely hypertensive, had elevated troponins, no ischemic EKG findings.  He is also noted to be hyperglycemic A1c greater than 6.5.  He was admitted to the hospital service and placed in the ICU while on nitroglycerin drip.    Patient required multiple antihypertensives to be initiated.  He was seen in consultation with cardiology who recommended coronary angiography once blood pressure had improved.  2D echocardiogram was largely unremarkable with no valvular abnormalities.  He underwent coronary angiography on 2024 with evidence of multivessel coronary artery disease.  Recommendation is for evaluation by CT surgery for CABG.  Patient is pending transfer to [facility] in [place] under the care of Dr. Mccollum.    Review of Systems:     All systems were reviewed and negative except as mentioned in subjective, assessment and plan.    Vital Signs:    Temp:  [97.7 °F (36.5 °C)-98.4 °F (36.9 °C)] 98 °F (36.7 °C)  Heart Rate:  [54-89] 64  Resp:  [16-24] 17  BP: (103-187)/() 127/77    Intake and output:    I/O last 3  completed shifts:  In: 198 [I.V.:198]  Out: 725 [Urine:725]  I/O this shift:  In: -   Out: 725 [Urine:725]    Physical Examination:    General Appearance:  Alert and cooperative.  NAD   Head:  Atraumatic and normocephalic.   Eyes: Conjunctivae and sclerae normal, no icterus. No pallor.   Throat: No oral lesions, no thrush, oral mucosa moist.   Neck: Supple, trachea midline, no thyromegaly.   Lungs:   Breath sounds heard bilaterally equally.  No wheezing or crackles. No Pleural rub or bronchial breathing.   Heart:  Normal S1 and S2, no murmur, no gallop, no rub. No JVD.   Abdomen:   Normal bowel sounds, no masses, no organomegaly. Soft, nontender, nondistended, no rebound tenderness.   Extremities: Supple, no edema, no cyanosis, no clubbing.   Skin: No bleeding or rash.   Neurologic: Alert and oriented x 3. No facial asymmetry. Moves all four limbs. No tremors.      Laboratory results:    Results from last 7 days   Lab Units 05/02/24 0446 05/01/24  1132 05/01/24  0307 04/30/24  2150   SODIUM mmol/L 133*  --  135* 138   POTASSIUM mmol/L 4.6 4.1 3.4* 3.5   CHLORIDE mmol/L 100  --  101 101   CO2 mmol/L 26.9  --  22.2 23.4   BUN mg/dL 8  --  11 13   CREATININE mg/dL 0.79  --  0.74* 0.90   CALCIUM mg/dL 9.1  --  8.9 9.6   BILIRUBIN mg/dL  --   --   --  0.5   ALK PHOS U/L  --   --   --  108   ALT (SGPT) U/L  --   --   --  18   AST (SGOT) U/L  --   --   --  18   GLUCOSE mg/dL 124*  --  162* 213*     Results from last 7 days   Lab Units 05/02/24  0446 05/01/24  0307 04/30/24  2150   WBC 10*3/mm3 8.47 8.07 11.00*   HEMOGLOBIN g/dL 12.1* 12.1* 14.4   HEMATOCRIT % 34.9* 33.7* 39.8   PLATELETS 10*3/mm3 177 163 191     Results from last 7 days   Lab Units 05/01/24  0307   INR  1.06     Results from last 7 days   Lab Units 05/01/24  0307 05/01/24  0141 04/30/24  2348   HSTROP T ng/L 33* 33* 29*         Recent Labs     08/22/23  2206   BASEEXCESS 0.9      I have reviewed the patient's laboratory results.    Radiology  results:    Cardiac Catheterization/Vascular Study    Result Date: 5/2/2024  1.  Severe multivessel coronary artery disease including:      -Severe sequential stenosis in the mid LAD  -Separate ostia for the LCx, severe stenosis in the proximal LCx and sedating into the ostium of a large caliber OM branch.  Additionally severe stenosis in the proximal portion of the large OM1 as well as the mid Lcx  -Severe stenosis in the mid RCA hemodynamically significant with a contrast RFR of 0.83.  Moderate to severe stenosis in the distal RCA 2.  Normal LVEDP (8 mmHg)    Adult Transthoracic Echo Complete W/ Cont if Necessary Per Protocol    Result Date: 5/1/2024    Left ventricular systolic function is normal. Calculated left ventricular EF = 56.6% Left ventricular ejection fraction appears to be 56 - 60%. Left ventricular diastolic function was normal.   Left ventricular wall thickness is consistent with mild concentric hypertrophy.   Normal right ventricular size and function.   No hemodynamically significant valvular dysfunction.   Mild dilation of the proximal aorta is present measuring 3.6 cm..   Cannot exclude the presence of an atrial septal defect.  Recommend limited echo with bubble study.     XR Chest 1 View    Result Date: 5/1/2024  PROCEDURE: XR CHEST 1 VW-    HISTORY: Tearing, stabbing pain in Chest, Chest Pain Triage Protocol  COMPARISON: May 2022.  FINDINGS: The heart is normal in size. The mediastinum is unremarkable. The lungs are clear. There is no pneumothorax. There are no acute osseous abnormalities.      Impression: No acute cardiopulmonary process.        Images were reviewed, interpreted, and dictated by Dr. Mary Horton MD Transcribed by Stephania Cooper PA-C.  This report was signed and finalized on 5/1/2024 10:25 AM by Mary Horton MD.      CT Angiogram Chest    Result Date: 4/30/2024  FINAL REPORT TECHNIQUE: Multiple axial CT images were obtained through the chest following IV contrast using a  CTA/PE protocol.  3D/MIP reconstruction images were also performed. This study was performed with techniques to keep radiation doses as low as reasonably achievable (ALARA). Individualized dose reduction techniques using automated exposure control or adjustment of mA and/or kV according to the patient's size were employed. CLINICAL HISTORY: Chest pain, hypertension and tachycardia FINDINGS: PAs and aorta: No pulmonary embolus.  There is a mildly aneurysmal ascending thoracic aorta measuring 4.1 cm.  There is coronary artery disease.  Heart/mediastinum: No evidence for right heart strain.  No pericardial effusion.    There is borderline cardiomegaly.  Lungs: There is bronchial wall thickening with areas of peripheral mucous plugging and tree-in-bud nodularity present within the posterior segment of the right upper lobe and bilateral lower lobes.    Lymph nodes: No pathologically enlarged thoracic lymph nodes.  Pleura: No pneumothorax or pleural effusion.  Chest Wall: No chest wall contusion.  Bones: No acute fracture.  Upper abdomen: No acute findings in the upper abdomen.     Impression: No pulmonary embolus.     Bronchitis and mild bronchopneumonia. Mildly aneurysmal thoracic aorta.  Coronary artery disease. Authenticated and Electronically Signed by Bam Nieto MD on 04/30/2024 11:13:38 PM   I have reviewed the patient's radiology reports.    Medication Review:     I have reviewed the patient's active and prn medications.     Problem List:      Hypertensive emergency    NSTEMI, initial episode of care      Assessment:    Multivessel coronary artery disease  Hypertensive urgency  Type I NSTEMI  Stable thoracic aortic aneurysm  Type 2 diabetes    Plan:    NSTEMI/CAD:  Discussed with Dr. Marroquin post angiography.  Multivessel coronary disease noted.  Recommendations will be for bypass surgery evaluation.  He has talked with [facility] in [place], Dr. Mccollum, has accepted the patient pending bed availability.   Patient agreeable to and understands.    Hypertension emergency:  Currently has been weaned off nitroglycerin.  He has been started on multiple new antihypertensives including carvedilol 25 mg twice a day, clonidine 0.2 mg twice a day, lisinopril 20 mg, HCTZ 12.5 mg, Imdur 120 mg, and hydralazine 25 mg 3 times a day.  Will continue to make further adjustments.    DVT Prophylaxis: Lovenox  Code Status: Full  Diet: Cardiac diabetic  Discharge Plan: DIANA Jacob DO  05/02/24  15:10 EDT    Dictated utilizing Dragon dictation.

## 2024-05-03 LAB
ACINETOBACTER SCREEN CX: NORMAL
VRE SPEC QL CULT: NORMAL

## 2024-05-03 NOTE — PAYOR COMM NOTE
"To:  [facility]  From: Lupe Galo RN  Phone: [phone]  Fax: [phone]  NPI: [id number]  TIN: [id number]  Member ID: [id number]   MRN: [id number]    Marta Denton (53 y.o. Male)       Date of Birth   1970    Social Security Number       Address   [address] [place] [id number]    Home Phone       MRN   [id number]       Buddhist   None    Marital Status                               Admission Date   24    Admission Type   Emergency    Admitting Provider   Kerley, Brian Joseph, DO    Attending Provider       Department, Room/Bed   [facility] INTENSIVE CARE, I03/       Discharge Date   2024    Discharge Disposition   Short Term Hospital (DC - External)    Discharge Destination                                 Attending Provider: (none)   Allergies: No Known Allergies    Isolation: None   Infection: None   Code Status: Prior    Ht: 170.2 cm (67.01\")   Wt: 110 kg (241 lb 6.5 oz)    Admission Cmt: None   Principal Problem: Hypertensive emergency [I16.1]                   Active Insurance as of 2024       Primary Coverage       Payor Plan Insurance Group Employer/Plan Group    [facility] COMMUNITY PLAN [facility] COMMUNITY PLAN [facility]       Payor Plan Address Payor Plan Phone Number Payor Plan Fax Number Effective Dates    [address]   2023 - None Entered    [facility] [id number]         Subscriber Name Subscriber Birth Date Member ID       MARTA DENTON 1970 [id number]                     Emergency Contacts        (Rel.) Home Phone Work Phone Mobile Phone    Aashish Denton (Son) [phone] -- --    JOHN DENTON (Spouse) [phone] -- [phone]                 Discharge Summary        Ene Jacob DO at 24 [facility]              [facility] HOSPITALIST   DISCHARGE SUMMARY      Name:  Marta Denton   Age:  53 y.o.  Sex:  male  :  1970  MRN:  [id number]   Visit Number:  " [id number]    Admission Date:  4/30/2024  Date of Discharge:  5/2/2024  Primary Care Physician:  Aisha Barba, APRN    Important issues to note:    Transfer to [facility] in [place] for CT surgery evaluation  Needs follow-up with PCP for treatment of hypertension and diabetes upon discharge    Discharge Diagnoses:     Multivessel coronary artery disease  Hypertensive urgency  NSTEMI  Hyperglycemia concern for new onset diabetes mellitus    Problem List:     Active Hospital Problems    Diagnosis  POA    **Hypertensive emergency [I16.1]  Yes    NSTEMI, initial episode of care [I21.4]  Unknown      Resolved Hospital Problems   No resolved problems to display.     Presenting Problem:    Chief Complaint   Patient presents with    Chest Pain      Consults:     Consulting Physician(s)         Provider   Role Specialty     Tejas Marroquin MD      Consulting Physician Cardiology          Procedures Performed:    Procedure(s):  Coronary angiography    History of presenting illness/Hospital Course:    53-year-old with history of hypertension, suspected diabetes, chronic tobacco use, chronic alcohol use currently in remission, who presented due to multiple complaints.  Patient developed intermittent chest pains, shortness of breath primarily with exertion over the last few weeks to a month.    Patient had presented to emergency room severely hypertensive, had elevated troponins, no ischemic EKG findings.  He is also noted to be hyperglycemic A1c greater than 6.5.  He was admitted to the hospital service and placed in the ICU while on nitroglycerin drip.    Patient required multiple antihypertensives to be initiated.  He was seen in consultation with cardiology who recommended coronary angiography once blood pressure had improved.  2D echocardiogram was largely unremarkable with no valvular abnormalities.  He underwent coronary angiography on 5/2/2024 with evidence of multivessel coronary artery disease.   Recommendation is for evaluation by CT surgery for CABG.  Patient is pending transfer to [facility] in [place] under the care of Dr. Mccollum.    Vital Signs:    Temp:  [97.7 °F (36.5 °C)-98.4 °F (36.9 °C)] 98 °F (36.7 °C)  Heart Rate:  [54-89] 68  Resp:  [16-24] 17  BP: (103-182)/() 137/87    Physical Exam:    General Appearance:  Alert and cooperative.  NAD   Head:  Atraumatic and normocephalic.   Eyes: Conjunctivae and sclerae normal, no icterus. No pallor.   Ears:  Ears with no abnormalities noted.   Throat: No oral lesions, no thrush, oral mucosa moist.   Neck: Supple, trachea midline, no thyromegaly.   Back:   No kyphoscoliosis present. No tenderness to palpation.   Lungs:   Breath sounds heard bilaterally equally.  No crackles or wheezing. No Pleural rub or bronchial breathing.   Heart:  Normal S1 and S2, no murmur, no gallop, no rub. No JVD.   Abdomen:   Normal bowel sounds, no masses, no organomegaly. Soft, nontender, nondistended, no rebound tenderness.   Extremities: Supple, no edema, no cyanosis, no clubbing.   Pulses: Pulses palpable bilaterally.   Skin: No bleeding or rash.   Neurologic: Alert and oriented x 3. No facial asymmetry. Moves all four limbs. No tremors.     Pertinent Lab Results:     Results from last 7 days   Lab Units 05/02/24  0446 05/01/24  1132 05/01/24  0307 04/30/24  2150   SODIUM mmol/L 133*  --  135* 138   POTASSIUM mmol/L 4.6 4.1 3.4* 3.5   CHLORIDE mmol/L 100  --  101 101   CO2 mmol/L 26.9  --  22.2 23.4   BUN mg/dL 8  --  11 13   CREATININE mg/dL 0.79  --  0.74* 0.90   CALCIUM mg/dL 9.1  --  8.9 9.6   BILIRUBIN mg/dL  --   --   --  0.5   ALK PHOS U/L  --   --   --  108   ALT (SGPT) U/L  --   --   --  18   AST (SGOT) U/L  --   --   --  18   GLUCOSE mg/dL 124*  --  162* 213*     Results from last 7 days   Lab Units 05/02/24  0446 05/01/24  0307 04/30/24  2150   WBC 10*3/mm3 8.47 8.07 11.00*   HEMOGLOBIN g/dL 12.1* 12.1* 14.4   HEMATOCRIT % 34.9* 33.7* 39.8   PLATELETS  10*3/mm3 177 163 191     Results from last 7 days   Lab Units 05/01/24  0307   INR  1.06     Results from last 7 days   Lab Units 05/01/24  0307 05/01/24  0141 04/30/24  2348   HSTROP T ng/L 33* 33* 29*                           Pertinent Radiology Results:    Imaging Results (All)       Procedure Component Value Units Date/Time    XR Chest 1 View [[id number]] Collected: 05/01/24 1023     Updated: 05/01/24 1027    Narrative:      PROCEDURE: XR CHEST 1 VW-        HISTORY: Tearing, stabbing pain in Chest, Chest Pain Triage Protocol     COMPARISON: May 2022.     FINDINGS: The heart is normal in size. The mediastinum is unremarkable.  The lungs are clear. There is no pneumothorax. There are no acute  osseous abnormalities.       Impression:      No acute cardiopulmonary process.                       Images were reviewed, interpreted, and dictated by Dr. Mary Horton MD  Transcribed by Stephania Cooper PA-C.     This report was signed and finalized on 5/1/2024 10:25 AM by Mary Horton MD.       CT Angiogram Chest [[id number]] Collected: 04/30/24 2255     Updated: 04/30/24 2314    Narrative:      FINAL REPORT    TECHNIQUE:  Multiple axial CT images were obtained through the chest  following IV contrast using a CTA/PE protocol.  3D/MIP  reconstruction images were also performed. This study was  performed with techniques to keep radiation doses as low as  reasonably achievable (ALARA). Individualized dose reduction  techniques using automated exposure control or adjustment of mA  and/or kV according to the patient's size were employed.    CLINICAL HISTORY:  Chest pain, hypertension and tachycardia    FINDINGS:  PAs and aorta: No pulmonary embolus.  There is a mildly  aneurysmal ascending thoracic aorta measuring 4.1 cm.  There is  coronary artery disease.  Heart/mediastinum: No evidence for  right heart strain.  No pericardial effusion.    There is  borderline cardiomegaly.  Lungs: There is bronchial wall  thickening with  areas of peripheral mucous plugging and  tree-in-bud nodularity present within the posterior segment of  the right upper lobe and bilateral lower lobes.    Lymph nodes:  No pathologically enlarged thoracic lymph nodes.  Pleura: No  pneumothorax or pleural effusion.  Chest Wall: No chest wall  contusion.  Bones: No acute fracture.  Upper abdomen: No acute  findings in the upper abdomen.      Impression:      No pulmonary embolus.     Bronchitis and mild bronchopneumonia.  Mildly aneurysmal thoracic aorta.  Coronary artery disease.    Authenticated and Electronically Signed by Bam Nieto MD on  04/30/2024 11:13:38 PM            Echo:    Results for orders placed during the hospital encounter of 04/30/24    Adult Transthoracic Echo Complete W/ Cont if Necessary Per Protocol    Interpretation Summary    Left ventricular systolic function is normal. Calculated left ventricular EF = 56.6% Left ventricular ejection fraction appears to be 56 - 60%. Left ventricular diastolic function was normal.    Left ventricular wall thickness is consistent with mild concentric hypertrophy.    Normal right ventricular size and function.    No hemodynamically significant valvular dysfunction.    Mild dilation of the proximal aorta is present measuring 3.6 cm..    Cannot exclude the presence of an atrial septal defect.  Recommend limited echo with bubble study.    Condition on Discharge:      Stable.    Code status during the hospital stay:    Code Status and Medical Interventions:   Ordered at: 05/02/24 1231     Level Of Support Discussed With:    Patient     Code Status (Patient has no pulse and is not breathing):    CPR (Attempt to Resuscitate)     Medical Interventions (Patient has pulse or is breathing):    Full Support     Discharge Disposition:        Discharge Medications:       Discharge Medications        ASK your doctor about these medications        Instructions Start Date   albuterol sulfate  (90 Base) MCG/ACT  inhaler  Commonly known as: PROVENTIL HFA;VENTOLIN HFA;PROAIR HFA   2 puffs, Inhalation, Every 4 Hours PRN      amLODIPine 10 MG tablet  Commonly known as: NORVASC   10 mg, Oral, Daily      aspirin 325 MG tablet   325 mg, Oral, Daily      atorvastatin 20 MG tablet  Commonly known as: LIPITOR   20 mg, Oral, Every Night at Bedtime      cilostazol 50 MG tablet  Commonly known as: PLETAL   50 mg, Oral, 2 Times Daily      disulfiram 250 MG tablet  Commonly known as: ANTABUSE   500 mg, Oral, Daily      FLUoxetine 20 MG capsule  Commonly known as: PROzac   20 mg, Oral, Daily      lisinopril 20 MG tablet  Commonly known as: PRINIVIL,ZESTRIL   20 mg, Oral, 2 Times Daily      melatonin 5 MG tablet tablet   5 mg, Oral, Nightly PRN      meloxicam 15 MG tablet  Commonly known as: MOBIC   15 mg, Oral, Daily      methylPREDNISolone 4 MG dose pack  Commonly known as: MEDROL   Take as directed on package instructions.      metoprolol succinate  MG 24 hr tablet  Commonly known as: TOPROL-XL   100 mg, Oral, Daily      rOPINIRole 0.5 MG tablet  Commonly known as: REQUIP   1 mg, Oral, Nightly, Take 1 hour before bedtime.             Discharge Diet:   Cardiac    Activity at Discharge:     As tolerated  Follow-up Appointments:      No future appointments.  Test Results Pending at Discharge:    Pending Labs       Order Current Status    Acinetobacter Screen - Swab, Axilla, Left In process    VRE Culture - Swab, Per Rectum Preliminary result               Ene Jacob DO  05/02/24  15:15 EDT    Time: I spent 18 minutes on this discharge activity which included: face-to-face encounter with the patient, reviewing the data in the system, coordination of the care with the nursing staff as well as consultants, documentation, and entering orders.     Dictated utilizing Dragon dictation.      Electronically signed by Ene Jacob DO at 05/02/24 2392

## 2024-05-13 ENCOUNTER — READMISSION MANAGEMENT (OUTPATIENT)
Dept: CALL CENTER | Facility: HOSPITAL | Age: 54
End: 2024-05-13
Payer: MEDICAID

## 2024-05-14 ENCOUNTER — COMMUNITY OUTREACH (OUTPATIENT)
Dept: PRIMARY CARE CLINIC | Age: 54
End: 2024-05-14

## 2024-05-14 NOTE — OUTREACH NOTE
Prep Survey      Flowsheet Row Responses   [facility] facility patient discharged from? Non-BH   Is LACE score < 7 ? Non-BH Discharge   Eligibility Not Eligible   What are the reasons patient is not eligible? Other  [no pcp appt noted]   Does the patient have one of the following disease processes/diagnoses(primary or secondary)? Other   Prep survey completed? Yes            [facility] SELLERS - Registered Nurse

## 2024-05-16 ENCOUNTER — APPOINTMENT (OUTPATIENT)
Dept: GENERAL RADIOLOGY | Facility: HOSPITAL | Age: 54
End: 2024-05-16
Payer: MEDICAID

## 2024-05-16 ENCOUNTER — APPOINTMENT (OUTPATIENT)
Dept: ULTRASOUND IMAGING | Facility: HOSPITAL | Age: 54
End: 2024-05-16
Payer: MEDICAID

## 2024-05-16 ENCOUNTER — HOSPITAL ENCOUNTER (EMERGENCY)
Facility: HOSPITAL | Age: 54
Discharge: HOME | End: 2024-05-16
Attending: EMERGENCY MEDICINE
Payer: MEDICAID

## 2024-05-16 VITALS
HEIGHT: 67 IN | TEMPERATURE: 98.6 F | BODY MASS INDEX: 36.88 KG/M2 | HEART RATE: 76 BPM | SYSTOLIC BLOOD PRESSURE: 132 MMHG | WEIGHT: 235 LBS | DIASTOLIC BLOOD PRESSURE: 95 MMHG | RESPIRATION RATE: 22 BRPM | OXYGEN SATURATION: 99 %

## 2024-05-16 DIAGNOSIS — M10.9 ACUTE GOUT INVOLVING TOE, UNSPECIFIED CAUSE, UNSPECIFIED LATERALITY: Primary | ICD-10-CM

## 2024-05-16 LAB
ALBUMIN SERPL-MCNC: 3.7 G/DL (ref 3.5–5.2)
ALBUMIN/GLOB SERPL: 1.2 G/DL
ALP SERPL-CCNC: 79 U/L (ref 39–117)
ALT SERPL W P-5'-P-CCNC: 14 U/L (ref 1–41)
ANION GAP SERPL CALCULATED.3IONS-SCNC: 10.9 MMOL/L (ref 5–15)
AST SERPL-CCNC: 13 U/L (ref 1–40)
BASOPHILS # BLD AUTO: 0.02 10*3/MM3 (ref 0–0.2)
BASOPHILS NFR BLD AUTO: 0.2 % (ref 0–1.5)
BILIRUB SERPL-MCNC: 0.6 MG/DL (ref 0–1.2)
BUN SERPL-MCNC: 16 MG/DL (ref 6–20)
BUN/CREAT SERPL: 17.4 (ref 7–25)
CALCIUM SPEC-SCNC: 9 MG/DL (ref 8.6–10.5)
CHLORIDE SERPL-SCNC: 100 MMOL/L (ref 98–107)
CO2 SERPL-SCNC: 24.1 MMOL/L (ref 22–29)
CREAT SERPL-MCNC: 0.92 MG/DL (ref 0.76–1.27)
CRP SERPL-MCNC: 3.89 MG/DL (ref 0–0.5)
DEPRECATED RDW RBC AUTO: 45.1 FL (ref 37–54)
EGFRCR SERPLBLD CKD-EPI 2021: 100.7 ML/MIN/1.73
EOSINOPHIL # BLD AUTO: 0.09 10*3/MM3 (ref 0–0.4)
EOSINOPHIL NFR BLD AUTO: 0.9 % (ref 0.3–6.2)
ERYTHROCYTE [DISTWIDTH] IN BLOOD BY AUTOMATED COUNT: 12.6 % (ref 12.3–15.4)
ERYTHROCYTE [SEDIMENTATION RATE] IN BLOOD: 13 MM/HR (ref 0–20)
GLOBULIN UR ELPH-MCNC: 3 GM/DL
GLUCOSE SERPL-MCNC: 126 MG/DL (ref 65–99)
HCT VFR BLD AUTO: 33 % (ref 37.5–51)
HGB BLD-MCNC: 11.3 G/DL (ref 13–17.7)
IMM GRANULOCYTES # BLD AUTO: 0.08 10*3/MM3 (ref 0–0.05)
IMM GRANULOCYTES NFR BLD AUTO: 0.8 % (ref 0–0.5)
LYMPHOCYTES # BLD AUTO: 1.91 10*3/MM3 (ref 0.7–3.1)
LYMPHOCYTES NFR BLD AUTO: 19.5 % (ref 19.6–45.3)
MCH RBC QN AUTO: 33.5 PG (ref 26.6–33)
MCHC RBC AUTO-ENTMCNC: 34.2 G/DL (ref 31.5–35.7)
MCV RBC AUTO: 97.9 FL (ref 79–97)
MONOCYTES # BLD AUTO: 0.99 10*3/MM3 (ref 0.1–0.9)
MONOCYTES NFR BLD AUTO: 10.1 % (ref 5–12)
NEUTROPHILS NFR BLD AUTO: 6.69 10*3/MM3 (ref 1.7–7)
NEUTROPHILS NFR BLD AUTO: 68.5 % (ref 42.7–76)
NRBC BLD AUTO-RTO: 0 /100 WBC (ref 0–0.2)
PLATELET # BLD AUTO: 256 10*3/MM3 (ref 140–450)
PMV BLD AUTO: 9.8 FL (ref 6–12)
POTASSIUM SERPL-SCNC: 4.5 MMOL/L (ref 3.5–5.2)
PROT SERPL-MCNC: 6.7 G/DL (ref 6–8.5)
RBC # BLD AUTO: 3.37 10*6/MM3 (ref 4.14–5.8)
SODIUM SERPL-SCNC: 135 MMOL/L (ref 136–145)
URATE SERPL-MCNC: 7.8 MG/DL (ref 3.4–7)
WBC NRBC COR # BLD AUTO: 9.78 10*3/MM3 (ref 3.4–10.8)

## 2024-05-16 PROCEDURE — 93970 EXTREMITY STUDY: CPT

## 2024-05-16 PROCEDURE — 96375 TX/PRO/DX INJ NEW DRUG ADDON: CPT

## 2024-05-16 PROCEDURE — 25010000002 ONDANSETRON PER 1 MG: Performed by: EMERGENCY MEDICINE

## 2024-05-16 PROCEDURE — 85652 RBC SED RATE AUTOMATED: CPT | Performed by: EMERGENCY MEDICINE

## 2024-05-16 PROCEDURE — 99284 EMERGENCY DEPT VISIT MOD MDM: CPT

## 2024-05-16 PROCEDURE — 71045 X-RAY EXAM CHEST 1 VIEW: CPT

## 2024-05-16 PROCEDURE — 73630 X-RAY EXAM OF FOOT: CPT

## 2024-05-16 PROCEDURE — 84550 ASSAY OF BLOOD/URIC ACID: CPT | Performed by: EMERGENCY MEDICINE

## 2024-05-16 PROCEDURE — 25010000002 HYDROMORPHONE PER 4 MG: Performed by: EMERGENCY MEDICINE

## 2024-05-16 PROCEDURE — 25010000002 KETOROLAC TROMETHAMINE PER 15 MG: Performed by: EMERGENCY MEDICINE

## 2024-05-16 PROCEDURE — 86140 C-REACTIVE PROTEIN: CPT | Performed by: EMERGENCY MEDICINE

## 2024-05-16 PROCEDURE — 96374 THER/PROPH/DIAG INJ IV PUSH: CPT

## 2024-05-16 PROCEDURE — 85025 COMPLETE CBC W/AUTO DIFF WBC: CPT | Performed by: EMERGENCY MEDICINE

## 2024-05-16 PROCEDURE — 80053 COMPREHEN METABOLIC PANEL: CPT | Performed by: EMERGENCY MEDICINE

## 2024-05-16 RX ORDER — PREDNISONE 20 MG/1
40 TABLET ORAL DAILY
Qty: 10 TABLET | Refills: 0 | Status: SHIPPED | OUTPATIENT
Start: 2024-05-16 | End: 2024-05-21

## 2024-05-16 RX ORDER — HYDROMORPHONE HYDROCHLORIDE 2 MG/ML
0.5 INJECTION, SOLUTION INTRAMUSCULAR; INTRAVENOUS; SUBCUTANEOUS ONCE
Status: COMPLETED | OUTPATIENT
Start: 2024-05-16 | End: 2024-05-16

## 2024-05-16 RX ORDER — OXYCODONE HYDROCHLORIDE 5 MG/1
5 TABLET ORAL EVERY 4 HOURS PRN
Qty: 12 TABLET | Refills: 0 | Status: SHIPPED | OUTPATIENT
Start: 2024-05-16

## 2024-05-16 RX ORDER — ONDANSETRON 2 MG/ML
4 INJECTION INTRAMUSCULAR; INTRAVENOUS ONCE
Status: COMPLETED | OUTPATIENT
Start: 2024-05-16 | End: 2024-05-16

## 2024-05-16 RX ORDER — NAPROXEN 500 MG/1
500 TABLET ORAL 2 TIMES DAILY WITH MEALS
Qty: 30 TABLET | Refills: 0 | Status: SHIPPED | OUTPATIENT
Start: 2024-05-16

## 2024-05-16 RX ORDER — KETOROLAC TROMETHAMINE 30 MG/ML
15 INJECTION, SOLUTION INTRAMUSCULAR; INTRAVENOUS ONCE
Status: COMPLETED | OUTPATIENT
Start: 2024-05-16 | End: 2024-05-16

## 2024-05-16 RX ADMIN — ONDANSETRON 4 MG: 2 INJECTION INTRAMUSCULAR; INTRAVENOUS at 07:38

## 2024-05-16 RX ADMIN — KETOROLAC TROMETHAMINE 15 MG: 30 INJECTION, SOLUTION INTRAMUSCULAR; INTRAVENOUS at 07:39

## 2024-05-16 RX ADMIN — HYDROMORPHONE HYDROCHLORIDE 0.5 MG: 2 INJECTION, SOLUTION INTRAMUSCULAR; INTRAVENOUS; SUBCUTANEOUS at 07:40

## 2024-05-16 NOTE — ED PROVIDER NOTES
EMERGENCY DEPARTMENT ENCOUNTER    Pt Name: Andrés Denton  MRN: [id number]  Pt :   1970  Room Number:    Date of encounter:  2024  PCP: Aisha Barba APRN  ED Provider: Roshan Macias MD    Historian: Patient      HPI:  Chief Complaint   Patient presents with    Foot Pain     Pt has gout, bilateral foot pain. Had bypass surgery last Monday, overall pain complaints.          Context: Andrés Denton is a 53 y.o. male who presents to the ED c/o bilateral foot pain, getting worse over the last several days, patient reports he had bypass surgery last Monday at [facility], he reports that this feels like his gout, it is in both feet and the first and second toes.  Feels swollen, denies fevers.  Reports pain at the site of his CABG as well.  Only taking Tylenol for pain      PAST MEDICAL HISTORY  Past Medical History:   Diagnosis Date    Alcohol abuse     Anxiety     Depression     Hypertension     Suicidal thoughts     Withdrawal symptoms, alcohol          PAST SURGICAL HISTORY  Past Surgical History:   Procedure Laterality Date    AMPUTATION      right ring finger partial amputation    CARDIAC CATHETERIZATION N/A 2024    Procedure: Coronary angiography;  Surgeon: Tejas Marroquin MD;  Location: [facility] CATH INVASIVE LOCATION;  Service: Cardiovascular;  Laterality: N/A;    FRACTURE SURGERY Left     tib/fib pt. is unsure the exact surgery it was when he was 17         FAMILY HISTORY  Family History   Problem Relation Age of Onset    Alcohol abuse Mother     Alcohol abuse Father     Alcohol abuse Maternal Uncle          SOCIAL HISTORY  Social History     Socioeconomic History    Marital status:     Number of children: 2    Years of education: 12    Highest education level: High school graduate   Tobacco Use    Smoking status: Never    Smokeless tobacco: Current     Types: Snuff    Tobacco comments:     One can per day- tobacco use starting at age 10- 44   years   Vaping Use    Vaping status: Never Used   Substance and Sexual Activity    Alcohol use: Yes     Comment: 1-2 x weekly1/2 pint, ~ 30 beers and a pint of vodka this past weekend    Drug use: Yes     Comment: alcohol    Sexual activity: Defer     Partners: Female         ALLERGIES  Morphine        REVIEW OF SYSTEMS  Review of Systems   Constitutional:  Negative for chills and fever.   HENT:  Negative for sore throat and trouble swallowing.    Eyes:  Negative for pain and redness.   Respiratory:  Negative for cough and shortness of breath.    Cardiovascular:  Positive for chest pain. Negative for leg swelling.   Gastrointestinal:  Negative for abdominal pain, nausea and vomiting.   Genitourinary:  Negative for dysuria and urgency.   Musculoskeletal:  Negative for back pain and neck pain.        Bilateral foot pain   Skin:  Negative for rash and wound.   Neurological:  Negative for dizziness and weakness.        All systems reviewed and negative except for those discussed in HPI.       PHYSICAL EXAM    I have reviewed the triage vital signs and nursing notes.    ED Triage Vitals [05/16/24 0702]   Temp Heart Rate Resp BP SpO2   98.6 °F (37 °C) 76 22 132/95 99 %      Temp src Heart Rate Source Patient Position BP Location FiO2 (%)   Oral Monitor Sitting Left arm --       Physical Exam  Constitutional:       Appearance: Normal appearance. He is not ill-appearing.      Comments: Uncomfortable appearing male, no acute respiratory distress   HENT:      Head: Normocephalic and atraumatic.      Right Ear: External ear normal.      Left Ear: External ear normal.      Nose: Nose normal.      Mouth/Throat:      Mouth: Mucous membranes are moist.      Pharynx: Oropharynx is clear.   Eyes:      Extraocular Movements: Extraocular movements intact.      Conjunctiva/sclera: Conjunctivae normal.      Pupils: Pupils are equal, round, and reactive to light.   Cardiovascular:      Rate and Rhythm: Normal rate and regular rhythm.       Pulses:           Radial pulses are 2+ on the right side and 2+ on the left side.        Dorsalis pedis pulses are 2+ on the right side and 2+ on the left side.        Posterior tibial pulses are 2+ on the right side and 2+ on the left side.      Heart sounds: No murmur heard.  Pulmonary:      Effort: Pulmonary effort is normal.      Breath sounds: Normal breath sounds.   Chest:       Abdominal:      General: There is no distension.      Tenderness: There is no abdominal tenderness. There is no guarding.   Musculoskeletal:         General: No swelling or deformity.      Cervical back: Normal range of motion and neck supple.      Right foot: Swelling, tenderness and bony tenderness present.      Left foot: Swelling, tenderness and bony tenderness present.   Skin:     General: Skin is warm and dry.      Capillary Refill: Capillary refill takes less than 2 seconds.      Findings: No rash.   Neurological:      General: No focal deficit present.      Mental Status: He is alert and oriented to person, place, and time.            LAB RESULTS  Recent Results (from the past 24 hour(s))   Comprehensive Metabolic Panel    Collection Time: 05/16/24  7:34 AM    Specimen: Blood   Result Value Ref Range    Glucose 126 (H) 65 - 99 mg/dL    BUN 16 6 - 20 mg/dL    Creatinine 0.92 0.76 - 1.27 mg/dL    Sodium 135 (L) 136 - 145 mmol/L    Potassium 4.5 3.5 - 5.2 mmol/L    Chloride 100 98 - 107 mmol/L    CO2 24.1 22.0 - 29.0 mmol/L    Calcium 9.0 8.6 - 10.5 mg/dL    Total Protein 6.7 6.0 - 8.5 g/dL    Albumin 3.7 3.5 - 5.2 g/dL    ALT (SGPT) 14 1 - 41 U/L    AST (SGOT) 13 1 - 40 U/L    Alkaline Phosphatase 79 39 - 117 U/L    Total Bilirubin 0.6 0.0 - 1.2 mg/dL    Globulin 3.0 gm/dL    A/G Ratio 1.2 g/dL    BUN/Creatinine Ratio 17.4 7.0 - 25.0    Anion Gap 10.9 5.0 - 15.0 mmol/L    eGFR 100.7 >60.0 mL/min/1.73   Uric Acid    Collection Time: 05/16/24  7:34 AM    Specimen: Blood   Result Value Ref Range    Uric Acid 7.8 (H) 3.4 - 7.0 mg/dL    CBC Auto Differential    Collection Time: 05/16/24  7:34 AM    Specimen: Blood   Result Value Ref Range    WBC 9.78 3.40 - 10.80 10*3/mm3    RBC 3.37 (L) 4.14 - 5.80 10*6/mm3    Hemoglobin 11.3 (L) 13.0 - 17.7 g/dL    Hematocrit 33.0 (L) 37.5 - 51.0 %    MCV 97.9 (H) 79.0 - 97.0 fL    MCH 33.5 (H) 26.6 - 33.0 pg    MCHC 34.2 31.5 - 35.7 g/dL    RDW 12.6 12.3 - 15.4 %    RDW-SD 45.1 37.0 - 54.0 fl    MPV 9.8 6.0 - 12.0 fL    Platelets 256 140 - 450 10*3/mm3    Neutrophil % 68.5 42.7 - 76.0 %    Lymphocyte % 19.5 (L) 19.6 - 45.3 %    Monocyte % 10.1 5.0 - 12.0 %    Eosinophil % 0.9 0.3 - 6.2 %    Basophil % 0.2 0.0 - 1.5 %    Immature Grans % 0.8 (H) 0.0 - 0.5 %    Neutrophils, Absolute 6.69 1.70 - 7.00 10*3/mm3    Lymphocytes, Absolute 1.91 0.70 - 3.10 10*3/mm3    Monocytes, Absolute 0.99 (H) 0.10 - 0.90 10*3/mm3    Eosinophils, Absolute 0.09 0.00 - 0.40 10*3/mm3    Basophils, Absolute 0.02 0.00 - 0.20 10*3/mm3    Immature Grans, Absolute 0.08 (H) 0.00 - 0.05 10*3/mm3    nRBC 0.0 0.0 - 0.2 /100 WBC   C-reactive Protein    Collection Time: 05/16/24  7:34 AM    Specimen: Blood   Result Value Ref Range    C-Reactive Protein 3.89 (H) 0.00 - 0.50 mg/dL   Sedimentation Rate    Collection Time: 05/16/24  7:34 AM    Specimen: Blood   Result Value Ref Range    Sed Rate 13 0 - 20 mm/hr       If labs were ordered, I independently reviewed the results and considered them in treating the patient.        RADIOLOGY  XR Foot 3+ View Bilateral    Result Date: 5/16/2024  RIGHT FOOT  THREE VIEW  HISTORY: Pain, swelling, gout  FINDINGS:  Three views show no evidence of an acute, displaced fracture or dislocation of the visualized bony architecture.  The joint spaces appear normal. Bony mineralization is normal. There are no erosions.      Unremarkable exam.    LEFT FOOT  THREE VIEW  HISTORY: Pain, swelling, gout  FINDINGS:  Three views show no evidence of an acute, displaced fracture or dislocation of the visualized bony architecture.   The joint spaces appear normal. Bony mineralization is normal. There are no erosions.  IMPRESSION: Unremarkable exam.     This report was signed and finalized on 5/16/2024 8:48 AM by Obi Cortes MD.      US Venous Doppler Lower Extremity Bilateral (duplex)    Result Date: 5/16/2024  PROCEDURE: US VENOUS DOPPLER LOWER EXTREMITY BILATERAL (DUPLEX)-   HISTORY: Pain, swelling, recent surgery  RIGHT LOWER EXTREMITY:  FINDINGS: Multiple transverse and longitudinal scans were performed of the femoropopliteal deep venous system, with augmentation and compression maneuvers.  Normal phasic flow was noted in the visualized deep venous system. No intraluminal increased echogenicity is noted to suggest thrombus. There is normal compression and augmentation of the venous structures. No abnormal venous collaterals are seen.      No evidence of deep venous thrombosis of the right lower extremity.    HISTORY:  Pain, swelling, recent surgery  LEFT LOWER EXTREMITY:  Findings: Multiple transverse and longitudinal scans were performed of the femoropopliteal deep venous system, with augmentation and compression maneuvers.  Normal phasic flow was noted in the visualized deep venous system. No intraluminal increased echogenicity is noted to suggest thrombus. There is normal compression and augmentation of the venous structures. No abnormal venous collaterals are seen.  IMPRESSION: No evidence of deep venous thrombosis of the left lower extremity.  This report was signed and finalized on 5/16/2024 8:45 AM by Obi Cortes MD.      XR Chest 1 View    Result Date: 5/16/2024  PROCEDURE: XR CHEST 1 VW-  HISTORY: Chest pain, recent CABG  COMPARISON: 4/30/2024  FINDINGS:  Portable view of the chest demonstrates the lungs to be grossly clear. Small left pleural effusion is noted. There is no pneumothorax. The mediastinum is unremarkable.  The heart size is normal.      Small left pleural effusion    This report was signed and finalized on  5/16/2024 8:45 AM by Obi Cortes MD.           PROCEDURES    Procedures    Interpretations    O2 Sat: The patients oxygen saturation was 99% on Room Air.  This was independently interpreted by me as Normal      Radiology: I ordered and independently reviewed the above noted radiographic studies.  I viewed images of Xray of the bilateral feet  which showed No fracture per my independent interpretation. See radiologist's dictation for official interpretation.     Radiology: I ordered and independently reviewed the above noted radiographic studies.  I viewed images of bilateral DVT study of the lower extremities which showed no dvt per my independent interpretation. See radiologist's dictation for official interpretation      Radiology: I ordered and independently reviewed the above noted radiographic studies.  I viewed images of Chest Xray which showed sternotomy wires per my independent interpretation. See radiologist's dictation for official interpretation  MEDICATIONS GIVEN IN ER    Medications   HYDROmorphone (DILAUDID) injection 0.5 mg (0.5 mg Intravenous Given 5/16/24 6540)   ondansetron (ZOFRAN) injection 4 mg (4 mg Intravenous Given 5/16/24 5938)   ketorolac (TORADOL) injection 15 mg (15 mg Intravenous Given 5/16/24 5239)         MEDICAL DECISION MAKING, PROGRESS, and CONSULTS    All labs, if obtained, have been independently reviewed by me.  All radiology studies, if obtained, have been reviewed by me and the radiologist dictating the report.  All EKG's, if obtained, have been independently viewed and interpreted by me      Discussion below represents my analysis of pertinent findings related to patient's condition, differential diagnosis, treatment plan and final disposition.      Differential diagnosis:    53-year-old male presented with bilateral foot pain, he has a history of gout, certainly could be gout, there is some redness, warmth and swelling.  Given that is bilateral and fairly equal I have lower  suspicion for cellulitis.  Patient recently had open heart surgery, does have some pain of the chest wall but this appears to be from his median sternotomy.  He is only taking Tylenol for pain, will give IV pain medication, obtain labs including uric acid, inflammatory markers.  Given the recent surgery will rule out DVT with bilateral DVT studies.    Additional Sources:  External (non-ED) record review:  Discharge summary May 2024, cardiothoracic surgery op note May 2024 and discharge summary       Orders placed during this visit:  Orders Placed This Encounter   Procedures    XR Foot 3+ View Bilateral    XR Chest 1 View    US Venous Doppler Lower Extremity Bilateral (duplex)    Comprehensive Metabolic Panel    Uric Acid    CBC Auto Differential    C-reactive Protein    Sedimentation Rate    Ambulatory Referral to Podiatry    CBC & Differential         Additional orders considered but not ordered:  None    ED Course:    Consultants:  None    ED Course as of 05/16/24 0853   Thu May 16, 2024   0800 WBC: 9.78  WBC normal [CS]   0801 Sedimentation Rate  ESR not significantly elevated [CS]   0815 Uric Acid(!): 7.8  Uric acid is elevated, likely speaks to the patient's acute gout exacerbation [CS]   0848 US Venous Doppler Lower Extremity Bilateral (duplex)  DVT study is negative [CS]   0848 XR Foot 3+ View Bilateral  X-ray does not show any acute bony destruction or other concerning process [CS]   0851 Patient feels much better, workup points to gout, will place patient on steroids, naprosyn and oxycodone for pain. Referred to podiatry for follow up [CS]      ED Course User Index  [CS] Roshan Macias MD           After my consideration of clinical presentation and any laboratory/radiology studies obtained, I discussed the findings with the patient/patient representative who is in agreement with the treatment plan and the final disposition. Risks and benefits of discharge were discussed.     AS OF 08:53 EDT  VITALS:    BP - 132/95  HR - 76  TEMP - 98.6 °F (37 °C) (Oral)  O2 SATS - 99%    I reviewed the patients prescription monitoring report if available prior to discharge    DIAGNOSIS  Final diagnoses:   Acute gout involving toe, unspecified cause, unspecified laterality         DISPOSITION  ED Disposition       ED Disposition   Discharge    Condition   Stable    Comment   --                   Please note that portions of this document were completed with voice recognition software.        Roshan Macias MD  05/16/24 0814

## 2024-05-20 ENCOUNTER — TELEPHONE (OUTPATIENT)
Dept: PRIMARY CARE CLINIC | Age: 54
End: 2024-05-20

## 2024-05-20 NOTE — TELEPHONE ENCOUNTER
Pt requesting an apt this week (He had a surgery and he need his medication to get regulated  his preferred date is this week morning schedule.     Was fermin garcia pt , he hasn't seen you to establish  yet, do you want to see him this week  ? I can't find any records of where he's had surgery in chart. Last time here was in September , recent ED visits in January, February for intoxication, suicidal ideation

## 2024-05-23 ENCOUNTER — OFFICE VISIT (OUTPATIENT)
Dept: PRIMARY CARE CLINIC | Age: 54
End: 2024-05-23
Payer: MEDICAID

## 2024-05-23 VITALS
SYSTOLIC BLOOD PRESSURE: 147 MMHG | WEIGHT: 231.8 LBS | OXYGEN SATURATION: 97 % | DIASTOLIC BLOOD PRESSURE: 92 MMHG | HEART RATE: 59 BPM | BODY MASS INDEX: 36.31 KG/M2

## 2024-05-23 DIAGNOSIS — D64.9 ANEMIA, UNSPECIFIED TYPE: ICD-10-CM

## 2024-05-23 DIAGNOSIS — G25.81 RLS (RESTLESS LEGS SYNDROME): ICD-10-CM

## 2024-05-23 DIAGNOSIS — M1A.9XX0 CHRONIC GOUT WITHOUT TOPHUS, UNSPECIFIED CAUSE, UNSPECIFIED SITE: ICD-10-CM

## 2024-05-23 DIAGNOSIS — I10 PRIMARY HYPERTENSION: ICD-10-CM

## 2024-05-23 DIAGNOSIS — E11.9 TYPE 2 DIABETES MELLITUS WITHOUT COMPLICATION, UNSPECIFIED WHETHER LONG TERM INSULIN USE (HCC): ICD-10-CM

## 2024-05-23 DIAGNOSIS — F41.9 ANXIETY: ICD-10-CM

## 2024-05-23 DIAGNOSIS — I25.10 CORONARY ARTERY DISEASE INVOLVING NATIVE HEART WITHOUT ANGINA PECTORIS, UNSPECIFIED VESSEL OR LESION TYPE: Primary | ICD-10-CM

## 2024-05-23 DIAGNOSIS — Z12.5 SCREENING FOR PROSTATE CANCER: ICD-10-CM

## 2024-05-23 PROCEDURE — 3080F DIAST BP >= 90 MM HG: CPT | Performed by: INTERNAL MEDICINE

## 2024-05-23 PROCEDURE — 99214 OFFICE O/P EST MOD 30 MIN: CPT | Performed by: INTERNAL MEDICINE

## 2024-05-23 PROCEDURE — 3077F SYST BP >= 140 MM HG: CPT | Performed by: INTERNAL MEDICINE

## 2024-05-23 RX ORDER — PREDNISONE 20 MG/1
20 TABLET ORAL DAILY
COMMUNITY
Start: 2024-05-16 | End: 2024-05-24

## 2024-05-23 RX ORDER — ATORVASTATIN CALCIUM 40 MG/1
40 TABLET, FILM COATED ORAL DAILY
COMMUNITY

## 2024-05-23 RX ORDER — GABAPENTIN 100 MG/1
100 CAPSULE ORAL 3 TIMES DAILY
Qty: 90 CAPSULE | Refills: 0 | Status: SHIPPED | OUTPATIENT
Start: 2024-05-23 | End: 2024-06-22

## 2024-05-23 RX ORDER — FLUOXETINE HYDROCHLORIDE 20 MG/1
20 CAPSULE ORAL DAILY
Qty: 90 CAPSULE | Refills: 1 | Status: SHIPPED | OUTPATIENT
Start: 2024-05-23

## 2024-05-23 RX ORDER — ASPIRIN 81 MG/1
81 TABLET, COATED ORAL DAILY
COMMUNITY
Start: 2024-05-13

## 2024-05-23 RX ORDER — CLONIDINE HYDROCHLORIDE 0.3 MG/1
0.3 TABLET ORAL 2 TIMES DAILY
COMMUNITY

## 2024-05-23 RX ORDER — CLOPIDOGREL BISULFATE 75 MG/1
75 TABLET ORAL DAILY
COMMUNITY

## 2024-05-23 RX ORDER — SEMAGLUTIDE 0.68 MG/ML
0.25 INJECTION, SOLUTION SUBCUTANEOUS WEEKLY
Qty: 3 ML | Refills: 1 | Status: SHIPPED | OUTPATIENT
Start: 2024-05-23

## 2024-05-23 RX ORDER — ALLOPURINOL 100 MG/1
100 TABLET ORAL DAILY
Qty: 30 TABLET | Refills: 0 | Status: SHIPPED | OUTPATIENT
Start: 2024-05-23

## 2024-05-23 RX ORDER — OXYCODONE HYDROCHLORIDE 5 MG/1
5 TABLET ORAL EVERY 4 HOURS PRN
COMMUNITY

## 2024-05-23 RX ORDER — OXYCODONE HYDROCHLORIDE 5 MG/1
5 TABLET ORAL EVERY 6 HOURS PRN
Qty: 20 TABLET | Refills: 0 | Status: SHIPPED | OUTPATIENT
Start: 2024-05-23 | End: 2024-05-28

## 2024-05-23 RX ORDER — DILTIAZEM HYDROCHLORIDE 180 MG/1
180 CAPSULE, EXTENDED RELEASE ORAL DAILY
COMMUNITY

## 2024-05-23 ASSESSMENT — ENCOUNTER SYMPTOMS
WHEEZING: 0
COUGH: 0
SHORTNESS OF BREATH: 0
ABDOMINAL PAIN: 0
SINUS PRESSURE: 0
NAUSEA: 0
VOMITING: 0
BACK PAIN: 0
EYE DISCHARGE: 0

## 2024-05-23 NOTE — PROGRESS NOTES
SUBJECTIVE:    Patient ID: Kade Graham is a 53 y.o.male.    Chief Complaint   Patient presents with    Follow-up     Pt here for FU after having open heart surgery.          HPI:  Patient is here today for new to provider visit. Former patient of Anaberna Jeffersway. He has a history of HTN, Arthritis, Back Pain, CAD, RLS, Gout, Depression, Anxiety and Diabetes. Patient with known CAD.  S/p recent open heart surgery on 5/6/2024 (CABG x 3). He will be following up with Cardiology (). He was seen yesterday by  for surgery follow-up. He did bring medications with him today but did forget a BP pill in his vehicle and has several listed that he is unsure about.  Patient reported that he is being compliant with taking his medications.  He reported his sternotomy site he is healing well.  Still having intermittent pain in that area especially with cough or deep breathing.  The swelling in the lower extremities has improved.    Patient recently diagnosed with diabetes while in the hospital with open heart surgery. He has not been taking medications for this. He was discharged home with Insulin but states sugar has not been high enough for him to take it. He has been monitoring fingersticks on a daily basis. He denies any hypoglycemic symptoms.     Patient with long history of anxiety.  He reported symptoms slightly worse since he is not taking any medication for it.    Patient's medications, allergies, past medical, surgical, social and family histories were reviewed and updated as appropriate in electronic medical record.        Outpatient Medications Marked as Taking for the 5/23/24 encounter (Office Visit) with Hosea Duenas MD   Medication Sig Dispense Refill    ASPIRIN LOW DOSE 81 MG EC tablet Take 1 tablet by mouth daily      cloNIDine (CATAPRES) 0.3 MG tablet Take 1 tablet by mouth 2 times daily      oxyCODONE (ROXICODONE) 5 MG immediate release tablet Take 1 tablet by mouth every 4 hours as needed

## 2024-05-23 NOTE — PROGRESS NOTES
Chief Complaint   Patient presents with    Follow-up     Pt here for FU after having open heart surgery.        Have you seen any other physician or provider since your last visit yes - Cardiothoracic      Have you had any other diagnostic tests since your last visit? yes - Labs-Imaging    Have you changed or stopped any medications since your last visit? yes - Please review med list. Pt says he isnt taking anymore meds than what he brought with him.

## 2024-06-04 DIAGNOSIS — I25.10 CORONARY ARTERY DISEASE INVOLVING NATIVE HEART WITHOUT ANGINA PECTORIS, UNSPECIFIED VESSEL OR LESION TYPE: Primary | ICD-10-CM

## 2024-06-04 DIAGNOSIS — I16.0 ASYMPTOMATIC HYPERTENSIVE URGENCY: ICD-10-CM

## 2024-06-04 RX ORDER — OXYCODONE HYDROCHLORIDE 5 MG/1
5 TABLET ORAL EVERY 8 HOURS PRN
Qty: 20 TABLET | Refills: 0 | Status: SHIPPED | OUTPATIENT
Start: 2024-06-04 | End: 2024-06-11

## 2024-06-04 RX ORDER — CLOPIDOGREL BISULFATE 75 MG/1
75 TABLET ORAL DAILY
Qty: 30 TABLET | Refills: 3 | Status: SHIPPED | OUTPATIENT
Start: 2024-06-04

## 2024-06-04 RX ORDER — LISINOPRIL 20 MG/1
20 TABLET ORAL 2 TIMES DAILY
Qty: 60 TABLET | Refills: 3 | Status: SHIPPED | OUTPATIENT
Start: 2024-06-04

## 2024-06-04 RX ORDER — ASPIRIN 81 MG/1
81 TABLET, COATED ORAL DAILY
Qty: 30 TABLET | Refills: 3 | Status: SHIPPED | OUTPATIENT
Start: 2024-06-04

## 2024-06-04 NOTE — TELEPHONE ENCOUNTER
Drew printed and on desk 6/4/24  Appt 8/14/24    States he still needs for pain caused by open heart sx

## 2024-06-09 ENCOUNTER — HOSPITAL ENCOUNTER (EMERGENCY)
Facility: HOSPITAL | Age: 54
Discharge: HOME | End: 2024-06-09
Attending: EMERGENCY MEDICINE
Payer: MEDICAID

## 2024-06-09 VITALS
WEIGHT: 210 LBS | TEMPERATURE: 98.3 F | DIASTOLIC BLOOD PRESSURE: 108 MMHG | RESPIRATION RATE: 16 BRPM | HEIGHT: 68 IN | SYSTOLIC BLOOD PRESSURE: 172 MMHG | HEART RATE: 78 BPM | BODY MASS INDEX: 31.83 KG/M2 | OXYGEN SATURATION: 97 %

## 2024-06-09 DIAGNOSIS — T81.49XA WOUND INFECTION AFTER SURGERY: ICD-10-CM

## 2024-06-09 DIAGNOSIS — M25.571 ACUTE RIGHT ANKLE PAIN: Primary | ICD-10-CM

## 2024-06-09 LAB
BASOPHILS # BLD: 0 K/UL (ref 0–0.1)
BASOPHILS NFR BLD: 0.3 %
EOSINOPHIL # BLD: 0.2 K/UL (ref 0–0.4)
EOSINOPHIL NFR BLD: 2.3 %
ERYTHROCYTE [DISTWIDTH] IN BLOOD BY AUTOMATED COUNT: 13.9 % (ref 11–16)
HCT VFR BLD AUTO: 38 % (ref 40–54)
HGB BLD-MCNC: 12.8 G/DL (ref 13–18)
IMM GRANULOCYTES # BLD: 0 K/UL
IMM GRANULOCYTES NFR BLD: 0.3 % (ref 0–5)
LYMPHOCYTES # BLD: 1.2 K/UL (ref 1.5–4)
LYMPHOCYTES NFR BLD: 13.6 %
MCH RBC QN AUTO: 32.2 PG (ref 27–32)
MCHC RBC AUTO-ENTMCNC: 33.7 G/DL (ref 31–35)
MCV RBC AUTO: 95.7 FL (ref 80–100)
MONOCYTES # BLD: 0.6 K/UL (ref 0.2–0.8)
MONOCYTES NFR BLD: 7.4 %
NEUTROPHILS # BLD: 6.6 K/UL (ref 2–7.5)
NEUTS SEG NFR BLD: 76.1 %
PLATELET # BLD AUTO: 156 K/UL (ref 150–400)
PMV BLD AUTO: 9.3 FL (ref 6–10)
RBC # BLD AUTO: 3.97 M/UL (ref 4.5–6)
URATE SERPL-MCNC: 5.4 MG/DL (ref 3.7–8.6)
WBC # BLD AUTO: 8.7 K/UL (ref 4–11)

## 2024-06-09 PROCEDURE — 99283 EMERGENCY DEPT VISIT LOW MDM: CPT

## 2024-06-09 PROCEDURE — 36415 COLL VENOUS BLD VENIPUNCTURE: CPT

## 2024-06-09 PROCEDURE — 85025 COMPLETE CBC W/AUTO DIFF WBC: CPT

## 2024-06-09 PROCEDURE — 96372 THER/PROPH/DIAG INJ SC/IM: CPT

## 2024-06-09 PROCEDURE — 84550 ASSAY OF BLOOD/URIC ACID: CPT

## 2024-06-09 PROCEDURE — 6370000000 HC RX 637 (ALT 250 FOR IP): Performed by: EMERGENCY MEDICINE

## 2024-06-09 PROCEDURE — 6360000002 HC RX W HCPCS: Performed by: EMERGENCY MEDICINE

## 2024-06-09 RX ORDER — CLONIDINE HYDROCHLORIDE 0.1 MG/1
0.3 TABLET ORAL ONCE
Status: COMPLETED | OUTPATIENT
Start: 2024-06-09 | End: 2024-06-09

## 2024-06-09 RX ORDER — KETOROLAC TROMETHAMINE 15 MG/ML
15 INJECTION, SOLUTION INTRAMUSCULAR; INTRAVENOUS ONCE
Status: COMPLETED | OUTPATIENT
Start: 2024-06-09 | End: 2024-06-09

## 2024-06-09 RX ORDER — SULFAMETHOXAZOLE AND TRIMETHOPRIM 800; 160 MG/1; MG/1
1 TABLET ORAL 2 TIMES DAILY
Qty: 14 TABLET | Refills: 0 | Status: SHIPPED | OUTPATIENT
Start: 2024-06-09 | End: 2024-06-16

## 2024-06-09 RX ORDER — SULFAMETHOXAZOLE AND TRIMETHOPRIM 800; 160 MG/1; MG/1
1 TABLET ORAL ONCE
Status: COMPLETED | OUTPATIENT
Start: 2024-06-09 | End: 2024-06-09

## 2024-06-09 RX ORDER — KETOROLAC TROMETHAMINE 10 MG/1
10 TABLET, FILM COATED ORAL EVERY 6 HOURS PRN
Qty: 20 TABLET | Refills: 0 | Status: SHIPPED | OUTPATIENT
Start: 2024-06-09 | End: 2024-06-13 | Stop reason: ALTCHOICE

## 2024-06-09 RX ORDER — LISINOPRIL 5 MG/1
20 TABLET ORAL ONCE
Status: COMPLETED | OUTPATIENT
Start: 2024-06-09 | End: 2024-06-09

## 2024-06-09 RX ADMIN — LISINOPRIL 20 MG: 5 TABLET ORAL at 14:29

## 2024-06-09 RX ADMIN — KETOROLAC TROMETHAMINE 15 MG: 15 INJECTION, SOLUTION INTRAMUSCULAR; INTRAVENOUS at 14:31

## 2024-06-09 RX ADMIN — CLONIDINE HYDROCHLORIDE 0.3 MG: 0.1 TABLET ORAL at 13:38

## 2024-06-09 RX ADMIN — SULFAMETHOXAZOLE AND TRIMETHOPRIM 1 TABLET: 800; 160 TABLET ORAL at 14:30

## 2024-06-09 ASSESSMENT — PAIN DESCRIPTION - LOCATION
LOCATION: ANKLE
LOCATION: ANKLE

## 2024-06-09 ASSESSMENT — PAIN DESCRIPTION - ORIENTATION
ORIENTATION: RIGHT
ORIENTATION: RIGHT

## 2024-06-09 ASSESSMENT — PAIN - FUNCTIONAL ASSESSMENT: PAIN_FUNCTIONAL_ASSESSMENT: 0-10

## 2024-06-09 ASSESSMENT — PAIN SCALES - GENERAL
PAINLEVEL_OUTOF10: 4
PAINLEVEL_OUTOF10: 8

## 2024-06-09 ASSESSMENT — PAIN DESCRIPTION - PAIN TYPE: TYPE: ACUTE PAIN

## 2024-06-09 ASSESSMENT — PAIN DESCRIPTION - ONSET: ONSET: GRADUAL

## 2024-06-09 ASSESSMENT — PAIN DESCRIPTION - DESCRIPTORS
DESCRIPTORS: ACHING
DESCRIPTORS: ACHING;THROBBING

## 2024-06-09 ASSESSMENT — PAIN DESCRIPTION - FREQUENCY: FREQUENCY: CONTINUOUS

## 2024-06-09 NOTE — ED PROVIDER NOTES
.        [facility]  EMERGENCY DEPARTMENT ENCOUNTER        Pt Name: Kade Graham  MRN: [id number]  Birthdate 1970  Date of evaluation: 6/9/2024  Provider: Allyssa Arguello MD  PCP: Hosea Duenas MD  Note Started: 1:20 PM EDT 6/9/24    CHIEF COMPLAINT       Chief Complaint   Patient presents with    Ankle Pain     Right ankle     Wound Check       HISTORY OF PRESENT ILLNESS: 1 or more Elements     History from : Patient    Limitations to history : None    Kade Graham is a 53 y.o. male who presents complaining of \"gout flareup\" in his right ankle and a wound on his left mid calf from his bypass surgery vein harvesting site which has opened up developed some redness around it and has been draining a yellow drainage the wound opening up started about 4 days as far as the gout goes the patient is not on any gout medications and does not know his uric acid level.  As far as the wound opening up he is diabetic he has not had any fever and has not seen his primary doctor    Nursing Notes were all reviewed and agreed with or any disagreements were addressed in the HPI.    REVIEW OF SYSTEMS :      Review of Systems     systems reviewed and negative except as in HPI/MDM    SURGICAL HISTORY     Past Surgical History:   Procedure Laterality Date    CORONARY ARTERY BYPASS GRAFT  05/06/2024    quadruple bypass    FINGER AMPUTATION      also has partial amputation of right index and ring finger    LEG SURGERY         CURRENTMEDICATIONS       Previous Medications    ALLOPURINOL (ZYLOPRIM) 100 MG TABLET    Take 1 tablet by mouth daily    ASPIRIN LOW DOSE 81 MG EC TABLET    Take 1 tablet by mouth daily    ATORVASTATIN (LIPITOR) 40 MG TABLET    Take 1 tablet by mouth daily    CLONIDINE (CATAPRES) 0.3 MG TABLET    Take 1 tablet by mouth 2 times daily    CLOPIDOGREL (PLAVIX) 75 MG TABLET    Take 1 tablet by mouth daily    DILTIAZEM (DILACOR XR) 180 MG EXTENDED RELEASE CAPSULE    Take 1 capsule by

## 2024-06-09 NOTE — ED NOTES
Reviewed discharge plan with Kade Graham.  Encouraged him to f/u with Hosea Duenas MD and he understood.  NAD noted on discharge, gait steady.  Reviewed discharge prescription for:     Current Discharge Medication List        START taking these medications    Details   sulfamethoxazole-trimethoprim (BACTRIM DS;SEPTRA DS) 800-160 MG per tablet Take 1 tablet by mouth 2 times daily for 7 days  Qty: 14 tablet, Refills: 0      ketorolac (TORADOL) 10 MG tablet Take 1 tablet by mouth every 6 hours as needed for Pain  Qty: 20 tablet, Refills: 0             Kade states understanding of how and when to take medications.      Pt also agrees that he will take the rest of his bp meds when he gets home.    Electronically signed by Tosin Franklin RN on 6/9/2024 at 2:58 PM

## 2024-06-09 NOTE — ED TRIAGE NOTES
Pt to ED with right ankle pain x 2 days. Pt also would like wound check to right leg s/p CABG 5/6/2024

## 2024-06-10 ENCOUNTER — APPOINTMENT (OUTPATIENT)
Dept: GENERAL RADIOLOGY | Facility: HOSPITAL | Age: 54
End: 2024-06-10
Payer: MEDICAID

## 2024-06-10 ENCOUNTER — HOSPITAL ENCOUNTER (INPATIENT)
Facility: HOSPITAL | Age: 54
LOS: 1 days | Discharge: HOME | End: 2024-06-11
Attending: STUDENT IN AN ORGANIZED HEALTH CARE EDUCATION/TRAINING PROGRAM | Admitting: INTERNAL MEDICINE
Payer: MEDICAID

## 2024-06-10 ENCOUNTER — TELEPHONE (OUTPATIENT)
Dept: CARDIOLOGY | Facility: CLINIC | Age: 54
End: 2024-06-10

## 2024-06-10 DIAGNOSIS — I16.0 HYPERTENSIVE URGENCY: Primary | ICD-10-CM

## 2024-06-10 DIAGNOSIS — Z95.1 HX OF CABG: ICD-10-CM

## 2024-06-10 PROBLEM — I25.10 CORONARY ARTERY DISEASE INVOLVING NATIVE CORONARY ARTERY OF NATIVE HEART WITHOUT ANGINA PECTORIS: Status: ACTIVE | Noted: 2024-06-10

## 2024-06-10 LAB
ALBUMIN SERPL-MCNC: 3.9 G/DL (ref 3.5–5.2)
ALBUMIN/GLOB SERPL: 1.2 G/DL
ALP SERPL-CCNC: 125 U/L (ref 39–117)
ALT SERPL W P-5'-P-CCNC: 10 U/L (ref 1–41)
ANION GAP SERPL CALCULATED.3IONS-SCNC: 11.7 MMOL/L (ref 5–15)
AST SERPL-CCNC: 15 U/L (ref 1–40)
BASOPHILS # BLD AUTO: 0.02 10*3/MM3 (ref 0–0.2)
BASOPHILS NFR BLD AUTO: 0.3 % (ref 0–1.5)
BILIRUB SERPL-MCNC: 0.5 MG/DL (ref 0–1.2)
BUN SERPL-MCNC: 10 MG/DL (ref 6–20)
BUN/CREAT SERPL: 10 (ref 7–25)
CALCIUM SPEC-SCNC: 9.5 MG/DL (ref 8.6–10.5)
CHLORIDE SERPL-SCNC: 100 MMOL/L (ref 98–107)
CO2 SERPL-SCNC: 27.3 MMOL/L (ref 22–29)
CREAT SERPL-MCNC: 1 MG/DL (ref 0.76–1.27)
DEPRECATED RDW RBC AUTO: 46 FL (ref 37–54)
EGFRCR SERPLBLD CKD-EPI 2021: 91.1 ML/MIN/1.73
EOSINOPHIL # BLD AUTO: 0.21 10*3/MM3 (ref 0–0.4)
EOSINOPHIL NFR BLD AUTO: 3.2 % (ref 0.3–6.2)
ERYTHROCYTE [DISTWIDTH] IN BLOOD BY AUTOMATED COUNT: 14.1 % (ref 12.3–15.4)
GEN 5 2HR TROPONIN T REFLEX: 23 NG/L
GLOBULIN UR ELPH-MCNC: 3.2 GM/DL
GLUCOSE SERPL-MCNC: 83 MG/DL (ref 65–99)
HCT VFR BLD AUTO: 37.2 % (ref 37.5–51)
HGB BLD-MCNC: 12.5 G/DL (ref 13–17.7)
HOLD SPECIMEN: NORMAL
HOLD SPECIMEN: NORMAL
IMM GRANULOCYTES # BLD AUTO: 0.02 10*3/MM3 (ref 0–0.05)
IMM GRANULOCYTES NFR BLD AUTO: 0.3 % (ref 0–0.5)
LYMPHOCYTES # BLD AUTO: 1.61 10*3/MM3 (ref 0.7–3.1)
LYMPHOCYTES NFR BLD AUTO: 24.8 % (ref 19.6–45.3)
MCH RBC QN AUTO: 32.3 PG (ref 26.6–33)
MCHC RBC AUTO-ENTMCNC: 33.6 G/DL (ref 31.5–35.7)
MCV RBC AUTO: 96.1 FL (ref 79–97)
MONOCYTES # BLD AUTO: 0.68 10*3/MM3 (ref 0.1–0.9)
MONOCYTES NFR BLD AUTO: 10.5 % (ref 5–12)
NEUTROPHILS NFR BLD AUTO: 3.94 10*3/MM3 (ref 1.7–7)
NEUTROPHILS NFR BLD AUTO: 60.9 % (ref 42.7–76)
NRBC BLD AUTO-RTO: 0 /100 WBC (ref 0–0.2)
PLATELET # BLD AUTO: 150 10*3/MM3 (ref 140–450)
PMV BLD AUTO: 9.8 FL (ref 6–12)
POTASSIUM SERPL-SCNC: 3.7 MMOL/L (ref 3.5–5.2)
PROT SERPL-MCNC: 7.1 G/DL (ref 6–8.5)
RBC # BLD AUTO: 3.87 10*6/MM3 (ref 4.14–5.8)
SODIUM SERPL-SCNC: 139 MMOL/L (ref 136–145)
TROPONIN T DELTA: -1 NG/L
TROPONIN T SERPL HS-MCNC: 24 NG/L
WBC NRBC COR # BLD AUTO: 6.48 10*3/MM3 (ref 3.4–10.8)
WHOLE BLOOD HOLD COAG: NORMAL
WHOLE BLOOD HOLD SPECIMEN: NORMAL

## 2024-06-10 PROCEDURE — 99223 1ST HOSP IP/OBS HIGH 75: CPT | Performed by: INTERNAL MEDICINE

## 2024-06-10 PROCEDURE — 84484 ASSAY OF TROPONIN QUANT: CPT | Performed by: STUDENT IN AN ORGANIZED HEALTH CARE EDUCATION/TRAINING PROGRAM

## 2024-06-10 PROCEDURE — 80053 COMPREHEN METABOLIC PANEL: CPT | Performed by: STUDENT IN AN ORGANIZED HEALTH CARE EDUCATION/TRAINING PROGRAM

## 2024-06-10 PROCEDURE — 71045 X-RAY EXAM CHEST 1 VIEW: CPT

## 2024-06-10 PROCEDURE — 25010000002 ENOXAPARIN PER 10 MG: Performed by: STUDENT IN AN ORGANIZED HEALTH CARE EDUCATION/TRAINING PROGRAM

## 2024-06-10 PROCEDURE — 25010000002 LABETALOL 5 MG/ML SOLUTION: Performed by: STUDENT IN AN ORGANIZED HEALTH CARE EDUCATION/TRAINING PROGRAM

## 2024-06-10 PROCEDURE — 25010000002 NITROGLYCERIN 200 MCG/ML SOLUTION: Performed by: STUDENT IN AN ORGANIZED HEALTH CARE EDUCATION/TRAINING PROGRAM

## 2024-06-10 PROCEDURE — 93005 ELECTROCARDIOGRAM TRACING: CPT | Performed by: STUDENT IN AN ORGANIZED HEALTH CARE EDUCATION/TRAINING PROGRAM

## 2024-06-10 PROCEDURE — 36415 COLL VENOUS BLD VENIPUNCTURE: CPT

## 2024-06-10 PROCEDURE — 99285 EMERGENCY DEPT VISIT HI MDM: CPT

## 2024-06-10 PROCEDURE — 85025 COMPLETE CBC W/AUTO DIFF WBC: CPT | Performed by: STUDENT IN AN ORGANIZED HEALTH CARE EDUCATION/TRAINING PROGRAM

## 2024-06-10 RX ORDER — SODIUM CHLORIDE 9 MG/ML
40 INJECTION, SOLUTION INTRAVENOUS AS NEEDED
Status: DISCONTINUED | OUTPATIENT
Start: 2024-06-10 | End: 2024-06-11 | Stop reason: HOSPADM

## 2024-06-10 RX ORDER — CILOSTAZOL 100 MG/1
50 TABLET ORAL 2 TIMES DAILY
Status: DISCONTINUED | OUTPATIENT
Start: 2024-06-10 | End: 2024-06-11 | Stop reason: HOSPADM

## 2024-06-10 RX ORDER — LISINOPRIL 20 MG/1
20 TABLET ORAL
Status: DISCONTINUED | OUTPATIENT
Start: 2024-06-11 | End: 2024-06-11 | Stop reason: HOSPADM

## 2024-06-10 RX ORDER — SODIUM CHLORIDE 0.9 % (FLUSH) 0.9 %
10 SYRINGE (ML) INJECTION AS NEEDED
Status: DISCONTINUED | OUTPATIENT
Start: 2024-06-10 | End: 2024-06-11 | Stop reason: HOSPADM

## 2024-06-10 RX ORDER — ATORVASTATIN CALCIUM 20 MG/1
20 TABLET, FILM COATED ORAL NIGHTLY
Status: DISCONTINUED | OUTPATIENT
Start: 2024-06-10 | End: 2024-06-11 | Stop reason: HOSPADM

## 2024-06-10 RX ORDER — FLUOXETINE HYDROCHLORIDE 20 MG/1
20 CAPSULE ORAL DAILY
Status: DISCONTINUED | OUTPATIENT
Start: 2024-06-11 | End: 2024-06-11 | Stop reason: HOSPADM

## 2024-06-10 RX ORDER — SODIUM CHLORIDE 0.9 % (FLUSH) 0.9 %
10 SYRINGE (ML) INJECTION EVERY 12 HOURS SCHEDULED
Status: DISCONTINUED | OUTPATIENT
Start: 2024-06-10 | End: 2024-06-11 | Stop reason: HOSPADM

## 2024-06-10 RX ORDER — LABETALOL HYDROCHLORIDE 5 MG/ML
20 INJECTION, SOLUTION INTRAVENOUS ONCE
Status: COMPLETED | OUTPATIENT
Start: 2024-06-10 | End: 2024-06-10

## 2024-06-10 RX ORDER — NITROGLYCERIN 20 MG/100ML
5-200 INJECTION INTRAVENOUS
Status: DISCONTINUED | OUTPATIENT
Start: 2024-06-10 | End: 2024-06-11 | Stop reason: HOSPADM

## 2024-06-10 RX ORDER — OXYCODONE HYDROCHLORIDE 5 MG/1
5 TABLET ORAL EVERY 4 HOURS PRN
Status: DISCONTINUED | OUTPATIENT
Start: 2024-06-10 | End: 2024-06-11 | Stop reason: HOSPADM

## 2024-06-10 RX ORDER — ONDANSETRON 2 MG/ML
4 INJECTION INTRAMUSCULAR; INTRAVENOUS EVERY 6 HOURS PRN
Status: DISCONTINUED | OUTPATIENT
Start: 2024-06-10 | End: 2024-06-11 | Stop reason: HOSPADM

## 2024-06-10 RX ORDER — BISACODYL 10 MG
10 SUPPOSITORY, RECTAL RECTAL DAILY PRN
Status: DISCONTINUED | OUTPATIENT
Start: 2024-06-10 | End: 2024-06-11 | Stop reason: HOSPADM

## 2024-06-10 RX ORDER — ACETAMINOPHEN 325 MG/1
650 TABLET ORAL EVERY 4 HOURS PRN
Status: DISCONTINUED | OUTPATIENT
Start: 2024-06-10 | End: 2024-06-11 | Stop reason: HOSPADM

## 2024-06-10 RX ORDER — ACETAMINOPHEN 650 MG/1
650 SUPPOSITORY RECTAL EVERY 4 HOURS PRN
Status: DISCONTINUED | OUTPATIENT
Start: 2024-06-10 | End: 2024-06-11 | Stop reason: HOSPADM

## 2024-06-10 RX ORDER — BISACODYL 5 MG/1
5 TABLET, DELAYED RELEASE ORAL DAILY PRN
Status: DISCONTINUED | OUTPATIENT
Start: 2024-06-10 | End: 2024-06-11 | Stop reason: HOSPADM

## 2024-06-10 RX ORDER — CARVEDILOL 25 MG/1
25 TABLET ORAL 2 TIMES DAILY WITH MEALS
Status: DISCONTINUED | OUTPATIENT
Start: 2024-06-10 | End: 2024-06-11 | Stop reason: HOSPADM

## 2024-06-10 RX ORDER — HYDROCHLOROTHIAZIDE 12.5 MG/1
12.5 CAPSULE, GELATIN COATED ORAL
Status: DISCONTINUED | OUTPATIENT
Start: 2024-06-11 | End: 2024-06-11 | Stop reason: HOSPADM

## 2024-06-10 RX ORDER — HYDRALAZINE HYDROCHLORIDE 25 MG/1
25 TABLET, FILM COATED ORAL EVERY 8 HOURS SCHEDULED
Status: DISCONTINUED | OUTPATIENT
Start: 2024-06-10 | End: 2024-06-11 | Stop reason: HOSPADM

## 2024-06-10 RX ORDER — AMOXICILLIN 250 MG
2 CAPSULE ORAL 2 TIMES DAILY PRN
Status: DISCONTINUED | OUTPATIENT
Start: 2024-06-10 | End: 2024-06-11 | Stop reason: HOSPADM

## 2024-06-10 RX ORDER — ACETAMINOPHEN 160 MG/5ML
650 SOLUTION ORAL EVERY 4 HOURS PRN
Status: DISCONTINUED | OUTPATIENT
Start: 2024-06-10 | End: 2024-06-11 | Stop reason: HOSPADM

## 2024-06-10 RX ORDER — ENOXAPARIN SODIUM 100 MG/ML
40 INJECTION SUBCUTANEOUS EVERY 24 HOURS
Status: DISCONTINUED | OUTPATIENT
Start: 2024-06-10 | End: 2024-06-11 | Stop reason: HOSPADM

## 2024-06-10 RX ORDER — ASPIRIN 325 MG
325 TABLET ORAL DAILY
Status: DISCONTINUED | OUTPATIENT
Start: 2024-06-11 | End: 2024-06-11 | Stop reason: HOSPADM

## 2024-06-10 RX ORDER — ASPIRIN 325 MG
325 TABLET ORAL ONCE
Status: COMPLETED | OUTPATIENT
Start: 2024-06-10 | End: 2024-06-10

## 2024-06-10 RX ORDER — POLYETHYLENE GLYCOL 3350 17 G/17G
17 POWDER, FOR SOLUTION ORAL DAILY PRN
Status: DISCONTINUED | OUTPATIENT
Start: 2024-06-10 | End: 2024-06-11 | Stop reason: HOSPADM

## 2024-06-10 RX ADMIN — Medication 20 MG: at 20:35

## 2024-06-10 RX ADMIN — NITROGLYCERIN 1 INCH: 20 OINTMENT TOPICAL at 18:18

## 2024-06-10 RX ADMIN — ENOXAPARIN SODIUM 40 MG: 100 INJECTION SUBCUTANEOUS at 23:45

## 2024-06-10 RX ADMIN — CARVEDILOL 25 MG: 25 TABLET, FILM COATED ORAL at 23:44

## 2024-06-10 RX ADMIN — NITROGLYCERIN 50 MCG/MIN: 20 INJECTION INTRAVENOUS at 22:40

## 2024-06-10 RX ADMIN — ATORVASTATIN CALCIUM 20 MG: 20 TABLET, FILM COATED ORAL at 23:44

## 2024-06-10 RX ADMIN — CILOSTAZOL 50 MG: 100 TABLET ORAL at 23:44

## 2024-06-10 RX ADMIN — Medication 10 ML: at 23:34

## 2024-06-10 RX ADMIN — NITROGLYCERIN 60 MCG/MIN: 20 INJECTION INTRAVENOUS at 23:33

## 2024-06-10 RX ADMIN — ASPIRIN 325 MG: 325 TABLET ORAL at 18:18

## 2024-06-10 RX ADMIN — OXYCODONE HYDROCHLORIDE 5 MG: 5 TABLET ORAL at 23:49

## 2024-06-10 RX ADMIN — HYDRALAZINE HYDROCHLORIDE 25 MG: 25 TABLET ORAL at 23:44

## 2024-06-10 NOTE — TELEPHONE ENCOUNTER
"Pts wife states that pt was sent home from the ED in [place] with his bp still elevated. When checked at home a \"little while ago\" it was 182/113 and . I asked her if she was able to bring him to [place] to the ED here to be evaluated she states that she can and will bring him here.  "

## 2024-06-10 NOTE — ED PROVIDER NOTES
EMERGENCY DEPARTMENT ENCOUNTER    Pt Name: Andrés Denton  MRN: [id number]  Pt :   1970  Room Number:    Date of encounter:  6/10/2024  PCP: Aisha Barba APRN  ED Physician: Clyde Eric DO      HPI:  Chief Complaint: Hypertension    Context: Andrés Denton is a 53 y.o. male who presents to the ED with chief complaint of hypertension.  Patient is postop CABG x 3 last month.  States over the past several days he has been having issues controlling his blood pressure.  Systolic blood pressures have been running in the 200s.  Patient is prescribed antihypertensive medications.  He has been taking his medications as prescribed.  No missed doses.  Does report intermittent chest pressure, but denies active chest pain.  States that he followed up with the CT surgeon last month.  Did miss his cardiology appointment on Friday secondary to GI illness.  Was experiencing nausea, vomiting and diarrhea.  This is since resolved.  Also was evaluated at outside hospital ER yesterday because incision of the right lower extremity did dehisce.  He was concerned about possible infection.  He was subsequently started on oral antibiotics for prophylaxis.  States that he otherwise feels well.  Denies headache, vision changes, slurred speech, shortness of breath, abdominal or back pain, problems urination or vitamins, swelling in the legs, numbness or weakness in the extremities.    PAST MEDICAL HISTORY  Past Medical History:   Diagnosis Date    Alcohol abuse     Anxiety     Coronary artery disease     Depression     Hyperlipidemia     Hypertension     Myocardial infarction     Suicidal thoughts     Withdrawal symptoms, alcohol      Current Outpatient Medications   Medication Instructions    albuterol sulfate  (90 Base) MCG/ACT inhaler 2 puffs, Inhalation, Every 4 Hours PRN    aspirin 325 mg, Oral, Daily    atorvastatin (LIPITOR) 20 mg, Oral, Every Night at Bedtime    carvedilol (COREG) 25 mg, Oral, 2  Times Daily With Meals    cilostazol (PLETAL) 50 mg, Oral, 2 Times Daily    cloNIDine (CATAPRES) 0.2 mg, Oral, Every 12 Hours Scheduled    disulfiram (ANTABUSE) 500 mg, Oral, Daily    FLUoxetine (PROZAC) 20 mg, Oral, Daily    hydrALAZINE (APRESOLINE) 25 mg, Oral, Every 8 Hours Scheduled    hydroCHLOROthiazide (MICROZIDE) 12.5 mg, Oral, Every 24 Hours Scheduled    isosorbide mononitrate (IMDUR) 120 mg, Oral, Every 24 Hours Scheduled    lisinopril (PRINIVIL,ZESTRIL) 20 mg, Oral, Every 24 Hours Scheduled    naproxen (EC NAPROSYN) 500 mg, Oral, 2 Times Daily With Meals    oxyCODONE (ROXICODONE) 5 mg, Oral, Every 4 Hours PRN        PAST SURGICAL HISTORY  Past Surgical History:   Procedure Laterality Date    AMPUTATION  1995    right ring finger partial amputation    CARDIAC CATHETERIZATION N/A 05/02/2024    Procedure: Coronary angiography;  Surgeon: Tejas Marroquin MD;  Location: [facility] CATH INVASIVE LOCATION;  Service: Cardiovascular;  Laterality: N/A;    CARDIAC SURGERY      CORONARY ARTERY BYPASS GRAFT      FRACTURE SURGERY Left     tib/fib pt. is unsure the exact surgery it was when he was 17       FAMILY HISTORY  Family History   Problem Relation Age of Onset    Alcohol abuse Mother     Alcohol abuse Father     Alcohol abuse Maternal Uncle        SOCIAL HISTORY  Social History     Socioeconomic History    Marital status:     Number of children: 2    Years of education: 12    Highest education level: High school graduate   Tobacco Use    Smoking status: Never    Smokeless tobacco: Current     Types: Snuff    Tobacco comments:     One can per day- tobacco use starting at age 10- 44  years   Vaping Use    Vaping status: Never Used   Substance and Sexual Activity    Alcohol use: Yes     Comment: 1-2 x weekly1/2 pint, ~ 30 beers and a pint of vodka this past weekend    Drug use: Yes     Comment: alcohol    Sexual activity: Defer     Partners: Female       ALLERGIES  Morphine    REVIEW OF SYSTEMS  All systems  reviewed and negative except for those discussed in HPI.     PHYSICAL EXAM  ED Triage Vitals [06/10/24 1745]   Temp Heart Rate Resp BP SpO2   98.5 °F (36.9 °C) 92 20 (!) 201/95 97 %      Temp src Heart Rate Source Patient Position BP Location FiO2 (%)   Oral Monitor Sitting Left arm --     I have reviewed the triage vital signs and nursing notes.    General: Alert.  Nontoxic appearance.  No acute distress.  Head: Normocephalic.  Atraumatic.  Eyes: No scleral icterus.  ENT: Moist mucous membranes.  Cardiovascular: Regular rate and rhythm.  No murmurs.  No rubs.  2+ distal pulses bilaterally.  Respiratory: Equal breath sounds bilaterally.  No rales.  No rhonchi.  No wheezing.  GI: Abdomen is soft.  Nondistended.  Nontender to palpation.  No rebound.  No guarding.  No CVA tenderness.  MSK: Moves all 4 extremities.  Neurologic: Oriented x 3.  No focal deficits.  Skin: Sternotomy scar present.  Surgical scar present on the right forearm.  Mild gapping, dehisced surgical incision right medial calf. Granulation tissue present. No induration, drainage, or crepitus.  No erythema.  No edema. No pallor. No cyanosis.  Psych: Normal mood and affect.    LAB RESULTS  Recent Results (from the past 24 hour(s))   Comprehensive Metabolic Panel    Collection Time: 06/10/24  5:57 PM    Specimen: Blood   Result Value Ref Range    Glucose 83 65 - 99 mg/dL    BUN 10 6 - 20 mg/dL    Creatinine 1.00 0.76 - 1.27 mg/dL    Sodium 139 136 - 145 mmol/L    Potassium 3.7 3.5 - 5.2 mmol/L    Chloride 100 98 - 107 mmol/L    CO2 27.3 22.0 - 29.0 mmol/L    Calcium 9.5 8.6 - 10.5 mg/dL    Total Protein 7.1 6.0 - 8.5 g/dL    Albumin 3.9 3.5 - 5.2 g/dL    ALT (SGPT) 10 1 - 41 U/L    AST (SGOT) 15 1 - 40 U/L    Alkaline Phosphatase 125 (H) 39 - 117 U/L    Total Bilirubin 0.5 0.0 - 1.2 mg/dL    Globulin 3.2 gm/dL    A/G Ratio 1.2 g/dL    BUN/Creatinine Ratio 10.0 7.0 - 25.0    Anion Gap 11.7 5.0 - 15.0 mmol/L    eGFR 91.1 >60.0 mL/min/1.73   High  Sensitivity Troponin T    Collection Time: 06/10/24  5:57 PM    Specimen: Blood   Result Value Ref Range    HS Troponin T 24 (H) <22 ng/L   Green Top (Gel)    Collection Time: 06/10/24  5:57 PM   Result Value Ref Range    Extra Tube Hold for add-ons.    Lavender Top    Collection Time: 06/10/24  5:57 PM   Result Value Ref Range    Extra Tube hold for add-on    Gold Top - SST    Collection Time: 06/10/24  5:57 PM   Result Value Ref Range    Extra Tube Hold for add-ons.    Light Blue Top    Collection Time: 06/10/24  5:57 PM   Result Value Ref Range    Extra Tube Hold for add-ons.    CBC Auto Differential    Collection Time: 06/10/24  5:57 PM    Specimen: Blood   Result Value Ref Range    WBC 6.48 3.40 - 10.80 10*3/mm3    RBC 3.87 (L) 4.14 - 5.80 10*6/mm3    Hemoglobin 12.5 (L) 13.0 - 17.7 g/dL    Hematocrit 37.2 (L) 37.5 - 51.0 %    MCV 96.1 79.0 - 97.0 fL    MCH 32.3 26.6 - 33.0 pg    MCHC 33.6 31.5 - 35.7 g/dL    RDW 14.1 12.3 - 15.4 %    RDW-SD 46.0 37.0 - 54.0 fl    MPV 9.8 6.0 - 12.0 fL    Platelets 150 140 - 450 10*3/mm3    Neutrophil % 60.9 42.7 - 76.0 %    Lymphocyte % 24.8 19.6 - 45.3 %    Monocyte % 10.5 5.0 - 12.0 %    Eosinophil % 3.2 0.3 - 6.2 %    Basophil % 0.3 0.0 - 1.5 %    Immature Grans % 0.3 0.0 - 0.5 %    Neutrophils, Absolute 3.94 1.70 - 7.00 10*3/mm3    Lymphocytes, Absolute 1.61 0.70 - 3.10 10*3/mm3    Monocytes, Absolute 0.68 0.10 - 0.90 10*3/mm3    Eosinophils, Absolute 0.21 0.00 - 0.40 10*3/mm3    Basophils, Absolute 0.02 0.00 - 0.20 10*3/mm3    Immature Grans, Absolute 0.02 0.00 - 0.05 10*3/mm3    nRBC 0.0 0.0 - 0.2 /100 WBC   High Sensitivity Troponin T 2Hr    Collection Time: 06/10/24  7:44 PM    Specimen: Arm, Left; Blood   Result Value Ref Range    HS Troponin T 23 (H) <22 ng/L    Troponin T Delta -1 >=-4 - <+4 ng/L       RADIOLOGY  No Radiology Exams Resulted Within Past 24 Hours    PROCEDURES  Critical Care    Performed by: Clyde Eric DO  Authorized by: Clyde Eric DO     Comments:      CRITICAL CARE PROCEDURE NOTE  Authorized and performed by: Dr. Eric  Total critical care time: Approximately 45 minutes.  Patient was critically ill due to: Hypertensive urgency  Interventions: Topical and IV vasoactive medications, close airway/mental status/hemodynamic monitoring    Due to a high probability of clinically significant, life threatening deterioration, the patient required my highest level of preparedness to intervene emergently and I personally spent this critical care time directly and personally managing the patient.  This critical care time included obtaining a history; examining the patient; pulse oximetry; ordering and review of studies; arranging urgent treatment with development of a management plan; evaluation of patient's response to treatment; frequent reassessment; and, discussions with other providers.    This critical care time was performed to assess and manage the high probability of imminent, life-threatening deterioration that could result in multiorgan failure.  It was exclusive of separately billable procedures and treating other patients and teaching time.    Please see MDM section and the rest of the note for further information on patient assessment and interventions.        MEDICATIONS GIVEN IN ER  Medications   sodium chloride 0.9 % flush 10 mL (has no administration in time range)   nitroglycerin (TRIDIL) 200 mcg/ml infusion (has no administration in time range)   aspirin tablet 325 mg (325 mg Oral Given 6/10/24 1818)   nitroglycerin (NITROSTAT) ointment 1 inch (1 inch Topical Given 6/10/24 1818)   labetalol (NORMODYNE,TRANDATE) injection 20 mg (20 mg Intravenous Given 6/10/24 2035)       MEDICAL DECISION MAKING  53 y.o. male with past medical history listed above who presents with hypertension.    Patient arrives via EMS.  I have reviewed the EMS documentation/notes and included that information in my HPI.    Vital signs markable for hypertension, BP  201/95, otherwise within normal limits.    Based on clinical presentation and physical exam, differential diagnosis includes, but is not limited to, hypertensive urgency versus emergency, myocardial infarction, pneumothorax, pneumonia.     I have discussed the indication, risk, and alternatives of the following high risk medications: Topical nitroglycerin    External notes reviewed.  CT surgery note from 5/20/2024 reviewed.  Patient underwent CABG x 3 with right radial artery harvest.  Appear to be doing well postoperatively.  Follows with Dr. Marroquin cardiology in [place].    At least 3 different tests have been ordered on this patient.    Please see ED course below for my interpretation of the ED workup.  ED Course as of 06/10/24 2217   Mon Rajat 10, 2024   1809 ECG 12 Lead ED Triage Standing Order; Chest Pain  EKG per my interpretation normal sinus rhythm, rate 84, normal axis, no STEMI, T wave inversions in inferior and lateral precordial leads, normal QRS QTc intervals.   [JS]   2209 I reviewed the labs listed above. Clinically unremarkable.    Notable abnormalities are highlighted below.    Old laboratory data was reviewed from the medical records and compared to today's results.   [JS]   2209 HS Troponin T(!): 24 [JS]   2209 High Sensitivity Troponin T 2Hr(!) [JS]   2209 HS Troponin T(!): 23 [JS]   2209 Troponin T Delta: -1 [JS]   2209 XR Chest 1 View  I have independently reviewed and interpreted the chest x-ray.  My interpretation is clear lungs bilaterally.    [JS]      ED Course User Index  [JS] Clyde Eric DO      Patient received multiple doses of antihypertensive medications during his ED course, but unfortunately blood pressure remained significantly elevated.  On repeat exam patient is resting comfortably. Denies active chest pain. Repeat blood pressure 193/121.  At this time will initiate nitroglycerin infusion.  Patient will require medical admission for further workup and management.  I discussed  the findings of the ED workup with the patient including my recommendation for admission.  Patient agreeable with plan and disposition.    Case discussed with Dr. Mendoza (hospitalist) who agrees to evaluate and admit the patient.  We discussed the HPI, pertinent PMHx, ED course and workup.    REPEAT VITAL SIGNS  AS OF 22:17 EDT VITALS:  BP - (!) 193/121  HR - 82  TEMP - 98.2 °F (36.8 °C) (Oral)  O2 SATS - 96%    DIAGNOSIS  Final diagnoses:   Hypertensive urgency   Hx of CABG       DISPOSITION  ED Disposition       ED Disposition   Decision to Admit    Condition   --    Comment   Level of Care: Critical Care [6]   Diagnosis: Hypertensive urgency [100724]   Admitting Physician: MIKEY MENDOZA [[id number]   Attending Physician: CLYDE MELENDEZ [[id number]]   Certification: I Certify That Inpatient Hospital Services Are Medically Necessary For Greater Than 2 Midnights                       Please note that portions of this document were completed with voice recognition software.        Clyde Melendez DO  06/11/24 5457

## 2024-06-10 NOTE — TELEPHONE ENCOUNTER
Hub staff attempted to follow warm transfer process and was unsuccessful     Caller: Andrés Denton    Relationship to patient: Self    Best call back number: [phone]     Patient is needing: PTS BP HAS BEEN RUNNING 200/120-130    201/123 TEN MINS AGO   ACTIVE NOW   PT WAS IN [facility] ER YESTERDAY WITH INFECTION, HAD TROUBLE GETTING THIS DOWN THEN   HAS BEEN GOING ON SINCE LAST THURSDAY.

## 2024-06-11 VITALS
WEIGHT: 228.84 LBS | SYSTOLIC BLOOD PRESSURE: 157 MMHG | HEIGHT: 68 IN | BODY MASS INDEX: 34.68 KG/M2 | HEART RATE: 86 BPM | RESPIRATION RATE: 19 BRPM | DIASTOLIC BLOOD PRESSURE: 101 MMHG | TEMPERATURE: 98.5 F | OXYGEN SATURATION: 97 %

## 2024-06-11 LAB
ANION GAP SERPL CALCULATED.3IONS-SCNC: 8.8 MMOL/L (ref 5–15)
BUN SERPL-MCNC: 11 MG/DL (ref 6–20)
BUN/CREAT SERPL: 12.1 (ref 7–25)
CALCIUM SPEC-SCNC: 8.7 MG/DL (ref 8.6–10.5)
CHLORIDE SERPL-SCNC: 103 MMOL/L (ref 98–107)
CO2 SERPL-SCNC: 26.2 MMOL/L (ref 22–29)
CREAT SERPL-MCNC: 0.91 MG/DL (ref 0.76–1.27)
DEPRECATED RDW RBC AUTO: 47 FL (ref 37–54)
EGFRCR SERPLBLD CKD-EPI 2021: 102 ML/MIN/1.73
ERYTHROCYTE [DISTWIDTH] IN BLOOD BY AUTOMATED COUNT: 14.2 % (ref 12.3–15.4)
GLUCOSE SERPL-MCNC: 114 MG/DL (ref 65–99)
HCT VFR BLD AUTO: 31.3 % (ref 37.5–51)
HGB BLD-MCNC: 10.5 G/DL (ref 13–17.7)
MCH RBC QN AUTO: 32.3 PG (ref 26.6–33)
MCHC RBC AUTO-ENTMCNC: 33.5 G/DL (ref 31.5–35.7)
MCV RBC AUTO: 96.3 FL (ref 79–97)
PLATELET # BLD AUTO: 128 10*3/MM3 (ref 140–450)
PMV BLD AUTO: 9.9 FL (ref 6–12)
POTASSIUM SERPL-SCNC: 3.7 MMOL/L (ref 3.5–5.2)
RBC # BLD AUTO: 3.25 10*6/MM3 (ref 4.14–5.8)
SODIUM SERPL-SCNC: 138 MMOL/L (ref 136–145)
TROPONIN T SERPL HS-MCNC: 24 NG/L
WBC NRBC COR # BLD AUTO: 5.02 10*3/MM3 (ref 3.4–10.8)

## 2024-06-11 PROCEDURE — 84484 ASSAY OF TROPONIN QUANT: CPT | Performed by: INTERNAL MEDICINE

## 2024-06-11 PROCEDURE — 80048 BASIC METABOLIC PNL TOTAL CA: CPT | Performed by: INTERNAL MEDICINE

## 2024-06-11 PROCEDURE — 85027 COMPLETE CBC AUTOMATED: CPT | Performed by: INTERNAL MEDICINE

## 2024-06-11 PROCEDURE — 99239 HOSP IP/OBS DSCHRG MGMT >30: CPT | Performed by: INTERNAL MEDICINE

## 2024-06-11 PROCEDURE — 25010000002 NITROGLYCERIN 200 MCG/ML SOLUTION: Performed by: STUDENT IN AN ORGANIZED HEALTH CARE EDUCATION/TRAINING PROGRAM

## 2024-06-11 RX ORDER — LISINOPRIL 20 MG/1
30 TABLET ORAL
Start: 2024-06-11 | End: 2024-07-11

## 2024-06-11 RX ORDER — CLONIDINE HYDROCHLORIDE 0.2 MG/1
0.2 TABLET ORAL EVERY 12 HOURS SCHEDULED
Qty: 60 TABLET | Refills: 0 | Status: SHIPPED | OUTPATIENT
Start: 2024-06-11 | End: 2024-07-11

## 2024-06-11 RX ORDER — AMLODIPINE BESYLATE 5 MG/1
10 TABLET ORAL
Status: DISCONTINUED | OUTPATIENT
Start: 2024-06-11 | End: 2024-06-11 | Stop reason: HOSPADM

## 2024-06-11 RX ORDER — CLONIDINE HYDROCHLORIDE 0.2 MG/1
0.2 TABLET ORAL ONCE
Status: COMPLETED | OUTPATIENT
Start: 2024-06-11 | End: 2024-06-11

## 2024-06-11 RX ORDER — AMLODIPINE BESYLATE 10 MG/1
10 TABLET ORAL
Qty: 30 TABLET | Refills: 0 | Status: SHIPPED | OUTPATIENT
Start: 2024-06-12 | End: 2024-07-12

## 2024-06-11 RX ADMIN — CILOSTAZOL 50 MG: 100 TABLET ORAL at 08:26

## 2024-06-11 RX ADMIN — FLUOXETINE HYDROCHLORIDE 20 MG: 20 CAPSULE ORAL at 08:26

## 2024-06-11 RX ADMIN — ASPIRIN 325 MG: 325 TABLET ORAL at 08:27

## 2024-06-11 RX ADMIN — AMLODIPINE BESYLATE 10 MG: 5 TABLET ORAL at 10:16

## 2024-06-11 RX ADMIN — CLONIDINE HYDROCHLORIDE 0.2 MG: 0.2 TABLET ORAL at 15:21

## 2024-06-11 RX ADMIN — LISINOPRIL 20 MG: 20 TABLET ORAL at 08:27

## 2024-06-11 RX ADMIN — CARVEDILOL 25 MG: 25 TABLET, FILM COATED ORAL at 08:27

## 2024-06-11 RX ADMIN — HYDRALAZINE HYDROCHLORIDE 25 MG: 25 TABLET ORAL at 13:34

## 2024-06-11 RX ADMIN — OXYCODONE HYDROCHLORIDE 5 MG: 5 TABLET ORAL at 04:07

## 2024-06-11 RX ADMIN — HYDROCHLOROTHIAZIDE 12.5 MG: 12.5 CAPSULE ORAL at 08:27

## 2024-06-11 RX ADMIN — NITROGLYCERIN 150 MCG/MIN: 20 INJECTION INTRAVENOUS at 05:49

## 2024-06-11 RX ADMIN — HYDRALAZINE HYDROCHLORIDE 25 MG: 25 TABLET ORAL at 06:22

## 2024-06-11 RX ADMIN — Medication 10 ML: at 08:27

## 2024-06-11 NOTE — CASE MANAGEMENT/SOCIAL WORK
Discharge Planning Assessment  [facility]     Patient Name: Andrés Denton  MRN: [id number]  Today's Date: 6/11/2024    Admit Date: 6/10/2024    Plan: DCP is to return home with family. Spoke with pt at bedside. Permission given to  discuss DCP with mother, father, son Aashish or wife (they are living apart but not ). Pt confirmed demographics. States no to Living Will/POA. PCP; Dr Dacosta, pharmacy is [facility] in [place]. Agreed to meds to bed. Does not use DME currently and no new needs anticipated at this time. New BP cuff provided to pt at bedside. Pt states is independent  with ADL's and mobility. Lives with parents.  Family to transport home when medically stable for discharge.   Discharge Needs Assessment       Row Name 06/11/24 1206       Living Environment    People in Home parent(s)    Name(s) of People in Home Marcos Denton    Current Living Arrangements home    Duration at Residence 1 month    Potentially Unsafe Housing Conditions none    In the past 12 months has the electric, gas, oil, or water company threatened to shut off services in your home? No    Primary Care Provided by self    Provides Primary Care For no one    Family Caregiver if Needed parent(s)    Family Caregiver Names Marcos Denton    Quality of Family Relationships helpful;involved;supportive    Able to Return to Prior Arrangements yes       Resource/Environmental Concerns    Resource/Environmental Concerns none    Transportation Concerns none       Transportation Needs    In the past 12 months, has lack of transportation kept you from medical appointments or from getting medications? no    In the past 12 months, has lack of transportation kept you from meetings, work, or from getting things needed for daily living? No       Food Insecurity    Within the past 12 months, you worried that your food would run out before you got the money to buy more. Never true  pt receives food stamps    Within the past 12  months, the food you bought just didn't last and you didn't have money to get more. Never true       Transition Planning    Patient/Family Anticipates Transition to home with family    Patient/Family Anticipated Services at Transition none    Transportation Anticipated family or friend will provide       Discharge Needs Assessment    Readmission Within the Last 30 Days no previous admission in last 30 days    Equipment Currently Used at Home bp cuff    Concerns to be Addressed no discharge needs identified    Anticipated Changes Related to Illness none    Equipment Needed After Discharge bp cuff  new BP cuff provided to pt at bedside.                   Discharge Plan       Row Name 06/11/24 1215       Plan    Plan DCP is to return home with family. Spoke with pt at bedside. Permission given to  discuss DCP with mother, father, son Aashish or wife (they are living apart but not ). Pt confirmed demographics. States no to Living Will/POA. PCP; Dr Dacosta, pharmacy is [facility] in [place]. Agreed to meds to bed. Does not use DME currently and no new needs anticipated at this time. New BP cuff provided to pt at bedside. Pt states is independent  with ADL's and mobility. Lives with parents.  Family to transport home when medically stable for discharge.                  Continued Care and Services - Admitted Since 6/10/2024    No active coordination exists for this encounter.       Selected Continued Care - Prior Encounters Includes continued care and service providers with selected services from prior encounters from 3/12/2024 to 6/11/2024      Discharged on 5/2/2024 Admission date: 4/30/2024 - Discharge disposition: Short Term Hospital (DC - External)      Destination       Service Provider Selected Services Address Phone Fax Patient Preferred    [facility] [place] [facility] [address], [place] [address] [phone] --                             Demographic Summary        Row Name 06/11/24 1204       General Information    Admission Type inpatient    Arrived From emergency department    Referral Source admission list    Reason for Consult discharge planning    Preferred Language English       Contact Information    Permission Granted to Share Info With ;family/designee                   Functional Status       Row Name 06/11/24 1205       Functional Status    Usual Activity Tolerance moderate    Current Activity Tolerance moderate       Physical Activity    On average, how many days per week do you engage in moderate to strenuous exercise (like a brisk walk)? 3 days    On average, how many minutes do you engage in exercise at this level? 10 min    Number of minutes of exercise per week 30       Assessment of Health Literacy    How often do you have someone help you read hospital materials? Never    How often do you have problems learning about your medical condition because of difficulty understanding written information? Never    How often do you have a problem understanding what is told to you about your medical condition? Never    How confident are you filling out medical forms by yourself? Extremely    Health Literacy Good       Functional Status, IADL    Medications independent    Meal Preparation independent    Housekeeping independent    Laundry independent    Shopping independent       Mental Status    General Appearance WDL WDL       Mental Status Summary    Recent Changes in Mental Status/Cognitive Functioning no changes       Employment/    Employment Status disabled           Current or Previous  Service none                   Psychosocial    No documentation.                  Abuse/Neglect    No documentation.                  Legal    No documentation.                  Substance Abuse    No documentation.                  Patient Forms    No documentation.                     Kamala Ryan RN

## 2024-06-11 NOTE — H&P
[facility]   HISTORY AND PHYSICAL      Name:  Andrés Denton   Age:  53 y.o.  Sex:  male  :  1970  MRN:  [id number]   Visit Number:  [id number]  Admission Date:  6/10/2024  Date Of Service:  06/10/24  Primary Care Physician:  Aisha Barba APRN    Chief Complaint:     Elevated blood pressures.    History Of Presenting Illness:      Andrés Denton is a 53-year-old male with history of hypertension, diabetes mellitus type 2, coronary artery disease with recent CABG at [facility] last month, was brought to the emergency room by his wife with persistently elevated blood pressures at home for the last several days.  Patient states that he had four-vessel bypass surgery at [facility] on 2024 and has been doing fairly well after that.  He follows up with Dr. Marroquin who is his primary cardiologist.  He has been taking his medications regularly.  He denies smoking or alcohol use.  He started noticing increased exertional shortness of breath in the last 1 week and started checking his blood pressures and noted them to be persistently elevated for the last 3 to 4 days.  He started developing some chest pressure today and was subsequently brought to the emergency room.  He denies any nausea, vomiting.  He states that his effort tolerance has significantly improved since his bypass surgery but he still has some sternal pain especially when coughing.    In the emergency room, he was afebrile and hemodynamically stable except for elevated blood pressure of 200/95 which remained persistently elevated despite giving IV labetalol and Nitropaste.  Initial troponin 24 with a repeat at 23.  CMP and CBC were unremarkable except for low hemoglobin at 12.5.  Chest x-ray and EKG were unremarkable.  Patient was subsequently initiated on nitroglycerin drip and was admitted to the medical ICU for management of hypertensive urgency.    Review Of Systems:    All systems  were reviewed and negative except as mentioned in history of presenting illness, assessment and plan.    Past Medical History: Patient  has a past medical history of Alcohol abuse, Anxiety, Coronary artery disease, Depression, Hyperlipidemia, Hypertension, Myocardial infarction, Suicidal thoughts, and Withdrawal symptoms, alcohol.    Past Surgical History: Patient  has a past surgical history that includes Fracture surgery (Left); Amputation (1995); Cardiac catheterization (N/A, 05/02/2024); Cardiac surgery; and Coronary artery bypass graft.    Social History: Patient  reports that he has never smoked. His smokeless tobacco use includes snuff. He reports current alcohol use. He reports current drug use.    Family History:  Patient family history includes Alcohol abuse in his father, maternal uncle, and mother.     Allergies:      Morphine    Home Medications:    Prior to Admission Medications       Prescriptions Last Dose Informant Patient Reported? Taking?    albuterol sulfate  (90 Base) MCG/ACT inhaler   No No    Inhale 2 puffs Every 4 (Four) Hours As Needed for Wheezing or Shortness of Air.    aspirin 325 MG tablet   Yes No    Take 1 tablet by mouth Daily.    atorvastatin (LIPITOR) 20 MG tablet   Yes No    Take 1 tablet by mouth every night at bedtime.    carvedilol (COREG) 25 MG tablet   No No    Take 1 tablet by mouth 2 (Two) Times a Day With Meals.    cilostazol (PLETAL) 50 MG tablet   Yes No    Take 1 tablet by mouth 2 (Two) Times a Day.    cloNIDine (CATAPRES) 0.2 MG tablet   No No    Take 1 tablet by mouth Every 12 (Twelve) Hours.    disulfiram (ANTABUSE) 250 MG tablet   Yes No    Take 2 tablets by mouth Daily.    FLUoxetine (PROzac) 20 MG capsule   No No    Take 1 capsule by mouth Daily. Indications: Depression    hydrALAZINE (APRESOLINE) 25 MG tablet   No No    Take 1 tablet by mouth Every 8 (Eight) Hours.    hydroCHLOROthiazide (MICROZIDE) 12.5 MG capsule   No No    Take 1 capsule by mouth Daily.  "   isosorbide mononitrate (IMDUR) 120 MG 24 hr tablet   No No    Take 1 tablet by mouth Daily.    lisinopril (PRINIVIL,ZESTRIL) 20 MG tablet   No No    Take 1 tablet by mouth Daily.    naproxen (EC NAPROSYN) 500 MG EC tablet   No No    Take 1 tablet by mouth 2 (Two) Times a Day With Meals.    oxyCODONE (Roxicodone) 5 MG immediate release tablet   No No    Take 1 tablet by mouth Every 4 (Four) Hours As Needed for Moderate Pain.     ED Medications:    Medications   sodium chloride 0.9 % flush 10 mL (has no administration in time range)   nitroglycerin (TRIDIL) 200 mcg/ml infusion (has no administration in time range)   aspirin tablet 325 mg (325 mg Oral Given 6/10/24 1818)   nitroglycerin (NITROSTAT) ointment 1 inch (1 inch Topical Given 6/10/24 1818)   labetalol (NORMODYNE,TRANDATE) injection 20 mg (20 mg Intravenous Given 6/10/24 2035)     Vital Signs:  Temp:  [98.2 °F (36.8 °C)-98.5 °F (36.9 °C)] 98.2 °F (36.8 °C)  Heart Rate:  [80-92] 82  Resp:  [20-28] 28  BP: (156-201)/() 193/121        06/10/24  1745   Weight: 107 kg (235 lb)     Body mass index is 36.81 kg/m².    Physical Exam:     Most recent vital Signs: BP (!) 193/121   Pulse 82   Temp 98.2 °F (36.8 °C) (Oral)   Resp 28   Ht 170.2 cm (67\")   Wt 107 kg (235 lb)   SpO2 96%   BMI 36.81 kg/m²     Physical Exam  Constitutional:       General: He is not in acute distress.     Appearance: Normal appearance. He is not ill-appearing.   HENT:      Head: Normocephalic and atraumatic.      Right Ear: External ear normal.      Left Ear: External ear normal.      Nose: Nose normal.      Mouth/Throat:      Mouth: Mucous membranes are moist.   Eyes:      Extraocular Movements: Extraocular movements intact.      Conjunctiva/sclera: Conjunctivae normal.   Cardiovascular:      Rate and Rhythm: Normal rate and regular rhythm.      Pulses: Normal pulses.      Heart sounds: Normal heart sounds. No murmur heard.     Comments: Midline CABG scar noted.  Pulmonary:      " Effort: Pulmonary effort is normal.      Breath sounds: Normal breath sounds. No wheezing or rales.   Abdominal:      General: Bowel sounds are normal.      Palpations: Abdomen is soft.      Tenderness: There is no abdominal tenderness. There is no guarding or rebound.   Musculoskeletal:         General: Normal range of motion.      Cervical back: Neck supple.      Right lower leg: No edema.      Left lower leg: No edema.   Skin:     General: Skin is warm.      Findings: No erythema or rash.   Neurological:      General: No focal deficit present.      Mental Status: He is alert and oriented to person, place, and time. Mental status is at baseline.   Psychiatric:         Mood and Affect: Mood normal.         Behavior: Behavior normal.       Laboratory data:    I have reviewed the labs done in the emergency room.    Results from last 7 days   Lab Units 06/10/24  1757   SODIUM mmol/L 139   POTASSIUM mmol/L 3.7   CHLORIDE mmol/L 100   CO2 mmol/L 27.3   BUN mg/dL 10   CREATININE mg/dL 1.00   CALCIUM mg/dL 9.5   BILIRUBIN mg/dL 0.5   ALK PHOS U/L 125*   ALT (SGPT) U/L 10   AST (SGOT) U/L 15   GLUCOSE mg/dL 83     Results from last 7 days   Lab Units 06/10/24  1757   WBC 10*3/mm3 6.48   HEMOGLOBIN g/dL 12.5*   HEMATOCRIT % 37.2*   PLATELETS 10*3/mm3 150       Results from last 7 days   Lab Units 06/10/24  1944 06/10/24  1757   HSTROP T ng/L 23* 24*     EKG:      EKG done in the emergency room was reviewed by me.  It shows sinus rhythm at 84 bpm.  Normal axis.  Diffuse T inversions noted in the limb leads and lateral chest leads.  Poor R wave progression noted through the chest leads.    Radiology:    Portable chest x-ray done in the emergency room was reviewed by me.  It shows sternal wires from CABG surgery.  No infiltrates or cardiomegaly noted.  An official report is currently pending.    Assessment:    Hypertensive urgency, POA.  Coronary artery disease status post CABG in May 2024.  Essential hypertension.  Diabetes  mellitus type 2, diet controlled.    Plan:    Hypertensive urgency.  - Patient does seem to have symptomatic elevated blood pressures.  Exact etiology of this is uncertain and he denies any dietary indiscretions and is compliant with his medications.  - Patient has been initiated on nitroglycerin drip which she will continue through the night.  - Continue carvedilol, clonidine,  hydralazine, lisinopril and hydrochlorothiazide which he already takes at home.  - If uptitration of these medications does not help, he may benefit initiation of amlodipine.    Coronary artery disease.  - Continue aspirin, atorvastatin.    Risk Assessment: Moderate  DVT Prophylaxis: Enoxaparin  Code Status: Full  Diet: Cardiac      Margarito Mendoza MD  06/10/24  22:21 EDT    Dictated utilizing Dragon dictation.

## 2024-06-11 NOTE — PLAN OF CARE
Goal Outcome Evaluation:              Outcome Evaluation: New admit from er on ntg gtt--have titrated up to 150mcg/min.  Sinus rhythm on monitor.  Initiated 2L NC this shift--pt sat's dropped intermittingly during sleep.  Periods of no respirations noted.  Analgesia admin. for chest/muscular discomfort with coughing--not new.

## 2024-06-11 NOTE — ED NOTES
Notified MD pt is still hypertensive, pt is sitting up in bed calm denies pain, wife at bedside. Bed left in lowest position, call light within reach, side rails up x2.

## 2024-06-11 NOTE — PAYOR COMM NOTE
"TO:[facility]  FROM:DAVIS FARRAR, RN PHONE [phone]  CLINICALS REF# [id number]    Marta Denton (53 y.o. Male)       Date of Birth   1970    Social Security Number       Address   [address] [place] [address]    Home Phone   [phone]    MRN   [id number]       Nondenominational   None    Marital Status                               Admission Date   6/10/24    Admission Type   Emergency    Admitting Provider   Margarito Mendoza MD    Attending Provider   Alfonso Wallis DO    Department, Room/Bed   [facility] INTENSIVE CARE, I02/       Discharge Date       Discharge Disposition       Discharge Destination                                 Attending Provider: Alfonso Wallis DO    Allergies: Morphine    Isolation: None   Infection: None   Code Status: CPR    Ht: 172.7 cm (68\")   Wt: 104 kg (228 lb 13.4 oz)    Admission Cmt: None   Principal Problem: Hypertensive urgency [I16.0]                   Active Insurance as of 6/10/2024       Primary Coverage       Payor Plan Insurance Group Employer/Plan Group    [facility] COMMUNITY PLAN [facility] COMMUNITY PLAN [facility] [place]       Payor Plan Address Payor Plan Phone Number Payor Plan Fax Number Effective Dates    [address]   2023 - None Entered    [facility] [phone]         Subscriber Name Subscriber Birth Date Member ID       MARTA DENTON 1970 [id number]                     Emergency Contacts        (Rel.) Home Phone Work Phone Mobile Phone    Aashish Denton (Son) [phone] -- --    JOHN DENTON (Spouse) [phone] -- [phone]                 History & Physical        Margarito Mendoza MD at 06/10/24 2221            [facility]ORY AND [facility]      Name:  Marta Denton   Age:  53 y.o.  Sex:  male  :  1970  MRN:  [id number]   Visit Number:  [id number]  Admission Date:  6/10/2024  Date Of Service:  06/10/24  Primary Care Physician:  Jameson" ALBERTINA Colon    Chief Complaint:     Elevated blood pressures.    History Of Presenting Illness:      Andrés Denton is a 53-year-old male with history of hypertension, diabetes mellitus type 2, coronary artery disease with recent CABG at [facility] last month, was brought to the emergency room by his wife with persistently elevated blood pressures at home for the last several days.  Patient states that he had four-vessel bypass surgery at [facility] on 5/6/2024 and has been doing fairly well after that.  He follows up with Dr. Marroquin who is his primary cardiologist.  He has been taking his medications regularly.  He denies smoking or alcohol use.  He started noticing increased exertional shortness of breath in the last 1 week and started checking his blood pressures and noted them to be persistently elevated for the last 3 to 4 days.  He started developing some chest pressure today and was subsequently brought to the emergency room.  He denies any nausea, vomiting.  He states that his effort tolerance has significantly improved since his bypass surgery but he still has some sternal pain especially when coughing.    In the emergency room, he was afebrile and hemodynamically stable except for elevated blood pressure of 200/95 which remained persistently elevated despite giving IV labetalol and Nitropaste.  Initial troponin 24 with a repeat at 23.  CMP and CBC were unremarkable except for low hemoglobin at 12.5.  Chest x-ray and EKG were unremarkable.  Patient was subsequently initiated on nitroglycerin drip and was admitted to the medical ICU for management of hypertensive urgency.    Review Of Systems:    All systems were reviewed and negative except as mentioned in history of presenting illness, assessment and plan.    Past Medical History: Patient  has a past medical history of Alcohol abuse, Anxiety, Coronary artery disease, Depression, Hyperlipidemia, Hypertension, Myocardial  infarction, Suicidal thoughts, and Withdrawal symptoms, alcohol.    Past Surgical History: Patient  has a past surgical history that includes Fracture surgery (Left); Amputation (1995); Cardiac catheterization (N/A, 05/02/2024); Cardiac surgery; and Coronary artery bypass graft.    Social History: Patient  reports that he has never smoked. His smokeless tobacco use includes snuff. He reports current alcohol use. He reports current drug use.    Family History:  Patient family history includes Alcohol abuse in his father, maternal uncle, and mother.     Allergies:      Morphine    Home Medications:    Prior to Admission Medications       Prescriptions Last Dose Informant Patient Reported? Taking?    albuterol sulfate  (90 Base) MCG/ACT inhaler   No No    Inhale 2 puffs Every 4 (Four) Hours As Needed for Wheezing or Shortness of Air.    aspirin 325 MG tablet   Yes No    Take 1 tablet by mouth Daily.    atorvastatin (LIPITOR) 20 MG tablet   Yes No    Take 1 tablet by mouth every night at bedtime.    carvedilol (COREG) 25 MG tablet   No No    Take 1 tablet by mouth 2 (Two) Times a Day With Meals.    cilostazol (PLETAL) 50 MG tablet   Yes No    Take 1 tablet by mouth 2 (Two) Times a Day.    cloNIDine (CATAPRES) 0.2 MG tablet   No No    Take 1 tablet by mouth Every 12 (Twelve) Hours.    disulfiram (ANTABUSE) 250 MG tablet   Yes No    Take 2 tablets by mouth Daily.    FLUoxetine (PROzac) 20 MG capsule   No No    Take 1 capsule by mouth Daily. Indications: Depression    hydrALAZINE (APRESOLINE) 25 MG tablet   No No    Take 1 tablet by mouth Every 8 (Eight) Hours.    hydroCHLOROthiazide (MICROZIDE) 12.5 MG capsule   No No    Take 1 capsule by mouth Daily.    isosorbide mononitrate (IMDUR) 120 MG 24 hr tablet   No No    Take 1 tablet by mouth Daily.    lisinopril (PRINIVIL,ZESTRIL) 20 MG tablet   No No    Take 1 tablet by mouth Daily.    naproxen (EC NAPROSYN) 500 MG EC tablet   No No    Take 1 tablet by mouth 2 (Two)  "Times a Day With Meals.    oxyCODONE (Roxicodone) 5 MG immediate release tablet   No No    Take 1 tablet by mouth Every 4 (Four) Hours As Needed for Moderate Pain.     ED Medications:    Medications   sodium chloride 0.9 % flush 10 mL (has no administration in time range)   nitroglycerin (TRIDIL) 200 mcg/ml infusion (has no administration in time range)   aspirin tablet 325 mg (325 mg Oral Given 6/10/24 1818)   nitroglycerin (NITROSTAT) ointment 1 inch (1 inch Topical Given 6/10/24 1818)   labetalol (NORMODYNE,TRANDATE) injection 20 mg (20 mg Intravenous Given 6/10/24 2035)     Vital Signs:  Temp:  [98.2 °F (36.8 °C)-98.5 °F (36.9 °C)] 98.2 °F (36.8 °C)  Heart Rate:  [80-92] 82  Resp:  [20-28] 28  BP: (156-201)/() 193/121        06/10/24  1745   Weight: 107 kg (235 lb)     Body mass index is 36.81 kg/m².    Physical Exam:     Most recent vital Signs: BP (!) 193/121   Pulse 82   Temp 98.2 °F (36.8 °C) (Oral)   Resp 28   Ht 170.2 cm (67\")   Wt 107 kg (235 lb)   SpO2 96%   BMI 36.81 kg/m²     Physical Exam  Constitutional:       General: He is not in acute distress.     Appearance: Normal appearance. He is not ill-appearing.   HENT:      Head: Normocephalic and atraumatic.      Right Ear: External ear normal.      Left Ear: External ear normal.      Nose: Nose normal.      Mouth/Throat:      Mouth: Mucous membranes are moist.   Eyes:      Extraocular Movements: Extraocular movements intact.      Conjunctiva/sclera: Conjunctivae normal.   Cardiovascular:      Rate and Rhythm: Normal rate and regular rhythm.      Pulses: Normal pulses.      Heart sounds: Normal heart sounds. No murmur heard.     Comments: Midline CABG scar noted.  Pulmonary:      Effort: Pulmonary effort is normal.      Breath sounds: Normal breath sounds. No wheezing or rales.   Abdominal:      General: Bowel sounds are normal.      Palpations: Abdomen is soft.      Tenderness: There is no abdominal tenderness. There is no guarding or " rebound.   Musculoskeletal:         General: Normal range of motion.      Cervical back: Neck supple.      Right lower leg: No edema.      Left lower leg: No edema.   Skin:     General: Skin is warm.      Findings: No erythema or rash.   Neurological:      General: No focal deficit present.      Mental Status: He is alert and oriented to person, place, and time. Mental status is at baseline.   Psychiatric:         Mood and Affect: Mood normal.         Behavior: Behavior normal.       Laboratory data:    I have reviewed the labs done in the emergency room.    Results from last 7 days   Lab Units 06/10/24  1757   SODIUM mmol/L 139   POTASSIUM mmol/L 3.7   CHLORIDE mmol/L 100   CO2 mmol/L 27.3   BUN mg/dL 10   CREATININE mg/dL 1.00   CALCIUM mg/dL 9.5   BILIRUBIN mg/dL 0.5   ALK PHOS U/L 125*   ALT (SGPT) U/L 10   AST (SGOT) U/L 15   GLUCOSE mg/dL 83     Results from last 7 days   Lab Units 06/10/24  1757   WBC 10*3/mm3 6.48   HEMOGLOBIN g/dL 12.5*   HEMATOCRIT % 37.2*   PLATELETS 10*3/mm3 150       Results from last 7 days   Lab Units 06/10/24  1944 06/10/24  1757   HSTROP T ng/L 23* 24*     EKG:      EKG done in the emergency room was reviewed by me.  It shows sinus rhythm at 84 bpm.  Normal axis.  Diffuse T inversions noted in the limb leads and lateral chest leads.  Poor R wave progression noted through the chest leads.    Radiology:    Portable chest x-ray done in the emergency room was reviewed by me.  It shows sternal wires from CABG surgery.  No infiltrates or cardiomegaly noted.  An official report is currently pending.    Assessment:    Hypertensive urgency, POA.  Coronary artery disease status post CABG in May 2024.  Essential hypertension.  Diabetes mellitus type 2, diet controlled.    Plan:    Hypertensive urgency.  - Patient does seem to have symptomatic elevated blood pressures.  Exact etiology of this is uncertain and he denies any dietary indiscretions and is compliant with his medications.  - Patient  has been initiated on nitroglycerin drip which she will continue through the night.  - Continue carvedilol, clonidine,  hydralazine, lisinopril and hydrochlorothiazide which he already takes at home.  - If uptitration of these medications does not help, he may benefit initiation of amlodipine.    Coronary artery disease.  - Continue aspirin, atorvastatin.    Risk Assessment: Moderate  DVT Prophylaxis: Enoxaparin  Code Status: Full  Diet: Cardiac      Margarito Mendoza MD  06/10/24  22:21 EDT    Dictated utilizing Dragon dictation.    Electronically signed by Margarito Mendoza MD at 06/10/24 2259          Emergency Department Notes        Violet Garzon RN at 06/10/24 2245          Hospitalist at bedside    Electronically signed by Violet Garzon RN at 06/10/24 2247       Violet Garzon RN at 06/10/24 2208          Notified MD pt is still hypertensive, pt is sitting up in bed calm denies pain, wife at bedside. Bed left in lowest position, call light within reach, side rails up x2.     Electronically signed by Violet Garzon RN at 06/10/24 220       Clyde Eric DO at 06/10/24 1810        Procedure Orders    1. Critical Care [[id number]] ordered by Clyde Eric DO                 EMERGENCY DEPARTMENT ENCOUNTER    Pt Name: Andrés Denton  MRN: [id number]  Pt :   1970  Room Number:    Date of encounter:  6/10/2024  PCP: Aisha Barba APRN  ED Physician: Clyde Eric DO      HPI:  Chief Complaint: Hypertension    Context: Andrés Denton is a 53 y.o. male who presents to the ED with chief complaint of hypertension.  Patient is postop CABG x 3 last month.  States over the past several days he has been having issues controlling his blood pressure.  Systolic blood pressures have been running in the 200s.  Patient is prescribed antihypertensive medications.  He has been taking his medications as prescribed.  No missed doses.  Does report intermittent chest pressure, but denies  active chest pain.  States that he followed up with the CT surgeon last month.  Did miss his cardiology appointment on Friday secondary to GI illness.  Was experiencing nausea, vomiting and diarrhea.  This is since resolved.  Also was evaluated at outside hospital ER yesterday because incision of the right lower extremity did dehisce.  He was concerned about possible infection.  He was subsequently started on oral antibiotics for prophylaxis.  States that he otherwise feels well.  Denies headache, vision changes, slurred speech, shortness of breath, abdominal or back pain, problems urination or vitamins, swelling in the legs, numbness or weakness in the extremities.    PAST MEDICAL HISTORY  Past Medical History:   Diagnosis Date    Alcohol abuse     Anxiety     Coronary artery disease     Depression     Hyperlipidemia     Hypertension     Myocardial infarction     Suicidal thoughts     Withdrawal symptoms, alcohol      Current Outpatient Medications   Medication Instructions    albuterol sulfate  (90 Base) MCG/ACT inhaler 2 puffs, Inhalation, Every 4 Hours PRN    aspirin 325 mg, Oral, Daily    atorvastatin (LIPITOR) 20 mg, Oral, Every Night at Bedtime    carvedilol (COREG) 25 mg, Oral, 2 Times Daily With Meals    cilostazol (PLETAL) 50 mg, Oral, 2 Times Daily    cloNIDine (CATAPRES) 0.2 mg, Oral, Every 12 Hours Scheduled    disulfiram (ANTABUSE) 500 mg, Oral, Daily    FLUoxetine (PROZAC) 20 mg, Oral, Daily    hydrALAZINE (APRESOLINE) 25 mg, Oral, Every 8 Hours Scheduled    hydroCHLOROthiazide (MICROZIDE) 12.5 mg, Oral, Every 24 Hours Scheduled    isosorbide mononitrate (IMDUR) 120 mg, Oral, Every 24 Hours Scheduled    lisinopril (PRINIVIL,ZESTRIL) 20 mg, Oral, Every 24 Hours Scheduled    naproxen (EC NAPROSYN) 500 mg, Oral, 2 Times Daily With Meals    oxyCODONE (ROXICODONE) 5 mg, Oral, Every 4 Hours PRN        PAST SURGICAL HISTORY  Past Surgical History:   Procedure Laterality Date    AMPUTATION  1995     right ring finger partial amputation    CARDIAC CATHETERIZATION N/A 05/02/2024    Procedure: Coronary angiography;  Surgeon: Tejas Marroquin MD;  Location[facility] CATH INVASIVE LOC[facility];  Service: Cardiovascular;  Laterality: N/A;    CARDIAC SURGERY      CORONARY ARTERY BYPASS GRAFT      FRACTURE SURGERY Left     tib/fib pt. is unsure the exact surgery it was when he was 17       FAMILY HISTORY  Family History   Problem Relation Age of Onset    Alcohol abuse Mother     Alcohol abuse Father     Alcohol abuse Maternal Uncle        SOCIAL HISTORY  Social History     Socioeconomic History    Marital status:     Number of children: 2    Years of education: 12    Highest education level: High school graduate   Tobacco Use    Smoking status: Never    Smokeless tobacco: Current     Types: Snuff    Tobacco comments:     One can per day- tobacco use starting at age 10- 44  years   Vaping Use    Vaping status: Never Used   Substance and Sexual Activity    Alcohol use: Yes     Comment: 1-2 x weekly1/2 pint, ~ 30 beers and a pint of vodka this past weekend    Drug use: Yes     Comment: alcohol    Sexual activity: Defer     Partners: Female       ALLERGIES  Morphine    REVIEW OF SYSTEMS  All systems reviewed and negative except for those discussed in HPI.     PHYSICAL EXAM  ED Triage Vitals [06/10/24 1745]   Temp Heart Rate Resp BP SpO2   98.5 °F (36.9 °C) 92 20 (!) 201/95 97 %      Temp src Heart Rate Source Patient Position BP Location FiO2 (%)   Oral Monitor Sitting Left arm --     I have reviewed the triage vital signs and nursing notes.    General: Alert.  Nontoxic appearance.  No acute distress.  Head: Normocephalic.  Atraumatic.  Eyes: No scleral icterus.  ENT: Moist mucous membranes.  Cardiovascular: Regular rate and rhythm.  No murmurs.  No rubs.  2+ distal pulses bilaterally.  Respiratory: Equal breath sounds bilaterally.  No rales.  No rhonchi.  No wheezing.  GI: Abdomen is soft.  Nondistended.  Nontender to  palpation.  No rebound.  No guarding.  No CVA tenderness.  MSK: Moves all 4 extremities.  Neurologic: Oriented x 3.  No focal deficits.  Skin: Sternotomy scar present.  Surgical scar present on the right forearm.  Mild gapping, dehisced surgical incision right medial calf. Granulation tissue present. No induration, drainage, or crepitus.  No erythema.  No edema. No pallor. No cyanosis.  Psych: Normal mood and affect.    LAB RESULTS  Recent Results (from the past 24 hour(s))   Comprehensive Metabolic Panel    Collection Time: 06/10/24  5:57 PM    Specimen: Blood   Result Value Ref Range    Glucose 83 65 - 99 mg/dL    BUN 10 6 - 20 mg/dL    Creatinine 1.00 0.76 - 1.27 mg/dL    Sodium 139 136 - 145 mmol/L    Potassium 3.7 3.5 - 5.2 mmol/L    Chloride 100 98 - 107 mmol/L    CO2 27.3 22.0 - 29.0 mmol/L    Calcium 9.5 8.6 - 10.5 mg/dL    Total Protein 7.1 6.0 - 8.5 g/dL    Albumin 3.9 3.5 - 5.2 g/dL    ALT (SGPT) 10 1 - 41 U/L    AST (SGOT) 15 1 - 40 U/L    Alkaline Phosphatase 125 (H) 39 - 117 U/L    Total Bilirubin 0.5 0.0 - 1.2 mg/dL    Globulin 3.2 gm/dL    A/G Ratio 1.2 g/dL    BUN/Creatinine Ratio 10.0 7.0 - 25.0    Anion Gap 11.7 5.0 - 15.0 mmol/L    eGFR 91.1 >60.0 mL/min/1.73   High Sensitivity Troponin T    Collection Time: 06/10/24  5:57 PM    Specimen: Blood   Result Value Ref Range    HS Troponin T 24 (H) <22 ng/L   Green Top (Gel)    Collection Time: 06/10/24  5:57 PM   Result Value Ref Range    Extra Tube Hold for add-ons.    Lavender Top    Collection Time: 06/10/24  5:57 PM   Result Value Ref Range    Extra Tube hold for add-on    Gold Top - SST    Collection Time: 06/10/24  5:57 PM   Result Value Ref Range    Extra Tube Hold for add-ons.    Light Blue Top    Collection Time: 06/10/24  5:57 PM   Result Value Ref Range    Extra Tube Hold for add-ons.    CBC Auto Differential    Collection Time: 06/10/24  5:57 PM    Specimen: Blood   Result Value Ref Range    WBC 6.48 3.40 - 10.80 10*3/mm3    RBC 3.87 (L)  4.14 - 5.80 10*6/mm3    Hemoglobin 12.5 (L) 13.0 - 17.7 g/dL    Hematocrit 37.2 (L) 37.5 - 51.0 %    MCV 96.1 79.0 - 97.0 fL    MCH 32.3 26.6 - 33.0 pg    MCHC 33.6 31.5 - 35.7 g/dL    RDW 14.1 12.3 - 15.4 %    RDW-SD 46.0 37.0 - 54.0 fl    MPV 9.8 6.0 - 12.0 fL    Platelets 150 140 - 450 10*3/mm3    Neutrophil % 60.9 42.7 - 76.0 %    Lymphocyte % 24.8 19.6 - 45.3 %    Monocyte % 10.5 5.0 - 12.0 %    Eosinophil % 3.2 0.3 - 6.2 %    Basophil % 0.3 0.0 - 1.5 %    Immature Grans % 0.3 0.0 - 0.5 %    Neutrophils, Absolute 3.94 1.70 - 7.00 10*3/mm3    Lymphocytes, Absolute 1.61 0.70 - 3.10 10*3/mm3    Monocytes, Absolute 0.68 0.10 - 0.90 10*3/mm3    Eosinophils, Absolute 0.21 0.00 - 0.40 10*3/mm3    Basophils, Absolute 0.02 0.00 - 0.20 10*3/mm3    Immature Grans, Absolute 0.02 0.00 - 0.05 10*3/mm3    nRBC 0.0 0.0 - 0.2 /100 WBC   High Sensitivity Troponin T 2Hr    Collection Time: 06/10/24  7:44 PM    Specimen: Arm, Left; Blood   Result Value Ref Range    HS Troponin T 23 (H) <22 ng/L    Troponin T Delta -1 >=-4 - <+4 ng/L       RADIOLOGY  No Radiology Exams Resulted Within Past 24 Hours    PROCEDURES  Critical Care    Performed by: Clyde Eric DO  Authorized by: Clyde Eric DO    Comments:      CRITICAL CARE PROCEDURE NOTE  Authorized and performed by: Dr. Eric  Total critical care time: Approximately 45 minutes.  Patient was critically ill due to: Hypertensive urgency  Interventions: Topical and IV vasoactive medications, close airway/mental status/hemodynamic monitoring    Due to a high probability of clinically significant, life threatening deterioration, the patient required my highest level of preparedness to intervene emergently and I personally spent this critical care time directly and personally managing the patient.  This critical care time included obtaining a history; examining the patient; pulse oximetry; ordering and review of studies; arranging urgent treatment with development of a  management plan; evaluation of patient's response to treatment; frequent reassessment; and, discussions with other providers.    This critical care time was performed to assess and manage the high probability of imminent, life-threatening deterioration that could result in multiorgan failure.  It was exclusive of separately billable procedures and treating other patients and teaching time.    Please see MDM section and the rest of the note for further information on patient assessment and interventions.        MEDICATIONS GIVEN IN ER  Medications   sodium chloride 0.9 % flush 10 mL (has no administration in time range)   nitroglycerin (TRIDIL) 200 mcg/ml infusion (has no administration in time range)   aspirin tablet 325 mg (325 mg Oral Given 6/10/24 1818)   nitroglycerin (NITROSTAT) ointment 1 inch (1 inch Topical Given 6/10/24 1818)   labetalol (NORMODYNE,TRANDATE) injection 20 mg (20 mg Intravenous Given 6/10/24 2035)       MEDICAL DECISION MAKING  53 y.o. male with past medical history listed above who presents with hypertension.    Patient arrives via EMS.  I have reviewed the EMS documentation/notes and included that information in my HPI.    Vital signs markable for hypertension, /95, otherwise within normal limits.    Based on clinical presentation and physical exam, differential diagnosis includes, but is not limited to, hypertensive urgency versus emergency, myocardial infarction, pneumothorax, pneumonia.     I have discussed the indication, risk, and alternatives of the following high risk medications: Topical nitroglycerin    External notes reviewed.  CT surgery note from 5/20/2024 reviewed.  Patient underwent CABG x 3 with right radial artery harvest.  Appear to be doing well postoperatively.  Follows with Dr. Marroquin cardiology in [place].    At least 3 different tests have been ordered on this patient.    Please see ED course below for my interpretation of the ED workup.  ED Course as of 06/10/24  2217   Mon Rajat 10, 2024   1809 ECG 12 Lead ED Triage Standing Order; Chest Pain  EKG per my interpretation normal sinus rhythm, rate 84, normal axis, no STEMI, T wave inversions in inferior and lateral precordial leads, normal QRS QTc intervals.   [JS]   2209 I reviewed the labs listed above. Clinically unremarkable.    Notable abnormalities are highlighted below.    Old laboratory data was reviewed from the medical records and compared to today's results.   [JS]   2209 HS Troponin T(!): 24 [JS]   2209 High Sensitivity Troponin T 2Hr(!) [JS]   2209 HS Troponin T(!): 23 [JS]   2209 Troponin T Delta: -1 [JS]   2209 XR Chest 1 View  I have independently reviewed and interpreted the chest x-ray.  My interpretation is clear lungs bilaterally.    [JS]      ED Course User Index  [JS] Clyde Eric DO      Patient received multiple doses of antihypertensive medications during his ED course, but unfortunately blood pressure remained significantly elevated.  On repeat exam patient is resting comfortably. Denies active chest pain. Repeat blood pressure 193/121.  At this time will initiate nitroglycerin infusion.  Patient will require medical admission for further workup and management.  I discussed the findings of the ED workup with the patient including my recommendation for admission.  Patient agreeable with plan and disposition.    Case discussed with Dr. Mendoza (hospitalist) who agrees to evaluate and admit the patient.  We discussed the HPI, pertinent PMHx, ED course and workup.    REPEAT VITAL SIGNS  AS OF 22:17 EDT VITALS:  BP - (!) 193/121  HR - 82  TEMP - 98.2 °F (36.8 °C) (Oral)  O2 SATS - 96%    DIAGNOSIS  Final diagnoses:   Hypertensive urgency   Hx of CABG       DISPOSITION  ED Disposition       ED Disposition   Decision to Admit    Condition   --    Comment   Level of Care: Critical Care [6]   Diagnosis: Hypertensive urgency [248550]   Admitting Physician: MIKEY MENDOZA [[id number]]   Attending Physician: AL  CLYDE [[id number]]   Certification: I Certify That Inpatient Hospital Services Are Medically Necessary For Greater Than 2 Midnights                       Please note that portions of this document were completed with voice recognition software.        Clyde Eric DO  06/11/24 1155      Electronically signed by Clyde Eric DO at 06/11/24 1155       Vital Signs (last day)       Date/Time Temp Temp src Pulse Resp BP Patient Position SpO2    06/11/24 1100 98.5 (36.9) -- -- -- -- -- --    06/11/24 1016 -- -- 75 -- 148/88 -- --    06/11/24 0900 -- -- 76 -- 134/84 -- 97    06/11/24 0827 -- -- 87 -- 142/87 -- --    06/11/24 0800 -- -- 89 24 131/83 Sitting 96    06/11/24 0700 97.8 (36.6) -- 74 -- 118/75 -- 99    06/11/24 0640 -- -- 76 -- 131/89 -- 98    06/11/24 0622 -- -- -- -- 128/74 -- --    06/11/24 0620 -- -- 73 -- 128/74 -- 98    06/11/24 0600 -- -- 82 20 135/87 Lying 97    06/11/24 0540 -- -- 71 -- 114/73 -- 98    06/11/24 0520 -- -- 80 -- 113/75 -- 98    06/11/24 0500 -- -- 80 -- 131/83 -- 96    06/11/24 0440 -- -- 87 -- 145/97 -- 98    06/11/24 0420 -- -- 80 -- 138/93 -- 98    06/11/24 0400 -- -- 71 22 148/102 Lying 100    06/11/24 0340 -- -- 70 -- 158/108 -- 98    06/11/24 0330 98.5 (36.9) Oral -- -- -- -- --    06/11/24 0320 -- -- 76 -- 162/103 -- 99    06/11/24 0313 -- -- 61 18 -- -- 87    06/11/24 0300 -- -- 73 -- 141/95 -- 93    06/11/24 0250 -- -- 71 -- 133/79 -- 95    06/11/24 0240 -- -- 80 -- 118/96 -- 96    06/11/24 0230 -- -- 74 -- 140/85 -- 90 06/11/24 0220 -- -- 75 -- 137/82 -- 96 06/11/24 0210 -- -- 79 -- 139/80 -- 96 06/11/24 0200 -- -- 77 -- 137/79 -- 97    06/11/24 0150 -- -- 81 -- 142/87 -- 96 06/11/24 0140 -- -- 78 -- 138/89 -- 94 06/11/24 0130 -- -- 80 -- 147/88 -- 95 06/11/24 0120 -- -- 83 -- 152/94 -- 95 06/11/24 0110 -- -- 85 -- 146/92 -- 95 06/11/24 0100 -- -- 82 -- 147/91 -- 94 06/11/24 0050 -- -- 86 -- 161/90 -- 94    06/11/24 0040 -- -- 83 -- 173/96 -- 95     06/11/24 0030 -- -- 87 -- 169/94 -- 96    06/11/24 0020 -- -- 82 -- 168/96 -- 95    06/11/24 0010 -- -- 82 -- 168/100 -- 96    06/11/24 0000 -- -- 77 22 173/97 Lying 96    06/10/24 2350 -- -- 80 -- 183/106 -- 97    06/10/24 2340 -- -- 87 -- 181/110 -- 96    06/10/24 2330 -- -- 87 -- 186/116 Lying 98    06/10/24 2320 -- -- 86 -- 168/117 Lying 97    06/10/24 2318 -- -- 81 -- 164/108 Lying 97    06/10/24 2316 -- -- 85 24 165/113 Lying 96    06/10/24 2315 98.8 (37.1) Oral -- -- -- -- --    06/10/24 22:58:52 -- -- -- -- 159/107 Sitting --    06/10/24 2256 -- -- 82 -- 159/107 -- --    06/10/24 2251 -- -- 86 -- 183/110 -- --    06/10/24 2246 -- -- 76 -- 164/105 -- 97    06/10/24 2240 -- -- 65 -- 151/84 -- 98    06/10/24 2220 -- -- 85 -- 198/114 -- 98    06/10/24 2200 -- -- 82 -- 193/121 -- 96    06/10/24 2140 -- -- 84 -- 182/122 -- 97    06/10/24 2120 -- -- 83 -- 167/100 -- 97    06/10/24 2100 -- -- 82 -- 156/110 -- 98    06/10/24 2040 -- -- 85 -- 163/115 -- 96    06/10/24 2035 -- -- 86 -- 194/120 -- --    06/10/24 2021 -- -- 85 -- -- -- 97    06/10/24 2015 -- -- 80 -- 193/114 -- 97    06/10/24 2005 -- -- 80 -- -- -- 97    06/10/24 1945 -- -- 85 -- 190/108 -- 97    06/10/24 1930 98.2 (36.8) Oral 84 28 175/107 Sitting 96    06/10/24 1900 -- -- 89 -- 190/103 -- 95    06/10/24 1745 98.5 (36.9) Oral 92 20 201/95 Sitting 97          Current Facility-Administered Medications   Medication Dose Route Frequency Provider Last Rate Last Admin    acetaminophen (TYLENOL) tablet 650 mg  650 mg Oral Q4H PRN Margarito Mendoza MD        Or    acetaminophen (TYLENOL) 160 MG/5ML oral solution 650 mg  650 mg Oral Q4H PRN Margarito Mendoza MD        Or    acetaminophen (TYLENOL) suppository 650 mg  650 mg Rectal Q4H PRN Margarito Mendoza MD        amLODIPine (NORVASC) tablet 10 mg  10 mg Oral Q24H Alfonso Wallis DO   10 mg at 06/11/24 1016    aspirin tablet 325 mg  325 mg Oral Daily Margarito Mendoza MD   325 mg at 06/11/24 0827    atorvastatin (LIPITOR)  tablet 20 mg  20 mg Oral Nightly Margarito Mendoza MD   20 mg at 06/10/24 2344    sennosides-docusate (PERICOLACE) 8.6-50 MG per tablet 2 tablet  2 tablet Oral BID PRN Margarito Mendoza MD        And    polyethylene glycol (MIRALAX) packet 17 g  17 g Oral Daily PRN Margarito Mendoza MD        And    bisacodyl (DULCOLAX) EC tablet 5 mg  5 mg Oral Daily PRN Margarito Mendoza MD        And    bisacodyl (DULCOLAX) suppository 10 mg  10 mg Rectal Daily PRN Margarito Mendoza MD        carvedilol (COREG) tablet 25 mg  25 mg Oral BID With Meals Margarito Mendoza MD   25 mg at 06/11/24 0827    cilostazol (PLETAL) tablet 50 mg  50 mg Oral BID Margarito Mendoza MD   50 mg at 06/11/24 0826    Enoxaparin Sodium (LOVENOX) syringe 40 mg  40 mg Subcutaneous Q24H Clyde Eric DO   40 mg at 06/10/24 2345    FLUoxetine (PROzac) capsule 20 mg  20 mg Oral Daily Margarito Mendoza MD   20 mg at 06/11/24 0826    hydrALAZINE (APRESOLINE) tablet 25 mg  25 mg Oral Q8H Margarito Mendoza MD   25 mg at 06/11/24 0622    hydroCHLOROthiazide (MICROZIDE) capsule 12.5 mg  12.5 mg Oral Q24H Margarito Mendoza MD   12.5 mg at 06/11/24 0827    lisinopril (PRINIVIL,ZESTRIL) tablet 20 mg  20 mg Oral Q24H Margarito Mendoza MD   20 mg at 06/11/24 0827    nitroglycerin (TRIDIL) 200 mcg/ml infusion  5-200 mcg/min Intravenous Titrated Clyde Eric DO   Stopped at 06/11/24 0845    ondansetron (ZOFRAN) injection 4 mg  4 mg Intravenous Q6H PRN Margarito Mendoza MD        oxyCODONE (ROXICODONE) immediate release tablet 5 mg  5 mg Oral Q4H PRN Margarito Mendoza MD   5 mg at 06/11/24 0407    Pharmacy to Dose enoxaparin (LOVENOX)   Does not apply Continuous PRN Margarito Mendoza MD        sodium chloride 0.9 % flush 10 mL  10 mL Intravenous PRN Clyde Eric,         sodium chloride 0.9 % flush 10 mL  10 mL Intravenous Q12H Margarito Mendoza MD   10 mL at 06/11/24 0827    sodium chloride 0.9 % flush 10 mL  10 mL Intravenous PRN Margarito Mendoza MD        sodium chloride 0.9 % infusion 40 mL  40 mL Intravenous PRN  Margarito Mendoza MD         Lab Results (last 24 hours)       Procedure Component Value Units Date/Time    High Sensitivity Troponin T [[id number]]  (Abnormal) Collected: 06/11/24 0440    Specimen: Blood Updated: 06/11/24 0526     HS Troponin T 24 ng/L     Narrative:      High Sensitive Troponin T Reference Range:  <14.0 ng/L- Negative Female for AMI  <22.0 ng/L- Negative Male for AMI  >=14 - Abnormal Female indicating possible myocardial injury.  >=22 - Abnormal Male indicating possible myocardial injury.   Clinicians would have to utilize clinical acumen, EKG, Troponin, and serial changes to determine if it is an Acute Myocardial Infarction or myocardial injury due to an underlying chronic condition.         Basic Metabolic Panel [[id number]]  (Abnormal) Collected: 06/11/24 0440    Specimen: Blood Updated: 06/11/24 0524     Glucose 114 mg/dL      BUN 11 mg/dL      Creatinine 0.91 mg/dL      Sodium 138 mmol/L      Potassium 3.7 mmol/L      Chloride 103 mmol/L      CO2 26.2 mmol/L      Calcium 8.7 mg/dL      BUN/Creatinine Ratio 12.1     Anion Gap 8.8 mmol/L      eGFR 102.0 mL/min/1.73     Narrative:      GFR Normal >60  Chronic Kidney Disease <60  Kidney Failure <15      CBC (No Diff) [[id number]]  (Abnormal) Collected: 06/11/24 0440    Specimen: Blood Updated: 06/11/24 0503     WBC 5.02 10*3/mm3      RBC 3.25 10*6/mm3      Hemoglobin 10.5 g/dL      Hematocrit 31.3 %      MCV 96.3 fL      MCH 32.3 pg      MCHC 33.5 g/dL      RDW 14.2 %      RDW-SD 47.0 fl      MPV 9.9 fL      Platelets 128 10*3/mm3     High Sensitivity Troponin T 2Hr [[id number]]  (Abnormal) Collected: 06/10/24 1944    Specimen: Blood from Arm, Left Updated: 06/10/24 2004     HS Troponin T 23 ng/L      Troponin T Delta -1 ng/L     Narrative:      High Sensitive Troponin T Reference Range:  <14.0 ng/L- Negative Female for AMI  <22.0 ng/L- Negative Male for AMI  >=14 - Abnormal Female indicating possible myocardial injury.  >=22 - Abnormal Male indicating  possible myocardial injury.   Clinicians would have to utilize clinical acumen, EKG, Troponin, and serial changes to determine if it is an Acute Myocardial Infarction or myocardial injury due to an underlying chronic condition.         Comprehensive Metabolic Panel [[id number]]  (Abnormal) Collected: 06/10/24 1757    Specimen: Blood Updated: 06/10/24 1844     Glucose 83 mg/dL      BUN 10 mg/dL      Creatinine 1.00 mg/dL      Sodium 139 mmol/L      Potassium 3.7 mmol/L      Chloride 100 mmol/L      CO2 27.3 mmol/L      Calcium 9.5 mg/dL      Total Protein 7.1 g/dL      Albumin 3.9 g/dL      ALT (SGPT) 10 U/L      AST (SGOT) 15 U/L      Alkaline Phosphatase 125 U/L      Total Bilirubin 0.5 mg/dL      Globulin 3.2 gm/dL      A/G Ratio 1.2 g/dL      BUN/Creatinine Ratio 10.0     Anion Gap 11.7 mmol/L      eGFR 91.1 mL/min/1.73     Narrative:      GFR Normal >60  Chronic Kidney Disease <60  Kidney Failure <15      High Sensitivity Troponin T [[id number]]  (Abnormal) Collected: 06/10/24 1757    Specimen: Blood Updated: 06/10/24 1838     HS Troponin T 24 ng/L     Narrative:      High Sensitive Troponin T Reference Range:  <14.0 ng/L- Negative Female for AMI  <22.0 ng/L- Negative Male for AMI  >=14 - Abnormal Female indicating possible myocardial injury.  >=22 - Abnormal Male indicating possible myocardial injury.   Clinicians would have to utilize clinical acumen, EKG, Troponin, and serial changes to determine if it is an Acute Myocardial Infarction or myocardial injury due to an underlying chronic condition.         CBC & Differential [[id number]]  (Abnormal) Collected: 06/10/24 1757    Specimen: Blood Updated: 06/10/24 1816    Narrative:      The following orders were created for panel order CBC & Differential.  Procedure                               Abnormality         Status                     ---------                               -----------         ------                     CBC Auto Differential[[id number]]         Abnormal            Final result                 Please view results for these tests on the individual orders.    CBC Auto Differential [[id number]]  (Abnormal) Collected: 06/10/24 1757    Specimen: Blood Updated: 06/10/24 1816     WBC 6.48 10*3/mm3      RBC 3.87 10*6/mm3      Hemoglobin 12.5 g/dL      Hematocrit 37.2 %      MCV 96.1 fL      MCH 32.3 pg      MCHC 33.6 g/dL      RDW 14.1 %      RDW-SD 46.0 fl      MPV 9.8 fL      Platelets 150 10*3/mm3      Neutrophil % 60.9 %      Lymphocyte % 24.8 %      Monocyte % 10.5 %      Eosinophil % 3.2 %      Basophil % 0.3 %      Immature Grans % 0.3 %      Neutrophils, Absolute 3.94 10*3/mm3      Lymphocytes, Absolute 1.61 10*3/mm3      Monocytes, Absolute 0.68 10*3/mm3      Eosinophils, Absolute 0.21 10*3/mm3      Basophils, Absolute 0.02 10*3/mm3      Immature Grans, Absolute 0.02 10*3/mm3      nRBC 0.0 /100 WBC     [place] Draw [[id number]] Collected: 06/10/24 1757    Specimen: Blood Updated: 06/10/24 1815    Narrative:      The following orders were created for panel order [place] [facility].  Procedure                               Abnormality         Status                     ---------                               -----------         ------                     Green Top (Gel)[[id number]]                                  Final result               Lavender Top[[id number]]                                     Final result               Gold Top - SST[[id number]]                                   Final result               Light Blue Top[[id number]]                                   Final result                 Please view results for these tests on the individual orders.    Green Top (Gel) [[id number]] Collected: 06/10/24 1757    Specimen: Blood Updated: 06/10/24 1815     Extra Tube Hold for add-ons.     Comment: Auto resulted.       Lavender Top [[id number]] Collected: 06/10/24 1757    Specimen: Blood Updated: 06/10/24 1815     Extra Tube hold for add-on     Comment: Auto  resulted       Gold [facility] [[id number]] Collected: 06/10/24 1757    Specimen: Blood Updated: 06/10/24 1815     Extra Tube Hold for add-ons.     Comment: Auto resulted.       Light Blue Top [[id number]] Collected: 06/10/24 1757    Specimen: Blood Updated: 06/10/24 1815     Extra Tube Hold for add-ons.     Comment: Auto resulted             Physician Progress Notes (last 24 hours)  Notes from 06/10/24 1212 through 06/11/24 1212   No notes of this type exist for this encounter.       Consult Notes (last 24 hours)  Notes from 06/10/24 1212 through 06/11/24 1212   No notes of this type exist for this encounter.

## 2024-06-12 ENCOUNTER — READMISSION MANAGEMENT (OUTPATIENT)
Dept: CALL CENTER | Facility: HOSPITAL | Age: 54
End: 2024-06-12
Payer: MEDICAID

## 2024-06-12 DIAGNOSIS — I25.10 CORONARY ARTERY DISEASE INVOLVING NATIVE HEART WITHOUT ANGINA PECTORIS, UNSPECIFIED VESSEL OR LESION TYPE: ICD-10-CM

## 2024-06-12 DIAGNOSIS — I10 UNCONTROLLED HYPERTENSION: ICD-10-CM

## 2024-06-12 DIAGNOSIS — I73.9 PVD (PERIPHERAL VASCULAR DISEASE) (HCC): ICD-10-CM

## 2024-06-12 DIAGNOSIS — M79.605 BILATERAL LEG PAIN: ICD-10-CM

## 2024-06-12 DIAGNOSIS — I16.0 ASYMPTOMATIC HYPERTENSIVE URGENCY: ICD-10-CM

## 2024-06-12 DIAGNOSIS — M79.604 BILATERAL LEG PAIN: ICD-10-CM

## 2024-06-12 RX ORDER — DISULFIRAM 250 MG/1
500 TABLET ORAL DAILY
COMMUNITY
End: 2024-06-13 | Stop reason: ALTCHOICE

## 2024-06-12 RX ORDER — CARVEDILOL 25 MG/1
25 TABLET ORAL 2 TIMES DAILY WITH MEALS
COMMUNITY
Start: 2024-05-03 | End: 2024-06-13 | Stop reason: ALTCHOICE

## 2024-06-12 RX ORDER — ISOSORBIDE MONONITRATE 120 MG/1
1 TABLET, EXTENDED RELEASE ORAL DAILY
COMMUNITY
Start: 2024-05-03 | End: 2024-06-13 | Stop reason: ALTCHOICE

## 2024-06-12 NOTE — TELEPHONE ENCOUNTER
Pt stopped stating he had been back in hospital and needing refill on these meds (hospital did not give him new scripts because he thought he had all of them at home per pt) I was able to find all of them in most recent discharge summary     Pt has appt with you 6/19/24, Drew needs reviewed by you     Also states he only has few Oxycodone left and needing more    Walgrmarisas Vijay

## 2024-06-12 NOTE — OUTREACH NOTE
Prep Survey      Flowsheet Row Responses   [facility] facility patient discharged from? [facility]   Is LACE score < 7 ? No   Eligibility Readm Mgmt   Discharge diagnosis Elevated blood pressures.   Does the patient have one of the following disease processes/diagnoses(primary or secondary)? Other   Does the patient have Home health ordered? No   Is there a DME ordered? No   Prep survey completed? Yes            MOE SELLERS - Registered Nurse

## 2024-06-13 ENCOUNTER — CARE COORDINATION (OUTPATIENT)
Dept: PRIMARY CARE CLINIC | Age: 54
End: 2024-06-13

## 2024-06-13 DIAGNOSIS — I16.0 ASYMPTOMATIC HYPERTENSIVE URGENCY: ICD-10-CM

## 2024-06-13 RX ORDER — AMLODIPINE BESYLATE 10 MG/1
10 TABLET ORAL DAILY
Qty: 30 TABLET | Refills: 1 | Status: SHIPPED | OUTPATIENT
Start: 2024-06-13

## 2024-06-13 RX ORDER — ISOSORBIDE MONONITRATE 120 MG/1
120 TABLET, EXTENDED RELEASE ORAL DAILY
Qty: 30 TABLET | Refills: 1 | Status: CANCELLED | OUTPATIENT
Start: 2024-06-13

## 2024-06-13 RX ORDER — ASPIRIN 325 MG
325 TABLET ORAL DAILY
Qty: 30 TABLET | Refills: 1 | Status: SHIPPED | OUTPATIENT
Start: 2024-06-13 | End: 2024-06-14

## 2024-06-13 RX ORDER — ALLOPURINOL 100 MG/1
100 TABLET ORAL DAILY
Qty: 30 TABLET | Refills: 1 | Status: SHIPPED | OUTPATIENT
Start: 2024-06-13

## 2024-06-13 RX ORDER — HYDROCHLOROTHIAZIDE 12.5 MG/1
12.5 TABLET ORAL DAILY
Qty: 30 TABLET | Refills: 0 | Status: CANCELLED | OUTPATIENT
Start: 2024-06-13

## 2024-06-13 RX ORDER — CLONIDINE HYDROCHLORIDE 0.2 MG/1
0.2 TABLET ORAL 2 TIMES DAILY
Qty: 90 TABLET | Refills: 3 | Status: SHIPPED | OUTPATIENT
Start: 2024-06-13

## 2024-06-13 RX ORDER — DISULFIRAM 250 MG/1
500 TABLET ORAL DAILY
Qty: 60 TABLET | Refills: 0 | Status: CANCELLED | OUTPATIENT
Start: 2024-06-13

## 2024-06-13 RX ORDER — CILOSTAZOL 50 MG/1
50 TABLET ORAL 2 TIMES DAILY
Qty: 60 TABLET | Refills: 0 | Status: CANCELLED | OUTPATIENT
Start: 2024-06-13

## 2024-06-13 RX ORDER — OXYCODONE HYDROCHLORIDE 5 MG/1
5 TABLET ORAL EVERY 8 HOURS PRN
Qty: 20 TABLET | Refills: 0 | OUTPATIENT
Start: 2024-06-13 | End: 2024-06-20

## 2024-06-13 RX ORDER — HYDRALAZINE HYDROCHLORIDE 25 MG/1
25 TABLET, FILM COATED ORAL 3 TIMES DAILY
Qty: 270 TABLET | Refills: 0 | Status: CANCELLED | OUTPATIENT
Start: 2024-06-13

## 2024-06-13 RX ORDER — ATORVASTATIN CALCIUM 20 MG/1
20 TABLET, FILM COATED ORAL DAILY
Qty: 30 TABLET | Refills: 1 | Status: CANCELLED | OUTPATIENT
Start: 2024-06-13

## 2024-06-13 RX ORDER — LISINOPRIL 20 MG/1
30 TABLET ORAL DAILY
Qty: 45 TABLET | Refills: 1 | Status: SHIPPED | OUTPATIENT
Start: 2024-06-13

## 2024-06-13 RX ORDER — CARVEDILOL 25 MG/1
25 TABLET ORAL 2 TIMES DAILY WITH MEALS
Qty: 60 TABLET | Refills: 1 | Status: CANCELLED | OUTPATIENT
Start: 2024-06-13

## 2024-06-13 NOTE — TELEPHONE ENCOUNTER
So there are several new medications are these from the new hospital stay or they were on his list and carried over at the new facility?  And why he is still needing pain medication?

## 2024-06-13 NOTE — CARE COORDINATION
Care Transitions Initial Follow Up Call    Call within 2 business days of discharge: Yes     Patient: Kade Graham Patient : 1970 MRN: [id number]    Last Discharge Facility       Date Complaint Diagnosis Description Type Department Provider    24 Ankle Pain; Wound Check Acute right ankle pain ... ED (DISCHARGE) [facility], MD            RARS: No data recorded     Spoke with: Attempting HFU call, unsuccessful.  Unable to leave message.     Discharge department/facility: [facility]     Non-face-to-face services provided:  Scheduled appointment with PCP-Yulissa  Obtained and reviewed discharge summary and/or continuity of care documents    Follow Up  Future Appointments   Date Time Provider Department Center   2024  3:00 PM Hosea Duenas MD [facility]   2024  2:30 PM Hosea Duenas MD [facility]       Nancy Simon RN

## 2024-06-13 NOTE — TELEPHONE ENCOUNTER
Pt states he was supposed to take the new ones with what he was already on. Also reported needs pain meds for pain in chest \"when cough or sneezes\"    He is going to bring all of meds that he has at home for us to go through

## 2024-06-14 DIAGNOSIS — I16.0 ASYMPTOMATIC HYPERTENSIVE URGENCY: ICD-10-CM

## 2024-06-14 RX ORDER — CLOPIDOGREL BISULFATE 75 MG/1
75 TABLET ORAL DAILY
Qty: 30 TABLET | Refills: 3 | Status: CANCELLED | OUTPATIENT
Start: 2024-06-14

## 2024-06-14 RX ORDER — ASPIRIN 325 MG
325 TABLET ORAL DAILY
Qty: 90 TABLET | Refills: 1 | Status: SHIPPED | OUTPATIENT
Start: 2024-06-14

## 2024-06-14 RX ORDER — DILTIAZEM HYDROCHLORIDE 180 MG/1
180 CAPSULE, EXTENDED RELEASE ORAL DAILY
Qty: 30 CAPSULE | Refills: 3 | Status: SHIPPED | OUTPATIENT
Start: 2024-06-14

## 2024-06-14 RX ORDER — ALLOPURINOL 100 MG/1
100 TABLET ORAL DAILY
Qty: 30 TABLET | Refills: 1 | Status: CANCELLED | OUTPATIENT
Start: 2024-06-14

## 2024-06-14 RX ORDER — METOPROLOL SUCCINATE 100 MG/1
100 TABLET, EXTENDED RELEASE ORAL DAILY
Qty: 30 TABLET | Refills: 3 | Status: SHIPPED | OUTPATIENT
Start: 2024-06-14

## 2024-06-14 RX ORDER — ATORVASTATIN CALCIUM 40 MG/1
40 TABLET, FILM COATED ORAL DAILY
Qty: 30 TABLET | Status: CANCELLED | OUTPATIENT
Start: 2024-06-14

## 2024-06-14 NOTE — TELEPHONE ENCOUNTER
Pt came in asking to have his Metoprolol refilled as well as ditalizem and I called the [facility] to ask what the issue was and the prescriptions for those two medications are  and need new ones.

## 2024-06-14 NOTE — CARE COORDINATION
Nancy Simon, RN  Manager Care Coordination  [phone]     I spoke to Ariel's father.  He tells me that Ariel is \"staying with them but is out of the house right now with his son\".  He offered that his blood pressure has been \"running good\" and he seems to be feeling well.  We made a plan for me to call back this afternoon.

## 2024-06-15 NOTE — DISCHARGE SUMMARY
[facility]   DISCHARGE SUMMARY      Name:  Andrés Denton   Age:  53 y.o.  Sex:  male  :  1970  MRN:  [id number]   Visit Number:  [id number]    Admission Date:  6/10/2024  Date of Discharge: 2024  Primary Care Physician:  Hieu Thompson MD    Discharge Diagnoses:     Hypertensive urgency,   Coronary artery disease status post CABG in May 2024.  Essential hypertension.  Diabetes mellitus type 2, diet controlled.    Problem List:     Active Hospital Problems    Diagnosis  POA    **Hypertensive urgency [I16.0]  Yes    Coronary artery disease involving native coronary artery of native heart without angina pectoris [I25.10]  Yes    Hypertension, essential [I10]  Yes      Resolved Hospital Problems   No resolved problems to display.     Presenting Problem:    Chief Complaint   Patient presents with    Chest Pain      Consults:     Consulting Physician(s)                     None              Procedures Performed:        History of presenting illness/Hospital Course:    Andrés Denton is a 53-year-old male with history of hypertension, diabetes mellitus type 2, coronary artery disease with recent CABG at [facility] last month, was brought to the emergency room by his wife with persistently elevated blood pressures at home for the last several days.  Patient states that he had four-vessel bypass surgery at [facility] on 2024 and has been doing fairly well after that.  He follows up with Dr. Marroquin who is his primary cardiologist.  He has been taking his medications regularly.  He denies smoking or alcohol use.   In the emergency room, he was afebrile and hemodynamically stable except for elevated blood pressure of 200/95 which remained persistently elevated despite giving IV labetalol and Nitropaste. Initial troponin 24 with a repeat at 23. CMP and CBC were unremarkable except for low hemoglobin at 12.5. Chest x-ray and EKG were unremarkable.    Patient admitted to the ICU and initiated on a nitroglycerin drip.  This was ultimately weaned off his blood pressure stabilized.  Patient's lisinopril was increased to 30 mg daily.  Norvasc was also added.  He was continued on carvedilol hydralazine clonidine and HCTZ.  Discharged home with close outpatient follow-up.  Strict return instructions given.    Vital Signs:         Physical Exam:    General Appearance:  Alert and cooperative.    Head:  Atraumatic and normocephalic.   Eyes: Conjunctivae and sclerae normal, no icterus. No pallor.   Ears:  Ears with no abnormalities noted.   Throat: No oral lesions, no thrush, oral mucosa moist.   Neck: Supple, trachea midline, no thyromegaly.   Back:   No kyphoscoliosis present. No tenderness to palpation.   Lungs:   Breath sounds heard bilaterally equally.  No crackles or wheezing. No Pleural rub or bronchial breathing.   Heart:  Normal S1 and S2, no murmur, no gallop, no rub. No JVD.   Abdomen:   Normal bowel sounds, no masses, no organomegaly. Soft, nontender, nondistended, no rebound tenderness.   Extremities: Supple, no edema, no cyanosis, no clubbing.   Pulses: Pulses palpable bilaterally.   Skin: No bleeding or rash.   Neurologic: Alert and oriented x 3. No facial asymmetry. Moves all four limbs. No tremors.     Pertinent Lab Results:     Results from last 7 days   Lab Units 06/11/24  0440 06/10/24  1757   SODIUM mmol/L 138 139   POTASSIUM mmol/L 3.7 3.7   CHLORIDE mmol/L 103 100   CO2 mmol/L 26.2 27.3   BUN mg/dL 11 10   CREATININE mg/dL 0.91 1.00   CALCIUM mg/dL 8.7 9.5   BILIRUBIN mg/dL  --  0.5   ALK PHOS U/L  --  125*   ALT (SGPT) U/L  --  10   AST (SGOT) U/L  --  15   GLUCOSE mg/dL 114* 83     Results from last 7 days   Lab Units 06/11/24  0440 06/10/24  1757   WBC 10*3/mm3 5.02 6.48   HEMOGLOBIN g/dL 10.5* 12.5*   HEMATOCRIT % 31.3* 37.2*   PLATELETS 10*3/mm3 128* 150         Results from last 7 days   Lab Units 06/11/24  0440 06/10/24  1944 06/10/24  1757    HSTROP T ng/L 24* 23* 24*                           Pertinent Radiology Results:    Imaging Results (All)       Procedure Component Value Units Date/Time    XR Chest 1 View [[id number]] Collected: 06/11/24 0828     Updated: 06/11/24 0906    Narrative:      PROCEDURE: XR CHEST 1 VW-     HISTORY: Chest Pain Triage Protocol     COMPARISON: May 2024.     FINDINGS: The heart is normal in size. The mediastinum is unremarkable.  The left costophrenic angle is not included. There is no acute  infiltrate. There is no pneumothorax.  There are no acute osseous  abnormalities. There is apical lordotic imaging. The patient is status  post median sternotomy.       Impression:      No acute cardiopulmonary process.                    Images were reviewed, interpreted, and dictated by Dr. Mary Horton MD  Transcribed by Janae Hoyt PA-C.     This report was signed and finalized on 6/11/2024 9:04 AM by Mary Horton MD.               Echo:    Results for orders placed during the hospital encounter of 04/30/24    Adult Transthoracic Echo Complete W/ Cont if Necessary Per Protocol    Interpretation Summary    Left ventricular systolic function is normal. Calculated left ventricular EF = 56.6% Left ventricular ejection fraction appears to be 56 - 60%. Left ventricular diastolic function was normal.    Left ventricular wall thickness is consistent with mild concentric hypertrophy.    Normal right ventricular size and function.    No hemodynamically significant valvular dysfunction.    Mild dilation of the proximal aorta is present measuring 3.6 cm..    Cannot exclude the presence of an atrial septal defect.  Recommend limited echo with bubble study.    Condition on Discharge:      Stable.    Code status during the hospital stay:    Code Status and Medical Interventions:   Ordered at: 06/10/24 2301     Code Status (Patient has no pulse and is not breathing):    CPR (Attempt to Resuscitate)     Medical Interventions (Patient has pulse  or is breathing):    Full Support     Discharge Disposition:    Home or Self Care    Discharge Medications:       Discharge Medications        New Medications        Instructions Start Date   amLODIPine 10 MG tablet  Commonly known as: NORVASC  Notes to patient: Calcium-channel blocker Blood pressure   10 mg, Oral, Every 24 Hours Scheduled             Changes to Medications        Instructions Start Date   lisinopril 20 MG tablet  Commonly known as: PRINIVIL,ZESTRIL  What changed: how much to take  Notes to patient: Ace-inhibitor blood pressure   30 mg, Oral, Every 24 Hours Scheduled             Continue These Medications        Instructions Start Date   albuterol sulfate  (90 Base) MCG/ACT inhaler  Commonly known as: PROVENTIL HFA;VENTOLIN HFA;PROAIR HFA   2 puffs, Inhalation, Every 4 Hours PRN      aspirin 325 MG tablet   325 mg, Oral, Daily      atorvastatin 20 MG tablet  Commonly known as: LIPITOR  Notes to patient: Cholesterol   20 mg, Oral, Every Night at Bedtime      carvedilol 25 MG tablet  Commonly known as: COREG  Notes to patient: Beta-Blocker Blood pressure   25 mg, Oral, 2 Times Daily With Meals      cilostazol 50 MG tablet  Commonly known as: PLETAL  Notes to patient: Peripheral artery disease   50 mg, Oral, 2 Times Daily      cloNIDine 0.2 MG tablet  Commonly known as: CATAPRES  Notes to patient: Blood pressure   0.2 mg, Oral, Every 12 Hours Scheduled      disulfiram 250 MG tablet  Commonly known as: ANTABUSE   500 mg, Oral, Daily      FLUoxetine 20 MG capsule  Commonly known as: PROzac  Notes to patient: Mood   20 mg, Oral, Daily      hydrALAZINE 25 MG tablet  Commonly known as: APRESOLINE  Notes to patient: Blood pressure   25 mg, Oral, Every 8 Hours Scheduled      hydroCHLOROthiazide 12.5 MG capsule  Commonly known as: MICROZIDE  Notes to patient: Mild diuretic, blood pressure   12.5 mg, Oral, Every 24 Hours Scheduled      isosorbide mononitrate 120 MG 24 hr tablet  Commonly known as:  IMDUR  Notes to patient: Chest pain, blood pressure   120 mg, Oral, Every 24 Hours Scheduled      oxyCODONE 5 MG immediate release tablet  Commonly known as: Roxicodone   5 mg, Oral, Every 4 Hours PRN             Stop These Medications      naproxen 500 MG EC tablet  Commonly known as: EC NAPROSYN            Discharge Diet:     Diet Instructions       Diet: Cardiac Diets; Healthy Heart (2-3 Na+); Regular (IDDSI 7); Thin (IDDSI 0)      Discharge Diet: Cardiac Diets    Cardiac Diet: Healthy Heart (2-3 Na+)    Texture: Regular (IDDSI 7)    Fluid Consistency: Thin (IDDSI 0)          Activity at Discharge:     Activity Instructions       Activity as Tolerated            Follow-up Appointments:    Additional Instructions for the Follow-ups that You Need to Schedule       Discharge Follow-up with PCP   As directed       Currently Documented PCP:    Hieu Thompson MD    PCP Phone Number:    [phone]     Follow Up Details: 1 week               Follow-up Information       Hieu Thompson MD. Go on 6/19/2024.    Specialty: Internal Medicine  Why: 3 PM  Contact information:  [address] [place] [address]  [phone]                           Future Appointments   Date Time Provider Department Center   6/18/2024 11:30 AM Tejas Marroquin MD [facility]     Test Results Pending at Discharge:           Alfonso Wallis DO  06/15/24  14:44 EDT    Time: I spent >30 minutes on this discharge activity which included: face-to-face encounter with the patient, reviewing the data in the system, coordination of the care with the nursing staff as well as consultants, documentation, and entering orders.     Dictated utilizing Dragon dictation.

## 2024-06-17 NOTE — CARE COORDINATION
Care Transitions Initial Follow Up Call    Call within 2 business days of discharge: Yes     Patient: Kade Graham Patient : 1970 MRN: [id number]    Last Discharge Facility       Date Complaint Diagnosis Description Type Department Provider    24 Ankle Pain; Wound Check Acute right ankle pain ... ED (DISCHARGE) [facility], MD            RARS: No data recorded     Spoke with: Attempting TCM call, unsuccessful.  Mailbox is full and unable to leave message.     Discharge department/facility: [facility]    Non-face-to-face services provided:  Scheduled appointment with PCP-Yulissa  Obtained and reviewed discharge summary and/or continuity of care documents    Follow Up  Future Appointments   Date Time Provider Department Center   2024  3:00 PM Hosea Duenas MD [facility]   2024  2:30 PM Hosea Duenas MD [facility]       Nancy Simon RN

## 2024-06-18 ENCOUNTER — READMISSION MANAGEMENT (OUTPATIENT)
Dept: CALL CENTER | Facility: HOSPITAL | Age: 54
End: 2024-06-18
Payer: MEDICAID

## 2024-06-18 ENCOUNTER — OFFICE VISIT (OUTPATIENT)
Dept: CARDIOLOGY | Facility: CLINIC | Age: 54
End: 2024-06-18
Payer: MEDICAID

## 2024-06-18 VITALS
SYSTOLIC BLOOD PRESSURE: 118 MMHG | HEIGHT: 68 IN | OXYGEN SATURATION: 100 % | BODY MASS INDEX: 35.1 KG/M2 | RESPIRATION RATE: 18 BRPM | WEIGHT: 231.6 LBS | HEART RATE: 84 BPM | DIASTOLIC BLOOD PRESSURE: 86 MMHG

## 2024-06-18 DIAGNOSIS — I10 HYPERTENSION, ESSENTIAL: Primary | ICD-10-CM

## 2024-06-18 DIAGNOSIS — Z09 HOSPITAL DISCHARGE FOLLOW-UP: ICD-10-CM

## 2024-06-18 DIAGNOSIS — I25.10 CORONARY ARTERY DISEASE INVOLVING NATIVE CORONARY ARTERY OF NATIVE HEART WITHOUT ANGINA PECTORIS: ICD-10-CM

## 2024-06-18 DIAGNOSIS — Z95.1 S/P CABG (CORONARY ARTERY BYPASS GRAFT): ICD-10-CM

## 2024-06-18 PROBLEM — F10.11 HISTORY OF ALCOHOL ABUSE: Status: ACTIVE | Noted: 2024-06-18

## 2024-06-18 PROBLEM — Z72.0 TOBACCO USE: Status: ACTIVE | Noted: 2024-06-18

## 2024-06-18 PROBLEM — F10.10 ALCOHOL ABUSE: Status: RESOLVED | Noted: 2024-06-18 | Resolved: 2024-06-18

## 2024-06-18 PROBLEM — F10.10 ALCOHOL ABUSE: Status: ACTIVE | Noted: 2024-06-18

## 2024-06-18 PROBLEM — Z72.0 TOBACCO USE: Status: RESOLVED | Noted: 2024-06-18 | Resolved: 2024-06-18

## 2024-06-18 RX ORDER — DILTIAZEM HYDROCHLORIDE 180 MG/1
1 CAPSULE, EXTENDED RELEASE ORAL DAILY
COMMUNITY
Start: 2024-06-14

## 2024-06-18 RX ORDER — ROPINIROLE 0.25 MG/1
0.5 TABLET, FILM COATED ORAL
COMMUNITY
Start: 2024-02-28

## 2024-06-18 RX ORDER — LISINOPRIL 20 MG/1
30 TABLET ORAL DAILY
Qty: 135 TABLET | Refills: 0 | Status: SHIPPED | OUTPATIENT
Start: 2024-06-18

## 2024-06-18 RX ORDER — ACETAMINOPHEN 325 MG/1
TABLET ORAL
COMMUNITY
Start: 2024-02-28

## 2024-06-18 RX ORDER — BLOOD-GLUCOSE METER
EACH MISCELLANEOUS
COMMUNITY
Start: 2024-05-13

## 2024-06-18 RX ORDER — CLOPIDOGREL BISULFATE 75 MG/1
75 TABLET ORAL DAILY
COMMUNITY

## 2024-06-18 RX ORDER — METOPROLOL SUCCINATE 100 MG/1
100 TABLET, EXTENDED RELEASE ORAL DAILY
COMMUNITY

## 2024-06-18 RX ORDER — LANCETS 30 GAUGE
EACH MISCELLANEOUS
COMMUNITY
Start: 2024-05-13

## 2024-06-18 RX ORDER — BLOOD SUGAR DIAGNOSTIC
STRIP MISCELLANEOUS
COMMUNITY
Start: 2024-05-13

## 2024-06-18 RX ORDER — ALLOPURINOL 100 MG/1
1 TABLET ORAL DAILY
COMMUNITY
Start: 2024-05-23

## 2024-06-18 NOTE — ASSESSMENT & PLAN NOTE
-Blood pressure is well controlled at today's visit  -Patient brought medications he is taking in bag from home.  There may be some confusion about frequency of some of them, however, what he is doing currently is adequately controlling blood pressure.  Medications have been changed multiple times since CABG due to hypertension.  -Will have patient continue with current medication regimen and continue to log blood pressures at home.  Instructed to call office with any sustained high readings of SBP>150 and we will assess at that time.  -Instructed patient to take all medications to the pharmacy the next time he goes and have them attempt to reconcile medications so that his list is accurate.

## 2024-06-18 NOTE — ASSESSMENT & PLAN NOTE
-Recent hospitalization for hypertensive urgency, blood pressure currently stable.  -Recent CABG 5/7/2024

## 2024-06-18 NOTE — PROGRESS NOTES
[facility]    Office Consult     Andrés Denton  [id number]  2024    Referred By: No ref. provider found    Chief Complaint   Patient presents with    Hypertension       Subjective     History of Present Illness:   Andrés Denton is a 53 y.o. male who presents to the Cardiology Clinic for follow up after recent hospitalization.  Patient was hospitalized 6/10/2024 with hypertensive urgency requiring a Nitroglycerin drip before his blood pressure stabilized.  His Lisinopril was increased to 30mg at time of discharge and he was started on Amlodipine 10mg in addition to his regular blood pressure medications.  Patient has a past health history significant for resistant hypertension, diet controlled diabetes, alcohol and tobacco abuse.  Patient is s/p 3 vessel CABG with right radial artery harvest with Dr. Mccollum 2024.  Since CABG patient denies any chest pain or shortness of breath and states he feels much better than prior to the surgery.  States that he has been active at home.  States he has had blood pressure medications adjusted several times since CABG due to persistent hypertension.  Blood pressure stable at visit today.    Past Cardiac Testin. Last Coronary Angio: 2024--Cardiac cath noted MVCAD, involving mid LAD, LCx, OM1 and RCA.   2. Prior Stress Testing: None  3. Last Echo: 2024--EF = 56.60% Left ventricular ejection fraction appears to be 56 - 60%. Left ventricular diastolic function was normal.  Left ventricular wall thickness is consistent with mild concentric hypertrophy. Normal right ventricular size and function. No hemodynamically significant valvular dysfunction. Mild dilation of the proximal aorta is present measuring 3.6 cm.  4. Prior Holter Monitor: None    Review of Systems   Constitutional:  Negative for activity change and fatigue.   Respiratory:  Negative for chest tightness and shortness of breath.    Cardiovascular:  Negative  for chest pain, palpitations and leg swelling.   Neurological:  Negative for dizziness.   All other systems reviewed and are negative.       Past Medical History:   Diagnosis Date    Alcohol abuse     Anxiety     Coronary artery disease     Depression     Hyperlipidemia     Hypertension     Myocardial infarction     Suicidal thoughts     Withdrawal symptoms, alcohol        Past Surgical History:   Procedure Laterality Date    AMPUTATION  1995    right ring finger partial amputation    CARDIAC CATHETERIZATION N/A 05/02/2024    Procedure: Coronary angiography;  Surgeon: Tejas Marroquin MD;  Location: [facility] CATH INVASIVE LOCATION;  Service: Cardiovascular;  Laterality: N/A;    CARDIAC SURGERY      CORONARY ARTERY BYPASS GRAFT      FRACTURE SURGERY Left     tib/fib pt. is unsure the exact surgery it was when he was 17       Family History   Problem Relation Age of Onset    Alcohol abuse Mother     Alcohol abuse Father     Alcohol abuse Maternal Uncle        Social History     Socioeconomic History    Marital status:     Number of children: 2    Years of education: 12    Highest education level: High school graduate   Tobacco Use    Smoking status: Never    Smokeless tobacco: Current     Types: Snuff    Tobacco comments:     One can per day- tobacco use starting at age 10- 44  years   Vaping Use    Vaping status: Never Used   Substance and Sexual Activity    Alcohol use: Yes     Comment: 1-2 x weekly1/2 pint, ~ 30 beers and a pint of vodka this past weekend    Drug use: Yes     Comment: alcohol    Sexual activity: Defer     Partners: Female         Current Outpatient Medications:     acetaminophen (TYLENOL) 325 MG tablet, TAKE 2 Tablets BY MOUTH FOUR TIMES DAILY AS NEEDED, Disp: , Rfl:     albuterol sulfate  (90 Base) MCG/ACT inhaler, Inhale 2 puffs Every 4 (Four) Hours As Needed for Wheezing or Shortness of Air., Disp: 18 g, Rfl: 0    allopurinol (ZYLOPRIM) 100 MG tablet, Take 1 tablet by mouth Daily.,  "Disp: , Rfl:     amLODIPine (NORVASC) 10 MG tablet, Take 1 tablet by mouth Daily for 30 days., Disp: 30 tablet, Rfl: 0    aspirin 325 MG tablet, Take 1 tablet by mouth Daily., Disp: , Rfl:     atorvastatin (LIPITOR) 20 MG tablet, Take 1 tablet by mouth every night at bedtime., Disp: , Rfl:     Blood Glucose Monitoring Suppl (OneTouch Verio Flex System) w/Device kit, , Disp: , Rfl:     cloNIDine (CATAPRES) 0.2 MG tablet, Take 1 tablet by mouth Every 12 (Twelve) Hours for 30 days., Disp: 60 tablet, Rfl: 0    Dilt- MG 24 hr capsule, Take 1 capsule by mouth Daily., Disp: , Rfl:     FLUoxetine (PROzac) 20 MG capsule, Take 1 capsule by mouth Daily. Indications: Depression, Disp: 30 capsule, Rfl: 0    hydrALAZINE (APRESOLINE) 25 MG tablet, Take 1 tablet by mouth Every 8 (Eight) Hours., Disp: , Rfl:     Lancets (OneTouch Delica Plus [id number]) misc, , Disp: , Rfl:     lisinopril (PRINIVIL,ZESTRIL) 20 MG tablet, Take 1.5 tablets by mouth Daily for 30 days., Disp: , Rfl:     OneTouch Verio test strip, , Disp: , Rfl:     oxyCODONE (Roxicodone) 5 MG immediate release tablet, Take 1 tablet by mouth Every 4 (Four) Hours As Needed for Moderate Pain., Disp: 12 tablet, Rfl: 0    rOPINIRole (REQUIP) 0.25 MG tablet, Take 2 tablets by mouth every night at bedtime., Disp: , Rfl:     clopidogrel (PLAVIX) 75 MG tablet, Take 1 tablet by mouth Daily., Disp: , Rfl:     metoprolol succinate XL (TOPROL-XL) 100 MG 24 hr tablet, Take 1 tablet by mouth Daily., Disp: , Rfl:       Allergies   Allergen Reactions    Morphine Hives     Pt had a bad reaction after having it during surgery - cardiac arrest later on, unknown if morphine was the cause.       Objective     Vitals:    06/18/24 1127   BP: 118/86   BP Location: Left arm   Patient Position: Sitting   Cuff Size: Adult   Pulse: 84   Resp: 18   SpO2: 100%   Weight: 105 kg (231 lb 9.6 oz)   Height: 172.7 cm (68\")     Body mass index is 35.21 kg/m².    Physical Exam  Vitals and nursing note " reviewed.   Constitutional:       General: He is not in acute distress.     Appearance: Normal appearance. He is not toxic-appearing.   HENT:      Head: Normocephalic.      Mouth/Throat:      Mouth: Mucous membranes are moist.   Eyes:      Pupils: Pupils are equal, round, and reactive to light.   Cardiovascular:      Rate and Rhythm: Normal rate and regular rhythm.      Pulses: Normal pulses.      Heart sounds: Normal heart sounds. No murmur heard.     Comments: Healing CABG scar midline  Pulmonary:      Effort: Pulmonary effort is normal.      Breath sounds: Normal breath sounds. No wheezing, rhonchi or rales.   Musculoskeletal:      Right lower leg: No edema.      Left lower leg: No edema.   Skin:     General: Skin is warm and dry.      Capillary Refill: Capillary refill takes less than 2 seconds.      Comments: Healing incision to right lower extremity   Neurological:      Mental Status: He is alert and oriented to person, place, and time. Mental status is at baseline.   Psychiatric:         Mood and Affect: Mood normal.         Behavior: Behavior normal.         Thought Content: Thought content normal.         Results Review:   I reviewed the patient's new clinical results.    Procedures    Assessment & Plan  Hospital discharge follow-up  -Recent hospitalization for hypertensive urgency, blood pressure currently stable.  -Recent CABG 5/7/2024     Hypertension, essential  -Blood pressure is well controlled at today's visit  -Patient brought medications he is taking in bag from home.  There may be some confusion about frequency of some of them, however, what he is doing currently is adequately controlling blood pressure.  Medications have been changed multiple times since CABG due to hypertension.  -Will have patient continue with current medication regimen and continue to log blood pressures at home.  Instructed to call office with any sustained high readings of SBP>150 and we will assess at that  time.  -Instructed patient to take all medications to the pharmacy the next time he goes and have them attempt to reconcile medications so that his list is accurate.    Coronary artery disease involving native coronary artery of native heart without angina pectoris    S/P CABG (coronary artery bypass graft)  -S/P 3 vessel CABG with right radial artery harvest with Dr. Mccollum 5/7/2024  -No chest pain, shortness of breath or palpitations.  No concern for angina at this time.  -Continues to rehab at home and is gradually increasing activity as tolerated  -Incisions healing without any s/s of infection  -Will have patient follow up in 6 months for recheck, sooner if needed  -Continue ASA, Statin, and Plavix        Preventative Cardiology:   Tobacco Cessation: N/A  Advance Care Planning: ACP discussion was held with the patient during this visit. Patient does not have an advance directive, declines further assistance.     Follow Up:   Return in about 6 months (around 12/18/2024) for Recheck.      Thank you for allowing me to participate in the care of your patient. Please to not hesitate to contact me with additional questions or concerns.     ALBERTINA Gaviria

## 2024-06-18 NOTE — OUTREACH NOTE
Medical Week 1 Survey      Flowsheet Row Responses   [facility] patient discharged from? [facility]   Does the patient have one of the following disease processes/diagnoses(primary or secondary)? Other   Week 1 attempt successful? No   Unsuccessful attempts Attempt 1            Mar MURRAY - Licensed Nurse

## 2024-06-18 NOTE — CARE COORDINATION
Nancy Simon, RN  Manager Care Coordination  [phone]     Attempting HFU call from 6/11/24 dc at [facility].  I spoke with his mother and she states that he is at doctor's office.  She took message that he has appointment tomorrow with Dr Duenas for HFU.

## 2024-06-18 NOTE — ASSESSMENT & PLAN NOTE
-S/P 3 vessel CABG with right radial artery harvest with Dr. Mccollum 5/7/2024  -No chest pain, shortness of breath or palpitations.  No concern for angina at this time.  -Continues to rehab at home and is gradually increasing activity as tolerated  -Incisions healing without any s/s of infection  -Will have patient follow up in 6 months for recheck, sooner if needed  -Continue ASA, Statin, and Plavix

## 2024-06-19 ENCOUNTER — CARE COORDINATION (OUTPATIENT)
Dept: PRIMARY CARE CLINIC | Age: 54
End: 2024-06-19

## 2024-06-28 ENCOUNTER — READMISSION MANAGEMENT (OUTPATIENT)
Dept: CALL CENTER | Facility: HOSPITAL | Age: 54
End: 2024-06-28
Payer: MEDICAID

## 2024-06-28 NOTE — OUTREACH NOTE
Medical Week 3 Survey      Flowsheet Row Responses   [facility] patient discharged from? [facility]   Does the patient have one of the following disease processes/diagnoses(primary or secondary)? Other   Week 3 attempt successful? No   Unsuccessful attempts Attempt 1            MICHEAL BABIN - Registered Nurse

## 2024-07-02 ENCOUNTER — READMISSION MANAGEMENT (OUTPATIENT)
Dept: CALL CENTER | Facility: HOSPITAL | Age: 54
End: 2024-07-02
Payer: MEDICAID

## 2024-07-02 ENCOUNTER — HOSPITAL ENCOUNTER (EMERGENCY)
Facility: HOSPITAL | Age: 54
Discharge: HOME | End: 2024-07-02
Attending: EMERGENCY MEDICINE
Payer: MEDICAID

## 2024-07-02 VITALS
SYSTOLIC BLOOD PRESSURE: 112 MMHG | HEART RATE: 61 BPM | DIASTOLIC BLOOD PRESSURE: 78 MMHG | BODY MASS INDEX: 34.1 KG/M2 | RESPIRATION RATE: 18 BRPM | OXYGEN SATURATION: 98 % | WEIGHT: 225 LBS | HEIGHT: 68 IN | TEMPERATURE: 97.9 F

## 2024-07-02 DIAGNOSIS — L03.115 CELLULITIS OF RIGHT LOWER EXTREMITY: Primary | ICD-10-CM

## 2024-07-02 PROCEDURE — 6370000000 HC RX 637 (ALT 250 FOR IP): Performed by: EMERGENCY MEDICINE

## 2024-07-02 PROCEDURE — 99283 EMERGENCY DEPT VISIT LOW MDM: CPT

## 2024-07-02 RX ORDER — CEPHALEXIN 500 MG/1
500 CAPSULE ORAL ONCE
Status: COMPLETED | OUTPATIENT
Start: 2024-07-02 | End: 2024-07-02

## 2024-07-02 RX ORDER — CEPHALEXIN 500 MG/1
500 CAPSULE ORAL 4 TIMES DAILY
Qty: 28 CAPSULE | Refills: 0 | Status: SHIPPED | OUTPATIENT
Start: 2024-07-03 | End: 2024-07-10

## 2024-07-02 RX ADMIN — CEPHALEXIN 500 MG: 500 CAPSULE ORAL at 20:33

## 2024-07-02 NOTE — OUTREACH NOTE
Medical Week 3 Survey      Flowsheet Row Responses   [facility] patient discharged from? [facility]   Does the patient have one of the following disease processes/diagnoses(primary or secondary)? Other   Week 3 attempt successful? Yes   Call start time 1151   Call end time 1155   Discharge diagnosis Elevated blood pressures.   Meds reviewed with patient/caregiver? Yes   Is the patient having any side effects they believe may be caused by any medication additions or changes? No   Does the patient have all medications ordered at discharge? Yes   Is the patient taking all medications as directed (includes completed medication regime)? Yes   Does the patient have a primary care provider?  Yes   Does the patient have an appointment with their PCP within 7 days of discharge? Greater than 7 days   What is preventing the patient from scheduling follow up appointments within 7 days of discharge? Earlier appointment not available   Nursing Interventions Verified appointment date/time/provider   Has the patient kept scheduled appointments due by today? N/A   Has home health visited the patient within 72 hours of discharge? N/A   Psychosocial issues? No   Did the patient receive a copy of their discharge instructions? Yes   Nursing interventions Reviewed instructions with patient   What is the patient's perception of their health status since discharge? Improving   Is the patient/caregiver able to teach back signs and symptoms related to disease process for when to call PCP? Yes   Is the patient/caregiver able to teach back signs and symptoms related to disease process for when to call 911? Yes   Is the patient/caregiver able to teach back the hierarchy of who to call/visit for symptoms/problems? PCP, Specialist, Home health nurse, Urgent Care, ED, 911 Yes   If the patient is a current smoker, are they able to teach back resources for cessation? Not a smoker   Additional teach back comments pt states BP's have been WNL's,  monitors BP closely, has been avoiding extremely hot weather   Week 3 Call Completed? Yes   Graduated Yes   Is the patient interested in additional calls from an ambulatory ? No   Would this patient benefit from a Referral to [facility] Social Work? No   Call end time 1315            Cat MURRAY - Registered Nurse

## 2024-07-03 NOTE — ED PROVIDER NOTES
motion of the knee is a very small area.  I would not start him on Keflex and have him follow-up with his primary care physician.  Given strict return precautions.         I am the Primary Clinician of Record.    FINAL IMPRESSION      1. Cellulitis of right lower extremity          DISPOSITION/PLAN     DISPOSITION Decision To Discharge 07/02/2024 08:37:45 PM      PATIENT REFERRED TO:  Hosea Duenas MD  [address] [place], [place] [id number]  [phone]    Schedule an appointment as soon as possible for a visit   If symptoms worsen      DISCHARGE MEDICATIONS:  Discharge Medication List as of 7/2/2024  8:27 PM        START taking these medications    Details   cephALEXin (KEFLEX) 500 MG capsule Take 1 capsule by mouth 4 times daily for 7 days, Disp-28 capsule, R-0Normal             DISCONTINUED MEDICATIONS:  Discharge Medication List as of 7/2/2024  8:27 PM        STOP taking these medications       gabapentin (NEURONTIN) 100 MG capsule Comments:   Reason for Stopping:         Semaglutide,0.25 or 0.5MG/DOS, (OZEMPIC, 0.25 OR 0.5 MG/DOSE,) 2 MG/3ML SOPN Comments:   Reason for Stopping:                      (Please note that portions of this note were completed with a voice recognition program.  Efforts were made to edit the dictations but occasionally words are mis-transcribed.)    Maria Dolores Seymour MD (electronically signed)            Maria Dolores Seymour MD  07/02/24 5201

## 2024-07-15 ENCOUNTER — SOCIAL WORK (OUTPATIENT)
Dept: EMERGENCY DEPT | Facility: HOSPITAL | Age: 54
End: 2024-07-15

## 2024-07-15 NOTE — PROGRESS NOTES
54 yo male with history of AUD walked into ED, to seek help for alcohol problem. Pt had no other immediate health concerns, and wanted to speak with PSS about treatment, because I helped him back in March.. Pt stated that he went to treatment in March, done well for a while, but returned to use recently. PSS shared several treatment options with pt, and pt completed a pre-screen for [facility] in [place], [place] Inpatient facility, submitted prescreen, and facility will contact PSS in the morning, to get intake date. Pt plans to stay with his son tonight, and PSS will follow up with him in the morning for the next step in plan of action. Pt had no food, so PSS gave pt a food bag, and filled out paper work for additional resources.PSS confirmed pt has PSS contact info, and PSS gave him community resources as well. PSS offered support and encouragement.    Florentino POZO

## 2024-07-16 ENCOUNTER — FOLLOWUP TELEPHONE ENCOUNTER (OUTPATIENT)
Dept: EMERGENCY DEPT | Facility: HOSPITAL | Age: 54
End: 2024-07-16

## 2024-07-16 NOTE — TELEPHONE ENCOUNTER
PSS followed up with pt, about inpatient treatment this am. PSS made a couple of CM calls and coordinated an assessment for [facility] in [place], [place]. Pt called and and Intake did a CIWA with pt to assess withdraw from alcohol. Pt contacted PSS, and stated that they let him know that he is okay for Intake, and that they will call him back to let him know a time today or tomorrow. Pt also said that they can probably come and get him. PSS praised pt decision, and asked if he will call me back and let me know the time and date. Pt stated that he will. PSS offered support and encouragement.  Associate from [facility] contacted PSS and confirmed pts acceptance and let me know a time for Intake. PSS confirmed pt address, and plan to transport pt at 8:30 am tomorrow morning. PSS also let pt know what he is allowed to bring to the facility, and what he can expect.    Florentino POZO

## 2024-07-17 ENCOUNTER — SOCIAL WORK (OUTPATIENT)
Dept: EMERGENCY DEPT | Facility: HOSPITAL | Age: 54
End: 2024-07-17

## 2024-07-17 NOTE — PROGRESS NOTES
PSS contacted pt this am, and confirmed address and pickup for transportation to [facility] in [place], [place] for inpatient treatment for AUD. Pt sated that he was up and ready. PSS and  picked up pt, transported to . PSS helped pt identify strengths, assets, supports, and resources, to help him cultivate various components of [employer]. PSS offered support and encouragement.

## 2024-07-22 ENCOUNTER — FOLLOWUP TELEPHONE ENCOUNTER (OUTPATIENT)
Dept: EMERGENCY DEPT | Facility: HOSPITAL | Age: 54
End: 2024-07-22

## 2024-07-22 NOTE — TELEPHONE ENCOUNTER
54 yo male with AUD who is presently doing inpatient treatment at [facility], contacted PSS via telephone call. Pt stated that he is doing very well, and has abstained from alcohol use for over a week. Pt stated he is attending 12 step meetings, as well as daily peer groups. Pt was interested in learning more about the 12 steps and what sponsorship is. AA has literature which defines what 12 step sponsorship is, so PSS encouraged pt to get a free pamphlet at his next 12 step meeting at the [facility] in [place]. PSS also pointed pt to the parts of the AA text book which describes the role of a \"sponsor\". PSS offered informational support and encouragement.    Florentino POZO

## 2024-08-13 ENCOUNTER — FOLLOWUP TELEPHONE ENCOUNTER (OUTPATIENT)
Dept: EMERGENCY DEPT | Facility: HOSPITAL | Age: 54
End: 2024-08-13

## 2024-08-13 NOTE — TELEPHONE ENCOUNTER
54 yo male contacted PSS from inpatient treatment at [facility]. Pt stated that he is doing really well, and has abstained from alcohol the entire time in treatment. Pt said he has been doing 12 step work with a sponsor, Peer Groups, and has been really working hard to give himself the best opportunity for recovery. PSS and pt discussed pt plans after initial treatment. PSS offered support and encouragement.    Florentino POZO

## 2024-08-20 NOTE — BH NOTE
54 yo male with AUD contacted PSS, to follow up. Pt stated that he completed inpatient [place] for AUD, and has recently transitioned to [facility] in [place], [place]. Pt stated that he does not have as structured recovery work as he did in initial treatment, and asked PSS for suggestions. PSS shared experience with maintaining willingness to continue recovery work as paramount in my personal recovery. PSS shared how that in the past when my options increased, my willingness decreased and I became complacent. I have to seek out 12 step work with sponsor, work with others, and continue to attend recovery meetings when no one is making me. This was the difference for me between long term recovery and returning to use. Pt related from his last return to use. Pt plans to continue 12 step work and attend community recovery meetings. PSS offered support and encouragement.    Florentino POZO

## 2024-08-27 PROBLEM — R53.83 TIRED: Status: ACTIVE | Noted: 2024-08-27

## 2024-08-29 ENCOUNTER — FOLLOWUP TELEPHONE ENCOUNTER (OUTPATIENT)
Dept: EMERGENCY DEPT | Facility: HOSPITAL | Age: 54
End: 2024-08-29

## 2024-08-29 NOTE — TELEPHONE ENCOUNTER
Pt contacted PSS, stating that he is doing well and has abstained from alcohol use. Pt had questions about steps 1-3 of a 12 step recovery program that he wanted PSS perspective on. PSS and pt both reviewed the literature (AA Text), and discussed. PSS offered perspective. Pt expressed gratitude. PSS offered support and encouragement.    Florentino POZO

## 2024-09-07 DIAGNOSIS — I77.9 CAROTID ARTERY DISEASE WITHOUT CEREBRAL INFARCTION (HCC): ICD-10-CM

## 2024-09-07 DIAGNOSIS — I16.0 ASYMPTOMATIC HYPERTENSIVE URGENCY: ICD-10-CM

## 2024-09-09 DIAGNOSIS — I16.0 ASYMPTOMATIC HYPERTENSIVE URGENCY: ICD-10-CM

## 2024-09-09 RX ORDER — ATORVASTATIN CALCIUM 20 MG/1
20 TABLET, FILM COATED ORAL NIGHTLY
Qty: 90 TABLET | Refills: 2 | OUTPATIENT
Start: 2024-09-09 | End: 2025-06-06

## 2024-09-09 RX ORDER — ATORVASTATIN CALCIUM 40 MG/1
40 TABLET, FILM COATED ORAL DAILY
Qty: 90 TABLET | OUTPATIENT
Start: 2024-09-09

## 2024-09-09 RX ORDER — ATORVASTATIN CALCIUM 40 MG/1
40 TABLET, FILM COATED ORAL DAILY
Qty: 30 TABLET | Refills: 0 | Status: SHIPPED | OUTPATIENT
Start: 2024-09-09

## 2024-09-09 RX ORDER — METOPROLOL SUCCINATE 100 MG/1
100 TABLET, EXTENDED RELEASE ORAL DAILY
Qty: 90 TABLET | OUTPATIENT
Start: 2024-09-09

## 2024-09-09 RX ORDER — CLOPIDOGREL BISULFATE 75 MG/1
75 TABLET ORAL DAILY
Qty: 90 TABLET | OUTPATIENT
Start: 2024-09-09

## 2024-09-09 RX ORDER — CLOPIDOGREL BISULFATE 75 MG/1
75 TABLET ORAL DAILY
Qty: 30 TABLET | Refills: 0 | Status: SHIPPED | OUTPATIENT
Start: 2024-09-09

## 2024-09-09 RX ORDER — METOPROLOL SUCCINATE 100 MG/1
100 TABLET, EXTENDED RELEASE ORAL DAILY
Qty: 30 TABLET | Refills: 0 | Status: SHIPPED | OUTPATIENT
Start: 2024-09-09

## 2024-09-10 ENCOUNTER — OFFICE VISIT (OUTPATIENT)
Dept: INTERNAL MEDICINE | Facility: CLINIC | Age: 54
End: 2024-09-10
Payer: MEDICAID

## 2024-09-10 VITALS
DIASTOLIC BLOOD PRESSURE: 127 MMHG | HEART RATE: 87 BPM | RESPIRATION RATE: 16 BRPM | WEIGHT: 246 LBS | HEIGHT: 68 IN | OXYGEN SATURATION: 98 % | SYSTOLIC BLOOD PRESSURE: 178 MMHG | BODY MASS INDEX: 37.28 KG/M2

## 2024-09-10 DIAGNOSIS — M1A.09X0 IDIOPATHIC CHRONIC GOUT OF MULTIPLE SITES WITHOUT TOPHUS: ICD-10-CM

## 2024-09-10 DIAGNOSIS — Z95.1 S/P CABG (CORONARY ARTERY BYPASS GRAFT): ICD-10-CM

## 2024-09-10 DIAGNOSIS — R73.9 HYPERGLYCEMIA: ICD-10-CM

## 2024-09-10 DIAGNOSIS — Z12.5 PROSTATE CANCER SCREENING: ICD-10-CM

## 2024-09-10 DIAGNOSIS — F33.1 MODERATE EPISODE OF RECURRENT MAJOR DEPRESSIVE DISORDER: ICD-10-CM

## 2024-09-10 DIAGNOSIS — G25.81 RESTLESS LEGS SYNDROME: ICD-10-CM

## 2024-09-10 DIAGNOSIS — I25.10 CORONARY ARTERY DISEASE INVOLVING NATIVE CORONARY ARTERY OF NATIVE HEART WITHOUT ANGINA PECTORIS: ICD-10-CM

## 2024-09-10 DIAGNOSIS — I10 PRIMARY HYPERTENSION: Primary | ICD-10-CM

## 2024-09-10 DIAGNOSIS — F51.01 PRIMARY INSOMNIA: ICD-10-CM

## 2024-09-10 DIAGNOSIS — Z12.11 COLON CANCER SCREENING: ICD-10-CM

## 2024-09-10 DIAGNOSIS — M25.50 GENERALIZED JOINT PAIN: ICD-10-CM

## 2024-09-10 DIAGNOSIS — F10.230 ALCOHOL DEPENDENCE WITH UNCOMPLICATED WITHDRAWAL: ICD-10-CM

## 2024-09-10 PROBLEM — F32.9 MAJOR DEPRESSION: Status: RESOLVED | Noted: 2023-06-15 | Resolved: 2024-09-10

## 2024-09-10 PROBLEM — R53.83 TIRED: Status: RESOLVED | Noted: 2024-08-27 | Resolved: 2024-09-10

## 2024-09-10 PROBLEM — I16.1 HYPERTENSIVE EMERGENCY: Status: RESOLVED | Noted: 2024-04-30 | Resolved: 2024-09-10

## 2024-09-10 PROBLEM — I16.0 HYPERTENSIVE URGENCY: Status: RESOLVED | Noted: 2024-06-10 | Resolved: 2024-09-10

## 2024-09-10 PROBLEM — Z09 HOSPITAL DISCHARGE FOLLOW-UP: Status: RESOLVED | Noted: 2024-06-18 | Resolved: 2024-09-10

## 2024-09-10 LAB
EXPIRATION DATE: ABNORMAL
EXPIRATION DATE: NORMAL
HBA1C MFR BLD: 5.9 % (ref 4.5–5.7)
Lab: ABNORMAL
Lab: NORMAL
POC CREATININE URINE: NORMAL
POC MICROALBUMIN URINE: NORMAL

## 2024-09-10 PROCEDURE — 3080F DIAST BP >= 90 MM HG: CPT | Performed by: STUDENT IN AN ORGANIZED HEALTH CARE EDUCATION/TRAINING PROGRAM

## 2024-09-10 PROCEDURE — 83036 HEMOGLOBIN GLYCOSYLATED A1C: CPT | Performed by: STUDENT IN AN ORGANIZED HEALTH CARE EDUCATION/TRAINING PROGRAM

## 2024-09-10 PROCEDURE — 3044F HG A1C LEVEL LT 7.0%: CPT | Performed by: STUDENT IN AN ORGANIZED HEALTH CARE EDUCATION/TRAINING PROGRAM

## 2024-09-10 PROCEDURE — 82044 UR ALBUMIN SEMIQUANTITATIVE: CPT | Performed by: STUDENT IN AN ORGANIZED HEALTH CARE EDUCATION/TRAINING PROGRAM

## 2024-09-10 PROCEDURE — 3077F SYST BP >= 140 MM HG: CPT | Performed by: STUDENT IN AN ORGANIZED HEALTH CARE EDUCATION/TRAINING PROGRAM

## 2024-09-10 PROCEDURE — 99214 OFFICE O/P EST MOD 30 MIN: CPT | Performed by: STUDENT IN AN ORGANIZED HEALTH CARE EDUCATION/TRAINING PROGRAM

## 2024-09-10 RX ORDER — ASPIRIN 325 MG
325 TABLET ORAL DAILY
Qty: 90 TABLET | Refills: 1 | Status: SHIPPED | OUTPATIENT
Start: 2024-09-10

## 2024-09-10 RX ORDER — DOXYLAMINE SUCCINATE 25 MG/1
25 TABLET ORAL NIGHTLY PRN
Qty: 90 TABLET | Refills: 0 | Status: SHIPPED | OUTPATIENT
Start: 2024-09-10

## 2024-09-10 RX ORDER — LISINOPRIL 30 MG/1
30 TABLET ORAL DAILY
COMMUNITY
Start: 2024-08-06 | End: 2024-09-10 | Stop reason: SDUPTHER

## 2024-09-10 RX ORDER — LISINOPRIL 40 MG/1
40 TABLET ORAL DAILY
Qty: 90 TABLET | Refills: 0 | Status: SHIPPED | OUTPATIENT
Start: 2024-09-10

## 2024-09-10 RX ORDER — IBUPROFEN 800 MG/1
800 TABLET, FILM COATED ORAL EVERY 6 HOURS PRN
Qty: 90 TABLET | Refills: 0 | Status: SHIPPED | OUTPATIENT
Start: 2024-09-10 | End: 2024-09-16 | Stop reason: SDUPTHER

## 2024-09-10 RX ORDER — ROPINIROLE 3 MG/1
3 TABLET, FILM COATED ORAL NIGHTLY
Qty: 90 TABLET | Refills: 0 | Status: SHIPPED | OUTPATIENT
Start: 2024-09-10

## 2024-09-10 RX ORDER — ALLOPURINOL 100 MG/1
100 TABLET ORAL DAILY
Qty: 90 TABLET | Refills: 0 | Status: SHIPPED | OUTPATIENT
Start: 2024-09-10

## 2024-09-10 RX ORDER — AMLODIPINE BESYLATE 10 MG/1
10 TABLET ORAL DAILY
Qty: 90 TABLET | Refills: 0 | Status: SHIPPED | OUTPATIENT
Start: 2024-09-10

## 2024-09-10 RX ORDER — ROPINIROLE 3 MG/1
3 TABLET, FILM COATED ORAL NIGHTLY
COMMUNITY
Start: 2024-07-22 | End: 2024-09-10 | Stop reason: SDUPTHER

## 2024-09-10 RX ORDER — ESCITALOPRAM OXALATE 10 MG/1
10 TABLET ORAL DAILY
COMMUNITY
Start: 2024-07-22 | End: 2024-09-10 | Stop reason: SDUPTHER

## 2024-09-10 RX ORDER — DOCUSATE SODIUM 250 MG
250 CAPSULE ORAL DAILY
COMMUNITY
Start: 2024-07-25

## 2024-09-10 RX ORDER — METOPROLOL SUCCINATE 100 MG/1
100 TABLET, EXTENDED RELEASE ORAL DAILY
Qty: 90 TABLET | Refills: 0 | Status: SHIPPED | OUTPATIENT
Start: 2024-09-10

## 2024-09-10 RX ORDER — TIZANIDINE 2 MG/1
2 TABLET ORAL EVERY 6 HOURS PRN
COMMUNITY
Start: 2024-07-22

## 2024-09-10 RX ORDER — ATORVASTATIN CALCIUM 40 MG/1
40 TABLET, FILM COATED ORAL DAILY
Qty: 90 TABLET | Refills: 0 | Status: SHIPPED | OUTPATIENT
Start: 2024-09-10

## 2024-09-10 RX ORDER — CLOPIDOGREL BISULFATE 75 MG/1
75 TABLET ORAL DAILY
Qty: 90 TABLET | Refills: 0 | Status: SHIPPED | OUTPATIENT
Start: 2024-09-10

## 2024-09-10 RX ORDER — ESCITALOPRAM OXALATE 20 MG/1
20 TABLET ORAL DAILY
Qty: 90 TABLET | Refills: 0 | Status: SHIPPED | OUTPATIENT
Start: 2024-09-10

## 2024-09-10 NOTE — PROGRESS NOTES
Office Note     Name: Andrés Denton    : 1970     MRN: [id number]     Chief Complaint  Establish Care (Previous PCP (Rupa lala Franklin )/)    Subjective     History of Present Illness:  Andrés Denton is a 54 y.o. male who presents today for chronic conditions.    Hypertension amlodipine 10mg, lisinopril 30, clonidine 0.2mg once daily however he is unsure of what exact medications he is taking.  Advised to bring all medications to next appointment.  Blood pressure high today but denies any headaches, chest pain, difficulty breathing.  history of CAD status post CABG (2024) on ASA and plavix  Anxiety & depression on lexapro  Gout on allopurinol, las flare up 3 weeks ago happens on knees, feet and toes  Generalized joint pains: was planning to see rheumatology.  He claims that previous PCP gave him oxycodone and gabapentin for generalized joint pains and gout and per patient, it was the only one that worked.  He has not taken this for about 3 to 4 months now.  Restless leg syndrome    Colonoscopy: Not done, due  PSA not done  Non-smoker    Past Medical History:   Past Medical History:   Diagnosis Date    Alcohol abuse     Anxiety     Coronary artery disease     Depression     Hyperlipidemia     Hypertension     Myocardial infarction     Suicidal thoughts     Withdrawal symptoms, alcohol        Past Surgical History:   Past Surgical History:   Procedure Laterality Date    AMPUTATION      right ring finger partial amputation    CARDIAC CATHETERIZATION N/A 2024    Procedure: Coronary angiography;  Surgeon: Tejas Marroquin MD;  Location: [facility] CATH INVASIVE LOCATION;  Service: Cardiovascular;  Laterality: N/A;    CARDIAC SURGERY      CORONARY ARTERY BYPASS GRAFT      FRACTURE SURGERY Left     tib/fib pt. is unsure the exact surgery it was when he was 17       Immunizations:   There is no immunization history on file for this patient.     Medications:     Current Outpatient  Medications:     acetaminophen (Tylenol) 325 MG tablet, Take 2 tablets by mouth Every 6 (Six) Hours As Needed for Mild Pain., Disp: 20 tablet, Rfl: 0    albuterol sulfate  (90 Base) MCG/ACT inhaler, Inhale 2 puffs Every 4 (Four) Hours As Needed for Wheezing or Shortness of Air., Disp: 18 g, Rfl: 0    allopurinol (ZYLOPRIM) 100 MG tablet, Take 1 tablet by mouth Daily., Disp: 90 tablet, Rfl: 0    aspirin 325 MG tablet, Take 1 tablet by mouth Daily., Disp: 90 tablet, Rfl: 1    Blood Glucose Monitoring Suppl (OneTouch Verio Flex System) w/Device kit, , Disp: , Rfl:     clopidogrel (PLAVIX) 75 MG tablet, Take 1 tablet by mouth Daily., Disp: 90 tablet, Rfl: 0    docusate sodium (COLACE) 250 MG capsule, Take 1 capsule by mouth Daily., Disp: , Rfl:     escitalopram (LEXAPRO) 20 MG tablet, Take 1 tablet by mouth Daily., Disp: 90 tablet, Rfl: 0    Lancets (OneTouch Delica Plus [id number]) misc, , Disp: , Rfl:     metoprolol succinate XL (TOPROL-XL) 100 MG 24 hr tablet, Take 1 tablet by mouth Daily., Disp: 90 tablet, Rfl: 0    OneTouch Verio test strip, , Disp: , Rfl:     oxyCODONE (Roxicodone) 5 MG immediate release tablet, Take 1 tablet by mouth Every 4 (Four) Hours As Needed for Moderate Pain., Disp: 12 tablet, Rfl: 0    rOPINIRole (REQUIP) 3 MG tablet, Take 1 tablet by mouth Every Night., Disp: 90 tablet, Rfl: 0    tiZANidine (ZANAFLEX) 2 MG tablet, Take 1 tablet by mouth Every 6 (Six) Hours As Needed for Muscle Spasms., Disp: , Rfl:     amLODIPine (NORVASC) 10 MG tablet, Take 1 tablet by mouth Daily., Disp: 90 tablet, Rfl: 0    atorvastatin (Lipitor) 40 MG tablet, Take 1 tablet by mouth Daily., Disp: 90 tablet, Rfl: 0    cloNIDine (CATAPRES) 0.2 MG tablet, Take 1 tablet by mouth Every 12 (Twelve) Hours for 30 days., Disp: 60 tablet, Rfl: 0    doxylamine (Unisom SleepTabs) 25 MG tablet, Take 1 tablet by mouth At Night As Needed for Sleep., Disp: 90 tablet, Rfl: 0    ibuprofen (ADVIL,MOTRIN) 800 MG tablet, Take 1  "tablet by mouth Every 6 (Six) Hours As Needed for Mild Pain., Disp: 90 tablet, Rfl: 0    lisinopril (PRINIVIL,ZESTRIL) 40 MG tablet, Take 1 tablet by mouth Daily., Disp: 90 tablet, Rfl: 0    Allergies:   Allergies   Allergen Reactions    Morphine Hives     Pt had a bad reaction after having it during surgery - cardiac arrest later on, unknown if morphine was the cause.       Family History:   Family History   Problem Relation Age of Onset    Alcohol abuse Mother     Alcohol abuse Father     Alcohol abuse Maternal Uncle        Social History:   Social History     Socioeconomic History    Marital status:     Number of children: 2    Years of education: 12    Highest education level: High school graduate   Tobacco Use    Smoking status: Never    Smokeless tobacco: Current     Types: Snuff    Tobacco comments:     One can per day- tobacco use starting at age 10- 44  years   Vaping Use    Vaping status: Never Used   Substance and Sexual Activity    Alcohol use: Yes     Comment: 1-2 x weekly1/2 pint, ~ 30 beers and a pint of vodka this past weekend    Drug use: Yes     Comment: alcohol    Sexual activity: Defer     Partners: Female       Health Maintenance   Topic Date Due    BMI FOLLOWUP  Never done    COLORECTAL CANCER SCREENING  Never done    Pneumococcal Vaccine 0-64 (1 of 2 - PCV) Never done    DIABETIC EYE EXAM  Never done    Hepatitis B (1 of 3 - 19+ 3-dose series) Never done    TDAP/TD VACCINES (1 - Tdap) Never done    ANNUAL PHYSICAL  Never done    DIABETIC FOOT EXAM  Never done    HEMOGLOBIN A1C  11/02/2024    INFLUENZA VACCINE  09/30/2024 (Originally 8/1/2024)    COVID-19 Vaccine (1 - 2023-24 season) 09/09/2025 (Originally 9/1/2024)    ZOSTER VACCINE (1 of 2) 09/09/2025 (Originally 8/30/2022)    LIPID PANEL  05/02/2025    URINE MICROALBUMIN  09/10/2025    HEPATITIS C SCREENING  Completed       Objective     Vital Signs  BP (!) 178/127   Pulse 87   Resp 16   Ht 172.7 cm (67.99\")   Wt 112 kg (246 lb)   " "SpO2 98%   BMI 37.41 kg/m²   Estimated body mass index is 37.41 kg/m² as calculated from the following:    Height as of this encounter: 172.7 cm (67.99\").    Weight as of this encounter: 112 kg (246 lb).    Class 2 Severe Obesity (BMI >=35 and <=39.9). Obesity-related health conditions include the following: hypertension, coronary heart disease, and dyslipidemias. Obesity is unchanged. BMI is is above average; BMI management plan is completed. We discussed portion control and increasing exercise.      Physical Exam  Vitals and nursing note reviewed.   Constitutional:       Appearance: Normal appearance.   HENT:      Head: Normocephalic and atraumatic.   Cardiovascular:      Rate and Rhythm: Normal rate and regular rhythm.      Pulses: Normal pulses.      Heart sounds: Normal heart sounds.   Pulmonary:      Effort: Pulmonary effort is normal. No respiratory distress.      Breath sounds: Normal breath sounds. No wheezing, rhonchi or rales.   Abdominal:      General: Abdomen is flat. Bowel sounds are normal.      Palpations: Abdomen is soft.      Tenderness: There is no abdominal tenderness. There is no guarding.   Musculoskeletal:      Cervical back: Neck supple.   Skin:     General: Skin is warm.      Capillary Refill: Capillary refill takes less than 2 seconds.   Neurological:      General: No focal deficit present.      Mental Status: He is alert. Mental status is at baseline.   Psychiatric:         Mood and Affect: Mood normal.         Behavior: Behavior normal.          Procedures     Assessment and Plan     Diagnoses and all orders for this visit:    1. Primary hypertension (Primary)  Assessment & Plan:  Uncontrolled, increase lisinopril to 40 mg daily, continue amlodipine 10 mg daily, restart metoprolol 100 mg daily.  Patient is unsure if he is compliant with clonidine.  Advised patient to review his medications at home and inform us if he is because clonidine can cause rebound hypertension.      Orders:  -    "  CBC & Differential  -     Comprehensive Metabolic Panel  -     TSH Rfx On Abnormal To Free T4  -     amLODIPine (NORVASC) 10 MG tablet; Take 1 tablet by mouth Daily.  Dispense: 90 tablet; Refill: 0  -     lisinopril (PRINIVIL,ZESTRIL) 40 MG tablet; Take 1 tablet by mouth Daily.  Dispense: 90 tablet; Refill: 0  -     metoprolol succinate XL (TOPROL-XL) 100 MG 24 hr tablet; Take 1 tablet by mouth Daily.  Dispense: 90 tablet; Refill: 0    2. Coronary artery disease involving native coronary artery of native heart without angina pectoris  Assessment & Plan:  Will establish care with cardiology at the end of the year currently still on aspirin, Plavix, continue beta-blocker, statin and ACE inhibitor     Orders:  -     Lipid Panel  -     atorvastatin (Lipitor) 40 MG tablet; Take 1 tablet by mouth Daily.  Dispense: 90 tablet; Refill: 0  -     aspirin 325 MG tablet; Take 1 tablet by mouth Daily.  Dispense: 90 tablet; Refill: 1  -     clopidogrel (PLAVIX) 75 MG tablet; Take 1 tablet by mouth Daily.  Dispense: 90 tablet; Refill: 0    3. S/P CABG (coronary artery bypass graft)  -     Lipid Panel  -     atorvastatin (Lipitor) 40 MG tablet; Take 1 tablet by mouth Daily.  Dispense: 90 tablet; Refill: 0  -     aspirin 325 MG tablet; Take 1 tablet by mouth Daily.  Dispense: 90 tablet; Refill: 1  -     clopidogrel (PLAVIX) 75 MG tablet; Take 1 tablet by mouth Daily.  Dispense: 90 tablet; Refill: 0    4. Hyperglycemia  Assessment & Plan:  A1c today normal, continue to monitor    Orders:  -     POC Microalbumin    5. Moderate episode of recurrent major depressive disorder  Assessment & Plan:  Increase Lexapro to 20 mg, discontinue hydroxyzine given that patient claims this is not helping     Orders:  -     escitalopram (LEXAPRO) 20 MG tablet; Take 1 tablet by mouth Daily.  Dispense: 90 tablet; Refill: 0    6. Alcohol dependence with uncomplicated withdrawal  Assessment & Plan:  Stable at this time      7. Idiopathic chronic gout of  multiple sites without tophus  Assessment & Plan:  Continue allopurinol, obtain uric acid level.  During acute flares trial of colchicine/indomethacin would be indicated.  Advised patient that I will not be able to restart him on oxycodone or gabapentin given that he has not been on this for about 3 to 4 months now.  I would be happy to refer him to pain clinic however patient refused at this time.    Orders:  -     allopurinol (ZYLOPRIM) 100 MG tablet; Take 1 tablet by mouth Daily.  Dispense: 90 tablet; Refill: 0  -     Uric Acid    8. Generalized joint pain  Assessment & Plan:  Will refer to rheumatology, will obtain initial rheumatologic labs    Orders:  -     Ambulatory Referral to Rheumatology  -     Sedimentation Rate  -     CK  -     Cyclic Citrul Peptide Antibody, IgG / IgA  -     Uric Acid  -     Rheumatoid Factor  -     C-reactive Protein  -     ZHENG  -     ibuprofen (ADVIL,MOTRIN) 800 MG tablet; Take 1 tablet by mouth Every 6 (Six) Hours As Needed for Mild Pain.  Dispense: 90 tablet; Refill: 0    9. Colon cancer screening  -     Ambulatory Referral For Screening Colonoscopy    10. Prostate cancer screening  -     PSA Screen    11. Restless legs syndrome  Assessment & Plan:  Stable on current medication and dosage. Will continue current management. Refill medication as necessary.  Ropinirole    Orders:  -     rOPINIRole (REQUIP) 3 MG tablet; Take 1 tablet by mouth Every Night.  Dispense: 90 tablet; Refill: 0    12. Primary insomnia  Assessment & Plan:  Stable on current medication and dosage. Will continue current management. Refill medication as necessary.  Doxylamine    Orders:  -     doxylamine (Unisom SleepTabs) 25 MG tablet; Take 1 tablet by mouth At Night As Needed for Sleep.  Dispense: 90 tablet; Refill: 0         Counseling was given to patient for the following topics: instructions for management, risks and benefits of treatment options, and importance of treatment compliance.    Follow Up  Return  in about 4 months (around 1/10/2025) for Annual.    MD FEROZ Walker [facility]  [address] [place] [id number] [phone]

## 2024-09-10 NOTE — ASSESSMENT & PLAN NOTE
Stable on current medication and dosage. Will continue current management. Refill medication as necessary.  Doxylamine

## 2024-09-10 NOTE — ASSESSMENT & PLAN NOTE
Uncontrolled, increase lisinopril to 40 mg daily, continue amlodipine 10 mg daily, restart metoprolol 100 mg daily.  Patient is unsure if he is compliant with clonidine.  Advised patient to review his medications at home and inform us if he is because clonidine can cause rebound hypertension.

## 2024-09-10 NOTE — ASSESSMENT & PLAN NOTE
Stable on current medication and dosage. Will continue current management. Refill medication as necessary.  Ropinirole

## 2024-09-10 NOTE — ASSESSMENT & PLAN NOTE
Continue allopurinol, obtain uric acid level.  During acute flares trial of colchicine/indomethacin would be indicated.  Advised patient that I will not be able to restart him on oxycodone or gabapentin given that he has not been on this for about 3 to 4 months now.  I would be happy to refer him to pain clinic however patient refused at this time.

## 2024-09-10 NOTE — ASSESSMENT & PLAN NOTE
Will establish care with cardiology at the end of the year currently still on aspirin, Plavix, continue beta-blocker, statin and ACE inhibitor

## 2024-09-16 ENCOUNTER — TELEPHONE (OUTPATIENT)
Dept: INTERNAL MEDICINE | Facility: CLINIC | Age: 54
End: 2024-09-16
Payer: MEDICAID

## 2024-09-16 ENCOUNTER — FOLLOWUP TELEPHONE ENCOUNTER (OUTPATIENT)
Dept: EMERGENCY DEPT | Facility: HOSPITAL | Age: 54
End: 2024-09-16

## 2024-09-16 DIAGNOSIS — M25.50 GENERALIZED JOINT PAIN: ICD-10-CM

## 2024-09-16 RX ORDER — IBUPROFEN 800 MG/1
800 TABLET, FILM COATED ORAL EVERY 6 HOURS PRN
Qty: 90 TABLET | Refills: 0 | Status: SHIPPED | OUTPATIENT
Start: 2024-09-16

## 2024-09-24 ENCOUNTER — FOLLOWUP TELEPHONE ENCOUNTER (OUTPATIENT)
Dept: EMERGENCY DEPT | Facility: HOSPITAL | Age: 54
End: 2024-09-24

## 2024-10-07 DIAGNOSIS — M25.50 GENERALIZED JOINT PAIN: ICD-10-CM

## 2024-10-08 RX ORDER — IBUPROFEN 800 MG/1
800 TABLET, FILM COATED ORAL EVERY 6 HOURS PRN
Qty: 90 TABLET | Refills: 0 | Status: SHIPPED | OUTPATIENT
Start: 2024-10-08

## 2024-10-16 ENCOUNTER — FOLLOWUP TELEPHONE ENCOUNTER (OUTPATIENT)
Dept: EMERGENCY DEPT | Facility: HOSPITAL | Age: 54
End: 2024-10-16

## 2024-10-16 NOTE — TELEPHONE ENCOUNTER
Pt contacted PSS via text message, stating that he is better understanding his 12 step work with his sponsor, the more he does it. Pt also stated that he was considering [employer]. PSS and pt discussed what this means to him, and pt feels good about this decision. PSS offered support, encouragement, and praised his commitment to his recovery.    Florentino POZO

## 2024-10-23 PROBLEM — J32.0 MAXILLARY SINUSITIS: Status: ACTIVE | Noted: 2024-10-23

## 2024-10-23 PROBLEM — R09.81 NASAL CONGESTION: Status: ACTIVE | Noted: 2024-10-23

## 2024-10-23 PROBLEM — R09.89 SYMPTOMS OF UPPER RESPIRATORY INFECTION (URI): Status: ACTIVE | Noted: 2024-10-23

## 2024-11-06 ENCOUNTER — FOLLOWUP TELEPHONE ENCOUNTER (OUTPATIENT)
Dept: EMERGENCY DEPT | Facility: HOSPITAL | Age: 54
End: 2024-11-06

## 2024-11-06 NOTE — TELEPHONE ENCOUNTER
Pt contacted PSS, and stated that he is doing well, and has abstained from use. Pt let PSS know his plan to get APSS certified, and help others on their path to recovery. PSS and pt discussed requirements, and PSS gave pt resources to follow up on to get certified. PSS offered support and encouragement.    Florentino POZO

## 2024-11-12 DIAGNOSIS — M25.50 GENERALIZED JOINT PAIN: ICD-10-CM

## 2024-11-12 RX ORDER — IBUPROFEN 800 MG/1
800 TABLET, FILM COATED ORAL EVERY 6 HOURS PRN
Qty: 90 TABLET | Refills: 0 | Status: SHIPPED | OUTPATIENT
Start: 2024-11-12

## 2024-11-23 ENCOUNTER — SOCIAL WORK (OUTPATIENT)
Dept: SOCIAL WORK | Facility: HOSPITAL | Age: 54
End: 2024-11-23

## 2024-11-25 NOTE — PROGRESS NOTES
Pt contacted PSS over the weekend and asked if I could meet with him and help him with his 12 step recovery work he must do before transitioning out of [facility] in the next few weeks. Pt said that he is doing very well and has abstained from drinking for several months. Pt said he and his wife are getting along very good, and he wants to try and save his marriage. Pt said he gets to do visits on the weekend,which has made it challenging for him to meet with PSS. Pt asked if PSS would be willing to meet pt at  [facility] to help him with his recovery work. PSS met pt at  and we reviewed his work thus far, and PSS and pt discussed the directions from the AA text. PSS offered support and encouragement.    Florentino POZO

## 2025-01-04 ENCOUNTER — HOSPITAL ENCOUNTER (EMERGENCY)
Facility: HOSPITAL | Age: 55
Discharge: LEFT BEFORE DISCHARGE | End: 2025-01-04

## 2025-01-09 ENCOUNTER — OFFICE VISIT (OUTPATIENT)
Age: 55
End: 2025-01-09
Payer: MEDICAID

## 2025-01-09 ENCOUNTER — HOSPITAL ENCOUNTER (OUTPATIENT)
Facility: HOSPITAL | Age: 55
Discharge: HOME | End: 2025-01-09
Payer: MEDICAID

## 2025-01-09 VITALS
DIASTOLIC BLOOD PRESSURE: 91 MMHG | WEIGHT: 259 LBS | OXYGEN SATURATION: 96 % | BODY MASS INDEX: 39.25 KG/M2 | HEART RATE: 72 BPM | SYSTOLIC BLOOD PRESSURE: 166 MMHG | HEIGHT: 68 IN

## 2025-01-09 DIAGNOSIS — D64.9 ANEMIA, UNSPECIFIED TYPE: ICD-10-CM

## 2025-01-09 DIAGNOSIS — G25.81 RLS (RESTLESS LEGS SYNDROME): ICD-10-CM

## 2025-01-09 DIAGNOSIS — I25.10 CORONARY ARTERY DISEASE INVOLVING NATIVE HEART WITHOUT ANGINA PECTORIS, UNSPECIFIED VESSEL OR LESION TYPE: ICD-10-CM

## 2025-01-09 DIAGNOSIS — G47.00 INSOMNIA, UNSPECIFIED TYPE: ICD-10-CM

## 2025-01-09 DIAGNOSIS — R63.5 WEIGHT GAIN: ICD-10-CM

## 2025-01-09 DIAGNOSIS — I10 PRIMARY HYPERTENSION: ICD-10-CM

## 2025-01-09 DIAGNOSIS — E11.9 TYPE 2 DIABETES MELLITUS WITHOUT COMPLICATION, UNSPECIFIED WHETHER LONG TERM INSULIN USE (HCC): ICD-10-CM

## 2025-01-09 DIAGNOSIS — I10 PRIMARY HYPERTENSION: Primary | ICD-10-CM

## 2025-01-09 DIAGNOSIS — R73.9 HYPERGLYCEMIA: ICD-10-CM

## 2025-01-09 DIAGNOSIS — G47.33 OSA (OBSTRUCTIVE SLEEP APNEA): ICD-10-CM

## 2025-01-09 DIAGNOSIS — Z12.5 SCREENING FOR PROSTATE CANCER: ICD-10-CM

## 2025-01-09 DIAGNOSIS — M1A.9XX0 CHRONIC GOUT WITHOUT TOPHUS, UNSPECIFIED CAUSE, UNSPECIFIED SITE: ICD-10-CM

## 2025-01-09 LAB
25(OH)D3 SERPL-MCNC: 29.9 NG/ML (ref 32–100)
ALBUMIN SERPL-MCNC: 4.6 G/DL (ref 3.4–4.8)
ALBUMIN/GLOB SERPL: 1.7 {RATIO} (ref 0.8–2)
ALP SERPL-CCNC: 93 U/L (ref 25–100)
ALT SERPL-CCNC: 22 U/L (ref 4–36)
ANION GAP SERPL CALCULATED.3IONS-SCNC: 13 MMOL/L (ref 3–16)
AST SERPL-CCNC: 25 U/L (ref 8–33)
BASOPHILS # BLD: 0 K/UL (ref 0–0.1)
BASOPHILS NFR BLD: 0.6 %
BILIRUB SERPL-MCNC: 0.6 MG/DL (ref 0.3–1.2)
BUN SERPL-MCNC: 15 MG/DL (ref 6–20)
CALCIUM SERPL-MCNC: 9.6 MG/DL (ref 8.5–10.5)
CHLORIDE SERPL-SCNC: 101 MMOL/L (ref 98–107)
CHOLEST SERPL-MCNC: 196 MG/DL (ref 0–200)
CO2 SERPL-SCNC: 24 MMOL/L (ref 20–30)
CREAT SERPL-MCNC: 0.8 MG/DL (ref 0.4–1.2)
EOSINOPHIL # BLD: 0.3 K/UL (ref 0–0.4)
EOSINOPHIL NFR BLD: 3.9 %
ERYTHROCYTE [DISTWIDTH] IN BLOOD BY AUTOMATED COUNT: 12.3 % (ref 11–16)
FERRITIN SERPL IA-MCNC: 53.5 NG/ML (ref 22–322)
FOLATE SERPL-MCNC: 8.96 NG/ML
GFR SERPLBLD CREATININE-BSD FMLA CKD-EPI: >90 ML/MIN/{1.73_M2}
GLOBULIN SER CALC-MCNC: 2.7 G/DL
GLUCOSE SERPL-MCNC: 85 MG/DL (ref 74–106)
HBA1C MFR BLD: 6.4 %
HCT VFR BLD AUTO: 43.1 % (ref 40–54)
HDLC SERPL-MCNC: 38 MG/DL (ref 40–60)
HGB BLD-MCNC: 14.8 G/DL (ref 13–18)
IMM GRANULOCYTES # BLD: 0 K/UL
IMM GRANULOCYTES NFR BLD: 0.1 % (ref 0–5)
IRON SATN MFR SERPL: 38 % (ref 20–50)
IRON SERPL-MCNC: 139 UG/DL (ref 59–158)
LDLC SERPL CALC-MCNC: 100 MG/DL
LYMPHOCYTES # BLD: 2 K/UL (ref 1.5–4)
LYMPHOCYTES NFR BLD: 27.6 %
MAGNESIUM SERPL-MCNC: 1.8 MG/DL (ref 1.7–2.4)
MCH RBC QN AUTO: 31.4 PG (ref 27–32)
MCHC RBC AUTO-ENTMCNC: 34.3 G/DL (ref 31–35)
MCV RBC AUTO: 91.5 FL (ref 80–100)
MONOCYTES # BLD: 0.7 K/UL (ref 0.2–0.8)
MONOCYTES NFR BLD: 9.5 %
NEUTROPHILS # BLD: 4.2 K/UL (ref 2–7.5)
NEUTS SEG NFR BLD: 58.3 %
PLATELET # BLD AUTO: 203 K/UL (ref 150–400)
PMV BLD AUTO: 10.5 FL (ref 6–10)
POTASSIUM SERPL-SCNC: 4 MMOL/L (ref 3.4–5.1)
PROT SERPL-MCNC: 7.3 G/DL (ref 6.4–8.3)
PSA SERPL DL<=0.01 NG/ML-MCNC: 0.52 NG/ML (ref 0–4)
RBC # BLD AUTO: 4.71 M/UL (ref 4.5–6)
SODIUM SERPL-SCNC: 138 MMOL/L (ref 136–145)
TIBC SERPL-MCNC: 365 UG/DL (ref 250–450)
TRIGL SERPL-MCNC: 290 MG/DL (ref 0–249)
TSH SERPL DL<=0.005 MIU/L-ACNC: 1.95 UIU/ML (ref 0.27–4.2)
URATE SERPL-MCNC: 7.3 MG/DL (ref 3.7–8.6)
VIT B12 SERPL-MCNC: 263 PG/ML (ref 211–911)
VLDLC SERPL CALC-MCNC: 58 MG/DL
WBC # BLD AUTO: 7.2 K/UL (ref 4–11)

## 2025-01-09 PROCEDURE — 85025 COMPLETE CBC W/AUTO DIFF WBC: CPT

## 2025-01-09 PROCEDURE — 99214 OFFICE O/P EST MOD 30 MIN: CPT | Performed by: INTERNAL MEDICINE

## 2025-01-09 PROCEDURE — 82607 VITAMIN B-12: CPT

## 2025-01-09 PROCEDURE — 83550 IRON BINDING TEST: CPT

## 2025-01-09 PROCEDURE — 84443 ASSAY THYROID STIM HORMONE: CPT

## 2025-01-09 PROCEDURE — 82728 ASSAY OF FERRITIN: CPT

## 2025-01-09 PROCEDURE — 82306 VITAMIN D 25 HYDROXY: CPT

## 2025-01-09 PROCEDURE — 82746 ASSAY OF FOLIC ACID SERUM: CPT

## 2025-01-09 PROCEDURE — 83735 ASSAY OF MAGNESIUM: CPT

## 2025-01-09 PROCEDURE — 84550 ASSAY OF BLOOD/URIC ACID: CPT

## 2025-01-09 PROCEDURE — 84153 ASSAY OF PSA TOTAL: CPT

## 2025-01-09 PROCEDURE — 80061 LIPID PANEL: CPT

## 2025-01-09 PROCEDURE — 3080F DIAST BP >= 90 MM HG: CPT | Performed by: INTERNAL MEDICINE

## 2025-01-09 PROCEDURE — 83540 ASSAY OF IRON: CPT

## 2025-01-09 PROCEDURE — 80053 COMPREHEN METABOLIC PANEL: CPT

## 2025-01-09 PROCEDURE — 3077F SYST BP >= 140 MM HG: CPT | Performed by: INTERNAL MEDICINE

## 2025-01-09 RX ORDER — DOXAZOSIN 2 MG/1
2 TABLET ORAL NIGHTLY
Qty: 90 TABLET | Refills: 1 | Status: SHIPPED | OUTPATIENT
Start: 2025-01-09

## 2025-01-09 RX ORDER — GABAPENTIN 100 MG/1
100 CAPSULE ORAL 3 TIMES DAILY
Qty: 90 CAPSULE | Refills: 0 | Status: SHIPPED | OUTPATIENT
Start: 2025-01-09 | End: 2025-04-09

## 2025-01-09 RX ORDER — DILTIAZEM HYDROCHLORIDE 180 MG/1
CAPSULE, EXTENDED RELEASE ORAL
COMMUNITY
Start: 2024-11-06 | End: 2025-01-09 | Stop reason: CLARIF

## 2025-01-09 RX ORDER — ESCITALOPRAM OXALATE 20 MG/1
20 TABLET ORAL DAILY
COMMUNITY
Start: 2024-09-10

## 2025-01-09 RX ORDER — NITROGLYCERIN 0.4 MG/1
0.4 TABLET SUBLINGUAL EVERY 5 MIN PRN
Qty: 25 TABLET | Refills: 0 | Status: SHIPPED | OUTPATIENT
Start: 2025-01-09

## 2025-01-09 RX ORDER — DOCUSATE SODIUM 100 MG/1
100 CAPSULE, LIQUID FILLED ORAL DAILY PRN
COMMUNITY
Start: 2024-11-07

## 2025-01-09 RX ORDER — LISINOPRIL 40 MG/1
40 TABLET ORAL 2 TIMES DAILY
Qty: 180 TABLET | Refills: 1 | Status: SHIPPED | OUTPATIENT
Start: 2025-01-09

## 2025-01-09 RX ORDER — IBUPROFEN 800 MG/1
800 TABLET, FILM COATED ORAL EVERY 6 HOURS PRN
COMMUNITY
Start: 2024-11-12 | End: 2025-01-09

## 2025-01-09 RX ORDER — HYDROXYZINE PAMOATE 25 MG/1
CAPSULE ORAL
COMMUNITY
Start: 2024-11-07

## 2025-01-09 ASSESSMENT — ENCOUNTER SYMPTOMS
COUGH: 0
NAUSEA: 0
EYE DISCHARGE: 0
VOMITING: 0
ABDOMINAL PAIN: 0
SINUS PRESSURE: 0
WHEEZING: 0
BACK PAIN: 0
SHORTNESS OF BREATH: 0

## 2025-01-09 ASSESSMENT — PATIENT HEALTH QUESTIONNAIRE - PHQ9: DEPRESSION UNABLE TO ASSESS: URGENT/EMERGENT SITUATION

## 2025-01-09 NOTE — PROGRESS NOTES
Chief Complaint   Patient presents with    Follow-Up from Hospital    Leg Pain     Both legs    Hypertension     Can't get blood pressure down with medication        Have you seen any other physician or provider since your last visit yes - [facility]     Have you had any other diagnostic tests since your last visit? no    Have you changed or stopped any medications since your last visit? no            
DAVIDA (obstructive sleep apnea)  Long discussion with pt regarding exercise, weight loss and the improtance of using CPAP on a regular basis.  Patient reported that his CPAP is not functioning anymore.  He has not been using it at least for the past year or 2.  Going to proceed with referral to sleep lab.  Further recommendation based on test result.    6. Insomnia, unspecified type  I am going to start on gabapentin which will help with insomnia/restless leg syndrome. Advised him on the direction and possible SE of meds. Advised him on sleep hygiene. Reassess every few months.    7. Hyperglycemia   I have advised him on diet, exercise and weight control.  I have also, advised him that this condition could possibly progress into diabetes.  I will monitor his A1c periodically.    8. RLS (restless legs syndrome)  I have advised him on the nature of the disease and discussed several treatment options.  I am going to place him on Gabapentin.  He was advised on possible side effects form the medication.  I will reassess his response in the next few weeks.    1. Goal is to alleviate pain to a degree that the patient can participate in own ADLS social activity and improve function.   2. Risk and benefits were reviewed at length, including risk for addiction and possible side effects and interactions. Alternative therapy was discussed and will be a part of the patients overall care. Including but not limited to, physical therapy, other medications as well as behavioral and activity modification and the use of other modalities (chiropractic care ect.).   3. This patient does not exhibit a potential for abuse or addiction at this time. Will monitor closely.   4. Will randomly check drug screen.   5. Drew completed and compliant, will check every 3 months.   6. Advised his that UDS and possible pill counts and frequent monitoring will be a part of his care.   7. Patient to sign medication agreement and consent to treat with

## 2025-01-15 ENCOUNTER — OFFICE VISIT (OUTPATIENT)
Dept: CARDIOLOGY | Facility: CLINIC | Age: 55
End: 2025-01-15
Payer: MEDICAID

## 2025-01-15 VITALS
HEART RATE: 105 BPM | HEIGHT: 67 IN | WEIGHT: 258 LBS | BODY MASS INDEX: 40.49 KG/M2 | DIASTOLIC BLOOD PRESSURE: 98 MMHG | OXYGEN SATURATION: 96 % | SYSTOLIC BLOOD PRESSURE: 142 MMHG

## 2025-01-15 DIAGNOSIS — I10 HYPERTENSION, ESSENTIAL: ICD-10-CM

## 2025-01-15 DIAGNOSIS — E66.01 MORBID OBESITY WITH BMI OF 40.0-44.9, ADULT: ICD-10-CM

## 2025-01-15 DIAGNOSIS — E11.00 TYPE 2 DIABETES MELLITUS WITH HYPEROSMOLARITY WITHOUT COMA, WITHOUT LONG-TERM CURRENT USE OF INSULIN: ICD-10-CM

## 2025-01-15 DIAGNOSIS — E78.2 MIXED HYPERLIPIDEMIA: ICD-10-CM

## 2025-01-15 DIAGNOSIS — I25.10 CORONARY ARTERY DISEASE INVOLVING NATIVE CORONARY ARTERY OF NATIVE HEART WITHOUT ANGINA PECTORIS: Primary | ICD-10-CM

## 2025-01-15 RX ORDER — GENTAMICIN SULFATE 3 MG/ML
2 SOLUTION/ DROPS OPHTHALMIC 3 TIMES DAILY
COMMUNITY
Start: 2025-01-06

## 2025-01-15 RX ORDER — GABAPENTIN 100 MG/1
100 CAPSULE ORAL 2 TIMES DAILY
COMMUNITY
Start: 2025-01-09

## 2025-01-15 RX ORDER — HYDROXYZINE PAMOATE 25 MG/1
25 CAPSULE ORAL 2 TIMES DAILY PRN
COMMUNITY
Start: 2024-11-07

## 2025-01-15 RX ORDER — CARVEDILOL 25 MG/1
25 TABLET ORAL 2 TIMES DAILY
Qty: 180 TABLET | Refills: 3 | Status: SHIPPED | OUTPATIENT
Start: 2025-01-15

## 2025-01-15 RX ORDER — CARVEDILOL 25 MG/1
25 TABLET ORAL 2 TIMES DAILY
Qty: 180 TABLET | Refills: 3 | Status: SHIPPED | OUTPATIENT
Start: 2025-01-15 | End: 2025-01-15

## 2025-01-15 RX ORDER — NITROGLYCERIN 0.4 MG/1
0.4 TABLET SUBLINGUAL
COMMUNITY
Start: 2025-01-09

## 2025-01-15 RX ORDER — DILTIAZEM HYDROCHLORIDE 180 MG/1
180 CAPSULE, EXTENDED RELEASE ORAL
COMMUNITY
Start: 2024-11-06 | End: 2025-01-15

## 2025-01-15 RX ORDER — DOXAZOSIN 2 MG/1
2 TABLET ORAL NIGHTLY
COMMUNITY
Start: 2025-01-09

## 2025-01-15 RX ORDER — CARIPRAZINE 1.5 MG/1
1.5 CAPSULE, GELATIN COATED ORAL
COMMUNITY
Start: 2024-10-27

## 2025-01-15 NOTE — PROGRESS NOTES
"             [facility] Cardiology Office Follow Up Note    Andrés Denton  [id number]  01/15/2025    Primary Care Provider: Hieu Thompson MD    Chief Complaint: Routine follow-up    History of Present Illness:   Mr. Andrés Denton is a 54 y.o. male who is being seen by Cardiology for routine follow-up.  The patient has a past medical history significant for hypertension, chronic EtOH use, and obesity with a BMI 40.  He has a past cardiac history significant for multivessel coronary artery disease, now status post 3V CABG.  Today, the patient reports he is doing well from a cardiac standpoint.  He does not have any significant chest pain or exertional anginal symptoms.  He does continue to report fatigue, which is gradually improving.  He is tolerating his current medications without difficulty.  He does check his blood pressure at home, with a systolic BP typically \"running high\" with a systolic BP in the 150s or higher.     Prior Cardiac Testin. Last Coronary Angio: 2024--Cardiac cath noted MVCAD, involving mid LAD, LCx, OM1 and RCA.   2. Prior Stress Testing: None  3. Last Echo: 2024--EF = 56.60% Left ventricular ejection fraction appears to be 56 - 60%. Left ventricular diastolic function was normal.  Left ventricular wall thickness is consistent with mild concentric hypertrophy. Normal right ventricular size and function. No hemodynamically significant valvular dysfunction. Mild dilation of the proximal aorta is present measuring 3.6 cm.  4. Prior Holter Monitor: None    Review of Systems:   Review of Systems   Constitutional:  Positive for fatigue. Negative for activity change, appetite change, chills, diaphoresis, fever, unexpected weight gain and unexpected weight loss.   Respiratory:  Negative for cough, chest tightness, shortness of breath and wheezing.    Cardiovascular:  Negative for chest pain, palpitations and leg swelling.   Gastrointestinal:  Negative for " abdominal pain, anal bleeding, blood in stool and GERD.   Endocrine: Negative for cold intolerance and heat intolerance.   Genitourinary:  Negative for hematuria.   Neurological:  Negative for dizziness, syncope, weakness and light-headedness.   Hematological:  Does not bruise/bleed easily.   Psychiatric/Behavioral:  Negative for depressed mood and stress. The patient is not nervous/anxious.        I have reviewed and/or updated the patient's past medical, past surgical, family, social history, problem list and allergies as appropriate.     Medications:     Current Outpatient Medications:     acetaminophen (Tylenol) 325 MG tablet, Take 2 tablets by mouth Every 6 (Six) Hours As Needed for Mild Pain., Disp: 20 tablet, Rfl: 0    allopurinol (ZYLOPRIM) 100 MG tablet, Take 1 tablet by mouth Daily., Disp: 90 tablet, Rfl: 0    amLODIPine (NORVASC) 10 MG tablet, Take 1 tablet by mouth Daily., Disp: 90 tablet, Rfl: 0    aspirin 325 MG tablet, Take 1 tablet by mouth Daily., Disp: 90 tablet, Rfl: 1    atorvastatin (Lipitor) 40 MG tablet, Take 1 tablet by mouth Daily., Disp: 90 tablet, Rfl: 0    Blood Glucose Monitoring Suppl (OneTouch Verio Flex System) w/Device kit, , Disp: , Rfl:     clopidogrel (PLAVIX) 75 MG tablet, Take 1 tablet by mouth Daily., Disp: 90 tablet, Rfl: 0    docusate sodium (COLACE) 250 MG capsule, Take 1 capsule by mouth Daily., Disp: , Rfl:     doxazosin (CARDURA) 2 MG tablet, Take 1 tablet by mouth Every Night., Disp: , Rfl:     escitalopram (LEXAPRO) 20 MG tablet, Take 1 tablet by mouth Daily., Disp: 90 tablet, Rfl: 0    gabapentin (NEURONTIN) 100 MG capsule, Take 1 capsule by mouth 2 (Two) Times a Day., Disp: , Rfl:     gentamicin (GARAMYCIN) 0.3 % ophthalmic solution, Administer 2 drops to both eyes 3 (Three) Times a Day., Disp: , Rfl:     hydrOXYzine pamoate (VISTARIL) 25 MG capsule, Take 1 capsule by mouth 2 (Two) Times a Day As Needed for Anxiety., Disp: , Rfl:     ibuprofen (ADVIL,MOTRIN) 800 MG  "tablet, TAKE 1 TABLET BY MOUTH EVERY 6 HOURS AS NEEDED FOR MILD PAIN, Disp: 90 tablet, Rfl: 0    Lancets (OneTouch Delica Plus [id number]) misc, , Disp: , Rfl:     lisinopril (PRINIVIL,ZESTRIL) 40 MG tablet, Take 1 tablet by mouth Daily. (Patient taking differently: Take 1 tablet by mouth 2 (Two) Times a Day.), Disp: 90 tablet, Rfl: 0    nitroglycerin (NITROSTAT) 0.4 MG SL tablet, Place 1 tablet under the tongue Every 5 (Five) Minutes As Needed for Chest Pain., Disp: , Rfl:     OneTouch Verio test strip, , Disp: , Rfl:     rOPINIRole (REQUIP) 3 MG tablet, Take 1 tablet by mouth Every Night., Disp: 90 tablet, Rfl: 0    tiZANidine (ZANAFLEX) 2 MG tablet, Take 1 tablet by mouth Every 6 (Six) Hours As Needed for Muscle Spasms., Disp: , Rfl:     Vraylar 1.5 MG capsule capsule, Take 1 capsule by mouth every night at bedtime., Disp: , Rfl:     brompheniramine-pseudoephedrine-DM 30-2-10 MG/5ML syrup, Take 5 mL by mouth 4 (Four) Times a Day As Needed for Congestion, Cough or Allergies. (Patient not taking: Reported on 1/15/2025), Disp: 473 mL, Rfl: 0    carvedilol (COREG) 25 MG tablet, Take 1 tablet by mouth 2 (Two) Times a Day., Disp: 180 tablet, Rfl: 3    doxylamine (Unisom SleepTabs) 25 MG tablet, Take 1 tablet by mouth At Night As Needed for Sleep. (Patient not taking: Reported on 1/15/2025), Disp: 90 tablet, Rfl: 0    oxyCODONE (Roxicodone) 5 MG immediate release tablet, Take 1 tablet by mouth Every 4 (Four) Hours As Needed for Moderate Pain. (Patient not taking: Reported on 1/15/2025), Disp: 12 tablet, Rfl: 0    Physical Exam:  Vital Signs:   Vitals:    01/15/25 1438   BP: 142/98   BP Location: Right arm   Patient Position: Sitting   Cuff Size: Adult   Pulse: 105   SpO2: 96%   Weight: 117 kg (258 lb)   Height: 170.2 cm (67\")       Physical Exam  Constitutional:       General: He is not in acute distress.     Appearance: He is obese. He is not diaphoretic.   HENT:      Head: Normocephalic and atraumatic. "   Cardiovascular:      Rate and Rhythm: Normal rate and regular rhythm.      Heart sounds: No murmur heard.  Pulmonary:      Effort: Pulmonary effort is normal. No respiratory distress.      Breath sounds: Normal breath sounds. No stridor. No wheezing, rhonchi or rales.   Abdominal:      General: Bowel sounds are normal. There is no distension.      Palpations: Abdomen is soft.      Tenderness: There is no abdominal tenderness. There is no guarding or rebound.   Musculoskeletal:         General: No swelling. Normal range of motion.      Cervical back: Neck supple. No tenderness.   Skin:     General: Skin is warm and dry.   Neurological:      General: No focal deficit present.      Mental Status: He is alert and oriented to person, place, and time.   Psychiatric:         Mood and Affect: Mood normal.         Behavior: Behavior normal.         Results Review:   I reviewed the patient's new clinical results.        Assessment / Plan:     1.  Multivessel coronary artery disease  -- Now status post 3V CABG  -- No current anginal symptoms  -- Continue DAPT with aspirin and Plavix for now, de-escalate to aspirin monotherapy on next follow-up  --Continue high intensity statin    2.  Hypertension  -- Suboptimal control  --Continue Norvasc and lisinopril  --Change metoprolol XL to carvedilol  --Consider the addition of Aldactone if required for BP control    3.  Type 2 diabetes mellitus  -- Last hemoglobin A1c 5.9%  --Management per PCP    4.  Hyperlipidemia  -- Continue high intensity statin    5.  Obesity, BMI 40  -- Weight loss advised      Follow Up:   Return in about 6 months (around 7/15/2025).      Thank you for allowing me to participate in the care of your patient. Please to not hesitate to contact me with additional questions or concerns.     WILLIAN Marroquin MD  Interventional Cardiology   01/15/2025  14:49 EST

## 2025-02-18 ENCOUNTER — OFFICE VISIT (OUTPATIENT)
Age: 55
End: 2025-02-18
Payer: MEDICAID

## 2025-02-18 VITALS
DIASTOLIC BLOOD PRESSURE: 100 MMHG | OXYGEN SATURATION: 97 % | HEART RATE: 79 BPM | SYSTOLIC BLOOD PRESSURE: 162 MMHG | WEIGHT: 255 LBS | BODY MASS INDEX: 38.77 KG/M2

## 2025-02-18 DIAGNOSIS — I25.10 CORONARY ARTERY DISEASE INVOLVING NATIVE HEART WITHOUT ANGINA PECTORIS, UNSPECIFIED VESSEL OR LESION TYPE: ICD-10-CM

## 2025-02-18 DIAGNOSIS — I77.9 CAROTID ARTERY DISEASE WITHOUT CEREBRAL INFARCTION: ICD-10-CM

## 2025-02-18 DIAGNOSIS — I10 PRIMARY HYPERTENSION: Primary | ICD-10-CM

## 2025-02-18 DIAGNOSIS — E11.9 TYPE 2 DIABETES MELLITUS WITHOUT COMPLICATION, UNSPECIFIED WHETHER LONG TERM INSULIN USE (HCC): ICD-10-CM

## 2025-02-18 DIAGNOSIS — D64.9 ANEMIA, UNSPECIFIED TYPE: ICD-10-CM

## 2025-02-18 DIAGNOSIS — G47.33 OSA (OBSTRUCTIVE SLEEP APNEA): ICD-10-CM

## 2025-02-18 DIAGNOSIS — G25.81 RLS (RESTLESS LEGS SYNDROME): ICD-10-CM

## 2025-02-18 DIAGNOSIS — R63.5 WEIGHT GAIN: ICD-10-CM

## 2025-02-18 LAB — HBA1C MFR BLD: 6.4 %

## 2025-02-18 PROCEDURE — 3077F SYST BP >= 140 MM HG: CPT | Performed by: INTERNAL MEDICINE

## 2025-02-18 PROCEDURE — 3044F HG A1C LEVEL LT 7.0%: CPT | Performed by: INTERNAL MEDICINE

## 2025-02-18 PROCEDURE — 3080F DIAST BP >= 90 MM HG: CPT | Performed by: INTERNAL MEDICINE

## 2025-02-18 PROCEDURE — 99214 OFFICE O/P EST MOD 30 MIN: CPT | Performed by: INTERNAL MEDICINE

## 2025-02-18 PROCEDURE — 83036 HEMOGLOBIN GLYCOSYLATED A1C: CPT | Performed by: INTERNAL MEDICINE

## 2025-02-18 RX ORDER — ESCITALOPRAM OXALATE 20 MG/1
20 TABLET ORAL DAILY
Qty: 30 TABLET | Refills: 2 | Status: SHIPPED | OUTPATIENT
Start: 2025-02-18

## 2025-02-18 RX ORDER — ATORVASTATIN CALCIUM 40 MG/1
40 TABLET, FILM COATED ORAL DAILY
Qty: 30 TABLET | Refills: 0 | Status: SHIPPED | OUTPATIENT
Start: 2025-02-18

## 2025-02-18 RX ORDER — MULTIVIT-MIN/IRON FUM/FOLIC AC 7.5 MG-4
1 TABLET ORAL DAILY
Qty: 90 TABLET | Refills: 3 | Status: SHIPPED | OUTPATIENT
Start: 2025-02-18

## 2025-02-18 RX ORDER — ASPIRIN 325 MG
325 TABLET ORAL DAILY
Qty: 90 TABLET | Refills: 1 | Status: SHIPPED | OUTPATIENT
Start: 2025-02-18

## 2025-02-18 RX ORDER — ROPINIROLE 3 MG/1
3 TABLET, FILM COATED ORAL NIGHTLY
Qty: 30 TABLET | Refills: 1 | Status: SHIPPED | OUTPATIENT
Start: 2025-02-18

## 2025-02-18 RX ORDER — ROPINIROLE 3 MG/1
3 TABLET, FILM COATED ORAL NIGHTLY
COMMUNITY
End: 2025-02-18 | Stop reason: SDUPTHER

## 2025-02-18 RX ORDER — AMLODIPINE BESYLATE 10 MG/1
10 TABLET ORAL DAILY
Qty: 30 TABLET | Refills: 1 | Status: SHIPPED | OUTPATIENT
Start: 2025-02-18

## 2025-02-18 RX ORDER — CARVEDILOL 25 MG/1
25 TABLET ORAL 2 TIMES DAILY
COMMUNITY

## 2025-02-18 RX ORDER — GENTAMICIN SULFATE 3 MG/ML
2 SOLUTION/ DROPS OPHTHALMIC 3 TIMES DAILY
COMMUNITY
Start: 2025-01-06

## 2025-02-18 RX ORDER — HYDROCHLOROTHIAZIDE 25 MG/1
25 TABLET ORAL EVERY MORNING
Qty: 90 TABLET | Refills: 1 | Status: SHIPPED | OUTPATIENT
Start: 2025-02-18

## 2025-02-18 RX ORDER — ALLOPURINOL 100 MG/1
100 TABLET ORAL DAILY
Qty: 30 TABLET | Refills: 1 | Status: SHIPPED | OUTPATIENT
Start: 2025-02-18

## 2025-02-18 RX ORDER — CLOPIDOGREL BISULFATE 75 MG/1
75 TABLET ORAL DAILY
Qty: 30 TABLET | Refills: 0 | Status: SHIPPED | OUTPATIENT
Start: 2025-02-18

## 2025-02-18 RX ORDER — GABAPENTIN 100 MG/1
100 CAPSULE ORAL 3 TIMES DAILY
Qty: 90 CAPSULE | Refills: 0 | Status: SHIPPED | OUTPATIENT
Start: 2025-02-18 | End: 2025-05-19

## 2025-02-18 ASSESSMENT — ENCOUNTER SYMPTOMS
SINUS PRESSURE: 0
COUGH: 0
VOMITING: 0
WHEEZING: 0
EYE DISCHARGE: 0
BACK PAIN: 0
SHORTNESS OF BREATH: 0
ABDOMINAL PAIN: 0
NAUSEA: 0

## 2025-02-18 NOTE — PROGRESS NOTES
SUBJECTIVE:    Patient ID: Kade Graham is a 54 y.o.male.    Chief Complaint   Patient presents with    Hypertension    Coronary Artery Disease         HPI:  Patient has been complaining of cramps and spasms in the lower extremities, especially at rest and at night.  He reports waking up several times a night due to pain and cramping in the lower extremities that is relieved with walking.  He denies any complaint during activities.     Patient has had hypertension for several years. He has been compliant with taking medications, without side effects from it. He has been following a low-sodium, is active and occasionally exercises.  Weight is down 4 pounds, compared to last visit. His blood pressure is elevated at this time.    Patient was diagnosed with CAD. S/P CABG. He has been compliant with taking his meds without side effect.  He denies any CP or palpitation.     Patient's medications, allergies, past medical, surgical, social and family histories were reviewed and updated as appropriate in electronic medical record.        Outpatient Medications Marked as Taking for the 2/18/25 encounter (Office Visit) with Hosea Duenas MD   Medication Sig Dispense Refill    cariprazine hcl (VRAYLAR) 1.5 MG capsule Take 1 capsule by mouth at bedtime      carvedilol (COREG) 25 MG tablet Take 1 tablet by mouth 2 times daily      gentamicin (GARAMYCIN) 0.3 % ophthalmic solution Apply 2 drops to eye 3 times daily      gabapentin (NEURONTIN) 100 MG capsule Take 1 capsule by mouth 3 times daily for 90 days. Intended supply: 30 days Max Daily Amount: 300 mg 90 capsule 0    atorvastatin (LIPITOR) 40 MG tablet Take 1 tablet by mouth daily 30 tablet 0    clopidogrel (PLAVIX) 75 MG tablet Take 1 tablet by mouth daily 30 tablet 0    allopurinol (ZYLOPRIM) 100 MG tablet Take 1 tablet by mouth daily 30 tablet 1    escitalopram (LEXAPRO) 20 MG tablet Take 1 tablet by mouth daily 30 tablet 2    amLODIPine (NORVASC) 10 MG tablet Take 1

## 2025-03-11 ENCOUNTER — OFFICE VISIT (OUTPATIENT)
Age: 55
End: 2025-03-11
Payer: MEDICAID

## 2025-03-11 VITALS
DIASTOLIC BLOOD PRESSURE: 79 MMHG | OXYGEN SATURATION: 91 % | BODY MASS INDEX: 37.4 KG/M2 | SYSTOLIC BLOOD PRESSURE: 120 MMHG | HEART RATE: 96 BPM | WEIGHT: 246 LBS

## 2025-03-11 DIAGNOSIS — Z12.11 SCREEN FOR COLON CANCER: ICD-10-CM

## 2025-03-11 DIAGNOSIS — E53.8 B12 DEFICIENCY: ICD-10-CM

## 2025-03-11 DIAGNOSIS — E66.01 CLASS 2 SEVERE OBESITY WITH SERIOUS COMORBIDITY AND BODY MASS INDEX (BMI) OF 37.0 TO 37.9 IN ADULT, UNSPECIFIED OBESITY TYPE: ICD-10-CM

## 2025-03-11 DIAGNOSIS — I25.10 CORONARY ARTERY DISEASE INVOLVING NATIVE HEART WITHOUT ANGINA PECTORIS, UNSPECIFIED VESSEL OR LESION TYPE: ICD-10-CM

## 2025-03-11 DIAGNOSIS — R06.2 WHEEZING: ICD-10-CM

## 2025-03-11 DIAGNOSIS — E11.9 TYPE 2 DIABETES MELLITUS WITHOUT COMPLICATION, WITHOUT LONG-TERM CURRENT USE OF INSULIN (HCC): Primary | ICD-10-CM

## 2025-03-11 DIAGNOSIS — G47.33 OSA (OBSTRUCTIVE SLEEP APNEA): ICD-10-CM

## 2025-03-11 DIAGNOSIS — I10 PRIMARY HYPERTENSION: ICD-10-CM

## 2025-03-11 DIAGNOSIS — E66.812 CLASS 2 SEVERE OBESITY WITH SERIOUS COMORBIDITY AND BODY MASS INDEX (BMI) OF 37.0 TO 37.9 IN ADULT, UNSPECIFIED OBESITY TYPE: ICD-10-CM

## 2025-03-11 DIAGNOSIS — Z23 NEED FOR PNEUMOCOCCAL VACCINATION: ICD-10-CM

## 2025-03-11 PROCEDURE — 99214 OFFICE O/P EST MOD 30 MIN: CPT | Performed by: INTERNAL MEDICINE

## 2025-03-11 PROCEDURE — 3074F SYST BP LT 130 MM HG: CPT | Performed by: INTERNAL MEDICINE

## 2025-03-11 PROCEDURE — 3044F HG A1C LEVEL LT 7.0%: CPT | Performed by: INTERNAL MEDICINE

## 2025-03-11 PROCEDURE — 3078F DIAST BP <80 MM HG: CPT | Performed by: INTERNAL MEDICINE

## 2025-03-11 PROCEDURE — 90471 IMMUNIZATION ADMIN: CPT | Performed by: INTERNAL MEDICINE

## 2025-03-11 PROCEDURE — 96372 THER/PROPH/DIAG INJ SC/IM: CPT | Performed by: INTERNAL MEDICINE

## 2025-03-11 PROCEDURE — 90677 PCV20 VACCINE IM: CPT | Performed by: INTERNAL MEDICINE

## 2025-03-11 RX ORDER — ALBUTEROL SULFATE 90 UG/1
2 INHALANT RESPIRATORY (INHALATION) EVERY 6 HOURS PRN
Qty: 18 G | Refills: 3 | Status: SHIPPED | OUTPATIENT
Start: 2025-03-11

## 2025-03-11 RX ORDER — NITROGLYCERIN 0.4 MG/1
0.4 TABLET SUBLINGUAL EVERY 5 MIN PRN
Qty: 25 TABLET | Refills: 0 | Status: SHIPPED | OUTPATIENT
Start: 2025-03-11

## 2025-03-11 RX ORDER — FLUTICASONE PROPIONATE 50 MCG
1 SPRAY, SUSPENSION (ML) NASAL DAILY
Qty: 32 G | Refills: 1 | Status: SHIPPED | OUTPATIENT
Start: 2025-03-11

## 2025-03-11 RX ORDER — LORATADINE 10 MG/1
10 TABLET ORAL DAILY
Qty: 30 TABLET | Refills: 2 | Status: SHIPPED | OUTPATIENT
Start: 2025-03-11

## 2025-03-11 RX ORDER — CYANOCOBALAMIN 1000 UG/ML
1000 INJECTION, SOLUTION INTRAMUSCULAR; SUBCUTANEOUS ONCE
Status: COMPLETED | OUTPATIENT
Start: 2025-03-11 | End: 2025-03-11

## 2025-03-11 RX ADMIN — CYANOCOBALAMIN 1000 MCG: 1000 INJECTION, SOLUTION INTRAMUSCULAR; SUBCUTANEOUS at 10:55

## 2025-03-11 SDOH — ECONOMIC STABILITY: FOOD INSECURITY: WITHIN THE PAST 12 MONTHS, YOU WORRIED THAT YOUR FOOD WOULD RUN OUT BEFORE YOU GOT MONEY TO BUY MORE.: SOMETIMES TRUE

## 2025-03-11 SDOH — ECONOMIC STABILITY: FOOD INSECURITY: WITHIN THE PAST 12 MONTHS, THE FOOD YOU BOUGHT JUST DIDN'T LAST AND YOU DIDN'T HAVE MONEY TO GET MORE.: SOMETIMES TRUE

## 2025-03-11 ASSESSMENT — ENCOUNTER SYMPTOMS
EYE DISCHARGE: 0
COUGH: 0
WHEEZING: 0
BACK PAIN: 1
NAUSEA: 0
VOMITING: 0
ABDOMINAL PAIN: 0
SHORTNESS OF BREATH: 0
SINUS PRESSURE: 0

## 2025-03-11 ASSESSMENT — PATIENT HEALTH QUESTIONNAIRE - PHQ9
SUM OF ALL RESPONSES TO PHQ QUESTIONS 1-9: 2
SUM OF ALL RESPONSES TO PHQ QUESTIONS 1-9: 2
1. LITTLE INTEREST OR PLEASURE IN DOING THINGS: SEVERAL DAYS
2. FEELING DOWN, DEPRESSED OR HOPELESS: SEVERAL DAYS
SUM OF ALL RESPONSES TO PHQ QUESTIONS 1-9: 2
SUM OF ALL RESPONSES TO PHQ QUESTIONS 1-9: 2

## 2025-03-11 NOTE — PROGRESS NOTES
SUBJECTIVE:    Patient ID: Kade Graham is a 54 y.o.male.    Chief Complaint   Patient presents with    Hypertension    Coronary Artery Disease         HPI:  Patient has had diabetes for the past few years.  He has been compliant with the medications and denies any side effects from it. He has been monitoring fingersticks on a daily basis.  His fingerstick range is between 100-150. He denies any hypoglycemic symptoms. He has been following a diabetic diet and has been active.  His last eye exam was greater than a year ago.  He is using oral meds and Ozempic.     Patient has had hypertension for several years. He has been compliant with taking medications, without side effects from it. He has been following a low-sodium, is active and occasionally exercises.  Weight is down 9 pounds, compared to last visit. His blood pressure is stable at this time.  Patient with known CAD and history of CABG.  Denies any chest pain.  He reported some dyspnea on exertion but seems to be stable compared to before.  Weight is down several pounds compared to before.    Patient's medications, allergies, past medical, surgical, social and family histories were reviewed and updated as appropriate in electronic medical record.        Outpatient Medications Marked as Taking for the 3/11/25 encounter (Office Visit) with Hosea Duenas MD   Medication Sig Dispense Refill    Semaglutide,0.25 or 0.5MG/DOS, 2 MG/3ML SOPN Inject 0.5 mg into the skin every 7 days 0.5 mg weekly 3 mL 1    cariprazine hcl (VRAYLAR) 1.5 MG capsule Take 1 capsule by mouth at bedtime      carvedilol (COREG) 25 MG tablet Take 1 tablet by mouth 2 times daily      gabapentin (NEURONTIN) 100 MG capsule Take 1 capsule by mouth 3 times daily for 90 days. Intended supply: 30 days Max Daily Amount: 300 mg 90 capsule 0    atorvastatin (LIPITOR) 40 MG tablet Take 1 tablet by mouth daily 30 tablet 0    clopidogrel (PLAVIX) 75 MG tablet Take 1 tablet by mouth daily 30 tablet 0

## 2025-03-21 DIAGNOSIS — G25.81 RLS (RESTLESS LEGS SYNDROME): ICD-10-CM

## 2025-03-21 RX ORDER — GABAPENTIN 100 MG/1
100 CAPSULE ORAL 3 TIMES DAILY
Qty: 90 CAPSULE | Refills: 2 | Status: SHIPPED | OUTPATIENT
Start: 2025-03-21 | End: 2025-06-19

## 2025-03-21 RX ORDER — MULTIVIT-MIN/IRON FUM/FOLIC AC 7.5 MG-4
1 TABLET ORAL DAILY
Qty: 90 TABLET | Refills: 1 | Status: SHIPPED | OUTPATIENT
Start: 2025-03-21

## 2025-04-02 ENCOUNTER — APPOINTMENT (OUTPATIENT)
Dept: CT IMAGING | Facility: HOSPITAL | Age: 55
End: 2025-04-02
Payer: MEDICAID

## 2025-04-02 ENCOUNTER — HOSPITAL ENCOUNTER (EMERGENCY)
Facility: HOSPITAL | Age: 55
Discharge: HOME | End: 2025-04-02
Attending: STUDENT IN AN ORGANIZED HEALTH CARE EDUCATION/TRAINING PROGRAM | Admitting: STUDENT IN AN ORGANIZED HEALTH CARE EDUCATION/TRAINING PROGRAM
Payer: MEDICAID

## 2025-04-02 VITALS
DIASTOLIC BLOOD PRESSURE: 101 MMHG | BODY MASS INDEX: 35.77 KG/M2 | WEIGHT: 236 LBS | TEMPERATURE: 98.1 F | RESPIRATION RATE: 18 BRPM | SYSTOLIC BLOOD PRESSURE: 158 MMHG | HEIGHT: 68 IN | HEART RATE: 68 BPM | OXYGEN SATURATION: 94 %

## 2025-04-02 DIAGNOSIS — K43.9 HERNIA OF ANTERIOR ABDOMINAL WALL: Primary | ICD-10-CM

## 2025-04-02 LAB
ALBUMIN SERPL-MCNC: 4.8 G/DL (ref 3.5–5.2)
ALBUMIN/GLOB SERPL: 1.7 G/DL
ALP SERPL-CCNC: 97 U/L (ref 39–117)
ALT SERPL W P-5'-P-CCNC: 24 U/L (ref 1–41)
ANION GAP SERPL CALCULATED.3IONS-SCNC: 13.4 MMOL/L (ref 5–15)
AST SERPL-CCNC: 24 U/L (ref 1–40)
BASOPHILS # BLD AUTO: 0.04 10*3/MM3 (ref 0–0.2)
BASOPHILS NFR BLD AUTO: 0.6 % (ref 0–1.5)
BILIRUB SERPL-MCNC: 0.6 MG/DL (ref 0–1.2)
BUN SERPL-MCNC: 11 MG/DL (ref 6–20)
BUN/CREAT SERPL: 10.7 (ref 7–25)
CALCIUM SPEC-SCNC: 9.6 MG/DL (ref 8.6–10.5)
CHLORIDE SERPL-SCNC: 99 MMOL/L (ref 98–107)
CO2 SERPL-SCNC: 25.6 MMOL/L (ref 22–29)
CREAT SERPL-MCNC: 1.03 MG/DL (ref 0.76–1.27)
CRP SERPL-MCNC: 0.31 MG/DL (ref 0–0.5)
D-LACTATE SERPL-SCNC: 1.1 MMOL/L (ref 0.5–2)
DEPRECATED RDW RBC AUTO: 42.3 FL (ref 37–54)
EGFRCR SERPLBLD CKD-EPI 2021: 87.4 ML/MIN/1.73
EOSINOPHIL # BLD AUTO: 0.22 10*3/MM3 (ref 0–0.4)
EOSINOPHIL NFR BLD AUTO: 3.3 % (ref 0.3–6.2)
ERYTHROCYTE [DISTWIDTH] IN BLOOD BY AUTOMATED COUNT: 12.3 % (ref 12.3–15.4)
FLUAV RNA RESP QL NAA+PROBE: NOT DETECTED
FLUBV RNA RESP QL NAA+PROBE: NOT DETECTED
GLOBULIN UR ELPH-MCNC: 2.9 GM/DL
GLUCOSE SERPL-MCNC: 113 MG/DL (ref 65–99)
HCT VFR BLD AUTO: 43.3 % (ref 37.5–51)
HGB BLD-MCNC: 15.4 G/DL (ref 13–17.7)
IMM GRANULOCYTES # BLD AUTO: 0.02 10*3/MM3 (ref 0–0.05)
IMM GRANULOCYTES NFR BLD AUTO: 0.3 % (ref 0–0.5)
LYMPHOCYTES # BLD AUTO: 1.68 10*3/MM3 (ref 0.7–3.1)
LYMPHOCYTES NFR BLD AUTO: 24.9 % (ref 19.6–45.3)
MAGNESIUM SERPL-MCNC: 1.9 MG/DL (ref 1.6–2.6)
MCH RBC QN AUTO: 32.9 PG (ref 26.6–33)
MCHC RBC AUTO-ENTMCNC: 35.6 G/DL (ref 31.5–35.7)
MCV RBC AUTO: 92.5 FL (ref 79–97)
MONOCYTES # BLD AUTO: 0.53 10*3/MM3 (ref 0.1–0.9)
MONOCYTES NFR BLD AUTO: 7.8 % (ref 5–12)
NEUTROPHILS NFR BLD AUTO: 4.27 10*3/MM3 (ref 1.7–7)
NEUTROPHILS NFR BLD AUTO: 63.1 % (ref 42.7–76)
NRBC BLD AUTO-RTO: 0 /100 WBC (ref 0–0.2)
NT-PROBNP SERPL-MCNC: 85.4 PG/ML (ref 0–900)
PLATELET # BLD AUTO: 191 10*3/MM3 (ref 140–450)
PMV BLD AUTO: 10 FL (ref 6–12)
POTASSIUM SERPL-SCNC: 4 MMOL/L (ref 3.5–5.2)
PROCALCITONIN SERPL-MCNC: 0.03 NG/ML (ref 0–0.25)
PROT SERPL-MCNC: 7.7 G/DL (ref 6–8.5)
RBC # BLD AUTO: 4.68 10*6/MM3 (ref 4.14–5.8)
SARS-COV-2 RNA RESP QL NAA+PROBE: NOT DETECTED
SODIUM SERPL-SCNC: 138 MMOL/L (ref 136–145)
TROPONIN T SERPL HS-MCNC: 10 NG/L
WBC NRBC COR # BLD AUTO: 6.76 10*3/MM3 (ref 3.4–10.8)

## 2025-04-02 PROCEDURE — 87636 SARSCOV2 & INF A&B AMP PRB: CPT | Performed by: STUDENT IN AN ORGANIZED HEALTH CARE EDUCATION/TRAINING PROGRAM

## 2025-04-02 PROCEDURE — 71260 CT THORAX DX C+: CPT

## 2025-04-02 PROCEDURE — 85025 COMPLETE CBC W/AUTO DIFF WBC: CPT | Performed by: STUDENT IN AN ORGANIZED HEALTH CARE EDUCATION/TRAINING PROGRAM

## 2025-04-02 PROCEDURE — 86140 C-REACTIVE PROTEIN: CPT | Performed by: STUDENT IN AN ORGANIZED HEALTH CARE EDUCATION/TRAINING PROGRAM

## 2025-04-02 PROCEDURE — 84145 PROCALCITONIN (PCT): CPT | Performed by: STUDENT IN AN ORGANIZED HEALTH CARE EDUCATION/TRAINING PROGRAM

## 2025-04-02 PROCEDURE — 25510000001 IOPAMIDOL 61 % SOLUTION: Performed by: STUDENT IN AN ORGANIZED HEALTH CARE EDUCATION/TRAINING PROGRAM

## 2025-04-02 PROCEDURE — 83605 ASSAY OF LACTIC ACID: CPT | Performed by: STUDENT IN AN ORGANIZED HEALTH CARE EDUCATION/TRAINING PROGRAM

## 2025-04-02 PROCEDURE — 93005 ELECTROCARDIOGRAM TRACING: CPT | Performed by: STUDENT IN AN ORGANIZED HEALTH CARE EDUCATION/TRAINING PROGRAM

## 2025-04-02 PROCEDURE — 84484 ASSAY OF TROPONIN QUANT: CPT | Performed by: STUDENT IN AN ORGANIZED HEALTH CARE EDUCATION/TRAINING PROGRAM

## 2025-04-02 PROCEDURE — 83735 ASSAY OF MAGNESIUM: CPT | Performed by: STUDENT IN AN ORGANIZED HEALTH CARE EDUCATION/TRAINING PROGRAM

## 2025-04-02 PROCEDURE — 80053 COMPREHEN METABOLIC PANEL: CPT | Performed by: STUDENT IN AN ORGANIZED HEALTH CARE EDUCATION/TRAINING PROGRAM

## 2025-04-02 PROCEDURE — 83880 ASSAY OF NATRIURETIC PEPTIDE: CPT | Performed by: STUDENT IN AN ORGANIZED HEALTH CARE EDUCATION/TRAINING PROGRAM

## 2025-04-02 PROCEDURE — 99285 EMERGENCY DEPT VISIT HI MDM: CPT | Performed by: STUDENT IN AN ORGANIZED HEALTH CARE EDUCATION/TRAINING PROGRAM

## 2025-04-02 PROCEDURE — 74177 CT ABD & PELVIS W/CONTRAST: CPT

## 2025-04-02 RX ORDER — IOPAMIDOL 612 MG/ML
100 INJECTION, SOLUTION INTRAVASCULAR
Status: COMPLETED | OUTPATIENT
Start: 2025-04-02 | End: 2025-04-02

## 2025-04-02 RX ADMIN — IOPAMIDOL 100 ML: 612 INJECTION, SOLUTION INTRAVENOUS at 09:16

## 2025-04-02 NOTE — Clinical Note
[facility] EMERGENCY DEPARTMENT  [address] [address]  Phone: [phone]    Andrés Denton was seen and treated in our emergency department on 4/2/2025.  He may return to work on 04/05/2025.         Thank you for choosing [facility].    Hudson Toro MD

## 2025-04-02 NOTE — DISCHARGE INSTRUCTIONS
You were evaluated for a new mass below your chest.  We got lab work and a CT scan which showed that you had a new hernia with no bowel that was in this hernia.  You are now stable for discharge.  I also see a possible wire which is likely related to your recent CABG.  We would recommend discussing your hernia as well as the wire seen on the CT imaging with your cardiothoracic surgeon and would call them on the ER discharge to schedule a follow-up appointment.  If you have severe increase in abdominal pain or chest pain, or fever, please come back to the the emergency department for further evaluation.  You are now stable for discharge.   parent/prenatal chart

## 2025-04-02 NOTE — ED PROVIDER NOTES
Subjective:  History of Present Illness:    Patient is a 54-year-old male with history of alcohol abuse, coronary artery disease, hypertension, hyperlipidemia, CAD status post CABG who presents today with a mass to his xiphoid process.  Reports gradually growing mass below the xiphoid.  Nontender to palpation.  Denies any current chest pain but has had intermittent chest pain.  No shortness of breath.  Denies any fevers.  No purulent discharge.  His postoperative wounds look appropriate.  Denies any abdominal pain.  No nausea or vomiting.  Denies any new leg swelling or leg pain.  Well-appearing on exam.      Nurses Notes reviewed and agree, including vitals, allergies, social history and prior medical history.     REVIEW OF SYSTEMS: All systems reviewed and not pertinent unless noted.  Review of Systems   Constitutional:  Positive for activity change. Negative for appetite change, chills, fatigue and fever.   HENT:  Negative for congestion, sinus pressure, sneezing and trouble swallowing.    Eyes:  Negative for discharge and itching.   Respiratory:  Negative for cough and shortness of breath.    Cardiovascular:  Positive for chest pain. Negative for palpitations.        Chest mass   Gastrointestinal:  Negative for abdominal distention and abdominal pain.   Endocrine: Negative for cold intolerance and heat intolerance.   Genitourinary:  Negative for decreased urine volume, dysuria and urgency.   Musculoskeletal:  Negative for gait problem, neck pain and neck stiffness.   Skin:  Negative for color change and rash.   Allergic/Immunologic: Negative for immunocompromised state.   Neurological:  Negative for facial asymmetry and headaches.   Hematological:  Negative for adenopathy.   Psychiatric/Behavioral:  Negative for self-injury and suicidal ideas.        Past Medical History:   Diagnosis Date    Alcohol abuse     Anxiety     Coronary artery disease     Depression     Hyperlipidemia     Hypertension     Myocardial  "infarction     Suicidal thoughts     Withdrawal symptoms, alcohol        Allergies:    Morphine      Past Surgical History:   Procedure Laterality Date    AMPUTATION  1995    right ring finger partial amputation    CARDIAC CATHETERIZATION N/A 05/02/2024    Procedure: Coronary angiography;  Surgeon: Tejas Marroquin MD;  Location: [facility] CATH INVASIVE LOCATION;  Service: Cardiovascular;  Laterality: N/A;    CARDIAC SURGERY      CORONARY ARTERY BYPASS GRAFT      FRACTURE SURGERY Left     tib/fib pt. is unsure the exact surgery it was when he was 17         Social History     Socioeconomic History    Marital status:     Number of children: 2    Years of education: 12    Highest education level: High school graduate   Tobacco Use    Smoking status: Never    Smokeless tobacco: Current     Types: Snuff    Tobacco comments:     One can per day- tobacco use starting at age 10- 44  years   Vaping Use    Vaping status: Never Used   Substance and Sexual Activity    Alcohol use: Yes     Comment: 1-2 x weekly1/2 pint, ~ 30 beers and a pint of vodka this past weekend    Drug use: Yes     Comment: alcohol    Sexual activity: Defer     Partners: Female         Family History   Problem Relation Age of Onset    Alcohol abuse Mother     Alcohol abuse Father     Alcohol abuse Maternal Uncle        Objective  Physical Exam:  BP (!) 158/101   Pulse 68   Temp 98.1 °F (36.7 °C) (Oral)   Resp 18   Ht 172.7 cm (68\")   Wt 107 kg (236 lb)   SpO2 94%   BMI 35.88 kg/m²      Physical Exam  Constitutional:       General: He is not in acute distress.     Appearance: Normal appearance. He is normal weight. He is not ill-appearing.   HENT:      Head: Normocephalic and atraumatic.      Nose: Nose normal. No congestion or rhinorrhea.      Mouth/Throat:      Mouth: Mucous membranes are moist.      Pharynx: Oropharynx is clear.   Eyes:      Extraocular Movements: Extraocular movements intact.      Conjunctiva/sclera: Conjunctivae normal. "      Pupils: Pupils are equal, round, and reactive to light.   Cardiovascular:      Rate and Rhythm: Normal rate and regular rhythm.      Pulses: Normal pulses.   Pulmonary:      Effort: Pulmonary effort is normal. No respiratory distress.      Breath sounds: Normal breath sounds.      Comments: Nontender fluctuant mass below the xiphoid with no tenderness or overlying erythema    Abdominal:      General: Abdomen is flat. Bowel sounds are normal. There is no distension.      Palpations: Abdomen is soft.      Tenderness: There is no abdominal tenderness. There is no right CVA tenderness, left CVA tenderness, guarding or rebound.   Musculoskeletal:         General: No swelling or tenderness. Normal range of motion.      Cervical back: Normal range of motion and neck supple. No rigidity or tenderness.   Skin:     General: Skin is warm and dry.      Capillary Refill: Capillary refill takes less than 2 seconds.   Neurological:      General: No focal deficit present.      Mental Status: He is alert and oriented to person, place, and time. Mental status is at baseline.      Cranial Nerves: No cranial nerve deficit.      Sensory: No sensory deficit.      Motor: No weakness.   Psychiatric:         Mood and Affect: Mood normal.         Behavior: Behavior normal.         Thought Content: Thought content normal.         Judgment: Judgment normal.         Procedures    ED Course:    ED Course as of 04/02/25 1136   Wed Apr 02, 2025   0826 EKG interpreted by me, normal sinus rhythm with right axis deviation, no concerning ST changes noted, rate of 82 abnormal EKG [JE]      ED Course User Index  [JE] Hudson Toro MD       Lab Results (last 24 hours)       Procedure Component Value Units Date/Time    COVID-19 and FLU A/B PCR, 1 HR TAT - Swab, Nasopharynx [[id number]]  (Normal) Collected: 04/02/25 0805    Specimen: Swab from Nasopharynx Updated: 04/02/25 0845     COVID19 Not Detected     Influenza A PCR Not Detected      Influenza B PCR Not Detected    Narrative:      Fact sheet for providers: https://www.fda.gov/media/142577/download    Fact sheet for patients: https://www.fda.gov/media/814390/download    Test performed by PCR.    CBC & Differential [[id number]]  (Normal) Collected: 04/02/25 0813    Specimen: Blood Updated: 04/02/25 0824    Narrative:      The following orders were created for panel order CBC & Differential.  Procedure                               Abnormality         Status                     ---------                               -----------         ------                     CBC Auto Differential[[id number]]        Normal              Final result                 Please view results for these tests on the individual orders.    Comprehensive Metabolic Panel [[id number]]  (Abnormal) Collected: 04/02/25 0813    Specimen: Blood Updated: 04/02/25 0845     Glucose 113 mg/dL      BUN 11 mg/dL      Creatinine 1.03 mg/dL      Sodium 138 mmol/L      Potassium 4.0 mmol/L      Chloride 99 mmol/L      CO2 25.6 mmol/L      Calcium 9.6 mg/dL      Total Protein 7.7 g/dL      Albumin 4.8 g/dL      ALT (SGPT) 24 U/L      AST (SGOT) 24 U/L      Alkaline Phosphatase 97 U/L      Total Bilirubin 0.6 mg/dL      Globulin 2.9 gm/dL      A/G Ratio 1.7 g/dL      BUN/Creatinine Ratio 10.7     Anion Gap 13.4 mmol/L      eGFR 87.4 mL/min/1.73     Narrative:      GFR Categories in Chronic Kidney Disease (CKD)      GFR Category          GFR (mL/min/1.73)    Interpretation  G1                     90 or greater         Normal or high (1)  G2                      60-89                Mild decrease (1)  G3a                   45-59                Mild to moderate decrease  G3b                   30-44                Moderate to severe decrease  G4                    15-29                Severe decrease  G5                    14 or less           Kidney failure          (1)In the absence of evidence of kidney disease, neither GFR category G1 or G2  fulfill the criteria for CKD.    eGFR calculation 2021 CKD-EPI creatinine equation, which does not include race as a factor    High Sensitivity Troponin T [[id number]]  (Normal) Collected: 04/02/25 0813    Specimen: Blood Updated: 04/02/25 0848     HS Troponin T 10 ng/L     Narrative:      High Sensitive Troponin T Reference Range:  <14.0 ng/L- Negative Female for AMI  <22.0 ng/L- Negative Male for AMI  >=14 - Abnormal Female indicating possible myocardial injury.  >=22 - Abnormal Male indicating possible myocardial injury.   Clinicians would have to utilize clinical acumen, EKG, Troponin, and serial changes to determine if it is an Acute Myocardial Infarction or myocardial injury due to an underlying chronic condition.         BNP [[id number]]  (Normal) Collected: 04/02/25 0813    Specimen: Blood Updated: 04/02/25 0848     proBNP 85.4 pg/mL     Narrative:      This assay is used as an aid in the diagnosis of individuals suspected of having heart failure. It can be used as an aid in the diagnosis of acute decompensated heart failure (ADHF) in patients presenting with signs and symptoms of ADHF to the emergency department (ED). In addition, NT-proBNP of <300 pg/mL indicates ADHF is not likely.    Age Range Result Interpretation  NT-proBNP Concentration (pg/mL:      <50             Positive            >450                   Gray                 300-450                    Negative             <300    50-75           Positive            >900                  Gray                300-900                  Negative            <300      >75             Positive            >1800                  Gray                300-1800                  Negative            <300    C-reactive Protein [[id number]]  (Normal) Collected: 04/02/25 0813    Specimen: Blood Updated: 04/02/25 0845     C-Reactive Protein 0.31 mg/dL     Lactic Acid, Plasma [[id number]]  (Normal) Collected: 04/02/25 0813    Specimen: Blood Updated: 04/02/25 0843      "Lactate 1.1 mmol/L     Procalcitonin [[id number]]  (Normal) Collected: 04/02/25 0813    Specimen: Blood Updated: 04/02/25 0853     Procalcitonin 0.03 ng/mL     Narrative:      As a Marker for Sepsis (Non-Neonates):    1. <0.5 ng/mL represents a low risk of severe sepsis and/or septic shock.  2. >2 ng/mL represents a high risk of severe sepsis and/or septic shock.    As a Marker for Lower Respiratory Tract Infections that require antibiotic therapy:    PCT on Admission    Antibiotic Therapy       6-12 Hrs later    >0.5                Strongly Recommended  >0.25 - <0.5        Recommended   0.1 - 0.25          Discouraged              Remeasure/reassess PCT  <0.1                Strongly Discouraged     Remeasure/reassess PCT    As 28 day mortality risk marker: \"Change in Procalcitonin Result\" (>80% or <=80%) if Day 0 (or Day 1) and Day 4 values are available. Refer to http://[email or web address]-pct-calculator.com    Change in PCT <=80%  A decrease of PCT levels below or equal to 80% defines a positive change in PCT test result representing a higher risk for 28-day all-cause mortality of patients diagnosed with severe sepsis for septic shock.    Change in PCT >80%  A decrease of PCT levels of more than 80% defines a negative change in PCT result representing a lower risk for 28-day all-cause mortality of patients diagnosed with severe sepsis or septic shock.       Magnesium [[id number]]  (Normal) Collected: 04/02/25 0813    Specimen: Blood Updated: 04/02/25 0845     Magnesium 1.9 mg/dL     CBC Auto Differential [[id number]]  (Normal) Collected: 04/02/25 0813    Specimen: Blood Updated: 04/02/25 0824     WBC 6.76 10*3/mm3      RBC 4.68 10*6/mm3      Hemoglobin 15.4 g/dL      Hematocrit 43.3 %      MCV 92.5 fL      MCH 32.9 pg      MCHC 35.6 g/dL      RDW 12.3 %      RDW-SD 42.3 fl      MPV 10.0 fL      Platelets 191 10*3/mm3      Neutrophil % 63.1 %      Lymphocyte % 24.9 %      Monocyte % 7.8 %      Eosinophil % 3.3 %      Basophil " % 0.6 %      Immature Grans % 0.3 %      Neutrophils, Absolute 4.27 10*3/mm3      Lymphocytes, Absolute 1.68 10*3/mm3      Monocytes, Absolute 0.53 10*3/mm3      Eosinophils, Absolute 0.22 10*3/mm3      Basophils, Absolute 0.04 10*3/mm3      Immature Grans, Absolute 0.02 10*3/mm3      nRBC 0.0 /100 WBC              CT Abdomen Pelvis With Contrast  Result Date: 4/2/2025  PROCEDURE: CT ABDOMEN PELVIS W CONTRAST-  HISTORY:  Chest mass to below the xiphoid, eval extension into the abdomen  COMPARISON: April 2, 2025.  TECHNIQUE: Multiple axial CT images were obtained from the lung bases through the pubic symphysis following the administration of Isovue 300 contrast.  FINDINGS:  ABDOMEN: The lung bases are clear. The heart is normal in size. There is an upper abdominal wall hernia containing fat. This is described in detail on the chest CT. There is no enhancing fluid collection. There is no infiltration of the subcutaneous fat. The liver is enlarged measuring 18 cm. Portal vein is at the upper limits of normal measuring 1.5 cm. Correlation with liver function tests is recommended. The gallbladder is unremarkable. The spleen is mildly enlarged measuring 14 cm. No adrenal mass is present.  The pancreas is normal. The kidneys are normal. The aorta is normal in caliber. There is no free fluid or adenopathy. The abdominal portions of the GI tract are unremarkable with no evidence of obstruction.  PELVIS: The appendix is normal.  The urinary bladder is unremarkable. Prostate is normal in size. There is no significant free fluid or adenopathy.      Impression: Midline upper abdominal wall hernia as described. Please see chest report for further findings.  Hepatosplenomegaly and borderline dilated portal vein. Correlation with liver function tests is recommended.   CTDI: 10.55 mGy DLP: 567.98 mGy.cm   This study was performed with techniques to keep radiation doses as low as reasonably achievable (ALARA). Individualized dose  reduction techniques using automated exposure control or adjustment of mA and/or kV according to the patient size were employed.      Images were reviewed, interpreted, and dictated by Dr. Mary Horton MD Transcribed by Stephania Cooper PA-C.  This report was signed and finalized on 4/2/2025 10:25 AM by Mary Horton MD.      CT Chest With Contrast Diagnostic  Result Date: 4/2/2025  PROCEDURE: CT CHEST W CONTRAST DIAGNOSTIC-  HISTORY: Chest mass below the subxiphoid, eval postoperative infection  COMPARISON: April 2024.  PROCEDURE: Multiple axial CT images were obtained from the thoracic inlet through the upper abdomen following the administration of intravenous contrast.  FINDINGS: Soft tissue windows demonstrate no suspicious adenopathy within the chest. The heart is normal in size. There are vascular calcifications. The ascending aorta measures 42 mm, not significantly changed. A 1 year follow-up exam is recommended. The patient is status post median sternotomy for CABG. Postoperative changes new from prior CT of April 30, 2024. There is a thin radiodense linear object extending from the pericardium at the base of the right ventricle to the base of the xiphoid and extending into the subcutaneous fat. Consider history of external pacing wires. Also in this region is a hernia containing fat best seen on sagittal images, series 5 image #41. Defect in the lower chest/upper abdominal wall measures measures 29 mm in AP dimension and 47 mm in craniocaudal dimension. The hernia sac measures 49 mm in transverse diameter. There is no enhancing fluid collection. There is no infiltration of the subcutaneous fat. There is no pericardial or pleural effusion. Lung windows reveal no suspicious infiltrate or nodules .      Impression: 1. Fat-containing hernia located immediately below the tip of the xiphoid, suspect correlates with palpable abnormality. 2. Curvilinear object extending from the pericardium below the xiphoid process  and into the hernia, recommend correlation with any history of external pacing wires. 3. Interval CABG since the prior chest CT.   This study was performed with techniques to keep radiation doses as low as reasonably achievable (ALARA). Individualized dose reduction techniques using automated exposure control or adjustment of mA and/or kV according to the patient size were employed.    CTDI: 10.55 mGy DLP: 567.98 mGy.cm      Images were reviewed, interpreted, and dictated by Dr. Mary Horton MD Transcribed by Stephania Cooper PA-C.  This report was signed and finalized on 4/2/2025 10:22 AM by Mary Horton MD.           MDM      Initial impression of presenting illness: Chest mass    DDX: includes but is not limited to: Seroma,    Patient arrives stable with vitals interpreted by myself.     Pertinent features from physical exam: Clear to auscultation, regular rate and rhythm, no murmur, nontender to abdominal palpation, nontender, fluctuant mass with no overlying erythema below the xiphoid process area of concern.    Initial diagnostic plan: CBC, CMP, troponin, BNP, EKG, chest x-ray, CRP, Pro-Darrion, magnesium    Results from initial plan were reviewed and interpreted by me revealing no concern for significant bacterial process, no concern for acute cardiac process.  CT imaging showing fat-containing hernia with possible wire in the hernia.  Suspect this is likely sternotomy wire    Diagnostic information from other sources: Reviewed past medical records including patient's operative note from recent CABG    Interventions / Re-evaluation: Observed in our emergency department for 3 hours no change in vital signs    Results/clinical rationale were discussed with patient at bedside    Consultations/Discussion of results with other physicians: Discussed negative workup in emergency department, no concern for sepsis, bacterial illness, or significant cardiac process.  Suspected patient's mass likely hernia which may be  complication of patient's recent CABG.  I recommended he follow closely with his cardiovascular surgeon to discuss this and the wire seen on CT imaging and given strict turn precautions for any significant increase in abdominal pain, chest pain, fever.    Disposition plan: Discharge  -----        Final diagnoses:   Hernia of anterior abdominal wall          Edge, Hudson Novak MD  04/02/25 6257

## 2025-04-15 RX ORDER — ROPINIROLE 3 MG/1
3 TABLET, FILM COATED ORAL NIGHTLY
Qty: 90 TABLET | Refills: 0 | Status: SHIPPED | OUTPATIENT
Start: 2025-04-15

## 2025-04-29 RX ORDER — CLOPIDOGREL BISULFATE 75 MG/1
75 TABLET ORAL DAILY
Qty: 90 TABLET | Refills: 0 | Status: SHIPPED | OUTPATIENT
Start: 2025-04-29

## 2025-04-29 RX ORDER — AMLODIPINE BESYLATE 10 MG/1
10 TABLET ORAL DAILY
Qty: 90 TABLET | Refills: 0 | Status: SHIPPED | OUTPATIENT
Start: 2025-04-29

## 2025-05-14 RX ORDER — DOCUSATE SODIUM 100 MG/1
100 CAPSULE, LIQUID FILLED ORAL DAILY PRN
Qty: 90 CAPSULE | Refills: 0 | Status: SHIPPED | OUTPATIENT
Start: 2025-05-14

## 2025-05-14 RX ORDER — HYDROXYZINE PAMOATE 25 MG/1
CAPSULE ORAL
Qty: 180 CAPSULE | Refills: 0 | Status: SHIPPED | OUTPATIENT
Start: 2025-05-14

## 2025-05-14 RX ORDER — ALLOPURINOL 100 MG/1
100 TABLET ORAL DAILY
Qty: 90 TABLET | Refills: 0 | Status: SHIPPED | OUTPATIENT
Start: 2025-05-14

## 2025-05-14 RX ORDER — SEMAGLUTIDE 0.68 MG/ML
INJECTION, SOLUTION SUBCUTANEOUS
Qty: 3 ML | Refills: 1 | OUTPATIENT
Start: 2025-05-14

## 2025-05-19 ENCOUNTER — OFFICE VISIT (OUTPATIENT)
Age: 55
End: 2025-05-19
Payer: MEDICAID

## 2025-05-19 VITALS
OXYGEN SATURATION: 93 % | BODY MASS INDEX: 37.56 KG/M2 | WEIGHT: 247 LBS | HEART RATE: 86 BPM | DIASTOLIC BLOOD PRESSURE: 116 MMHG | SYSTOLIC BLOOD PRESSURE: 159 MMHG | TEMPERATURE: 97.3 F

## 2025-05-19 DIAGNOSIS — G25.81 RLS (RESTLESS LEGS SYNDROME): ICD-10-CM

## 2025-05-19 DIAGNOSIS — I10 PRIMARY HYPERTENSION: ICD-10-CM

## 2025-05-19 DIAGNOSIS — I25.10 CORONARY ARTERY DISEASE INVOLVING NATIVE HEART WITHOUT ANGINA PECTORIS, UNSPECIFIED VESSEL OR LESION TYPE: ICD-10-CM

## 2025-05-19 DIAGNOSIS — K43.2 INCISIONAL HERNIA, WITHOUT OBSTRUCTION OR GANGRENE: Primary | ICD-10-CM

## 2025-05-19 DIAGNOSIS — E11.9 TYPE 2 DIABETES MELLITUS WITHOUT COMPLICATION, WITHOUT LONG-TERM CURRENT USE OF INSULIN (HCC): ICD-10-CM

## 2025-05-19 PROCEDURE — 99214 OFFICE O/P EST MOD 30 MIN: CPT | Performed by: INTERNAL MEDICINE

## 2025-05-19 PROCEDURE — 3044F HG A1C LEVEL LT 7.0%: CPT | Performed by: INTERNAL MEDICINE

## 2025-05-19 PROCEDURE — 3077F SYST BP >= 140 MM HG: CPT | Performed by: INTERNAL MEDICINE

## 2025-05-19 PROCEDURE — 3079F DIAST BP 80-89 MM HG: CPT | Performed by: INTERNAL MEDICINE

## 2025-05-19 RX ORDER — ROPINIROLE 3 MG/1
3 TABLET, FILM COATED ORAL NIGHTLY
Qty: 90 TABLET | Refills: 0 | Status: SHIPPED | OUTPATIENT
Start: 2025-05-19

## 2025-05-19 RX ORDER — GABAPENTIN 300 MG/1
300 CAPSULE ORAL 3 TIMES DAILY
Qty: 90 CAPSULE | Refills: 1 | Status: SHIPPED | OUTPATIENT
Start: 2025-05-19 | End: 2025-08-17

## 2025-05-19 RX ORDER — HYDROCHLOROTHIAZIDE 25 MG/1
25 TABLET ORAL EVERY MORNING
Qty: 90 TABLET | Refills: 1 | Status: SHIPPED | OUTPATIENT
Start: 2025-05-19

## 2025-05-19 RX ORDER — DOXAZOSIN 4 MG/1
4 TABLET ORAL NIGHTLY
Qty: 30 TABLET | Refills: 1 | Status: SHIPPED | OUTPATIENT
Start: 2025-05-19

## 2025-05-19 RX ORDER — MULTIVITAMIN WITH FOLIC ACID 400 MCG
1 TABLET ORAL DAILY
COMMUNITY
Start: 2025-03-21 | End: 2025-05-19

## 2025-05-19 RX ORDER — HYDRALAZINE HYDROCHLORIDE 25 MG/1
25 TABLET, FILM COATED ORAL 3 TIMES DAILY PRN
Qty: 90 TABLET | Refills: 0 | Status: SHIPPED | OUTPATIENT
Start: 2025-05-19 | End: 2025-08-17

## 2025-05-19 ASSESSMENT — ENCOUNTER SYMPTOMS
ABDOMINAL PAIN: 1
NAUSEA: 0
SINUS PRESSURE: 0
BACK PAIN: 1
SHORTNESS OF BREATH: 0
VOMITING: 0
WHEEZING: 0
COUGH: 0
EYE DISCHARGE: 0

## 2025-05-19 NOTE — PROGRESS NOTES
Chief Complaint   Patient presents with    Abdominal Pain     Patient was seen in the ER and told to follow up with PCP. He was seen on 04/02/25. He also would like to discuss having his gabapentin increased due to increased leg pain.     Diabetes     Patient states his ozempic was defective and he can't use it. He would like to try and get a refill today if possible.          Have you seen any other physician or provider since your last visit yes - ER visit    Have you had any other diagnostic tests since your last visit? yes - CT scan    Have you changed or stopped any medications since your last visit? no         
Marked as Taking for the 5/19/25 encounter (Office Visit) with Hosea Duenas MD   Medication Sig Dispense Refill    allopurinol (ZYLOPRIM) 100 MG tablet Take 1 tablet by mouth daily 90 tablet 0    docusate sodium (COLACE) 100 MG capsule Take 1 capsule by mouth daily as needed for Constipation 90 capsule 0    hydrOXYzine pamoate (VISTARIL) 25 MG capsule TAKE 1 CAPSULE BY MOUTH TWICE A DAY AS NEEDED FOR ANXIETY 180 capsule 0    amLODIPine (NORVASC) 10 MG tablet Take 1 tablet by mouth daily 90 tablet 0    clopidogrel (PLAVIX) 75 MG tablet Take 1 tablet by mouth daily 90 tablet 0    cariprazine hcl (VRAYLAR) 1.5 MG capsule Take 1 capsule by mouth daily 30 capsule 3    gabapentin (NEURONTIN) 100 MG capsule Take 1 capsule by mouth 3 times daily for 90 days. Intended supply: 30 days Max Daily Amount: 300 mg 90 capsule 2    Multiple Vitamins-Minerals (MULTIVITAMIN WITH MINERALS) tablet Take 1 tablet by mouth daily 90 tablet 1    nitroGLYCERIN (NITROSTAT) 0.4 MG SL tablet Place 1 tablet under the tongue every 5 minutes as needed for Chest pain up to max of 3 total doses. If no relief after 1 dose, call 911. 25 tablet 0    loratadine (CLARITIN) 10 MG tablet Take 1 tablet by mouth daily 30 tablet 2    fluticasone (FLONASE) 50 MCG/ACT nasal spray 1 spray by Each Nostril route daily 32 g 1    albuterol sulfate HFA (PROVENTIL HFA) 108 (90 Base) MCG/ACT inhaler Inhale 2 puffs into the lungs every 6 hours as needed for Wheezing 18 g 3    carvedilol (COREG) 25 MG tablet Take 1 tablet by mouth 2 times daily      atorvastatin (LIPITOR) 40 MG tablet Take 1 tablet by mouth daily 30 tablet 0    escitalopram (LEXAPRO) 20 MG tablet Take 1 tablet by mouth daily 30 tablet 2    aspirin 325 MG tablet Take 1 tablet by mouth daily 90 tablet 1    hydroCHLOROthiazide (HYDRODIURIL) 25 MG tablet Take 1 tablet by mouth every morning 90 tablet 1    lisinopril (PRINIVIL;ZESTRIL) 40 MG tablet Take 1 tablet by mouth in the morning and at bedtime 180

## 2025-05-23 ENCOUNTER — TELEPHONE (OUTPATIENT)
Age: 55
End: 2025-05-23

## 2025-06-11 ENCOUNTER — OFFICE VISIT (OUTPATIENT)
Age: 55
End: 2025-06-11
Payer: MEDICAID

## 2025-06-11 VITALS
HEART RATE: 100 BPM | DIASTOLIC BLOOD PRESSURE: 70 MMHG | HEIGHT: 68 IN | BODY MASS INDEX: 38.34 KG/M2 | TEMPERATURE: 97.8 F | SYSTOLIC BLOOD PRESSURE: 106 MMHG | WEIGHT: 253 LBS | OXYGEN SATURATION: 95 %

## 2025-06-11 DIAGNOSIS — I10 PRIMARY HYPERTENSION: ICD-10-CM

## 2025-06-11 DIAGNOSIS — I25.10 CORONARY ARTERY DISEASE INVOLVING NATIVE HEART WITHOUT ANGINA PECTORIS, UNSPECIFIED VESSEL OR LESION TYPE: ICD-10-CM

## 2025-06-11 DIAGNOSIS — G25.81 RLS (RESTLESS LEGS SYNDROME): ICD-10-CM

## 2025-06-11 DIAGNOSIS — R05.9 COUGH, UNSPECIFIED TYPE: ICD-10-CM

## 2025-06-11 DIAGNOSIS — K43.2 INCISIONAL HERNIA, WITHOUT OBSTRUCTION OR GANGRENE: ICD-10-CM

## 2025-06-11 DIAGNOSIS — G47.33 OSA (OBSTRUCTIVE SLEEP APNEA): ICD-10-CM

## 2025-06-11 DIAGNOSIS — E11.9 TYPE 2 DIABETES MELLITUS WITHOUT COMPLICATION, WITHOUT LONG-TERM CURRENT USE OF INSULIN (HCC): Primary | ICD-10-CM

## 2025-06-11 PROCEDURE — 3078F DIAST BP <80 MM HG: CPT | Performed by: INTERNAL MEDICINE

## 2025-06-11 PROCEDURE — 3044F HG A1C LEVEL LT 7.0%: CPT | Performed by: INTERNAL MEDICINE

## 2025-06-11 PROCEDURE — 99213 OFFICE O/P EST LOW 20 MIN: CPT | Performed by: INTERNAL MEDICINE

## 2025-06-11 PROCEDURE — 3074F SYST BP LT 130 MM HG: CPT | Performed by: INTERNAL MEDICINE

## 2025-06-11 RX ORDER — BENZONATATE 200 MG/1
200 CAPSULE ORAL 3 TIMES DAILY PRN
Qty: 30 CAPSULE | Refills: 0 | Status: SHIPPED | OUTPATIENT
Start: 2025-06-11 | End: 2025-06-21

## 2025-06-11 RX ORDER — ROPINIROLE 3 MG/1
3 TABLET, FILM COATED ORAL NIGHTLY
Qty: 90 TABLET | Refills: 0 | Status: SHIPPED | OUTPATIENT
Start: 2025-06-11

## 2025-06-11 NOTE — PROGRESS NOTES
SUBJECTIVE:    Patient ID: Kade Graham is a 54 y.o.male.    Chief Complaint   Patient presents with    Diabetes    Hypertension    RLS         HPI:  History of Present Illness  The patient presents for evaluation of diabetes, hernia, hypertension, leg pain, and sleep issues.    He reports satisfactory control of his diabetes, with blood glucose levels typically ranging between 118 and 120. However, he experienced a malfunction with his glucometer during the last check, which he believes may have affected the accuracy of the readings. He is currently seeking a replacement device.    He continues to experience symptoms related to his hernia. He developed a severe cough last week, which appears to exacerbate the hernia due to the pressure exerted on his abdomen during coughing episodes. This pressure causes a sensation akin to his stomach protruding through his skin.    His blood pressure readings have been consistently around 130/82 or 85. He has been adhering to a daily regimen of hydralazine, despite the medication being prescribed for use only when his blood pressure exceeds 150/90.    He continues to experience leg pain, although there has been some improvement. He is requesting a written statement from the physician to facilitate a move to a ground-floor apartment, as he finds it challenging to navigate the stairs in his current residence.    He has been struggling with a persistent dry cough, which has been disrupting his sleep. He has attempted to manage this issue with melatonin and Unisom, but these interventions have not been effective in providing him with a full night's rest.    Patient's medications, allergies, past medical, surgical, social and family histories were reviewed and updated as appropriate in electronic medical record.        Outpatient Medications Marked as Taking for the 6/11/25 encounter (Office Visit) with Hosea Duenas MD   Medication Sig Dispense Refill    cariprazine hcl (VRAYLAR)

## 2025-07-14 DIAGNOSIS — G25.81 RLS (RESTLESS LEGS SYNDROME): ICD-10-CM

## 2025-07-14 RX ORDER — ESCITALOPRAM OXALATE 20 MG/1
20 TABLET ORAL DAILY
Qty: 90 TABLET | Refills: 1 | Status: SHIPPED | OUTPATIENT
Start: 2025-07-14

## 2025-07-14 RX ORDER — GABAPENTIN 300 MG/1
300 CAPSULE ORAL 3 TIMES DAILY
Qty: 90 CAPSULE | Refills: 1 | Status: SHIPPED | OUTPATIENT
Start: 2025-07-14 | End: 2025-10-12

## 2025-07-14 RX ORDER — ASPIRIN 325 MG
325 TABLET ORAL DAILY
Qty: 90 TABLET | Refills: 1 | Status: SHIPPED | OUTPATIENT
Start: 2025-07-14

## 2025-07-14 RX ORDER — ATORVASTATIN CALCIUM 40 MG/1
40 TABLET, FILM COATED ORAL DAILY
Qty: 90 TABLET | Refills: 1 | Status: SHIPPED | OUTPATIENT
Start: 2025-07-14

## 2025-07-14 RX ORDER — DOXAZOSIN 4 MG/1
4 TABLET ORAL NIGHTLY
Qty: 90 TABLET | Refills: 1 | Status: SHIPPED | OUTPATIENT
Start: 2025-07-14

## 2025-07-14 RX ORDER — DOCUSATE SODIUM 100 MG/1
100 CAPSULE, LIQUID FILLED ORAL DAILY PRN
Qty: 90 CAPSULE | Refills: 1 | Status: SHIPPED | OUTPATIENT
Start: 2025-07-14

## 2025-07-14 RX ORDER — LISINOPRIL 40 MG/1
40 TABLET ORAL 2 TIMES DAILY
Qty: 180 TABLET | Refills: 1 | Status: SHIPPED | OUTPATIENT
Start: 2025-07-14

## 2025-07-14 RX ORDER — IBUPROFEN 800 MG/1
800 TABLET, FILM COATED ORAL EVERY 6 HOURS PRN
Qty: 120 TABLET | Refills: 0 | Status: SHIPPED | OUTPATIENT
Start: 2025-07-14

## 2025-07-23 NOTE — PROGRESS NOTES
[facility] Cardiology    Office Consult     Andrés Denton  [id number]  2025    Referred By: Hieu Thompson MD    Chief Complaint   Patient presents with    Coronary Artery Disease    Hypertension       Subjective     History of Present Illness:   Andrés Denton is a 54 y.o. male who presents to the Cardiology Clinic for routine follow up. The patient has a past medical history significant for hypertension, chronic EtOH use, and obesity with a BMI 40.  He has a past cardiac history significant for multivessel coronary artery disease, now status post 3V CABG. Since his last appointment he states his blood pressure has been running high in 150/160s systolic mostly.  States they have been staying busy with grandchildren this summer.  States he does have an occasional chest pressure but nothing significant or different from last appointment.   States he does get out of breath with some activity and is tired often.  He continues to take his medications daily as prescribed.  States he is trying to get his hernia fixed that was recently diagnosed and feels that might be contributing to pressure and dyspnea.  He states that his PCP recently increased his Lisinopril to 40mg BID and he has Hydralazine 25mg TID PRN for hypertension as well which he is taking nightly on average.  States he does not currently have a BP cuff and we discussed he needed to get one.    Past Cardiac Testin. Last Coronary Angio: 2024--Cardiac cath noted MVCAD, involving mid LAD, LCx, OM1 and RCA.   2. Prior Stress Testing: None  3. Last Echo: 2024--EF = 56.60% Left ventricular ejection fraction appears to be 56 - 60%. Left ventricular diastolic function was normal.  Left ventricular wall thickness is consistent with mild concentric hypertrophy. Normal right ventricular size and function. No hemodynamically significant valvular dysfunction. Mild dilation of the proximal aorta is present measuring 3.6  cm.  4. Prior Holter Monitor: None    Review of Systems   Constitutional:  Positive for fatigue. Negative for activity change.   Respiratory:  Positive for shortness of breath. Negative for chest tightness.    Cardiovascular:  Negative for chest pain, palpitations and leg swelling.   Neurological:  Negative for dizziness.   All other systems reviewed and are negative.       Past Medical History:   Diagnosis Date    Alcohol abuse     Anxiety     Coronary artery disease     Depression     Hyperlipidemia     Hypertension     Myocardial infarction     Suicidal thoughts     Withdrawal symptoms, alcohol        Past Surgical History:   Procedure Laterality Date    AMPUTATION  1995    right ring finger partial amputation    CARDIAC CATHETERIZATION N/A 05/02/2024    Procedure: Coronary angiography;  Surgeon: Tejas Marroquin MD;  Location: [facility] CATH INVASIVE LOCATION;  Service: Cardiovascular;  Laterality: N/A;    CARDIAC SURGERY      CORONARY ARTERY BYPASS GRAFT      FRACTURE SURGERY Left     tib/fib pt. is unsure the exact surgery it was when he was 17       Family History   Problem Relation Age of Onset    Alcohol abuse Mother     Alcohol abuse Father     Alcohol abuse Maternal Uncle        Social History     Socioeconomic History    Marital status:     Number of children: 2    Years of education: 12    Highest education level: High school graduate   Tobacco Use    Smoking status: Never    Smokeless tobacco: Current     Types: Snuff    Tobacco comments:     One can per day- tobacco use starting at age 10- 44  years   Vaping Use    Vaping status: Never Used   Substance and Sexual Activity    Alcohol use: Yes     Comment: 1-2 x weekly1/2 pint, ~ 30 beers and a pint of vodka this past weekend.    Drug use: Yes     Comment: alcohol    Sexual activity: Defer     Partners: Female         Current Outpatient Medications:     allopurinol (ZYLOPRIM) 100 MG tablet, Take 1 tablet by mouth Daily., Disp: 90 tablet, Rfl: 0     amLODIPine (NORVASC) 10 MG tablet, Take 1 tablet by mouth Daily., Disp: 90 tablet, Rfl: 0    aspirin 325 MG tablet, Take 1 tablet by mouth Daily., Disp: 90 tablet, Rfl: 1    atorvastatin (Lipitor) 40 MG tablet, Take 1 tablet by mouth Daily. (Patient taking differently: Take 0.5 tablets by mouth Daily.), Disp: 90 tablet, Rfl: 0    Blood Glucose Monitoring Suppl (OneTouch Verio Flex System) w/Device kit, , Disp: , Rfl:     carvedilol (COREG) 25 MG tablet, Take 1 tablet by mouth 2 (Two) Times a Day., Disp: 180 tablet, Rfl: 3    clopidogrel (PLAVIX) 75 MG tablet, Take 1 tablet by mouth Daily., Disp: 90 tablet, Rfl: 0    docusate sodium (COLACE) 250 MG capsule, Take 1 capsule by mouth Daily., Disp: , Rfl:     doxazosin (CARDURA) 2 MG tablet, Take 1 tablet by mouth Every Night., Disp: , Rfl:     escitalopram (LEXAPRO) 20 MG tablet, Take 1 tablet by mouth Daily., Disp: 90 tablet, Rfl: 0    gabapentin (NEURONTIN) 100 MG capsule, Take 1 capsule by mouth 2 (Two) Times a Day. (Patient taking differently: Take 1 capsule by mouth 3 (Three) Times a Day.), Disp: , Rfl:     gentamicin (GARAMYCIN) 0.3 % ophthalmic solution, Administer 2 drops to both eyes 3 (Three) Times a Day., Disp: , Rfl:     hydrOXYzine pamoate (VISTARIL) 25 MG capsule, Take 1 capsule by mouth 2 (Two) Times a Day As Needed for Anxiety., Disp: , Rfl:     ibuprofen (ADVIL,MOTRIN) 800 MG tablet, TAKE 1 TABLET BY MOUTH EVERY 6 HOURS AS NEEDED FOR MILD PAIN, Disp: 90 tablet, Rfl: 0    Lancets (OneTouch Delica Plus [id number]) misc, , Disp: , Rfl:     lisinopril (PRINIVIL,ZESTRIL) 40 MG tablet, Take 1 tablet by mouth Daily. (Patient taking differently: Take 1 tablet by mouth 2 (Two) Times a Day.), Disp: 90 tablet, Rfl: 0    nitroglycerin (NITROSTAT) 0.4 MG SL tablet, Place 1 tablet under the tongue Every 5 (Five) Minutes As Needed for Chest Pain., Disp: , Rfl:     OneTouch Verio test strip, , Disp: , Rfl:     rOPINIRole (REQUIP) 3 MG tablet, Take 1 tablet by mouth  "Every Night., Disp: 90 tablet, Rfl: 0    Semaglutide (OZEMPIC, 0.25 OR 0.5 MG/DOSE, SC), Inject  under the skin into the appropriate area as directed., Disp: , Rfl:     tiZANidine (ZANAFLEX) 2 MG tablet, Take 1 tablet by mouth Every 6 (Six) Hours As Needed for Muscle Spasms., Disp: , Rfl:     Vraylar 1.5 MG capsule capsule, Take 1 capsule by mouth every night at bedtime., Disp: , Rfl:       Allergies   Allergen Reactions    Morphine Hives     Pt had a bad reaction after having it during surgery - cardiac arrest later on, unknown if morphine was the cause.       Objective     Vitals:    07/28/25 1536 07/28/25 1556   BP: (!) 164/108 160/100   BP Location: Left arm Left arm   Patient Position: Sitting Sitting   Cuff Size: Adult    Pulse: 96    Resp: 19    SpO2: 97%    Weight: 119 kg (262 lb 12.8 oz)    Height: 172.7 cm (68\")      Body mass index is 39.96 kg/m².    Physical Exam  Vitals and nursing note reviewed.   Constitutional:       General: He is not in acute distress.     Appearance: Normal appearance. He is obese. He is not ill-appearing or toxic-appearing.   HENT:      Head: Normocephalic.      Mouth/Throat:      Mouth: Mucous membranes are moist.   Eyes:      Pupils: Pupils are equal, round, and reactive to light.   Cardiovascular:      Rate and Rhythm: Normal rate and regular rhythm.      Pulses: Normal pulses.      Heart sounds: Normal heart sounds. No murmur heard.  Pulmonary:      Effort: Pulmonary effort is normal.      Breath sounds: Normal breath sounds. No wheezing, rhonchi or rales.   Musculoskeletal:      Right lower leg: No edema.      Left lower leg: No edema.   Skin:     General: Skin is warm and dry.      Capillary Refill: Capillary refill takes less than 2 seconds.   Neurological:      Mental Status: He is alert and oriented to person, place, and time. Mental status is at baseline.   Psychiatric:         Mood and Affect: Mood normal.         Behavior: Behavior normal.         Thought Content: " Thought content normal.         Results Review:   I reviewed the patient's new clinical results.    Procedures    Assessment & Plan  Coronary artery disease involving native coronary artery of native heart without angina pectoris  -S/P 3 vessel CABG with right radial artery harvest with Dr. Mccollum 5/7/2024   -Does have complaints of dyspnea with exertion and some pressure with newly diagnosed hernia  -Has also been having elevated blood pressures  -Per Dr. Marroquin last note, will de-escalate to aspirin monotherapy today stopping Plavix, he has been taking ASA 325mg  -Continue high intensity statin  -With continued dyspnea on exertion would consider Isosorbide with follow up  -Discussed with any new, changing, worsening symptoms to go to the ER for evaluation  -Follow up with Dr. Marroquin in 2 weeks for recheck         Primary hypertension  -Blood pressure elevated today  -Was recently given Hydralazine 25mg TID PRN  -He does not currently have a blood pressure cuff but was instructed to get one  -Noted to be taking Lisinopril 40mg BID per PCP increase which is double the max recommended dosage  -Will recheck BMP   -Discussed to keep log of blood pressure checking twice daily 1hr after medications and bring to follow up  -If BP consistently running >160/100 begin taking Hydralazine scheduled TID  -Follow up with Dr. Marroquin in 2 weeks for recheck  -Consider addition of Isosorbide with continued hypertension or Spironolactone  -Currently diet heavy in processed foods, discussed to limit salt in diet  -May benefit from nutrition consult as he is on a limited income currently trying to get disability  -Encouraged weight loss to achieve healthy BMI  -Educational handouts provided  -Educated on s/s of stroke and with any development of symptoms to call 9-1-1 to go to the ER      Orders:    Basic Metabolic Panel; Future    Hyperlipidemia LDL goal <55  -Goal LDL<55 per AHA/ELLIS recommendations  -LDL with lipid panel 1/2025  100  -Currently taking Lipitor 40mg           History of alcohol abuse  -History of heavy alcohol abuse  -Now drinking occasionally--reports 6/7 beers/ month  -Started cutting back about 7/8 months ago       Type 2 diabetes mellitus without complication, without long-term current use of insulin  -Managed by PCP         Obesity (BMI 30-39.9)  -BMI-39  -Recommend diet and exercise to achieve healthy BMI  -Complicates all aspects of care           Preventative Cardiology:   Tobacco Cessation: N/A   Advance Care Planning: ACP discussion was held with the patient during this visit. Patient does not have an advance directive, declines further assistance.     Follow Up:   Return in about 2 weeks (around 8/11/2025) for Next scheduled follow up--Dr. Marroquin.      Thank you for allowing me to participate in the care of your patient. Please to not hesitate to contact me with additional questions or concerns.     ALBERTINA Gaviria

## 2025-07-25 ENCOUNTER — TELEPHONE (OUTPATIENT)
Dept: CARDIOLOGY | Facility: CLINIC | Age: 55
End: 2025-07-25

## 2025-07-28 ENCOUNTER — OFFICE VISIT (OUTPATIENT)
Dept: CARDIOLOGY | Facility: CLINIC | Age: 55
End: 2025-07-28
Payer: MEDICAID

## 2025-07-28 VITALS
RESPIRATION RATE: 19 BRPM | WEIGHT: 262.8 LBS | SYSTOLIC BLOOD PRESSURE: 160 MMHG | DIASTOLIC BLOOD PRESSURE: 100 MMHG | HEIGHT: 68 IN | OXYGEN SATURATION: 97 % | HEART RATE: 96 BPM | BODY MASS INDEX: 39.83 KG/M2

## 2025-07-28 DIAGNOSIS — I10 PRIMARY HYPERTENSION: ICD-10-CM

## 2025-07-28 DIAGNOSIS — F10.11 HISTORY OF ALCOHOL ABUSE: ICD-10-CM

## 2025-07-28 DIAGNOSIS — I25.10 CORONARY ARTERY DISEASE INVOLVING NATIVE CORONARY ARTERY OF NATIVE HEART WITHOUT ANGINA PECTORIS: Primary | ICD-10-CM

## 2025-07-28 DIAGNOSIS — E11.9 TYPE 2 DIABETES MELLITUS WITHOUT COMPLICATION, WITHOUT LONG-TERM CURRENT USE OF INSULIN: ICD-10-CM

## 2025-07-28 DIAGNOSIS — E78.5 HYPERLIPIDEMIA LDL GOAL <55: ICD-10-CM

## 2025-07-28 DIAGNOSIS — E66.9 OBESITY (BMI 30-39.9): ICD-10-CM

## 2025-07-28 PROCEDURE — 3077F SYST BP >= 140 MM HG: CPT | Performed by: NURSE PRACTITIONER

## 2025-07-28 PROCEDURE — 1159F MED LIST DOCD IN RCRD: CPT | Performed by: NURSE PRACTITIONER

## 2025-07-28 PROCEDURE — 99214 OFFICE O/P EST MOD 30 MIN: CPT | Performed by: NURSE PRACTITIONER

## 2025-07-28 PROCEDURE — 1160F RVW MEDS BY RX/DR IN RCRD: CPT | Performed by: NURSE PRACTITIONER

## 2025-07-28 PROCEDURE — 3080F DIAST BP >= 90 MM HG: CPT | Performed by: NURSE PRACTITIONER

## 2025-07-28 NOTE — ASSESSMENT & PLAN NOTE
-Blood pressure elevated today  -Was recently given Hydralazine 25mg TID PRN  -He does not currently have a blood pressure cuff but was instructed to get one  -Noted to be taking Lisinopril 40mg BID per PCP increase which is double the max recommended dosage  -Will recheck BMP   -Discussed to keep log of blood pressure checking twice daily 1hr after medications and bring to follow up  -If BP consistently running >160/100 begin taking Hydralazine scheduled TID  -Follow up with Dr. Marroquin in 2 weeks for recheck  -Consider addition of Isosorbide with continued hypertension or Spironolactone  -Currently diet heavy in processed foods, discussed to limit salt in diet  -May benefit from nutrition consult as he is on a limited income currently trying to get disability  -Encouraged weight loss to achieve healthy BMI  -Educational handouts provided  -Educated on s/s of stroke and with any development of symptoms to call 9-1-1 to go to the ER      Orders:    Basic Metabolic Panel; Future

## 2025-07-28 NOTE — ASSESSMENT & PLAN NOTE
-History of heavy alcohol abuse  -Now drinking occasionally--reports 6/7 beers/ month  -Started cutting back about 7/8 months ago

## 2025-07-28 NOTE — PATIENT INSTRUCTIONS
You may stop taking Plavix, continue with ASA    Continue current medications    GET  A BP CUFF TODAY    IF your BP stays >160/100 consistently then start taking Hydralazine 25mg three times/ day    Follow up with Dr. Marroquin in 2 weeks for a recheck

## 2025-07-28 NOTE — ASSESSMENT & PLAN NOTE
-S/P 3 vessel CABG with right radial artery harvest with Dr. Mccollum 5/7/2024   -Does have complaints of dyspnea with exertion and some pressure with newly diagnosed hernia  -Has also been having elevated blood pressures  -Per Dr. Marroquin last note, will de-escalate to aspirin monotherapy today stopping Plavix, he has been taking ASA 325mg  -Continue high intensity statin  -With continued dyspnea on exertion would consider Isosorbide with follow up  -Discussed with any new, changing, worsening symptoms to go to the ER for evaluation  -Follow up with Dr. Marroquin in 2 weeks for recheck

## 2025-08-07 ENCOUNTER — OFFICE VISIT (OUTPATIENT)
Dept: SURGERY | Facility: CLINIC | Age: 55
End: 2025-08-07
Payer: MEDICAID

## 2025-08-07 VITALS
BODY MASS INDEX: 40.47 KG/M2 | OXYGEN SATURATION: 97 % | WEIGHT: 267 LBS | DIASTOLIC BLOOD PRESSURE: 92 MMHG | TEMPERATURE: 98.2 F | HEART RATE: 98 BPM | SYSTOLIC BLOOD PRESSURE: 132 MMHG | HEIGHT: 68 IN

## 2025-08-07 DIAGNOSIS — K43.2 INCISIONAL HERNIA, WITHOUT OBSTRUCTION OR GANGRENE: Primary | ICD-10-CM

## 2025-08-07 PROCEDURE — 1160F RVW MEDS BY RX/DR IN RCRD: CPT | Performed by: SURGERY

## 2025-08-07 PROCEDURE — 3080F DIAST BP >= 90 MM HG: CPT | Performed by: SURGERY

## 2025-08-07 PROCEDURE — 3075F SYST BP GE 130 - 139MM HG: CPT | Performed by: SURGERY

## 2025-08-07 PROCEDURE — 99203 OFFICE O/P NEW LOW 30 MIN: CPT | Performed by: SURGERY

## 2025-08-07 PROCEDURE — 1159F MED LIST DOCD IN RCRD: CPT | Performed by: SURGERY

## 2025-08-11 ENCOUNTER — OFFICE VISIT (OUTPATIENT)
Dept: CARDIOLOGY | Facility: CLINIC | Age: 55
End: 2025-08-11
Payer: MEDICAID

## 2025-08-11 VITALS
RESPIRATION RATE: 20 BRPM | SYSTOLIC BLOOD PRESSURE: 128 MMHG | DIASTOLIC BLOOD PRESSURE: 98 MMHG | WEIGHT: 260 LBS | HEART RATE: 88 BPM | HEIGHT: 68 IN | BODY MASS INDEX: 39.4 KG/M2 | OXYGEN SATURATION: 97 %

## 2025-08-11 DIAGNOSIS — Z95.1 S/P CABG (CORONARY ARTERY BYPASS GRAFT): ICD-10-CM

## 2025-08-11 DIAGNOSIS — E11.9 TYPE 2 DIABETES MELLITUS WITHOUT COMPLICATION, WITHOUT LONG-TERM CURRENT USE OF INSULIN: ICD-10-CM

## 2025-08-11 DIAGNOSIS — E78.5 HYPERLIPIDEMIA LDL GOAL <55: ICD-10-CM

## 2025-08-11 DIAGNOSIS — E66.9 OBESITY (BMI 30-39.9): ICD-10-CM

## 2025-08-11 DIAGNOSIS — I25.10 CORONARY ARTERY DISEASE INVOLVING NATIVE CORONARY ARTERY OF NATIVE HEART WITHOUT ANGINA PECTORIS: Primary | ICD-10-CM

## 2025-08-11 DIAGNOSIS — I10 PRIMARY HYPERTENSION: ICD-10-CM

## 2025-08-11 PROCEDURE — 99213 OFFICE O/P EST LOW 20 MIN: CPT | Performed by: INTERNAL MEDICINE

## 2025-08-11 PROCEDURE — 3074F SYST BP LT 130 MM HG: CPT | Performed by: INTERNAL MEDICINE

## 2025-08-11 PROCEDURE — 3080F DIAST BP >= 90 MM HG: CPT | Performed by: INTERNAL MEDICINE

## 2025-08-11 RX ORDER — ATORVASTATIN CALCIUM 40 MG/1
40 TABLET, FILM COATED ORAL DAILY
Qty: 90 TABLET | Refills: 3 | Status: SHIPPED | OUTPATIENT
Start: 2025-08-11

## 2025-08-11 RX ORDER — LISINOPRIL 40 MG/1
40 TABLET ORAL DAILY
Start: 2025-08-11

## 2025-08-26 RX ORDER — ASPIRIN 325 MG
325 TABLET ORAL DAILY
Qty: 90 TABLET | Refills: 1 | Status: SHIPPED | OUTPATIENT
Start: 2025-08-26

## (undated) DEVICE — TR BAND RADIAL ARTERY COMPRESSION DEVICE: Brand: TR BAND

## (undated) DEVICE — DGW .035 FC J3MM 260CM TEF: Brand: EMERALD

## (undated) DEVICE — RADIFOCUS OPTITORQUE ANGIOGRAPHIC CATHETER: Brand: OPTITORQUE

## (undated) DEVICE — ANGIOGRAPHIC CATHETER: Brand: EXPO™

## (undated) DEVICE — CATH F6 ST JR 4 100CM: Brand: SUPERTORQUE

## (undated) DEVICE — GLIDESHEATH SLENDER STAINLESS STEEL KIT: Brand: GLIDESHEATH SLENDER

## (undated) DEVICE — GW PRESSUREWIRE X WIRELESS FFR 175CM

## (undated) DEVICE — COPILOT BLEEDBACK CONTROL VALVE: Brand: COPILOT

## (undated) DEVICE — CATH F6 ST JL 3.5 100CM: Brand: SUPERTORQUE

## (undated) DEVICE — INFLATION DEVICE KIT: Brand: ENCORE™ 26 ADVANTAGE KIT

## (undated) DEVICE — GUIDE CATHETER: Brand: MACH1™